# Patient Record
Sex: FEMALE | Race: WHITE | HISPANIC OR LATINO | Employment: OTHER | URBAN - METROPOLITAN AREA
[De-identification: names, ages, dates, MRNs, and addresses within clinical notes are randomized per-mention and may not be internally consistent; named-entity substitution may affect disease eponyms.]

---

## 2017-09-22 ENCOUNTER — HOSPITAL ENCOUNTER (EMERGENCY)
Facility: HOSPITAL | Age: 82
Discharge: HOME | End: 2017-09-22
Attending: EMERGENCY MEDICINE
Payer: MEDICARE

## 2017-09-22 ENCOUNTER — APPOINTMENT (EMERGENCY)
Dept: RADIOLOGY | Facility: HOSPITAL | Age: 82
End: 2017-09-22
Payer: MEDICARE

## 2017-09-22 VITALS
HEIGHT: 55 IN | SYSTOLIC BLOOD PRESSURE: 190 MMHG | DIASTOLIC BLOOD PRESSURE: 79 MMHG | WEIGHT: 104.8 LBS | OXYGEN SATURATION: 97 % | BODY MASS INDEX: 24.26 KG/M2 | TEMPERATURE: 98.2 F | HEART RATE: 64 BPM | RESPIRATION RATE: 18 BRPM
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
## 2017-09-22 PROCEDURE — 72125 CT NECK SPINE W/O DYE: CPT

## 2017-09-22 PROCEDURE — 99284 EMERGENCY DEPT VISIT MOD MDM: CPT

## 2017-09-22 PROCEDURE — 70450 CT HEAD/BRAIN W/O DYE: CPT

## 2017-09-22 RX ORDER — ESCITALOPRAM OXALATE 20 MG/1
20 TABLET ORAL DAILY
COMMUNITY
End: 2018-05-03

## 2017-09-22 RX ORDER — INSULIN GLARGINE 100 [IU]/ML
10 INJECTION, SOLUTION SUBCUTANEOUS
COMMUNITY
End: 2017-11-27 | Stop reason: HOSPADM

## 2017-09-22 RX ORDER — ROSUVASTATIN CALCIUM 5 MG/1
1 TABLET, COATED ORAL DAILY
COMMUNITY

## 2017-09-22 RX ORDER — LANOLIN ALCOHOL/MO/W.PET/CERES
1000 CREAM (GRAM) TOPICAL DAILY
COMMUNITY
End: 2020-11-16 | Stop reason: ALTCHOICE

## 2017-09-22 RX ORDER — LEVOTHYROXINE SODIUM 0.05 MG/1
75 TABLET ORAL DAILY
COMMUNITY
End: 2020-11-16 | Stop reason: ALTCHOICE

## 2017-09-22 RX ORDER — OMEPRAZOLE 20 MG/1
20 CAPSULE, DELAYED RELEASE ORAL DAILY
COMMUNITY
End: 2020-11-16 | Stop reason: ALTCHOICE

## 2017-09-22 RX ORDER — NYSTATIN 100000 [USP'U]/G
POWDER TOPICAL 4 TIMES DAILY
COMMUNITY
End: 2017-11-27 | Stop reason: HOSPADM

## 2017-09-22 RX ORDER — MELATONIN
1 DAILY
COMMUNITY

## 2017-09-22 RX ORDER — ACETAMINOPHEN 500 MG
500 TABLET ORAL EVERY 6 HOURS PRN
COMMUNITY
End: 2017-11-27 | Stop reason: HOSPADM

## 2017-11-12 ENCOUNTER — APPOINTMENT (EMERGENCY)
Dept: RADIOLOGY | Facility: HOSPITAL | Age: 82
End: 2017-11-12
Payer: MEDICARE

## 2017-11-12 ENCOUNTER — HOSPITAL ENCOUNTER (EMERGENCY)
Facility: HOSPITAL | Age: 82
Discharge: HOME | End: 2017-11-12
Attending: EMERGENCY MEDICINE
Payer: MEDICARE

## 2017-11-12 ENCOUNTER — HOSPITAL ENCOUNTER (EMERGENCY)
Facility: HOSPITAL | Age: 82
Discharge: NURSING, REHAB OR LONG-TERM CARE | End: 2017-11-12
Attending: EMERGENCY MEDICINE | Admitting: EMERGENCY MEDICINE
Payer: MEDICARE

## 2017-11-12 VITALS
SYSTOLIC BLOOD PRESSURE: 159 MMHG | HEART RATE: 76 BPM | BODY MASS INDEX: 27.02 KG/M2 | WEIGHT: 110 LBS | TEMPERATURE: 97.6 F | RESPIRATION RATE: 20 BRPM | DIASTOLIC BLOOD PRESSURE: 116 MMHG

## 2017-11-12 VITALS
TEMPERATURE: 97.8 F | DIASTOLIC BLOOD PRESSURE: 72 MMHG | OXYGEN SATURATION: 100 % | SYSTOLIC BLOOD PRESSURE: 164 MMHG | HEART RATE: 72 BPM | RESPIRATION RATE: 18 BRPM | BODY MASS INDEX: 27.02 KG/M2 | WEIGHT: 110.01 LBS

## 2017-11-12 DIAGNOSIS — J98.11 ATELECTASIS: ICD-10-CM

## 2017-11-12 DIAGNOSIS — R07.89 CHEST WALL PAIN: Primary | ICD-10-CM
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
## 2017-11-12 DIAGNOSIS — R07.9 CHEST PAIN: Primary | ICD-10-CM

## 2017-11-12 LAB
ANION GAP BLD CALC-SCNC: 15 MMOL/L (ref 4–13)
ATRIAL RATE: 69 BPM
BUN BLD-MCNC: 31 MG/DL (ref 5–25)
CA-I BLD-SCNC: 1.17 MMOL/L (ref 1.12–1.32)
CHLORIDE BLD-SCNC: 105 MMOL/L (ref 100–108)
CREAT BLD-MCNC: 1.5 MG/DL (ref 0.6–1.3)
GFR SERPL CREATININE-BSD FRML MDRD: 31 ML/MIN/1.73SQ M
GLUCOSE SERPL-MCNC: 231 MG/DL (ref 65–140)
HCT VFR BLD CALC: 39 % (ref 34.8–46.1)
HGB BLDA-MCNC: 13.3 G/DL (ref 11.5–15.4)
P AXIS: 47 DEGREES
PCO2 BLD: 24 MMOL/L (ref 21–32)
POTASSIUM BLD-SCNC: 4.5 MMOL/L (ref 3.5–5.3)
PR INTERVAL: 192 MS
QRS AXIS: 24 DEGREES
QRSD INTERVAL: 72 MS
QT INTERVAL: 408 MS
QTC INTERVAL: 437 MS
SODIUM BLD-SCNC: 138 MMOL/L (ref 136–145)
SPECIMEN SOURCE: ABNORMAL
SPECIMEN SOURCE: NORMAL
SPECIMEN SOURCE: NORMAL
T WAVE AXIS: 46 DEGREES
TROPONIN I BLD-MCNC: 0 NG/ML (ref 0–0.08)
TROPONIN I BLD-MCNC: 0 NG/ML (ref 0–0.08)
VENTRICULAR RATE: 69 BPM

## 2017-11-12 PROCEDURE — 93005 ELECTROCARDIOGRAM TRACING: CPT | Performed by: EMERGENCY MEDICINE

## 2017-11-12 PROCEDURE — 71020 HB CHEST X-RAY 2VW FRONTAL&LATL: CPT

## 2017-11-12 PROCEDURE — 99284 EMERGENCY DEPT VISIT MOD MDM: CPT

## 2017-11-12 PROCEDURE — 71250 CT THORAX DX C-: CPT

## 2017-11-12 PROCEDURE — 84484 ASSAY OF TROPONIN QUANT: CPT

## 2017-11-12 PROCEDURE — 80047 BASIC METABLC PNL IONIZED CA: CPT

## 2017-11-12 PROCEDURE — 85014 HEMATOCRIT: CPT

## 2017-11-12 RX ORDER — ACETAMINOPHEN 325 MG/1
650 TABLET ORAL ONCE
Status: COMPLETED | OUTPATIENT
Start: 2017-11-12 | End: 2017-11-12

## 2017-11-12 RX ORDER — ACETAMINOPHEN 325 MG/1
975 TABLET ORAL ONCE
Status: COMPLETED | OUTPATIENT
Start: 2017-11-12 | End: 2017-11-12

## 2017-11-12 RX ORDER — SENNOSIDES 8.6 MG
650 CAPSULE ORAL EVERY 8 HOURS PRN
Qty: 28 TABLET | Refills: 0 | Status: SHIPPED | OUTPATIENT
Start: 2017-11-12 | End: 2017-11-27 | Stop reason: HOSPADM

## 2017-11-12 RX ADMIN — ACETAMINOPHEN 975 MG: 325 TABLET, FILM COATED ORAL at 04:42

## 2017-11-12 RX ADMIN — ACETAMINOPHEN 650 MG: 325 TABLET, FILM COATED ORAL at 16:26

## 2017-11-12 NOTE — ED NOTES
SLETS to p/u at [address] [place] [address], [address] [place] [address]  11/12/17 116

## 2017-11-12 NOTE — ED ATTENDING ATTESTATION
Anup Crespo MD, saw and evaluated the patient  I have discussed the patient with the resident/non-physician practitioner and agree with the resident's/non-physician practitioner's findings, Plan of Care, and MDM as documented in the resident's/non-physician practitioner's note, except where noted  All available labs and Radiology studies were reviewed  At this point I agree with the current assessment done in the Emergency Department  I have conducted an independent evaluation of this patient a history and physical is as follows:    Pt was seen here for fall and traumatic chest pain Pt had ct that was neg Pt returns now because pain has not gotten any better She did not get meds that were prescribed for her   Pain has been constant Review of Ct shows healing fractures PE; alert nad abd soft nontender some rib tenderness mdm: will check cardiac gutiérrez treat pain  Critical Care Time  CritCare Time

## 2017-11-12 NOTE — ED ATTENDING ATTESTATION
Piper Horton MD, saw and evaluated the patient  I have discussed the patient with the resident/non-physician practitioner and agree with the resident's/non-physician practitioner's findings, Plan of Care, and MDM as documented in the resident's/non-physician practitioner's note, except where noted  All available labs and Radiology studies were reviewed  At this point I agree with the current assessment done in the Emergency Department  I have conducted an independent evaluation of this patient including a focused history of:    Emergency Department Note- Beatris Aschoff 80 y o  female MRN: [id number]    Unit/Bed#: ED 10 Encounter: [id number]    Beatris Aschoff is a 80 y o  female who presents with   Chief Complaint   Patient presents with   [employer] Fall     Per EMS, pt comes from [facility] after falling on her way to the bathroom  Patient tripped and fell onto her walker and hit her chest           History of Present Illness   HPI:  Beatris Aschoff is a 80 y o  female who presents for evaluation of: With chest wall tenderness after falling in the bathroom  Patient was transferred from [facility] where she lives  She states that she fell onto her walker injuring her chest   She denies loss of consciousness  Review of Systems    Historical Information   Past Medical History:   Diagnosis Date    Diabetes mellitus ([address] )     Hyperlipidemia     Hypothyroidism     Osteoarthritis      History reviewed  No pertinent surgical history    Social History   History   Alcohol Use No     History   Drug Use     History   Smoking Status    Never Smoker   Smokeless Tobacco    Never Used     Family History: non-contributory    Meds/Allergies   all medications and allergies reviewed  Allergies   Allergen Reactions    Penicillins     Valsartan        Objective   First Vitals:   Blood Pressure: (!) 159/116 (11/12/17 0347)  Pulse: 76 (11/12/17 0347)  Temperature: 97 6 °F (36 4 °C) (11/12/17 0347)  Temp Source: Tympanic (17)  Respirations: 20 (17)  Weight - Scale: 49 9 kg (110 lb) (17)    Current Vitals:   Blood Pressure: (!) 159/116 (17)  Pulse: 76 (17)  Temperature: 97 6 °F (36 4 °C) (17)  Temp Source: Tympanic (17)  Respirations: 20 (17)  Weight - Scale: 49 9 kg (110 lb) (17)    No intake or output data in the 24 hours ending 17 0408    Invasive Devices     Peripheral Intravenous Line            Peripheral IV 17 Left Antecubital less than 1 day                Physical Exam   Constitutional: She is oriented to person, place, and time  She appears well-developed and well-nourished  HENT:   Head: Normocephalic and atraumatic  Eyes: Conjunctivae are normal  Pupils are equal, round, and reactive to light  Neck: Normal range of motion  Neck supple  Pulmonary/Chest: Effort normal and breath sounds normal  No respiratory distress  She has no wheezes  She exhibits tenderness (  Anterior)  Abdominal: Soft  Bowel sounds are normal    Musculoskeletal: Normal range of motion  She exhibits no deformity  Neurological: She is alert and oriented to person, place, and time  Skin: Skin is warm and dry  Psychiatric: She has a normal mood and affect  Her behavior is normal  Judgment and thought content normal    Nursing note and vitals reviewed  Medical Decision Makin  Acute chest wall trauma status post fall: Plan to obtain a CT scan of the chest to rule out intrathoracic injury including pulmonary contusion, rib fracture, and pneumothorax      Recent Results (from the past 36 hour(s))   POCT troponin    Collection Time: 17  3:53 AM   Result Value Ref Range    POC Troponin I 0 00 0 00 - 0 08 ng/ml    Specimen Type VENOUS    POCT Chem 8+    Collection Time: 17  3:57 AM   Result Value Ref Range    SODIUM, I-STAT 138 136 - 145 mmol/l    Potassium, i-STAT 4 5 3 5 - 5 3 mmol/L Chloride, istat 105 100 - 108 mmol/L    CO2, i-STAT 24 21 - 32 mmol/L    Anion Gap, Istat 15 (H) 4 - 13 mmol/L    Calcium, Ionized i-STAT 1 17 1 12 - 1 32 mmol/L    BUN, I-STAT 31 (H) 5 - 25 mg/dl    Creatinine, i-STAT 1 5 (H) 0 6 - 1 3 mg/dl    eGFR 31 ml/min/1 73sq m    Glucose, i-STAT 231 (H) 65 - 140 mg/dl    Hct, i-STAT 39 34 8 - 46 1 %    Hgb, i-STAT 13 3 11 5 - 15 4 g/dl    Specimen Type VENOUS      CT chest without contrast    (Results Pending)         Portions of the record may have been created with voice recognition software  Occasional wrong word or "sound a like" substitutions may have occurred due to the inherent limitations of voice recognition software  Read the chart carefully and recognize, using context, where substitutions have occurred

## 2017-11-12 NOTE — ED PROVIDER NOTES
History  Chief Complaint   Patient presents with    Fall     Per EMS, pt comes from [facility] after falling on her way to the bathroom  Patient tripped and fell onto her walker and hit her chest       This is a 80 y o  old female who presents to the ED for evaluation of fall  Patient was in her walker to go into the bathroom when she caught it on the interface between the carpet and the bathroom floor  She fell forward onto her chest   Has pain diffuse across her chest   Not hit her head or lose consciousness  There is no evidence of head trauma  Staff heard her fall and merely found on the ground awake alert and oriented  She is not short of breath  Her pain is worse with inspiration  Diffuse across her entire chest   Rated 5/10  She has not taken any medications to help with it  No anti-platelet him anticoagulant medications  No abdominal pain, neck pain, back pain  Denies preceding chest pain, shortness of breath, palpitations, dizziness  Prior to Admission Medications   Prescriptions Last Dose Informant Patient Reported? Taking?    Linagliptin (TRADJENTA) 5 MG TABS 11/11/2017 at Unknown time  Yes Yes   Sig: Take 5 mg by mouth daily   acetaminophen (TYLENOL) 500 mg tablet   Yes Yes   Sig: Take 500 mg by mouth every 6 (six) hours as needed for mild pain   cholecalciferol (VITAMIN D3) 1,000 units tablet 11/11/2017 at Unknown time  Yes Yes   Sig: Take 1,000 Units by mouth daily   cyanocobalamin (VITAMIN B-12) 1,000 mcg tablet 11/11/2017 at Unknown time  Yes Yes   Sig: Take 1,000 mcg by mouth daily   escitalopram (LEXAPRO) 20 mg tablet 11/11/2017 at Unknown time  Yes Yes   Sig: Take 20 mg by mouth daily   insulin glargine (LANTUS) 100 units/mL subcutaneous injection 11/11/2017 at Unknown time  Yes Yes   Sig: Inject 10 Units under the skin daily at bedtime   levothyroxine 50 mcg tablet 11/11/2017 at Unknown time  Yes Yes   Sig: Take 75 mcg by mouth daily   nystatin (MYCOSTATIN) powder 11/11/2017 at Unknown time  Yes Yes   Sig: Apply topically 4 (four) times a day   omeprazole (PriLOSEC) 20 mg delayed release capsule 11/11/2017 at Unknown time  Yes Yes   Sig: Take 20 mg by mouth daily   rosuvastatin (CRESTOR) 5 mg tablet 11/11/2017 at Unknown time  Yes Yes   Sig: Take 5 mg by mouth daily      Facility-Administered Medications: None     Past Medical History:   Diagnosis Date    Diabetes mellitus ([facility] [address] )     Hyperlipidemia     Hypothyroidism     Osteoarthritis      History reviewed  No pertinent surgical history  History reviewed  No pertinent family history  I have reviewed and agree with the history as documented  Social History   Substance Use Topics    Smoking status: Never Smoker    Smokeless tobacco: Never Used    Alcohol use No      Review of Systems   Constitutional: Negative for chills, fatigue, fever and unexpected weight change  HENT: Negative for congestion, rhinorrhea and sore throat  Eyes: Negative for redness and visual disturbance  Respiratory: Negative for cough and shortness of breath  Cardiovascular: Positive for chest pain  Negative for leg swelling  Gastrointestinal: Negative for abdominal pain, constipation, diarrhea, nausea and vomiting  Endocrine: Negative for cold intolerance and heat intolerance  Genitourinary: Negative for dysuria, frequency and urgency  Musculoskeletal: Negative for back pain  Skin: Negative for rash  Neurological: Negative for dizziness, syncope and numbness  All other systems reviewed and are negative  Physical Exam  ED Triage Vitals [11/12/17 0347]   Temperature Pulse Respirations Blood Pressure SpO2   97 6 °F (36 4 °C) 76 20 (!) 159/116 --      Temp Source Heart Rate Source Patient Position - Orthostatic VS BP Location FiO2 (%)   Tympanic Monitor Lying Right arm --      Pain Score       6         Physical Exam   Constitutional: She is oriented to person, place, and time   She appears well-developed and well-nourished  No distress  HENT:   Head: Normocephalic and atraumatic  Nose: Nose normal    Mouth/Throat: No oropharyngeal exudate  Eyes: Conjunctivae and EOM are normal  Pupils are equal, round, and reactive to light  Neck: Normal range of motion  Neck supple  Cardiovascular: Normal rate, regular rhythm and normal heart sounds  Exam reveals no gallop  No murmur heard  Pulmonary/Chest: Effort normal and breath sounds normal  She has no wheezes  She exhibits no tenderness  Abdominal: Soft  Bowel sounds are normal  She exhibits no distension  There is no tenderness  There is no rebound and no guarding  Musculoskeletal: Normal range of motion  She exhibits no tenderness or deformity  Lymphadenopathy:     She has no cervical adenopathy  Neurological: She is alert and oriented to person, place, and time  No cranial nerve deficit  Skin: Skin is warm and dry  No rash noted  She is not diaphoretic  No erythema  Psychiatric: She has a normal mood and affect  Nursing note and vitals reviewed  ED Medications  Medications   acetaminophen (TYLENOL) tablet 975 mg (975 mg Oral Given 11/12/17 0442)     Diagnostic Studies  Results Reviewed     Procedure Component Value Units Date/Time    POCT troponin [[id number]]  (Normal) Collected:  11/12/17 0353    Lab Status:  Final result Updated:  11/12/17 0407     POC Troponin I 0 00 ng/ml      Specimen Type VENOUS    Narrative:         [employer] i-Stat handheld analyzer 99% cutoff is > 0 08ng/mL in [facility] Emergency Departments    o cTnI 99% cutoff is useful only when applied to patients in the clinical setting of myocardial ischemia  o cTnI 99% cutoff should be interpreted in the context of clinical history, ECG findings and possibly cardiac imaging to establish correct diagnosis  o cTnI 99% cutoff may be suggestive but clearly not indicative of a coronary event without the clinical setting of myocardial ischemia      POCT Chem 8+ [[id number]]  (Abnormal) Collected:  11/12/17 0357    Lab Status:  Final result Updated:  11/12/17 0401     SODIUM, I-STAT 138 mmol/l      Potassium, i-STAT 4 5 mmol/L      Chloride, istat 105 mmol/L      CO2, i-STAT 24 mmol/L      Anion Gap, Istat 15 (H) mmol/L      Calcium, Ionized i-STAT 1 17 mmol/L      BUN, I-STAT 31 (H) mg/dl      Creatinine, i-STAT 1 5 (H) mg/dl      eGFR 31 ml/min/1 73sq m      Glucose, i-STAT 231 (H) mg/dl      Hct, i-STAT 39 %      Hgb, i-STAT 13 3 g/dl      Specimen Type VENOUS        CT chest without contrast    (Results Pending)    ED interpretation: Old ribs and sternal fracture  No acute fracture noted, no hemothorax, no pneumothorax, interpreted by me  Procedures  ECG 12 Lead Documentation  Date/Time: 11/12/2017 3:55 AM  Performed by: Samuel Dietz  Authorized by: Honora Button     Indications / Diagnosis:  Fall, chest pain  ECG reviewed by me, the ED Provider: yes    Patient location:  ED  Previous ECG:     Previous ECG:  Unavailable    Comparison to cardiac monitor: Yes    Comments:      Normal sinus rhythm, rate 70, normal intervals, normal axis, no acute ST, T-wave changes  Phone Consults  ED Phone Contact    ED Course    A/P: This is a 80 y o  female who presents to the ED for evaluation of fall with chest wall pain  Will that CT scan of the chest to rule out fractures, pneumothorax  EKG, i-STAT chemistry, i-STAT troponin  Symptom management    0430 CT scan is negative for acute fracture patient's exam is stable  We will treat with Tylenol  Discharge back to facility  Return precautions discussed  We will discharge this patient home with primary care follow-up  Patient is in agreement with this plan as outlined above      MDM  CritCare Time    Disposition  Final diagnoses:   Chest wall pain   Fall, initial encounter     Time reflects when diagnosis was documented in both MDM as applicable and the Disposition within this note     Time User Action Codes Description Comment    11/12/2017 4:29 AM Allison Jean Baptiste Add [R07 89] Chest wall pain     11/12/2017  4:29 AM Allison Jean Baptiste Add [X37  XXXA] Fall, initial encounter       ED Disposition     ED Disposition Condition Comment    Discharge  Nandini Foreman Angelica discharge to home/self care  Condition at discharge: Stable        Follow-up Information     Follow up With Specialties Details Why Mary Menjivar MD [facility] Medicine Schedule an appointment as soon as possible for a visit in 2 days As needed, If symptoms worsen [address] [facility]  [phone]          Discharge Medication List as of 11/12/2017  4:29 AM      CONTINUE these medications which have NOT CHANGED    Details   acetaminophen (TYLENOL) 500 mg tablet Take 500 mg by mouth every 6 (six) hours as needed for mild pain, Historical Med      cholecalciferol (VITAMIN D3) 1,000 units tablet Take 1,000 Units by mouth daily, Historical Med      cyanocobalamin (VITAMIN B-12) 1,000 mcg tablet Take 1,000 mcg by mouth daily, Historical Med      escitalopram (LEXAPRO) 20 mg tablet Take 20 mg by mouth daily, Historical Med      insulin glargine (LANTUS) 100 units/mL subcutaneous injection Inject 10 Units under the skin daily at bedtime, Historical Med      levothyroxine 50 mcg tablet Take 75 mcg by mouth daily, Historical Med      Linagliptin (TRADJENTA) 5 MG TABS Take 5 mg by mouth daily, Historical Med      nystatin (MYCOSTATIN) powder Apply topically 4 (four) times a day, Historical Med      omeprazole (PriLOSEC) 20 mg delayed release capsule Take 20 mg by mouth daily, Historical Med      rosuvastatin (CRESTOR) 5 mg tablet Take 5 mg by mouth daily, Historical Med           No discharge procedures on file  ED Provider  Attending physically available and evaluated Nandini Harris  I managed the patient along with the ED Attending      Electronically Signed by         Roxanne Graham MD  Resident  11/12/17 5987

## 2017-11-12 NOTE — ED NOTES
Patient to be picked up by [facility] at 2000  [facility] did call ER  Med tech made aware patient has been discharged and awaiting ride back  Tech requested that patient be sent back with tylenol RX so she can be medicated through the night           Piper Costello RN  11/12/17 0767

## 2017-11-12 NOTE — ED NOTES
Dr Rachele Polanco at the bedside for patient evaluation upon arrival to ED     Loreta Esparza RN  11/12/17 9958

## 2017-11-12 NOTE — ED NOTES
[facility] to transport patient back to [facility] at [facility], [facility]  11/12/17 1037

## 2017-11-12 NOTE — DISCHARGE INSTRUCTIONS
Patient can take Tylenol and ibuprofen for her chest pain as needed  Atelectasis   WHAT YOU NEED TO KNOW:   Atelectasis happens when the alveoli in your lungs cannot expand fully  This may cause part or all of your lung to collapse  The exchange of oxygen and carbon dioxide cannot take place in the alveoli, when your lung collapses  Atelectasis happens very often after surgery  Atelectasis may last for days  It may be caused by not being able to take a deep breath due to blocked airways or surgery  It may also be due to disease, infection, or trauma  DISCHARGE INSTRUCTIONS:   Call 911 if:  You cough up blood continuously or more than 3 teaspoons  Return to the emergency department if:   · Your symptoms return or get worse  · You have a fever  · You cough up blood  Contact your healthcare provider if:   · You are coughing up a large amount of mucus  · You are more tired than usual      · You have trouble catching your breath while you exercise or walk up stairs  · You have questions or concerns about your condition or care  Follow up with your healthcare provider as directed: You may need more tests  Write down your questions so you remember to ask them during your visits  Do not smoke:  Smoking can make your symptoms worse  It is never too late to quit  Ask your healthcare provider for more information if you need help quitting  Manage and prevent atelectasis:   · Postural drainage  means getting into positions that help mucus drain  Postural drainage is sometimes used with chest percussion  (gentle clapping to help move the mucus out of your lungs)  Ask your healthcare provider for more information about postural drainage and chest percussion  · Frequent coughing  can help clear mucus from your lungs  · Deep breathing exercises  help improve your lung function and reduce your risk for atelectasis   An incentive spirometer may be used after surgery to help you breathe deeply and slowly  Ask your healthcare provider for more information on deep breathing exercises  · Change your position  to promote lung expansion and reduce the risk for infection  Sit on the side of the bed or walk frequently after surgery as directed  · Drink liquids as directed  to help loosen mucus  Ask how much liquid to drink each day and which liquids are best for you  © 2017 [address] Information is for End User's use only and may not be sold, redistributed or otherwise used for commercial purposes  All illustrations and images included in CareNotes® are the copyrighted property of [employer]  or Darrick Sanchez  The above information is an  only  It is not intended as medical advice for individual conditions or treatments  Talk to your doctor, nurse or pharmacist before following any medical regimen to see if it is safe and effective for you

## 2017-11-12 NOTE — DISCHARGE INSTRUCTIONS
Chest Wall Pain, Ambulatory Care   GENERAL INFORMATION:   Chest wall pain  may be caused by problems with the muscles, cartilage, or bones of the chest wall  Chest wall pain may also be caused by pain that spreads to your chest from another part of your body  The pain may be aching, severe, dull, or sharp  It may come and go, or it may be constant  The pain may be worse when you move in certain ways, breathe deeply, or cough  Seek immediate care for the following symptoms:   · Severe pain    · You have any of the following signs of a heart attack:      ¨ Squeezing, pressure, or pain in your chest that lasts longer than 5 minutes or returns    ¨ Discomfort or pain in your back, neck, jaw, stomach, or arm     ¨ Trouble breathing    ¨ Nausea or vomiting    ¨ Lightheadedness or a sudden cold sweat, especially with chest pain or trouble breathing  Treatment  depends on the cause of your chest wall pain  You may need any of the following:  · NSAIDs  help decrease swelling and pain or fever  This medicine is available with or without a doctor's order  NSAIDs can cause stomach bleeding or kidney problems in certain people  If you take blood thinner medicine, always ask your healthcare provider if NSAIDs are safe for you  Always read the medicine label and follow directions  · Acetaminophen  decreases pain  It is available without a doctor's order  Ask how much to take and how often to take it  Follow directions  Acetaminophen can cause liver damage if not taken correctly  · Apply heat  on your chest for 20 to 30 minutes every 2 hours for as many days as directed  Heat helps decrease pain and muscle spasms  · Apply ice  on your chest for 15 to 20 minutes every hour or as directed  Use an ice pack, or put crushed ice in a plastic bag  Cover it with a towel  Ice helps prevent tissue damage and decreases swelling and pain    Follow up with your healthcare provider as directed:  Write down your questions so you remember to ask them during your visits  CARE AGREEMENT:   You have the right to help plan your care  Learn about your health condition and how it may be treated  Discuss treatment options with your caregivers to decide what care you want to receive  You always have the right to refuse treatment  The above information is an  only  It is not intended as medical advice for individual conditions or treatments  Talk to your doctor, nurse or pharmacist before following any medical regimen to see if it is safe and effective for you  © 2014 [address] is for End User's use only and may not be sold, redistributed or otherwise used for commercial purposes  All illustrations and images included in CareNotes® are the copyrighted property of [employer]  or Darrick Sanchez

## 2017-11-12 NOTE — ED PROVIDER NOTES
History  Chief Complaint   Patient presents with    Pain     Patient seen here this am for fall with chest pain and left rib pain  Patient test results negative  Patient send back to ER due to pain  Patient was not provided pain medication by Juancarlos Fitzgerald and sent back  This is an 78-year-old female with chronic past medical history who presents to the emergency department after falling at her nursing home last night  Patient was seen in this emergency department early this morning for fall had a CT of her chest performed which was negative except for chronic fractures from previous falls and injuries  Patient presents complaining of the same chest wall pain she complained of last night, states the nursing home did not provide her medications for her pain  Patient denies any other symptoms at this time including headache, shortness of breath, nausea or vomiting, urinary symptoms but does state she has back pain as well since the fall  This back pain is not new from her fall last night  History provided by:  Patient  Fall   Mechanism of injury: fall    Injury location:  Torso  Torso injury location:  R chest and L chest  Incident location:  Home  Arrived directly from scene: yes    Fall:     Fall occurred:  [facility] [employer] of impact:  Front    Entrapped after fall: no    Suspicion of alcohol use: no    Suspicion of drug use: no    Prior to arrival data:     Bystander interventions:  None    Patient ambulatory at scene: no      Blood loss:  None    Loss of consciousness: no      Amnesic to event: no      Airway interventions:  None    Fluids administered:  None    Cardiac interventions:  None    Medications administered:  None    Immobilization:  None  Associated symptoms: chest pain    Associated symptoms: no abdominal pain, no nausea and no vomiting        Prior to Admission Medications   Prescriptions Last Dose Informant Patient Reported? Taking?    Linagliptin (TRADJENTA) 5 MG TABS   Yes No Sig: Take 5 mg by mouth daily   acetaminophen (TYLENOL) 500 mg tablet   Yes No   Sig: Take 500 mg by mouth every 6 (six) hours as needed for mild pain   cholecalciferol (VITAMIN D3) 1,000 units tablet   Yes No   Sig: Take 1,000 Units by mouth daily   cyanocobalamin (VITAMIN B-12) 1,000 mcg tablet   Yes No   Sig: Take 1,000 mcg by mouth daily   escitalopram (LEXAPRO) 20 mg tablet   Yes No   Sig: Take 20 mg by mouth daily   insulin glargine (LANTUS) 100 units/mL subcutaneous injection   Yes No   Sig: Inject 10 Units under the skin daily at bedtime   levothyroxine 50 mcg tablet   Yes No   Sig: Take 75 mcg by mouth daily   nystatin (MYCOSTATIN) powder   Yes No   Sig: Apply topically 4 (four) times a day   omeprazole (PriLOSEC) 20 mg delayed release capsule   Yes No   Sig: Take 20 mg by mouth daily   rosuvastatin (CRESTOR) 5 mg tablet   Yes No   Sig: Take 5 mg by mouth daily      Facility-Administered Medications: None       Past Medical History:   Diagnosis Date    Diabetes mellitus ([facility] [address] )     Hyperlipidemia     Hypothyroidism     Osteoarthritis        History reviewed  No pertinent surgical history  History reviewed  No pertinent family history  I have reviewed and agree with the history as documented  Social History   Substance Use Topics    Smoking status: Never Smoker    Smokeless tobacco: Never Used    Alcohol use No        Review of Systems   Constitutional: Negative for chills and fever  HENT: Negative for sore throat  Eyes: Negative for visual disturbance  Respiratory: Negative for chest tightness, shortness of breath and wheezing  Cardiovascular: Positive for chest pain  Gastrointestinal: Negative for abdominal pain, blood in stool, constipation, diarrhea, nausea and vomiting  Genitourinary: Negative for dysuria and hematuria  Musculoskeletal: Negative for arthralgias and myalgias  Skin: Negative for color change  Neurological: Negative for light-headedness     Hematological: Negative for adenopathy  Psychiatric/Behavioral: Negative for agitation and behavioral problems  All other systems reviewed and are negative  Physical Exam  ED Triage Vitals   Temperature Pulse Respirations Blood Pressure SpO2   11/12/17 1524 11/12/17 1524 11/12/17 1524 11/12/17 1524 11/12/17 1524   97 8 °F (36 6 °C) 75 18 (!) 186/85 98 %      Temp Source Heart Rate Source Patient Position - Orthostatic VS BP Location FiO2 (%)   11/12/17 1524 11/12/17 1524 11/12/17 1524 11/12/17 1524 --   Oral Monitor Sitting Right arm       Pain Score       11/12/17 1830       2           Orthostatic Vital Signs  Vitals:    11/12/17 1524 11/12/17 1621 11/12/17 1801   BP: (!) 186/85 (!) 192/91 164/72   Pulse: 75 68 72   Patient Position - Orthostatic VS: Sitting Lying Lying       Physical Exam   Constitutional: She is oriented to person, place, and time  She appears well-developed and well-nourished  No distress  HENT:   Head: Normocephalic and atraumatic  Eyes: Conjunctivae and EOM are normal  No scleral icterus  Neck: Normal range of motion  Neck supple  Cardiovascular: Normal rate, regular rhythm and normal heart sounds  No murmur heard  Pulmonary/Chest: Effort normal and breath sounds normal  No respiratory distress  She exhibits tenderness  Abdominal: Soft  Bowel sounds are normal  There is no tenderness  Musculoskeletal: Normal range of motion  Neurological: She is alert and oriented to person, place, and time  Skin: Skin is warm and dry  Psychiatric: She has a normal mood and affect  Her behavior is normal    Nursing note and vitals reviewed        ED Medications  Medications   acetaminophen (TYLENOL) tablet 650 mg (650 mg Oral Given 11/12/17 1626)       Diagnostic Studies  Results Reviewed     Procedure Component Value Units Date/Time    POCT troponin [[id number]]  (Normal) Collected:  11/12/17 1700    Lab Status:  Final result Updated:  11/12/17 1713     POC Troponin I 0 00 ng/ml      Specimen Type VENOUS    Narrative:         [employer] i-Stat handheld analyzer 99% cutoff is > 0 08ng/mL in network Emergency Departments    o cTnI 99% cutoff is useful only when applied to patients in the clinical setting of myocardial ischemia  o cTnI 99% cutoff should be interpreted in the context of clinical history, ECG findings and possibly cardiac imaging to establish correct diagnosis  o cTnI 99% cutoff may be suggestive but clearly not indicative of a coronary event without the clinical setting of myocardial ischemia  XR chest 2 views   Final Result by Emma Mckay MD (11/12 4260)      Scattered interstitial opacities are noted bilaterally, question related to atelectasis or possibly infiltrate  ##sigslh##sigslh         Workstation performed: [id number]               Procedures  ECG 12 Lead Documentation  Date/Time: 11/12/2017 6:11 PM  Performed by: Bravo Siegel  Authorized by: Leny SANTA     ECG reviewed by me, the ED Provider: yes    Patient location:  ED  Previous ECG:     Previous ECG:  Compared to current    Similarity:  No change    Comparison to cardiac monitor: Yes    Interpretation:     Interpretation: normal    Rate:     ECG rate assessment: normal    Rhythm:     Rhythm: sinus rhythm    Ectopy:     Ectopy: none    QRS:     QRS axis:  Normal  Conduction:     Conduction: normal    ST segments:     ST segments:  Normal  T waves:     T waves: normal            Phone Consults  ED Phone Contact    ED Course  ED Course            Identification of Seniors at Risk    Flowsheet Row Most Recent Value   (ISAR) Identification of Seniors at Risk   Before the illness or injury that brought you to the Emergency, did you need someone to help you on a regular basis? 1 Filed at: 11/12/2017 1525   In the last 24 hours, have you needed more help than usual?  0 Filed at: 11/12/2017 1525   Have you been hospitalized for one or more nights during the past 6 months?   0 Filed at: 11/12/2017 1525   In general, do you see well?  0 Filed at: 11/12/2017 1525   In general, do you have serious problems with your memory? 0 Filed at: 11/12/2017 1525   Do you take more than three different medications every day? 1 Filed at: 11/12/2017 1525   ISAR Score  2 Filed at: 11/12/2017 1525              [facility]  Number of Diagnoses or Management Options  Atelectasis: new and does not require workup  Chest pain: established and worsening  Fall, initial encounter: established and worsening  Diagnosis management comments: 80-year-old female presenting after visiting the department last night with continued chest wall pain, will provide patient pain relief with Tylenol as well as a cardiac workup including EKG, troponin, chest x-ray  Chest x-ray concerning for atelectasis as was CT this morning, will provide patient with incentive spirometer and encourage her to use it  Will write patient prescription for Tylenol so her nursing facility can provided to her  Amount and/or Complexity of Data Reviewed  Clinical lab tests: ordered and reviewed  Tests in the radiology section of CPT®: ordered and reviewed  Tests in the medicine section of CPT®: ordered and reviewed  Review and summarize past medical records: yes  Independent visualization of images, tracings, or specimens: yes      CritCare Time    Disposition  Final diagnoses:   Fall, initial encounter   Chest pain   Atelectasis     Time reflects when diagnosis was documented in both MDM as applicable and the Disposition within this note     Time User Action Codes Description Comment    11/12/2017  6:04 PM Radha Clock Add [H31  WDJP] Fall, initial encounter     11/12/2017  6:04 PM Radha Clock Add [R07 9] Chest pain     11/12/2017  6:04 PM Radha Clock Add [J98 11] Atelectasis     11/12/2017  6:04 PM Radha Clock Modify [C10  QYUZ] SEEM, initial encounter     11/12/2017  6:04 PM Radha Clock Modify [R07 9] Chest pain       ED Disposition     ED Disposition Condition Comment    Discharge  [address] discharge to home/self care      Condition at discharge: Stable        Follow-up Information     Follow up With Specialties Details Why [address], MD Family Medicine Schedule an appointment as soon as possible for a visit in 1 week  Digna Mccray  [address] [facility] Emergency Department Emergency Medicine Go to If symptoms worsen [place] [address] 8[address] [facility] ED, [address], [address]        Discharge Medication List as of 11/12/2017  7:52 PM      START taking these medications    Details   acetaminophen (TYLENOL) 650 mg CR tablet Take 1 tablet by mouth every 8 (eight) hours as needed for mild pain or moderate pain for up to 14 days, Starting Sun 11/12/2017, Until Sun 11/26/2017, Print         CONTINUE these medications which have NOT CHANGED    Details   acetaminophen (TYLENOL) 500 mg tablet Take 500 mg by mouth every 6 (six) hours as needed for mild pain, Historical Med      cholecalciferol (VITAMIN D3) 1,000 units tablet Take 1,000 Units by mouth daily, Historical Med      cyanocobalamin (VITAMIN B-12) 1,000 mcg tablet Take 1,000 mcg by mouth daily, Historical Med      escitalopram (LEXAPRO) 20 mg tablet Take 20 mg by mouth daily, Historical Med      insulin glargine (LANTUS) 100 units/mL subcutaneous injection Inject 10 Units under the skin daily at bedtime, Historical Med      levothyroxine 50 mcg tablet Take 75 mcg by mouth daily, Historical Med      Linagliptin (TRADJENTA) 5 MG TABS Take 5 mg by mouth daily, Historical Med      nystatin (MYCOSTATIN) powder Apply topically 4 (four) times a day, Historical Med      omeprazole (PriLOSEC) 20 mg delayed release capsule Take 20 mg by mouth daily, Historical Med      rosuvastatin (CRESTOR) 5 mg tablet Take 5 mg by mouth daily, Historical Med           No discharge procedures on file  ED Provider  Attending physically available and evaluated Anamika Lindo I managed the patient along with the ED Attending      Electronically Signed by         Roshan Downs MD  Resident  11/12/17 2393

## 2017-11-13 LAB
ATRIAL RATE: 67 BPM
P AXIS: 32 DEGREES
PR INTERVAL: 164 MS
QRS AXIS: 22 DEGREES
QRSD INTERVAL: 66 MS
QT INTERVAL: 404 MS
QTC INTERVAL: 426 MS
T WAVE AXIS: 41 DEGREES
VENTRICULAR RATE: 67 BPM

## 2017-11-13 NOTE — ED NOTES
Patient saturated at this time  Patient changed cleaned and new adult underwear provided         Eliezer Adames RN  11/12/17 7867

## 2017-11-13 NOTE — ED NOTES
Patient escorted by GABBY back to [facility] in [place] via stretcher  Verbal report provided to crew  Discharge paperwork and prescription sent back with patient         Zoya Garces RN  11/12/17 0792

## 2017-11-15 ENCOUNTER — APPOINTMENT (EMERGENCY)
Dept: RADIOLOGY | Facility: HOSPITAL | Age: 82
End: 2017-11-15
Payer: MEDICARE

## 2017-11-15 ENCOUNTER — HOSPITAL ENCOUNTER (EMERGENCY)
Facility: HOSPITAL | Age: 82
Discharge: HOME | End: 2017-11-15
Attending: EMERGENCY MEDICINE | Admitting: EMERGENCY MEDICINE
Payer: MEDICARE

## 2017-11-15 VITALS
DIASTOLIC BLOOD PRESSURE: 68 MMHG | TEMPERATURE: 98.8 F | OXYGEN SATURATION: 96 % | HEART RATE: 62 BPM | RESPIRATION RATE: 20 BRPM | SYSTOLIC BLOOD PRESSURE: 160 MMHG

## 2017-11-15 DIAGNOSIS — N39.0 UTI (URINARY TRACT INFECTION): Primary | ICD-10-CM

## 2017-11-15 DIAGNOSIS — M54.9 BACK PAIN: ICD-10-CM

## 2017-11-15 DIAGNOSIS — G89.29 CHRONIC BACK PAIN: ICD-10-CM

## 2017-11-15 DIAGNOSIS — M54.9 CHRONIC BACK PAIN: ICD-10-CM

## 2017-11-15 LAB
BACTERIA UR QL AUTO: ABNORMAL /HPF
BILIRUB UR QL STRIP: NEGATIVE
CLARITY UR: ABNORMAL
COLOR UR: YELLOW
COLOR, POC: NORMAL
GLUCOSE UR STRIP-MCNC: ABNORMAL MG/DL
HGB UR QL STRIP.AUTO: ABNORMAL
KETONES UR STRIP-MCNC: NEGATIVE MG/DL
LEUKOCYTE ESTERASE UR QL STRIP: ABNORMAL
NITRITE UR QL STRIP: POSITIVE
NON-SQ EPI CELLS URNS QL MICRO: ABNORMAL /HPF
PH UR STRIP.AUTO: 5.5 [PH] (ref 4.5–8)
PROT UR STRIP-MCNC: ABNORMAL MG/DL
RBC #/AREA URNS AUTO: ABNORMAL /HPF
SP GR UR STRIP.AUTO: 1.02 (ref 1–1.03)
UROBILINOGEN UR QL STRIP.AUTO: 0.2 E.U./DL
WBC #/AREA URNS AUTO: ABNORMAL /HPF

## 2017-11-15 PROCEDURE — 81002 URINALYSIS NONAUTO W/O SCOPE: CPT | Performed by: EMERGENCY MEDICINE

## 2017-11-15 PROCEDURE — 99284 EMERGENCY DEPT VISIT MOD MDM: CPT

## 2017-11-15 PROCEDURE — 87086 URINE CULTURE/COLONY COUNT: CPT

## 2017-11-15 PROCEDURE — 81001 URINALYSIS AUTO W/SCOPE: CPT

## 2017-11-15 PROCEDURE — 87077 CULTURE AEROBIC IDENTIFY: CPT

## 2017-11-15 PROCEDURE — 72131 CT LUMBAR SPINE W/O DYE: CPT

## 2017-11-15 PROCEDURE — 87186 SC STD MICRODIL/AGAR DIL: CPT

## 2017-11-15 RX ORDER — NITROFURANTOIN 25; 75 MG/1; MG/1
100 CAPSULE ORAL 2 TIMES DAILY
Qty: 10 CAPSULE | Refills: 0 | Status: SHIPPED | OUTPATIENT
Start: 2017-11-15 | End: 2017-11-27 | Stop reason: HOSPADM

## 2017-11-15 RX ORDER — HYDROCODONE BITARTRATE AND ACETAMINOPHEN 5; 325 MG/1; MG/1
1 TABLET ORAL ONCE
Status: COMPLETED | OUTPATIENT
Start: 2017-11-15 | End: 2017-11-15

## 2017-11-15 RX ORDER — LIDOCAINE 50 MG/G
1 PATCH TOPICAL ONCE
Status: DISCONTINUED | OUTPATIENT
Start: 2017-11-15 | End: 2017-11-16 | Stop reason: HOSPADM

## 2017-11-15 RX ORDER — NITROFURANTOIN 25; 75 MG/1; MG/1
100 CAPSULE ORAL 2 TIMES DAILY
Qty: 14 CAPSULE | Refills: 0 | Status: SHIPPED | OUTPATIENT
Start: 2017-11-15 | End: 2017-11-27 | Stop reason: HOSPADM

## 2017-11-15 RX ADMIN — LIDOCAINE 1 PATCH: 50 PATCH TOPICAL at 20:04

## 2017-11-15 RX ADMIN — HYDROCODONE BITARTRATE AND ACETAMINOPHEN 1 TABLET: 5; 325 TABLET ORAL at 20:11

## 2017-11-16 NOTE — ED PROVIDER NOTES
History  Chief Complaint   Patient presents with    Back Pain     pt with fall last sunday, has been seen 3X post fall, presenting with lower back pain, and pelvic pain  80[facility] history of diabetes dimentia, and subacute fractures comes emergent department for evaluation of back pain  Patient was seen in the ED last Sunday for fall and had CT of the chest did not show any acute abnormalities and patient was discharged subsequently  Patient today was curled up in a ball at the nursing home  When the son saw this he called 46 and patient was brought to the emergency department  Patient otherwise has denies any other recent traumas  Denying having any fever, chest pain, shortness of breath, nausea, vomiting, abdominal pain  Medical management decision making:  I will get a CT lumbar spine given the previous CTs did not evaluate the entire lumbar spine I will assess for fractures  I will get a urinalysis to assess for UTI as cause of patient's back pain  I will treat patient symptomatically Lidoderm patch and Norco             Prior to Admission Medications   Prescriptions Last Dose Informant Patient Reported? Taking?    Linagliptin (TRADJENTA) 5 MG TABS 11/15/2017 at Unknown time  Yes Yes   Sig: Take 5 mg by mouth daily   acetaminophen (TYLENOL) 500 mg tablet   Yes Yes   Sig: Take 500 mg by mouth every 6 (six) hours as needed for mild pain   acetaminophen (TYLENOL) 650 mg CR tablet 11/15/2017 at Unknown time  No Yes   Sig: Take 1 tablet by mouth every 8 (eight) hours as needed for mild pain or moderate pain for up to 14 days   cholecalciferol (VITAMIN D3) 1,000 units tablet 11/15/2017 at Unknown time  Yes Yes   Sig: Take 1,000 Units by mouth daily   cyanocobalamin (VITAMIN B-12) 1,000 mcg tablet 11/15/2017 at Unknown time  Yes Yes   Sig: Take 1,000 mcg by mouth daily   escitalopram (LEXAPRO) 20 mg tablet 11/15/2017 at Unknown time  Yes Yes   Sig: Take 20 mg by mouth daily insulin glargine (LANTUS) 100 units/mL subcutaneous injection 11/14/2017 at Unknown time  Yes Yes   Sig: Inject 10 Units under the skin daily at bedtime   levothyroxine 50 mcg tablet 11/15/2017 at Unknown time  Yes Yes   Sig: Take 75 mcg by mouth daily   nystatin (MYCOSTATIN) powder 11/15/2017 at Unknown time  Yes Yes   Sig: Apply topically 4 (four) times a day   omeprazole (PriLOSEC) 20 mg delayed release capsule 11/15/2017 at Unknown time  Yes Yes   Sig: Take 20 mg by mouth daily   rosuvastatin (CRESTOR) 5 mg tablet 11/15/2017 at Unknown time  Yes Yes   Sig: Take 5 mg by mouth daily      Facility-Administered Medications: None       Past Medical History:   Diagnosis Date    Diabetes mellitus ([facility] [address] )     Hyperlipidemia     Hypothyroidism     Osteoarthritis        History reviewed  No pertinent surgical history  History reviewed  No pertinent family history  I have reviewed and agree with the history as documented  Social History   Substance Use Topics    Smoking status: Never Smoker    Smokeless tobacco: Never Used    Alcohol use No        Review of Systems   Constitutional: Negative for appetite change, chills, diaphoresis, fatigue and fever  HENT: Negative for congestion, ear discharge, ear pain, hearing loss, postnasal drip, rhinorrhea, sneezing and sore throat  Eyes: Negative for pain, discharge and redness  Respiratory: Negative for choking, chest tightness, shortness of breath, wheezing and stridor  Cardiovascular: Negative for chest pain and palpitations  Gastrointestinal: Negative for abdominal distention, abdominal pain, blood in stool, constipation, diarrhea, nausea and vomiting  Genitourinary: Negative for decreased urine volume, difficulty urinating, dysuria, flank pain, frequency and hematuria  Musculoskeletal: Positive for back pain  Negative for arthralgias, gait problem, joint swelling and neck pain  Skin: Negative for color change, pallor and rash  Allergic/Immunologic: Negative for environmental allergies, food allergies and immunocompromised state  Neurological: Negative for dizziness, seizures, weakness, light-headedness, numbness and headaches  Hematological: Negative for adenopathy  Does not bruise/bleed easily  Psychiatric/Behavioral: Negative for agitation and behavioral problems  Physical Exam  ED Triage Vitals [11/15/17 1843]   Temperature Pulse Respirations Blood Pressure SpO2   98 8 °F (37 1 °C) 66 18 (!) 191/104 95 %      Temp Source Heart Rate Source Patient Position - Orthostatic VS BP Location FiO2 (%)   Oral Monitor Lying Right arm --      Pain Score       3           Orthostatic Vital Signs  Vitals:    11/15/17 1843 11/15/17 2015 11/15/17 2143   BP: (!) 191/104 (!) 204/86 160/68   Pulse: 66 70 62   Patient Position - Orthostatic VS: Lying Lying Lying       Physical Exam   Constitutional: She is oriented to person, place, and time  She appears well-developed and well-nourished  HENT:   Head: Normocephalic and atraumatic  Nose: Nose normal    Mouth/Throat: Oropharynx is clear and moist    Eyes: Conjunctivae and EOM are normal  Pupils are equal, round, and reactive to light  Neck: Normal range of motion  Neck supple  Cardiovascular: Normal rate, regular rhythm and normal heart sounds  Exam reveals no gallop and no friction rub  No murmur heard  Pulmonary/Chest: Effort normal and breath sounds normal    Abdominal: Soft  Bowel sounds are normal  She exhibits no distension  There is no tenderness  There is no rebound and no guarding  Musculoskeletal: Normal range of motion  She exhibits tenderness  Neurological: She is alert and oriented to person, place, and time  She has normal reflexes  Skin: Skin is warm and dry  No erythema  No pallor  Psychiatric: She has a normal mood and affect  Her behavior is normal    Nursing note and vitals reviewed        ED Medications  Medications   HYDROcodone-acetaminophen (NORCO) 5-325 mg per tablet 1 tablet (1 tablet Oral Given 11/15/17 2011)       Diagnostic Studies  Results Reviewed     Procedure Component Value Units Date/Time    Urine Microscopic [[id number]]  (Abnormal) Collected:  11/15/17 2246    Lab Status:  Final result Specimen:  Urine from Urine, Indwelling Palomo Catheter Updated:  11/15/17 2230     RBC, UA None Seen /hpf      WBC, UA Innumerable (A) /hpf      Epithelial Cells Occasional /hpf      Bacteria, UA Innumerable (A) /hpf     Urine culture [[id number]] Collected:  11/15/17 2246    Lab Status: In process Specimen:  Urine from Urine, Indwelling Palomo Catheter Updated:  11/15/17 2230    POCT urinalysis dipstick [[id number]]  (Normal) Resulted:  11/15/17 2146    Lab Status:  Final result Specimen:  Urine Updated:  11/15/17 2146     Color, UA *    ED Urine Macroscopic [[id number]]  (Abnormal) Collected:  11/15/17 2246    Lab Status:  Final result Specimen:  Urine Updated:  11/15/17 2139     Color, UA Yellow     Clarity, UA Cloudy     pH, UA 5 5     Leukocytes, UA Large (A)     Nitrite, UA Positive (A)     Protein,  (2+) (A) mg/dl      Glucose,  (1/10%) (A) mg/dl      Ketones, UA Negative mg/dl      Urobilinogen, UA 0 2 E U /dl      Bilirubin, UA Negative     Blood, UA Large (A)     Specific Tumtum, UA 1 020    Narrative:       CLINITEK RESULT                 CT spine lumbar without contrast   Final Result by Yee Alexander MD (11/15 2024)      1  Chronic severe wedging deformities T11-L1, unchanged since 2006  No new fractures or acute malalignment  2   Multilevel degenerative changes, greatest at L4-5           Workstation performed: [id number]               Procedures  Procedures      Phone Consults  ED Phone Contact    ED Course  ED Course            Identification of Seniors at [facility] Most Recent Value   (ISAR) Identification of Seniors at Risk   Before the illness or injury that brought you to the Emergency, did you need someone to help you on a regular basis? 0 Filed at: 11/15/2017 1845   In the last 24 hours, have you needed more help than usual?  0 Filed at: 11/15/2017 1845   Have you been hospitalized for one or more nights during the past 6 months? 0 Filed at: 11/15/2017 1845   In general, do you see well?  0 Filed at: 11/15/2017 1845   In general, do you have serious problems with your memory? 1 Filed at: 11/15/2017 1845   Do you take more than three different medications every day? 1 Filed at: 11/15/2017 1845   ISAR Score  2 Filed at: 11/15/2017 1845                          MDM  CritCare Time    Disposition  Final diagnoses:   UTI (urinary tract infection)   Back pain   Chronic back pain     Time reflects when diagnosis was documented in both MDM as applicable and the Disposition within this note     Time User Action Codes Description Comment    11/15/2017  9:44 PM Joy Rings Add [N39 0] UTI (urinary tract infection)     11/15/2017  9:44 PM Joy Rings Add [M54 9] Back pain     11/15/2017  9:44 PM Joy Rings Add [M54 9,  G89 29] Chronic back pain       ED Disposition     ED Disposition Condition Comment    Discharge  [address] discharge to home/self care      Condition at discharge: Stable        Follow-up Information     Follow up With Specialties Details Why Mary Menjivar MD Family Medicine In 3 days  [address] [facility]  [phone]          Discharge Medication List as of 11/15/2017  9:46 PM      START taking these medications    Details   nitrofurantoin (MACROBID) 100 mg capsule Take 1 capsule by mouth 2 (two) times a day for 7 days, Starting Wed 11/15/2017, Until Wed 11/22/2017, Print         CONTINUE these medications which have NOT CHANGED    Details   acetaminophen (TYLENOL) 500 mg tablet Take 500 mg by mouth every 6 (six) hours as needed for mild pain, Historical Med      acetaminophen (TYLENOL) 650 mg CR tablet Take 1 tablet by mouth every 8 (eight) hours as needed for mild pain or moderate pain for up to 14 days, Starting Sun 11/12/2017, Until Sun 11/26/2017, Print      cholecalciferol (VITAMIN D3) 1,000 units tablet Take 1,000 Units by mouth daily, Historical Med      cyanocobalamin (VITAMIN B-12) 1,000 mcg tablet Take 1,000 mcg by mouth daily, Historical Med      escitalopram (LEXAPRO) 20 mg tablet Take 20 mg by mouth daily, Historical Med      insulin glargine (LANTUS) 100 units/mL subcutaneous injection Inject 10 Units under the skin daily at bedtime, Historical Med      levothyroxine 50 mcg tablet Take 75 mcg by mouth daily, Historical Med      Linagliptin (TRADJENTA) 5 MG TABS Take 5 mg by mouth daily, Historical Med      nystatin (MYCOSTATIN) powder Apply topically 4 (four) times a day, Historical Med      omeprazole (PriLOSEC) 20 mg delayed release capsule Take 20 mg by mouth daily, Historical Med      rosuvastatin (CRESTOR) 5 mg tablet Take 5 mg by mouth daily, Historical Med           No discharge procedures on file  ED Provider  Attending physically available and evaluated Yudy Cowiley ABBOTT managed the patient along with the ED Attending      Electronically Signed by         Nathen Smith MD  Resident  11/16/17 1336

## 2017-11-16 NOTE — ED ATTENDING ATTESTATION
Rosa Maria Fernandez MD, saw and evaluated the patient  I have discussed the patient with the resident/non-physician practitioner and agree with the resident's/non-physician practitioner's findings, Plan of Care, and MDM as documented in the resident's/non-physician practitioner's note, except where noted  All available labs and Radiology studies were reviewed  At this point I agree with the current assessment done in the Emergency Department  I have conducted an independent evaluation of this patient a history and physical is as follows:  Patient presents with complaints of back pain the patient has had chronic back pain she is in nursing home resident who only walk with a walker she has had falls in the past however none recently she was seen several days ago in the emergency room for similar thoracic back pain she had a CT scan of the chest which revealed multiple subacute to chronic thoracic compression fractures rib fractures and upper lumbar fractures there are no acute fractures noted the patient was sent home with Tylenol for pain the Tylenol does not seem to be adequately treating her pain  Exam the patient is in no acute distress vital signs are stable she is afebrile HEENT is unremarkable neck is no JVD lungs clear she has kyphoscoliosis noted mild thoracic tenderness and mild lumbar tenderness noted abdomen is obese positive bowel sounds soft nondistended no bruising noted pelvis is stable good range of motion of the hip joints moves all extremities  Impression:  Chronic back pain secondary to arthritis, probable    Spinal stenosis and compression fractures  Will treat pain will CT the lumbar spine as this was not done on the prior visit    Critical Care Time  CritCare Time

## 2017-11-16 NOTE — DISCHARGE INSTRUCTIONS
Follow up with primary care doctor in 1-3 days  If you begin to have fevers, worsening symptoms, return to the ED immediately

## 2017-11-16 NOTE — ED NOTES
[facility] ambulance called for BLS transport back to facility  Waiting for call back with transport time        Jasmyne Dove RN  11/15/17 2988

## 2017-11-18 LAB
BACTERIA UR CULT: ABNORMAL
BACTERIA UR CULT: ABNORMAL

## 2017-11-21 ENCOUNTER — APPOINTMENT (EMERGENCY)
Dept: RADIOLOGY | Facility: HOSPITAL | Age: 82
DRG: 884 | End: 2017-11-21
Payer: MEDICARE

## 2017-11-21 ENCOUNTER — HOSPITAL ENCOUNTER (INPATIENT)
Facility: HOSPITAL | Age: 82
LOS: 6 days | Discharge: NURSING, REHAB OR LONG-TERM CARE | DRG: 884 | End: 2017-11-27
Attending: EMERGENCY MEDICINE | Admitting: HOSPITALIST
Payer: MEDICARE

## 2017-11-21 DIAGNOSIS — R29.810 FACIAL DROOP: ICD-10-CM

## 2017-11-21 DIAGNOSIS — R41.82 ALTERED MENTAL STATUS: ICD-10-CM

## 2017-11-21 DIAGNOSIS — G45.9 TIA (TRANSIENT ISCHEMIC ATTACK): Primary | ICD-10-CM

## 2017-11-21 DIAGNOSIS — R41.82 ALTERED MENTAL STATUS, UNSPECIFIED ALTERED MENTAL STATUS TYPE: ICD-10-CM

## 2017-11-21 PROBLEM — E11.9 DIABETES MELLITUS (HCC): Status: ACTIVE | Noted: 2017-11-21

## 2017-11-21 PROBLEM — I10 ESSENTIAL HYPERTENSION: Status: ACTIVE | Noted: 2017-11-21

## 2017-11-21 PROBLEM — E86.0 DEHYDRATION: Status: ACTIVE | Noted: 2017-11-21

## 2017-11-21 PROBLEM — E87.5 HYPERKALEMIA: Status: ACTIVE | Noted: 2017-11-21

## 2017-11-21 PROBLEM — F03.90 DEMENTIA (HCC): Status: ACTIVE | Noted: 2017-11-21

## 2017-11-21 PROBLEM — N39.0 UTI (URINARY TRACT INFECTION): Status: ACTIVE | Noted: 2017-11-21

## 2017-11-21 LAB
ALBUMIN SERPL BCP-MCNC: 2.9 G/DL (ref 3.5–5)
ALP SERPL-CCNC: 70 U/L (ref 46–116)
ALT SERPL W P-5'-P-CCNC: 20 U/L (ref 12–78)
ANION GAP SERPL CALCULATED.3IONS-SCNC: 5 MMOL/L (ref 4–13)
AST SERPL W P-5'-P-CCNC: 59 U/L (ref 5–45)
BACTERIA UR QL AUTO: ABNORMAL /HPF
BASOPHILS # BLD AUTO: 0.05 THOUSANDS/ΜL (ref 0–0.1)
BASOPHILS NFR BLD AUTO: 1 % (ref 0–1)
BILIRUB SERPL-MCNC: 0.34 MG/DL (ref 0.2–1)
BILIRUB UR QL STRIP: NEGATIVE
BUN SERPL-MCNC: 23 MG/DL (ref 5–25)
CALCIUM SERPL-MCNC: 9.1 MG/DL (ref 8.3–10.1)
CHLORIDE SERPL-SCNC: 103 MMOL/L (ref 100–108)
CLARITY UR: ABNORMAL
CO2 SERPL-SCNC: 27 MMOL/L (ref 21–32)
COLOR UR: YELLOW
COLOR, POC: NORMAL
CREAT SERPL-MCNC: 1.19 MG/DL (ref 0.6–1.3)
EOSINOPHIL # BLD AUTO: 0.24 THOUSAND/ΜL (ref 0–0.61)
EOSINOPHIL NFR BLD AUTO: 4 % (ref 0–6)
ERYTHROCYTE [DISTWIDTH] IN BLOOD BY AUTOMATED COUNT: 14.8 % (ref 11.6–15.1)
GFR SERPL CREATININE-BSD FRML MDRD: 41 ML/MIN/1.73SQ M
GLUCOSE SERPL-MCNC: 189 MG/DL (ref 65–140)
GLUCOSE UR STRIP-MCNC: ABNORMAL MG/DL
HCT VFR BLD AUTO: 36.3 % (ref 34.8–46.1)
HGB BLD-MCNC: 12.1 G/DL (ref 11.5–15.4)
HGB UR QL STRIP.AUTO: ABNORMAL
KETONES UR STRIP-MCNC: NEGATIVE MG/DL
LEUKOCYTE ESTERASE UR QL STRIP: ABNORMAL
LIPASE SERPL-CCNC: 256 U/L (ref 73–393)
LYMPHOCYTES # BLD AUTO: 1.97 THOUSANDS/ΜL (ref 0.6–4.47)
LYMPHOCYTES NFR BLD AUTO: 35 % (ref 14–44)
MCH RBC QN AUTO: 30.1 PG (ref 26.8–34.3)
MCHC RBC AUTO-ENTMCNC: 33.3 G/DL (ref 31.4–37.4)
MCV RBC AUTO: 90 FL (ref 82–98)
MONOCYTES # BLD AUTO: 0.53 THOUSAND/ΜL (ref 0.17–1.22)
MONOCYTES NFR BLD AUTO: 9 % (ref 4–12)
NEUTROPHILS # BLD AUTO: 2.84 THOUSANDS/ΜL (ref 1.85–7.62)
NEUTS SEG NFR BLD AUTO: 51 % (ref 43–75)
NITRITE UR QL STRIP: NEGATIVE
NON-SQ EPI CELLS URNS QL MICRO: ABNORMAL /HPF
NRBC BLD AUTO-RTO: 0 /100 WBCS
PH UR STRIP.AUTO: 6 [PH] (ref 4.5–8)
PLATELET # BLD AUTO: 311 THOUSANDS/UL (ref 149–390)
PMV BLD AUTO: 11.5 FL (ref 8.9–12.7)
POTASSIUM SERPL-SCNC: 5.9 MMOL/L (ref 3.5–5.3)
PROT SERPL-MCNC: 8 G/DL (ref 6.4–8.2)
PROT UR STRIP-MCNC: ABNORMAL MG/DL
RBC # BLD AUTO: 4.02 MILLION/UL (ref 3.81–5.12)
RBC #/AREA URNS AUTO: ABNORMAL /HPF
SODIUM SERPL-SCNC: 135 MMOL/L (ref 136–145)
SP GR UR STRIP.AUTO: 1.02 (ref 1–1.03)
SPECIMEN SOURCE: NORMAL
TROPONIN I BLD-MCNC: 0 NG/ML (ref 0–0.08)
UROBILINOGEN UR QL STRIP.AUTO: 1 E.U./DL
WBC # BLD AUTO: 5.66 THOUSAND/UL (ref 4.31–10.16)
WBC #/AREA URNS AUTO: ABNORMAL /HPF

## 2017-11-21 PROCEDURE — 80053 COMPREHEN METABOLIC PANEL: CPT | Performed by: EMERGENCY MEDICINE

## 2017-11-21 PROCEDURE — 93005 ELECTROCARDIOGRAM TRACING: CPT | Performed by: EMERGENCY MEDICINE

## 2017-11-21 PROCEDURE — 99285 EMERGENCY DEPT VISIT HI MDM: CPT

## 2017-11-21 PROCEDURE — 85025 COMPLETE CBC W/AUTO DIFF WBC: CPT | Performed by: EMERGENCY MEDICINE

## 2017-11-21 PROCEDURE — 84484 ASSAY OF TROPONIN QUANT: CPT

## 2017-11-21 PROCEDURE — 96360 HYDRATION IV INFUSION INIT: CPT

## 2017-11-21 PROCEDURE — 83690 ASSAY OF LIPASE: CPT | Performed by: EMERGENCY MEDICINE

## 2017-11-21 PROCEDURE — 81001 URINALYSIS AUTO W/SCOPE: CPT

## 2017-11-21 PROCEDURE — 70450 CT HEAD/BRAIN W/O DYE: CPT

## 2017-11-21 PROCEDURE — 74177 CT ABD & PELVIS W/CONTRAST: CPT

## 2017-11-21 PROCEDURE — 87086 URINE CULTURE/COLONY COUNT: CPT

## 2017-11-21 PROCEDURE — 96361 HYDRATE IV INFUSION ADD-ON: CPT

## 2017-11-21 PROCEDURE — 81002 URINALYSIS NONAUTO W/O SCOPE: CPT | Performed by: EMERGENCY MEDICINE

## 2017-11-21 PROCEDURE — 36415 COLL VENOUS BLD VENIPUNCTURE: CPT | Performed by: EMERGENCY MEDICINE

## 2017-11-21 RX ORDER — ACETAMINOPHEN 325 MG/1
650 TABLET ORAL EVERY 6 HOURS PRN
Status: DISCONTINUED | OUTPATIENT
Start: 2017-11-21 | End: 2017-11-22

## 2017-11-21 RX ORDER — ONDANSETRON 2 MG/ML
4 INJECTION INTRAMUSCULAR; INTRAVENOUS EVERY 6 HOURS PRN
Status: DISCONTINUED | OUTPATIENT
Start: 2017-11-21 | End: 2017-11-27 | Stop reason: HOSPADM

## 2017-11-21 RX ORDER — PRAVASTATIN SODIUM 40 MG
40 TABLET ORAL
Status: DISCONTINUED | OUTPATIENT
Start: 2017-11-22 | End: 2017-11-27 | Stop reason: HOSPADM

## 2017-11-21 RX ORDER — MELATONIN
1000 DAILY
Status: DISCONTINUED | OUTPATIENT
Start: 2017-11-22 | End: 2017-11-27 | Stop reason: HOSPADM

## 2017-11-21 RX ORDER — LEVOTHYROXINE SODIUM 0.07 MG/1
75 TABLET ORAL
Status: DISCONTINUED | OUTPATIENT
Start: 2017-11-22 | End: 2017-11-27 | Stop reason: HOSPADM

## 2017-11-21 RX ORDER — CHOLECALCIFEROL (VITAMIN D3) 125 MCG
1000 CAPSULE ORAL DAILY
Status: DISCONTINUED | OUTPATIENT
Start: 2017-11-22 | End: 2017-11-27 | Stop reason: HOSPADM

## 2017-11-21 RX ORDER — SODIUM CHLORIDE 9 MG/ML
75 INJECTION, SOLUTION INTRAVENOUS CONTINUOUS
Status: DISCONTINUED | OUTPATIENT
Start: 2017-11-21 | End: 2017-11-25

## 2017-11-21 RX ORDER — ESCITALOPRAM OXALATE 20 MG/1
20 TABLET ORAL DAILY
Status: DISCONTINUED | OUTPATIENT
Start: 2017-11-22 | End: 2017-11-27 | Stop reason: HOSPADM

## 2017-11-21 RX ORDER — ASPIRIN 81 MG/1
81 TABLET, CHEWABLE ORAL DAILY
Status: DISCONTINUED | OUTPATIENT
Start: 2017-11-22 | End: 2017-11-27 | Stop reason: HOSPADM

## 2017-11-21 RX ORDER — ACETAMINOPHEN 325 MG/1
650 TABLET ORAL ONCE
Status: DISCONTINUED | OUTPATIENT
Start: 2017-11-21 | End: 2017-11-27 | Stop reason: HOSPADM

## 2017-11-21 RX ORDER — NYSTATIN 100000 [USP'U]/G
POWDER TOPICAL 3 TIMES DAILY
Status: DISCONTINUED | OUTPATIENT
Start: 2017-11-22 | End: 2017-11-27 | Stop reason: HOSPADM

## 2017-11-21 RX ORDER — INSULIN GLARGINE 100 [IU]/ML
10 INJECTION, SOLUTION SUBCUTANEOUS
Status: DISCONTINUED | OUTPATIENT
Start: 2017-11-21 | End: 2017-11-24

## 2017-11-21 RX ORDER — PANTOPRAZOLE SODIUM 20 MG/1
20 TABLET, DELAYED RELEASE ORAL
Status: DISCONTINUED | OUTPATIENT
Start: 2017-11-22 | End: 2017-11-22

## 2017-11-21 RX ADMIN — IODIXANOL 80 ML: 320 INJECTION, SOLUTION INTRAVASCULAR at 21:31

## 2017-11-21 RX ADMIN — SODIUM CHLORIDE 1000 ML: 0.9 INJECTION, SOLUTION INTRAVENOUS at 20:15

## 2017-11-21 NOTE — ED PROVIDER NOTES
History  Chief Complaint   Patient presents with    Altered Mental Status     nursing home reports increased confusion  pt reports no complaints     Patient is an 79 yo woman with a pmh of dementia , dm, who presents for evaluation of altered mental status  Patient is a resident at [facility]  Per staff, she was noted to have a change in mental status over the last few days  She is confused at baseline but staff states that she has subjectively worsened somewhat  Staff also states that staff physician noticed right facial drooping  Staff is unsure of what time this was noticed or when her last known normal may have been  She was found with spilled coffee this am as well  Staff is unsure if she dropped the coffee or not  Patient complains of back and left chest wall pain  She does not know why she is here  She doesn't remember dropping anything  She denies weakness in extremities, dizziness, f/c/n/v/d, cp, sob  Denies pain  History provided by:  Nursing home, EMS personnel and patient  History limited by:  Dementia   used: No        Prior to Admission Medications   Prescriptions Last Dose Informant Patient Reported? Taking?    Linagliptin (TRADJENTA) 5 MG TABS   Yes Yes   Sig: Take 5 mg by mouth daily   acetaminophen (TYLENOL) 500 mg tablet   Yes Yes   Sig: Take 500 mg by mouth every 6 (six) hours as needed for mild pain   acetaminophen (TYLENOL) 650 mg CR tablet   No Yes   Sig: Take 1 tablet by mouth every 8 (eight) hours as needed for mild pain or moderate pain for up to 14 days   cholecalciferol (VITAMIN D3) 1,000 units tablet   Yes Yes   Sig: Take 1,000 Units by mouth daily   cyanocobalamin (VITAMIN B-12) 1,000 mcg tablet   Yes Yes   Sig: Take 1,000 mcg by mouth daily   escitalopram (LEXAPRO) 20 mg tablet   Yes Yes   Sig: Take 20 mg by mouth daily   insulin glargine (LANTUS) 100 units/mL subcutaneous injection   Yes Yes   Sig: Inject 10 Units under the skin daily at bedtime levothyroxine 50 mcg tablet   Yes Yes   Sig: Take 75 mcg by mouth daily   nitrofurantoin (MACROBID) 100 mg capsule   No Yes   Sig: Take 1 capsule by mouth 2 (two) times a day for 7 days   nitrofurantoin (MACROBID) 100 mg capsule   No No   Sig: Take 1 capsule by mouth 2 (two) times a day for 5 days   nystatin (MYCOSTATIN) powder   Yes Yes   Sig: Apply topically 4 (four) times a day   omeprazole (PriLOSEC) 20 mg delayed release capsule   Yes Yes   Sig: Take 20 mg by mouth daily   rosuvastatin (CRESTOR) 5 mg tablet   Yes Yes   Sig: Take 5 mg by mouth daily      Facility-Administered Medications: None       Past Medical History:   Diagnosis Date    Diabetes mellitus ([facility] [address] )     Hyperlipidemia     Hypothyroidism     Macular degeneration     Osteoarthritis        History reviewed  No pertinent surgical history  History reviewed  No pertinent family history  I have reviewed and agree with the history as documented  Social History   Substance Use Topics    Smoking status: Never Smoker    Smokeless tobacco: Never Used    Alcohol use No        Review of Systems   Constitutional: Negative for chills and fever  HENT: Negative for congestion, rhinorrhea and sore throat  Eyes: Negative for redness and visual disturbance  Respiratory: Negative for cough, shortness of breath, wheezing and stridor  Cardiovascular: Positive for chest pain (diffuse rib pain)  Negative for palpitations and leg swelling  Gastrointestinal: Negative for abdominal distention, abdominal pain, constipation, diarrhea, nausea and vomiting  Genitourinary: Negative for dysuria and hematuria  Musculoskeletal: Positive for back pain  Negative for neck pain  Skin: Negative for pallor, rash and wound  Neurological: Negative for light-headedness and headaches  Psychiatric/Behavioral: Positive for confusion (Patient is unsure why she is here  )  Negative for behavioral problems         Physical Exam  ED Triage Vitals   Temperature Pulse Respirations Blood Pressure SpO2   11/21/17 1804 11/21/17 1718 11/21/17 1718 11/21/17 1718 11/21/17 1718   98 2 °F (36 8 °C) 64 18 139/65 98 %      Temp Source Heart Rate Source Patient Position - Orthostatic VS BP Location FiO2 (%)   11/21/17 1804 11/21/17 1718 11/21/17 1718 11/21/17 1718 --   Oral Monitor Lying Right arm       Pain Score       11/21/17 1718       4           Orthostatic Vital Signs  Vitals:    11/23/17 0716 11/23/17 1515 11/24/17 0003 11/24/17 0753   BP: 146/70 155/82 157/85 141/63   Pulse: 61 66 62 62   Patient Position - Orthostatic VS:  Lying Lying Lying       Physical Exam   Constitutional: She appears well-developed and well-nourished  No distress  HENT:   Head: Normocephalic and atraumatic  Eyes: Conjunctivae are normal  Pupils are equal, round, and reactive to light  No scleral icterus  Neck: Normal range of motion  Neck supple  Cardiovascular: Normal rate, regular rhythm, normal heart sounds and intact distal pulses  No murmur heard  Pulmonary/Chest: Breath sounds normal  No respiratory distress  She has no wheezes  Abdominal: Soft  Bowel sounds are normal  She exhibits no distension and no mass  There is tenderness (Suprapubic tenderness to light palpation)  There is no rebound and no guarding  Musculoskeletal: She exhibits no edema or deformity  Neurological: She is alert  She has normal strength  No cranial nerve deficit or sensory deficit  Coordination normal  GCS eye subscore is 4  GCS verbal subscore is 5  GCS motor subscore is 6  Patient oriented to self only  Cranial Nerves II-XII intact grossly bilateral    Motor: 5/5 in b/l UE with flexion and extension  5/5 in b/l LE with flexion, extension, EHL    No focal sensory deficit   Skin: Skin is warm and dry  Capillary refill takes less than 2 seconds  No rash noted  She is not diaphoretic  No erythema  No pallor  Psychiatric: She has a normal mood and affect   Her behavior is normal    Nursing note and vitals reviewed        ED Medications  Medications   acetaminophen (TYLENOL) tablet 650 mg (650 mg Oral Not Given 11/21/17 1858)   cholecalciferol (VITAMIN D3) tablet 1,000 Units (1,000 Units Oral Given 11/24/17 1023)   cyanocobalamin (VITAMIN B-12) tablet 1,000 mcg (1,000 mcg Oral Given 11/24/17 1023)   escitalopram (LEXAPRO) tablet 20 mg (20 mg Oral Given 11/24/17 1022)   insulin glargine (LANTUS) subcutaneous injection 10 Units (10 Units Subcutaneous Given 11/23/17 2117)   levothyroxine tablet 75 mcg (75 mcg Oral Given 11/24/17 0557)   sitaGLIPtin (JANUVIA) tablet 25 mg (25 mg Oral Given 11/24/17 1025)   nystatin (MYCOSTATIN) powder ( Topical Given 11/24/17 1025)   pravastatin (PRAVACHOL) tablet 40 mg (40 mg Oral Given 11/23/17 1705)   sodium chloride 0 9 % infusion (75 mL/hr Intravenous New Bag 11/23/17 2120)   ondansetron (ZOFRAN) injection 4 mg (not administered)   enoxaparin (LOVENOX) subcutaneous injection 30 mg (30 mg Subcutaneous Given 11/24/17 1022)   aspirin chewable tablet 81 mg (81 mg Oral Given 11/24/17 1023)   insulin lispro (HumaLOG) 100 units/mL subcutaneous injection 1-5 Units (1 Units Subcutaneous Not Given 11/24/17 0720)   insulin lispro (HumaLOG) 100 units/mL subcutaneous injection 1-5 Units (1 Units Subcutaneous Given 11/23/17 2117)   amLODIPine (NORVASC) tablet 2 5 mg (2 5 mg Oral Given 11/24/17 1023)   acetaminophen (TYLENOL) tablet 975 mg (975 mg Oral Given 11/24/17 0556)   lidocaine (LIDODERM) 5 % patch 1 patch (1 patch Transdermal Medication Applied 11/24/17 1023)   pantoprazole (PROTONIX) EC tablet 40 mg (40 mg Oral Given 11/24/17 0557)   aluminum-magnesium hydroxide-simethicone (MYLANTA) 200-200-20 mg/5 mL oral suspension 30 mL (not administered)   sodium chloride 0 9 % bolus 1,000 mL (1,000 mL Intravenous New Bag 11/21/17 2015)   iodixanol (VISIPAQUE) 320 MG/ML injection 80 mL (80 mL Intravenous Given 11/21/17 2131)       Diagnostic Studies  Results Reviewed     Procedure Component Value Units Date/Time    Urine culture [[id number]]  (Abnormal) Collected:  11/21/17 1841    Lab Status:  Final result Specimen:  Urine from Urine, Clean Catch Updated:  11/23/17 1453     Urine Culture >100,000 cfu/ml Alpha Hemolytic Streptococcus NOT Enterococcus (A)      5591-6828 cfu/ml Gram Negative Parvez (A)    MRSA culture [[id number]]  (Normal) Collected:  11/22/17 0026    Lab Status:  Final result Specimen:  Nares from Nose Updated:  11/23/17 1326     MRSA Culture Only No Methicillin Resistant Staphlyococcus aureus (MRSA) isolated    Basic metabolic panel [[id number]] Collected:  11/22/17 0753    Lab Status:  Final result Specimen:  Blood from Arm, Left Updated:  11/22/17 0741     Sodium 139 mmol/L      Potassium 4 1 mmol/L      Chloride 107 mmol/L      CO2 28 mmol/L      Anion Gap 4 mmol/L      BUN 17 mg/dL      Creatinine 1 10 mg/dL      Glucose 139 mg/dL      Calcium 8 7 mg/dL      eGFR 46 ml/min/1 73sq m     Narrative:         National Kidney Disease Education Program recommendations are as follows:  GFR calculation is accurate only with a steady state creatinine  Chronic Kidney disease less than 60 ml/min/1 73 sq  meters  Kidney failure less than 15 ml/min/1 73 sq  meters  TSH, 3rd generation [[id number]]  (Normal) Collected:  11/22/17 9155    Lab Status:  Final result Specimen:  Blood from Arm, Left Updated:  11/22/17 0741     TSH 3RD GENERATON 1 340 uIU/mL     Narrative:         Patients undergoing fluorescein dye angiography may retain small amounts of fluorescein in the body for 48-72 hours post procedure  Samples containing fluorescein can produce falsely depressed TSH values  If the patient had this procedure,a specimen should be resubmitted post fluorescein clearance            The recommended reference ranges for TSH during pregnancy are as follows:  First trimester 0 1 to 2 5 uIU/mL  Second trimester  0 2 to 3 0 uIU/mL  Third trimester 0 3 to 3 0 uIU/m      Vitamin B12 [[id number]]  (Normal) Collected: 11/22/17 2596    Lab Status:  Final result Specimen:  Blood from Arm, Left Updated:  11/22/17 0741     Vitamin B-12 612 pg/mL     Hemoglobin A1c (Orders if not completed within the last 90 days) [[id number]]  (Abnormal) Collected:  11/22/17 0635    Lab Status:  Final result Specimen:  Blood from Arm, Left Updated:  11/22/17 0721     Hemoglobin A1C 8 9 (H) %       mg/dl     Platelet count [[id number]]  (Normal) Collected:  11/22/17 0915    Lab Status:  Final result Specimen:  Blood from Arm, Left Updated:  11/22/17 0647     Platelets 212 Thousands/uL      MPV 10 8 fL     Urine Microscopic [[id number]]  (Abnormal) Collected:  11/21/17 1841    Lab Status:  Final result Specimen:  Urine from Urine, Clean Catch Updated:  11/21/17 2011     RBC, UA None Seen /hpf      WBC, UA Innumerable (A) /hpf      Epithelial Cells Occasional /hpf      Bacteria, UA Occasional /hpf     Comprehensive metabolic panel [[id number]]  (Abnormal) Collected:  11/21/17 1821    Lab Status:  Final result Specimen:  Blood from Arm, Left Updated:  11/21/17 2004     Sodium 135 (L) mmol/L      Potassium 5 9 (H) mmol/L      Chloride 103 mmol/L      CO2 27 mmol/L      Anion Gap 5 mmol/L      BUN 23 mg/dL      Creatinine 1 19 mg/dL      Glucose 189 (H) mg/dL      Calcium 9 1 mg/dL      AST 59 (H) U/L      ALT 20 U/L      Alkaline Phosphatase 70 U/L      Total Protein 8 0 g/dL      Albumin 2 9 (L) g/dL      Total Bilirubin 0 34 mg/dL      eGFR 41 ml/min/1 73sq m     Narrative:         National Kidney Disease Education Program recommendations are as follows:  GFR calculation is accurate only with a steady state creatinine  Chronic Kidney disease less than 60 ml/min/1 73 sq  meters  Kidney failure less than 15 ml/min/1 73 sq  meters      Lipase [[id number]]  (Normal) Collected:  11/21/17 1821    Lab Status:  Final result Specimen:  Blood from Arm, Left Updated:  11/21/17 2004     Lipase 256 u/L     POCT troponin [[id number]]  (Normal) Collected:  11/21/17 1843 Lab Status:  Final result Updated:  11/21/17 1857     POC Troponin I 0 00 ng/ml      Specimen Type VENOUS    Narrative:         [employer] i-Stat handheld analyzer 99% cutoff is > 0 08ng/mL in [facility] Emergency Departments    o cTnI 99% cutoff is useful only when applied to patients in the clinical setting of myocardial ischemia  o cTnI 99% cutoff should be interpreted in the context of clinical history, ECG findings and possibly cardiac imaging to establish correct diagnosis  o cTnI 99% cutoff may be suggestive but clearly not indicative of a coronary event without the clinical setting of myocardial ischemia      POCT urinalysis dipstick [[id number]]  (Normal) Resulted:  11/21/17 1846    Lab Status:  Final result Specimen:  Urine Updated:  11/21/17 1846     Color, UA -    ED Urine Macroscopic [[id number]]  (Abnormal) Collected:  11/21/17 1841    Lab Status:  Final result Specimen:  Urine Updated:  11/21/17 1843     Color, UA Yellow     Clarity, UA Cloudy     pH, UA 6 0     Leukocytes, UA Small (A)     Nitrite, UA Negative     Protein,  (2+) (A) mg/dl      Glucose,  (1/10%) (A) mg/dl      Ketones, UA Negative mg/dl      Urobilinogen, UA 1 0 E U /dl      Bilirubin, UA Negative     Blood, UA Trace (A)     Specific [place], UA 1 020    Narrative:       CLINITEK RESULT    CBC and differential [[id number]]  (Normal) Collected:  11/21/17 1821    Lab Status:  Final result Specimen:  Blood from Arm, Left Updated:  11/21/17 1835     WBC 5 66 Thousand/uL      RBC 4 02 Million/uL      Hemoglobin 12 1 g/dL      Hematocrit 36 3 %      MCV 90 fL      MCH 30 1 pg      MCHC 33 3 g/dL      RDW 14 8 %      MPV 11 5 fL      Platelets 971 Thousands/uL      nRBC 0 /100 WBCs      Neutrophils Relative 51 %      Lymphocytes Relative 35 %      Monocytes Relative 9 %      Eosinophils Relative 4 %      Basophils Relative 1 %      Neutrophils Absolute 2 84 Thousands/µL      Lymphocytes Absolute 1 97 Thousands/µL      Monocytes Absolute 0 53 Thousand/µL      Eosinophils Absolute 0 24 Thousand/µL      Basophils Absolute 0 05 Thousands/µL                  MRI brain wo contrast   Final Result by Altagracia Klein MD (11/22 2111)         1  White matter T2/FLAIR signal abnormality in the periventricular, subcortical and deep white matter could relate to a combination of microangiopathy and/or demyelinating disease as clinically suspected  There is abnormal signal abnormality in the    anterior genu of the corpus callosum suggesting demyelinating disease as clinically suspected  2  Volume loss/atrophy  Workstation performed: [id number]         CT abdomen pelvis with contrast   Final Result by Nick Batista MD (11/21 2154)      No acute abdominal or pelvic abnormality  Stable compression fractures in the lumbar spine  Distended but thick-walled bladder  Correlate with UA  Workstation performed: [id number]         CT head without contrast   Final Result by Nick Batista MD (11/21 2149)      No acute intracranial abnormality or significant change from 9/22/2017           Workstation performed: [id number]               Procedures  ECG 12 Lead Documentation  Date/Time: 11/21/2017 7:00 PM  Performed by: Vijay De by: Bassam Milner     Indications / Diagnosis:  Chest wall pain  ECG reviewed by me, the ED Provider: yes    Patient location:  ED  Previous ECG:     Previous ECG:  Compared to current    Similarity:  No change    Comparison to cardiac monitor: Yes    Interpretation:     Interpretation: normal    Rate:     ECG rate:  61    ECG rate assessment: normal    Rhythm:     Rhythm: sinus rhythm    QRS:     QRS axis:  Normal  Conduction:     Conduction: normal    ST segments:     ST segments:  Normal  T waves:     T waves: normal            Phone Consults  ED Phone Contact    ED Course  ED Course as of Nov 24 1114   Tue Nov 21, 2017   1857 POC Troponin I: 0 00   1858 WBC: 5 66   1858 Hemoglobin: 12 1 1858 Leukocytes, UA: (!) Small   1858 Nitrite, UA: Negative     ABCD2 Score    Flowsheet Row Most Recent Value   ABCD2 Score   Age 60+  1 Filed at: 11/21/2017 2238   Initial SBP >140 or DBP >90   1 Filed at: 11/21/2017 2238   Clinical Features of TIA  2 Filed at: 11/21/2017 2238   Duration of Symptoms  0 Filed at: 11/21/2017 2238   History of Diabetes  1 Filed at: 11/21/2017 2238   ABCD2 Score  5 Filed at: 11/21/2017 2238            Identification of Seniors at [address] [place] [address] Most Recent Value   (ISAR) Identification of Seniors at Risk   Before the illness or injury that brought you to the Emergency, did you need someone to help you on a regular basis? 1 Filed at: 11/21/2017 1720   In the last 24 hours, have you needed more help than usual?  1 Filed at: 11/21/2017 1720   Have you been hospitalized for one or more nights during the past 6 months? 1 Filed at: 11/21/2017 1720   In general, do you see well?  0 Filed at: 11/21/2017 1720   In general, do you have serious problems with your memory? 1 Filed at: 11/21/2017 1720   Do you take more than three different medications every day? 1 Filed at: 11/21/2017 1720   ISAR Score  5 Filed at: 11/21/2017 1720                          MDM  Number of Diagnoses or Management Options  Altered mental status: new and requires workup  Facial droop: new and requires workup  TIA (transient ischemic attack): new and requires workup  Diagnosis management comments: 81 yo woman presents from [facility] with change in mental status, dropping of objects and right facial droop observed by staff physician with unknown last known normal  Neuro exam significant for confusion  Abdomen tender on PE  Will obtain CMP, lipase, CBC troponin, CXR, EKG, CT head, CT abd/pelvis  CT head negative for acute process  Patient will be admitted to [facility] on stroke pathway and possible further imaging          Amount and/or Complexity of Data Reviewed  Clinical lab tests: ordered and reviewed  Tests in the radiology section of CPT®: ordered  Tests in the medicine section of CPT®: ordered and reviewed  Decide to obtain previous medical records or to obtain history from someone other than the patient: yes  Obtain history from someone other than the patient: yes  Review and summarize past medical records: yes  Discuss the patient with other providers: yes  Independent visualization of images, tracings, or specimens: yes    Risk of Complications, Morbidity, and/or Mortality  Presenting problems: high  Diagnostic procedures: low  Management options: low    Patient Progress  Patient progress: stable    CritCare Time    Disposition  Final diagnoses:   TIA (transient ischemic attack)   Altered mental status   Facial droop - Right, now resolved     Time reflects when diagnosis was documented in both MDM as applicable and the Disposition within this note     Time User Action Codes Description Comment    11/21/2017 10:43 PM Efrem Mendez Add [G45 9] TIA (transient ischemic attack)     11/21/2017 10:43 PM Efrem Mendez Add [R41 82] Altered mental state     11/21/2017 10:43 PM Efrem Mendez Remove [R41 82] Altered mental state     11/21/2017 10:43 PM Alyce Mendez Add [R41 82] Altered mental status     11/21/2017 10:43 PM Alyce Mendez Add [R29 810] Facial droop     11/21/2017 10:43 PM Efrem Mendez Modify [R29 810] Facial droop Left, now resolved    11/21/2017 10:43 PM Alyce Mendez Modify [R29 810] Facial droop Right, now resolved    11/21/2017 11:05 PM Florencio James Modify [R41 82] Altered mental status     11/22/2017  3:45 PM Pramod Garcia 35 [R41 82] Altered mental status, unspecified altered mental status type     11/22/2017  3:45 PM Gloria Garcia 27 [R41 82] Altered mental status, unspecified altered mental status type       ED Disposition     ED Disposition Condition Comment    Admit  Case was discussed with Dr Mayda Sykes and the patient's admission status was agreed to be Admission Status: inpatient status to the service of Dr Chris Ogden   Follow-up Information    None       Current Discharge Medication List      CONTINUE these medications which have NOT CHANGED    Details   acetaminophen (TYLENOL) 500 mg tablet Take 500 mg by mouth every 6 (six) hours as needed for mild pain      acetaminophen (TYLENOL) 650 mg CR tablet Take 1 tablet by mouth every 8 (eight) hours as needed for mild pain or moderate pain for up to 14 days  Qty: 28 tablet, Refills: 0      cholecalciferol (VITAMIN D3) 1,000 units tablet Take 1,000 Units by mouth daily      cyanocobalamin (VITAMIN B-12) 1,000 mcg tablet Take 1,000 mcg by mouth daily      escitalopram (LEXAPRO) 20 mg tablet Take 20 mg by mouth daily      insulin glargine (LANTUS) 100 units/mL subcutaneous injection Inject 10 Units under the skin daily at bedtime      levothyroxine 50 mcg tablet Take 75 mcg by mouth daily      Linagliptin (TRADJENTA) 5 MG TABS Take 5 mg by mouth daily      nystatin (MYCOSTATIN) powder Apply topically 4 (four) times a day      omeprazole (PriLOSEC) 20 mg delayed release capsule Take 20 mg by mouth daily      rosuvastatin (CRESTOR) 5 mg tablet Take 5 mg by mouth daily         STOP taking these medications       nitrofurantoin (MACROBID) 100 mg capsule Comments:   Reason for Stopping:         nitrofurantoin (MACROBID) 100 mg capsule Comments:   Reason for Stopping:             No discharge procedures on file  ED Provider  Attending physically available and evaluated Lexx Miles I managed the patient along with the ED Attending      Electronically Signed by         Xin Islas MD  Resident  11/24/17 5939

## 2017-11-21 NOTE — PROGRESS NOTES
This is a 69-year-old female with history of dementia, hypertension who presents from the nursing home for concerns of change of mental status and report of a right-sided facial droop that has since resolved  Report from the nursing home is that the patient is not acting like her normal self and her confusion appears to be worse than its baseline  There is also reports that the rounding physician noted a right-sided facial droop and right arm drop attacks at unknown time during the day the patient has an unknown last normal according to nursing home staff  The patient has had several frequent visits to the ED over the past 10 days for both mechanical falls and urinary tract infections  The nursing home staff does not report any other falls or traumatic events  Today she offers complaints of chest discomfort when pressed on  She is disoriented to place and time but does know her name and date of birth  She has no signs of head trauma or midline C-spine tenderness  She has reproducible right-sided chest wall tenderness with no external signs of trauma  She has regular rate and rhythm without murmur, lungs are clear with equal breath sounds bilaterally, abdomen has periumbilical and suprapubic tenderness without rebound or guarding or external signs of trauma  She has no midline T but mild L-spine tenderness, no sacral decubitus ulcers or signs of skin breakdown in the perineum  She has full range of motion of bilateral lower extremity with no overlying skin changes concerning for infection  She has normal pulses to the bilateral radius and DP pulses  Patient has normal cranial nerve, upper and lower extremity strength and sensory exam   For change in mental status will evaluate with CT head, will evaluate chest discomfort with EKG, chest x-ray troponin    Evaluate abdominal pain with CBC, CMP, lipase and CT abdomen pelvis with IV contrast as well as straight cath urine to evaluate for a continued UTI

## 2017-11-22 ENCOUNTER — APPOINTMENT (INPATIENT)
Dept: NEUROLOGY | Facility: AMBULATORY SURGERY CENTER | Age: 82
DRG: 884 | End: 2017-11-22
Payer: MEDICARE

## 2017-11-22 ENCOUNTER — APPOINTMENT (INPATIENT)
Dept: RADIOLOGY | Facility: HOSPITAL | Age: 82
DRG: 884 | End: 2017-11-22
Payer: MEDICARE

## 2017-11-22 ENCOUNTER — GENERIC CONVERSION - ENCOUNTER (OUTPATIENT)
Dept: OTHER | Facility: OTHER | Age: 82
End: 2017-11-22
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
## 2017-11-22 PROBLEM — R10.13 EPIGASTRIC PAIN: Status: ACTIVE | Noted: 2017-11-22

## 2017-11-22 LAB
ANION GAP SERPL CALCULATED.3IONS-SCNC: 4 MMOL/L (ref 4–13)
ATRIAL RATE: 61 BPM
BUN SERPL-MCNC: 17 MG/DL (ref 5–25)
CALCIUM SERPL-MCNC: 8.7 MG/DL (ref 8.3–10.1)
CHLORIDE SERPL-SCNC: 107 MMOL/L (ref 100–108)
CO2 SERPL-SCNC: 28 MMOL/L (ref 21–32)
CREAT SERPL-MCNC: 1.1 MG/DL (ref 0.6–1.3)
EST. AVERAGE GLUCOSE BLD GHB EST-MCNC: 209 MG/DL
GFR SERPL CREATININE-BSD FRML MDRD: 46 ML/MIN/1.73SQ M
GLUCOSE SERPL-MCNC: 135 MG/DL (ref 65–140)
GLUCOSE SERPL-MCNC: 139 MG/DL (ref 65–140)
GLUCOSE SERPL-MCNC: 201 MG/DL (ref 65–140)
GLUCOSE SERPL-MCNC: 214 MG/DL (ref 65–140)
GLUCOSE SERPL-MCNC: 264 MG/DL (ref 65–140)
HBA1C MFR BLD: 8.9 % (ref 4.2–6.3)
P AXIS: 52 DEGREES
PLATELET # BLD AUTO: 230 THOUSANDS/UL (ref 149–390)
PMV BLD AUTO: 10.8 FL (ref 8.9–12.7)
POTASSIUM SERPL-SCNC: 4.1 MMOL/L (ref 3.5–5.3)
PR INTERVAL: 198 MS
QRS AXIS: 32 DEGREES
QRSD INTERVAL: 78 MS
QT INTERVAL: 446 MS
QTC INTERVAL: 448 MS
SODIUM SERPL-SCNC: 139 MMOL/L (ref 136–145)
T WAVE AXIS: 62 DEGREES
TROPONIN I SERPL-MCNC: <0.02 NG/ML
TSH SERPL DL<=0.05 MIU/L-ACNC: 1.34 UIU/ML (ref 0.36–3.74)
VENTRICULAR RATE: 61 BPM
VIT B12 SERPL-MCNC: 612 PG/ML (ref 100–900)

## 2017-11-22 PROCEDURE — 70551 MRI BRAIN STEM W/O DYE: CPT

## 2017-11-22 PROCEDURE — 85049 AUTOMATED PLATELET COUNT: CPT | Performed by: HOSPITALIST

## 2017-11-22 PROCEDURE — 87081 CULTURE SCREEN ONLY: CPT | Performed by: HOSPITALIST

## 2017-11-22 PROCEDURE — 84484 ASSAY OF TROPONIN QUANT: CPT | Performed by: PHYSICIAN ASSISTANT

## 2017-11-22 PROCEDURE — 95816 EEG AWAKE AND DROWSY: CPT

## 2017-11-22 PROCEDURE — 84443 ASSAY THYROID STIM HORMONE: CPT | Performed by: HOSPITALIST

## 2017-11-22 PROCEDURE — 82948 REAGENT STRIP/BLOOD GLUCOSE: CPT

## 2017-11-22 PROCEDURE — 92610 EVALUATE SWALLOWING FUNCTION: CPT

## 2017-11-22 PROCEDURE — 83036 HEMOGLOBIN GLYCOSYLATED A1C: CPT | Performed by: HOSPITALIST

## 2017-11-22 PROCEDURE — 82607 VITAMIN B-12: CPT | Performed by: HOSPITALIST

## 2017-11-22 PROCEDURE — 80048 BASIC METABOLIC PNL TOTAL CA: CPT | Performed by: HOSPITALIST

## 2017-11-22 RX ORDER — MAGNESIUM HYDROXIDE/ALUMINUM HYDROXICE/SIMETHICONE 120; 1200; 1200 MG/30ML; MG/30ML; MG/30ML
30 SUSPENSION ORAL EVERY 4 HOURS PRN
Status: DISCONTINUED | OUTPATIENT
Start: 2017-11-22 | End: 2017-11-27 | Stop reason: HOSPADM

## 2017-11-22 RX ORDER — AMLODIPINE BESYLATE 2.5 MG/1
2.5 TABLET ORAL DAILY
Status: DISCONTINUED | OUTPATIENT
Start: 2017-11-22 | End: 2017-11-27 | Stop reason: HOSPADM

## 2017-11-22 RX ORDER — LIDOCAINE 50 MG/G
1 PATCH TOPICAL DAILY
Status: DISCONTINUED | OUTPATIENT
Start: 2017-11-22 | End: 2017-11-27 | Stop reason: HOSPADM

## 2017-11-22 RX ORDER — ACETAMINOPHEN 325 MG/1
975 TABLET ORAL EVERY 8 HOURS SCHEDULED
Status: DISCONTINUED | OUTPATIENT
Start: 2017-11-22 | End: 2017-11-27 | Stop reason: HOSPADM

## 2017-11-22 RX ORDER — PANTOPRAZOLE SODIUM 40 MG/1
40 TABLET, DELAYED RELEASE ORAL
Status: DISCONTINUED | OUTPATIENT
Start: 2017-11-22 | End: 2017-11-27 | Stop reason: HOSPADM

## 2017-11-22 RX ADMIN — INSULIN LISPRO 2 UNITS: 100 INJECTION, SOLUTION INTRAVENOUS; SUBCUTANEOUS at 16:59

## 2017-11-22 RX ADMIN — INSULIN LISPRO 2 UNITS: 100 INJECTION, SOLUTION INTRAVENOUS; SUBCUTANEOUS at 21:35

## 2017-11-22 RX ADMIN — INSULIN LISPRO 1 UNITS: 100 INJECTION, SOLUTION INTRAVENOUS; SUBCUTANEOUS at 01:15

## 2017-11-22 RX ADMIN — SODIUM CHLORIDE 75 ML/HR: 0.9 INJECTION, SOLUTION INTRAVENOUS at 15:02

## 2017-11-22 RX ADMIN — ASPIRIN 81 MG 81 MG: 81 TABLET ORAL at 11:06

## 2017-11-22 RX ADMIN — ENOXAPARIN SODIUM 30 MG: 30 INJECTION SUBCUTANEOUS at 08:49

## 2017-11-22 RX ADMIN — ACETAMINOPHEN 975 MG: 325 TABLET, FILM COATED ORAL at 16:27

## 2017-11-22 RX ADMIN — PRAVASTATIN SODIUM 40 MG: 40 TABLET ORAL at 17:01

## 2017-11-22 RX ADMIN — PANTOPRAZOLE SODIUM 20 MG: 20 TABLET, DELAYED RELEASE ORAL at 06:03

## 2017-11-22 RX ADMIN — ESCITALOPRAM OXALATE 20 MG: 20 TABLET ORAL at 11:09

## 2017-11-22 RX ADMIN — INSULIN GLARGINE 10 UNITS: 100 INJECTION, SOLUTION SUBCUTANEOUS at 00:37

## 2017-11-22 RX ADMIN — VITAMIN D, TAB 1000IU (100/BT) 1000 UNITS: 25 TAB at 11:07

## 2017-11-22 RX ADMIN — LEVOTHYROXINE SODIUM 75 MCG: 75 TABLET ORAL at 06:00

## 2017-11-22 RX ADMIN — SODIUM CHLORIDE 75 ML/HR: 0.9 INJECTION, SOLUTION INTRAVENOUS at 00:15

## 2017-11-22 RX ADMIN — INSULIN GLARGINE 10 UNITS: 100 INJECTION, SOLUTION SUBCUTANEOUS at 21:34

## 2017-11-22 RX ADMIN — LIDOCAINE 1 PATCH: 50 PATCH TOPICAL at 16:26

## 2017-11-22 RX ADMIN — SITAGLIPTIN 25 MG: 25 TABLET, FILM COATED ORAL at 11:17

## 2017-11-22 RX ADMIN — CYANOCOBALAMIN TAB 500 MCG 1000 MCG: 500 TAB at 11:07

## 2017-11-22 RX ADMIN — ACETAMINOPHEN 975 MG: 325 TABLET, FILM COATED ORAL at 21:34

## 2017-11-22 RX ADMIN — NYSTATIN: 100000 POWDER TOPICAL at 21:35

## 2017-11-22 RX ADMIN — AMLODIPINE BESYLATE 2.5 MG: 2.5 TABLET ORAL at 11:04

## 2017-11-22 RX ADMIN — NYSTATIN: 100000 POWDER TOPICAL at 17:00

## 2017-11-22 RX ADMIN — INSULIN LISPRO 1 UNITS: 100 INJECTION, SOLUTION INTRAVENOUS; SUBCUTANEOUS at 11:47

## 2017-11-22 NOTE — MEDICAL STUDENT
Progress Note - Gonzalez Jean 80 y o  female MRN: [id number]    Unit/Bed#: [facility] 728-01 Encounter: [id number]      Assessment:  · Altered Mental Status  · Pt was brought to ED after nursing home staff reported altered mental status and R sided facial droop  She presented to the ED as alert and oriented  When rounding the AM she was extremely fatigued and was aroused to verbal stimuli  When rounding for a second time, she was alert and oriented to person  · No facial droop noted on examination today  · CT Head w/o contrast 11/21/17: No intracranial abnormality or significant change  · CT abdomen and pelvis with IV contrast 11/21/17: No acute abnormalities  Stable compression fx in lumbar spine  Distended by thick-walled bladder   · Continue neuro checks q 4 hours   · Brain MRI   · Neuro Consult pending   · Continue ASA 81 mg PO qd  · Continue Pravastatin 40 mg PO qd  · Lipid Profile Pending   · TSH today 1 340   · Dementia  · Pt most recently alert and oriented to person   · Neuro Consult pending   · Essential Hypertension   · On Amlodipine 2 5 mg qd    · Continue to monitor Bps   · Most recent Bp: 11/22/17 08:42   105/60  · Will continue IVF   · Diabetes Mellitus Type II   · On Lantus 10 U at Bed time at home    · Most recent blood glucose 11/22/17 11:25: 214   · Continue to monitor glucose levels prior to meals   · Continue Lantus 10 U at bed time   · Continue Sliding scale insulin  · Abdominal Pain  · Per , pt reporting severe pain in epigastric region, states it is constant and has been occurring for several days  · Will increase Pantoprazole to 40 mg qd   · Will start Mylanta prn for heartburn, indigestion   · Will obtain Troponin level today  · CT scan abd/pelvis: No acute abnormalities  Stable compression fx in lumbar spine   Distended by thick-walled bladder   · Ambulatory Dysfunction  · PT/OT recommendations appreciated   · Pt experiencing pain, PT to hold treatment at this time  · Difficulty Eating   · Diet changed to Mechanical Diet Type II per Speech pathology recommendation   · UTI  · Pt recent UTI on 11/15/17 treated with Macrobid   · Recent culture pending at this time   · Headache  · Pt reports headache this AM  · Will start pt on Acetaminophen 650 mg PO q 6 hours       Subjective: On initial visit with patient this AM she was extremely fatigued and was able to be aroused to some verbal and mild painful stimuli  Assessment was difficult at that time secondary to the pt's fatigue and language barrier  On return to assess pt she was alert and oriented to person and complained of pain in her epigastric region  She states that pain is constant and has been occurring for several days  She also notes pain in her L lateral chest which is worsened with manipulation, this pain is most likely attributed to hx of rib and sternal fractures  Pt admits to headache and when she gets a headache at home she reports she finds relief with Tylenol  Objective:     Vitals: Blood pressure 105/60, pulse 65, temperature 98 °F (36 7 °C), resp  rate 18, height 4' 6" (1 372 m), weight 61 7 kg (136 lb 0 4 oz), SpO2 96 %  ,Body mass index is 32 8 kg/m²  Intake/Output Summary (Last 24 hours) at 11/22/17 1202  Last data filed at 11/22/17 0900   Gross per 24 hour   Intake                0 ml   Output              746 ml   Net             -746 ml       Physical Exam: General appearance: alert and appears to be in pain  Skin: No rashes, lesions or bruising noted on skin   Cardiac: No murmurs, rubs or gallops noted to auscultation  Regular rate and rhythm  Pulmonary: No accessory muscle use noted with respirations  No wheezing, rales or rhonchi noted to ausculation  Abdomen: Pt reporting pain in epigastric region  No pain noted to palpation  No palpable masses noted  Neurologic: Pt alert and oriented to person  Pt confused and fatigued throughout examination       Invasive Devices     Peripheral Intravenous Line            Peripheral IV 11/15/17 Left Antecubital 6 days    Peripheral IV 11/21/17 Left Hand less than 1 day                Lab, Imaging and other studies: I have personally reviewed pertinent reports      VTE Pharmacologic Prophylaxis: Enoxaparin (Lovenox)  VTE Mechanical Prophylaxis: sequential compression device

## 2017-11-22 NOTE — OCCUPATIONAL THERAPY NOTE
Occupational Therapy     Patient Name: Jose Alfredo Farrell  YQTPU'W Date: 11/22/2017    OT consult received and chart review completed   Pt  cx'd this AM d/t increased pain and fatigue per RN request      BALJINDER Rasmussen, OTR/L  11/22/2017  11:59 AM

## 2017-11-22 NOTE — ASSESSMENT & PLAN NOTE
· Son noted overall decline over last year  · Feels she needs upgrade from assisted living to skilled nursing upon discharge

## 2017-11-22 NOTE — PLAN OF CARE
Problem: DISCHARGE PLANNING - CARE MANAGEMENT  Goal: Discharge to post-acute care or home with appropriate resources  INTERVENTIONS:  - Conduct assessment to determine patient/family and health care team treatment goals, and need for post-acute services based on payer coverage, community resources, and patient preferences, and barriers to discharge  - Address psychosocial, clinical, and financial barriers to discharge as identified in assessment in conjunction with the patient/family and health care team  - Arrange appropriate level of post-acute services according to patient's   needs and preference and payer coverage in collaboration with the physician and health care team  - Communicate with and update the patient/family, physician, and health care team regarding progress on the discharge plan  - Arrange appropriate transportation to post-acute venues  - Plan for discharge to [facility]    Outcome: Progressing

## 2017-11-22 NOTE — PROCEDURES
ELECTROENCEPHALOGRAM    Patient Name:  Luberta Nageotte  MRN: [id number]   :  1931 File #: Galina Villafuerte 16-12   Age: 80 y o  Encounter #: [id number]   Date performed: 2017 Referring Provider: Kristy Kingsley MD          Report date: 2017          Study type: awake EEG    ICD 10 diagnosis: Transient alteration of awareness R40 4 and Encephalopathy, unspecified G93 40    Patient History:  EEG is requested to assess for seizures and/or classification of epilepsy  Patient is 80 y o  female with episodic confusion, possible underlying dementia, presenting to hospital with a right facial droop and right-sided weakness  She also has some visual deficits  Current AEDs:  Medications include:   acetaminophen 650 mg Oral Once   acetaminophen 975 mg Oral Q8H [address]   amLODIPine 2 5 mg Oral Daily   aspirin 81 mg Oral Daily   cholecalciferol 1,000 Units Oral Daily   cyanocobalamin 1,000 mcg Oral Daily   enoxaparin 30 mg Subcutaneous Daily   escitalopram 20 mg Oral Daily   insulin glargine 10 Units Subcutaneous HS   insulin lispro 1-5 Units Subcutaneous TID AC   insulin lispro 1-5 Units Subcutaneous HS   levothyroxine 75 mcg Oral Early Morning   lidocaine 1 patch Transdermal Daily   nystatin  Topical TID   pantoprazole 40 mg Oral Early Morning   pravastatin 40 mg Oral Daily With Dinner   sitaGLIPtin 25 mg Oral Daily       Description of Procedure: The EEG was performed with electrodes applied using the International 10-20 System  Additional electrodes used included EOG, ECG and T1/T2 electrodes  A single lead ECG channel is also present  At least 16 channels are reviewed at a time and formatted into longitudinal bipolar, transverse bipolar, and referential (to common reference or calculated common reference) montages  The EEG was recorded with the patient encephalopathic  The recording was technically satisfactory  EEG was recorded from 14:59 to 15:32  Findings:   Background Activity:    The background is grossly symmetric with respect to voltages and activities  The background is disorganized with moderate voltage 0 5-2 Hz delta activity and 5-7 Hz low-moderate voltage theta activity with admixed alpha/beta activity mostly within the midline region  There is no stable posterior dominant rhythm  There is a slight asymmetry with lower voltage theta activity over the right posterior quadrant  Activation Procedures:   Hyperventilation was not performed  Stepped photic stimulation from 1 to 30 fps was performed and produced no abnormality  Other findings: The single lead ECG shows a regular and sinus rhythm  Interpretation: This is an abnormal 33 minutes awake EEG due to disorganized background with theta high and delta activity and slight attenuation of beta activity over the right posterior quadrant  These findings indicate the presence of moderate diffuse cerebral dysfunction, etiologically nonspecific, with a superimposed right posterior quadrant focal cerebral dysfunction, possibly structural in origin        MD Brenna Mejía [facility]  [address] [place] [address]

## 2017-11-22 NOTE — CASE MANAGEMENT
Initial Clinical Review    Admission: Date/Time/Statement: 11/21/17 @ 2242     Orders Placed This Encounter   Procedures    Inpatient Admission (expected length of stay for this patient is greater than two midnights)     Standing Status:   Standing     Number of Occurrences:   1     Order Specific Question:   Admitting Physician     Answer:   Bettye Thompson [[id number]]     Order Specific Question:   Level of Care     Answer:   Med Surg [[id number]]     Order Specific Question:   Estimated length of stay     Answer:   More than 2 Midnights     Order Specific Question:   Certification     Answer:   I certify that inpatient services are medically necessary for this patient for a duration of greater than two midnights  See H&P and MD Progress Notes for additional information about the patient's course of treatment  ED: Date/Time/Mode of Arrival:   ED Arrival Information     Expected Arrival Acuity Means of Arrival Escorted By Service Admission Type    - 11/21/2017 17:10 Urgent Ambulance [facility] Urgent    Arrival Complaint    confusion          Chief Complaint:   Chief Complaint   Patient presents with    Altered Mental Status     nursing home reports increased confusion  pt reports no complaints       History of Illness:       Beatris Aschoff is a 80 y o  female with hx of dementia, HL, GERD, IDDM was recently in ED with low back had CT which showed severe DDD thoracic and lumbar fx, b/l rib fractures and sternum fx, she returned with MS change and was found to have UTI and was sent from ED with script for macrobid  She returns today after being evaluted by nursing home physician for mental status change over past several days and he noted that pt had right facial droop  In the ED no noticeable droop, pt oriented to person only and unable to provide much history    Patient is an 79 yo woman with a pmh of dementia , dm, who presents for evaluation of altered mental status   Patient is a resident at [facility]  Per staff, she was noted to have a change in mental status over the last few days  She is confused at baseline but staff states that she has subjectively worsened  Staff also states that staff physician noticed right facial drooping  Staff is unsure of what time this was noticed or when her last known normal may have been  She was found with spilled coffee this am as well  Staff is unsure if she dropped the coffee or not   Patient complains of back and left chest wall pain      ED Vital Signs:   ED Triage Vitals   Temperature Pulse Respirations Blood Pressure SpO2   11/21/17 1804 11/21/17 1718 11/21/17 1718 11/21/17 1718 11/21/17 1718   98 2 °F (36 8 °C) 64 18 139/65 98 %      Temp Source Heart Rate Source Patient Position - Orthostatic VS BP Location FiO2 (%)   11/21/17 1804 11/21/17 1718 11/21/17 1718 11/21/17 1718 --   Oral Monitor Lying Right arm       Pain Score       11/21/17 1718       4        Wt Readings from Last 1 Encounters:   11/22/17 61 7 kg (136 lb 0 4 oz)       Vital Signs (abnormal):        11/22/17 0700  98 °F (36 7 °C)  65  18  96/59  96 %  --  --   11/22/17 0330  97 6 °F (36 4 °C)  60  20   176/61  96 %  None (Room air)  Lying   11/22/17 0100  --  --  --   176/92  --  --  Lying   11/22/17 0030  --  --  --  --  --  None (Room air)  --   11/22/17 0024  97 9 °F (36 6 °C)  61  20   191/101  97 %  None (Room air)  Lying   11/21/17 2338  97 9 °F (36 6 °C)  62  20   199/90  96 %  None (Room air)  Lying   11/21/17 2100  --  60  20   173/72  97 %  None (Room air)  Lying       GCS 14      Abnormal Labs/Diagnostic Test Results:     Color, UA Yellow   Clarity, UA Cloudy   pH, UA 6 0   Leukocytes, UA Small (A)   Nitrite, UA Negative   Protein,  (2+) (A) mg/dl     Glucose,  (1/10%) (A) mg/dl     Ketones, UA Negative mg/dl     Urobilinogen, UA 1 0 E U /dl     Bilirubin, UA Negative   Blood, UA Trace (A)       Sodium 135 (*) 136 - 145 mmol/L Final   Potassium 5 9 (*) 3 5 - 5 3 mmol/L Final     Glucose 189 (*) 65 - 140 mg/dL Final   AST 59 (*) 5 - 45 U/L Final   Albumin 2 9 (*) 3 5 - 5 0 g/dL Final       CT HEAD  NO ACUTE FINDING  CT ABDOMEN AND PELVIS   NO ACUTE FINGING    TIA SCORE =6    AGE>60=1   SBP>140=1  DBP>90 =1  LEFT FACIAL DROOP =2   DURATION UNKNOWN =0   DM = 1    ED Treatment:   Medication Administration from 11/21/2017 1710 to 11/21/2017 0417       Date/Time Order Dose Route     11/21/2017 2015 sodium chloride 0 9 % bolus 1,000 mL 1,000 mL Intravenous       Past Medical/Surgical History:       Past Medical History:    Diabetes mellitus ([facility] [address] )     Hyperlipidemia     Hypothyroidism     Osteoarthritis        Admitting Diagnosis: TIA (transient ischemic attack) [G45 9]  Altered mental status [R41 82]  Facial droop [R29 810]    Age/Sex: 80 y o  female    Assessment/Plan:      · Altered mental status  ? ? Facial droop earlier today CT brain with no acute abnormalities  ? Will place on neuro checks, check MRI, neurology consultation will be obtained  ?  ASA/statin, check lipid profile in am  ? Check tsh, b12 levels      Plan for Additional Problems:   · Dementia: supportive care  · UTI: completed course of macrobid, afebrile and no leukocytosis  · Dehydration: by examination, may be contributing to altered mental status, maintenance fluids, follow exam  · Hyperkalemia: hydrate, repeat bmp, no ekg changes, if increase further will treat with kayexalate, dextrose, insulin  · GERD: ppi  · Abn CXR: suspect atx as pt afebrile and no leukocytosis, increase activity as toelrated, pt/ot eval        Admission Orders:    HBA1C  MRI BRAIN   CONSULT NEUROLOGY  SEQUENTIAL COMPRESSION DEVICE  PT OT EVAL AND TREAT  NPO  SPEECH BEDSIDE SWALLOW EVAL AND TREAT  NEURO CHECKS  Q4 HR         Scheduled Meds:   acetaminophen 650 mg Oral Once   amLODIPine 2 5 mg Oral Daily   aspirin 81 mg Oral Daily   cholecalciferol 1,000 Units Oral Daily   cyanocobalamin 1,000 mcg Oral Daily   enoxaparin 30 mg Subcutaneous Daily   escitalopram 20 mg Oral Daily   insulin glargine 10 Units Subcutaneous HS   insulin lispro 1-5 Units Subcutaneous TID AC   insulin lispro 1-5 Units Subcutaneous HS   levothyroxine 75 mcg Oral Early Morning   nystatin  Topical TID   pantoprazole 20 mg Oral Early Morning   pravastatin 40 mg Oral Daily With Dinner   sitaGLIPtin 25 mg Oral Daily     Continuous Infusions:   sodium chloride 75 mL/hr Last Rate: 75 mL/hr (11/22/17 0015)     PRN Meds: acetaminophen    ondansetron

## 2017-11-22 NOTE — ED ATTENDING ATTESTATION
Mami Coombs MD, saw and evaluated the patient  I have discussed the patient with the resident/non-physician practitioner and agree with the resident's/non-physician practitioner's findings, Plan of Care, and MDM as documented in the resident's/non-physician practitioner's note, except where noted  All available labs and Radiology studies were reviewed  At this point I agree with the current assessment done in the Emergency Department  I have conducted an independent evaluation of this patient including a focused history and a physical exam     19-year-old female, nursing home resident, sent from the nursing home for evaluation of change in mental status  Unclear from the description as to what the change in mental status consisted of  Additional reports were that the patient had right-sided facial droop and right upper extremity weakness earlier today  Unclear as to the time of onset, duration of symptoms, or time of resolution  Patient has underlying dementia prevented her from providing reliable review of systems or history  Patient was found to have abdominal tenderness on resident exam     The patient is resting comfortably on a stretcher in no acute respiratory distress  The patient appears nontoxic  HEENT reveals moist mucous membranes  Head is normocephalic and atraumatic  Conjunctiva and sclera are normal  Neck is nontender and supple with full range of motion to flexion, extension, lateral rotation  No meningismus appreciated  No masses are appreciated  Lungs are clear to auscultation bilaterally without any wheezes, rales or rhonchi  Heart is regular rate and rhythm without any murmurs, rubs or gallops  Abdomen is soft and nontender without any rebound or guarding  Extremities appear grossly normal without any significant arthropathy  Patient is awake and alert  The patient has normal interaction  Motor is 5 out of 5 bilateral upper lower extremities      Assessment and plan: 66-year-old female with transient face and extremity weakness which is not present on today's exam     Evaluation with lab work and CT abdomen  Labs Reviewed   COMPREHENSIVE METABOLIC PANEL - Abnormal        Result Value Ref Range Status    Sodium 135 (*) 136 - 145 mmol/L Final    Potassium 5 9 (*) 3 5 - 5 3 mmol/L Final    Comment: Moderately Hemolyzed; Results May be Affected    Glucose 189 (*) 65 - 140 mg/dL Final    Comment:   If the patient is fasting, the ADA then defines impaired fasting glucose as > 100 mg/dL and diabetes as > or equal to 123 mg/dL  Specimen collection should occur prior to Sulfasalazine administration due to the potential for falsely depressed results  Specimen collection should occur prior to Sulfapyridine administration due to the potential for falsely elevated results  AST 59 (*) 5 - 45 U/L Final    Comment: Moderately Hemolyzed; Results May be Affected  Specimen collection should occur prior to Sulfasalazine administration due to the potential for falsely depressed results  Albumin 2 9 (*) 3 5 - 5 0 g/dL Final    Chloride 103  100 - 108 mmol/L Final    CO2 27  21 - 32 mmol/L Final    Anion Gap 5  4 - 13 mmol/L Final    BUN 23  5 - 25 mg/dL Final    Creatinine 1 19  0 60 - 1 30 mg/dL Final    Comment: Standardized to IDMS reference method    Calcium 9 1  8 3 - 10 1 mg/dL Final    ALT 20  12 - 78 U/L Final    Comment:   Specimen collection should occur prior to Sulfasalazine and/or Sulfapyridine administration due to the potential for falsely depressed results       Alkaline Phosphatase 70  46 - 116 U/L Final    Total Protein 8 0  6 4 - 8 2 g/dL Final    Total Bilirubin 0 34  0 20 - 1 00 mg/dL Final    eGFR 41  ml/min/1 73sq m Final    Narrative:     National Kidney Disease Education Program recommendations are as follows:  GFR calculation is accurate only with a steady state creatinine  Chronic Kidney disease less than 60 ml/min/1 73 sq  meters  Kidney failure less than 15 ml/min/1 73 sq  meters     URINE MICROSCOPIC - Abnormal     WBC, UA Innumerable (*) None Seen, 0-5, 5-55, 5-65 /hpf Final    RBC, UA None Seen  None Seen, 0-5 /hpf Final    Epithelial Cells Occasional  None Seen, Occasional /hpf Final    Bacteria, UA Occasional  None Seen, Occasional /hpf Final   ED URINE MACROSCOPIC - Abnormal     Leukocytes, UA Small (*) Negative Final    Protein,  (2+) (*) Negative mg/dl Final    Glucose,  (1/10%) (*) Negative mg/dl Final    Blood, UA Trace (*) Negative Final    Color, UA Yellow   Final    Clarity, UA Cloudy   Final    pH, UA 6 0  4 5 - 8 0 Final    Nitrite, UA Negative  Negative Final    Ketones, UA Negative  Negative mg/dl Final    Urobilinogen, UA 1 0  0 2, 1 0 E U /dl E U /dl Final    Bilirubin, UA Negative  Negative Final    Specific Gravity, UA 1 020  1 003 - 1 030 Final    Narrative:     CLINITEK RESULT   LIPASE - Normal    Lipase 256  73 - 393 u/L Final   CBC AND DIFFERENTIAL - Normal    WBC 5 66  4 31 - 10 16 Thousand/uL Final    RBC 4 02  3 81 - 5 12 Million/uL Final    Hemoglobin 12 1  11 5 - 15 4 g/dL Final    Hematocrit 36 3  34 8 - 46 1 % Final    MCV 90  82 - 98 fL Final    MCH 30 1  26 8 - 34 3 pg Final    MCHC 33 3  31 4 - 37 4 g/dL Final    RDW 14 8  11 6 - 15 1 % Final    MPV 11 5  8 9 - 12 7 fL Final    Platelets 234  071 - 390 Thousands/uL Final    nRBC 0  /100 WBCs Final    Neutrophils Relative 51  43 - 75 % Final    Lymphocytes Relative 35  14 - 44 % Final    Monocytes Relative 9  4 - 12 % Final    Eosinophils Relative 4  0 - 6 % Final    Basophils Relative 1  0 - 1 % Final    Neutrophils Absolute 2 84  1 85 - 7 62 Thousands/µL Final    Lymphocytes Absolute 1 97  0 60 - 4 47 Thousands/µL Final    Monocytes Absolute 0 53  0 17 - 1 22 Thousand/µL Final    Eosinophils Absolute 0 24  0 00 - 0 61 Thousand/µL Final    Basophils Absolute 0 05  0 00 - 0 10 Thousands/µL Final   POCT URINALYSIS DIPSTICK - Normal    Color, UA -   Final   POCT TROPONIN - Normal    POC Troponin I 0 00  0 00 - 0 08 ng/ml Final    Specimen Type VENOUS   Final    Narrative:     [employer] i-Stat handheld analyzer 99% cutoff is > 0 08ng/mL in network Emergency Departments    o cTnI 99% cutoff is useful only when applied to patients in the clinical setting of myocardial ischemia  o cTnI 99% cutoff should be interpreted in the context of clinical history, ECG findings and possibly cardiac imaging to establish correct diagnosis  o cTnI 99% cutoff may be suggestive but clearly not indicative of a coronary event without the clinical setting of myocardial ischemia  URINE CULTURE   MRSA CULTURE   PLATELET COUNT     CT abdomen pelvis with contrast   Final Result      No acute abdominal or pelvic abnormality  Stable compression fractures in the lumbar spine  Distended but thick-walled bladder  Correlate with UA  Workstation performed: [id number]         CT head without contrast   Final Result      No acute intracranial abnormality or significant change from 9/22/2017           Workstation performed: [id number]         MRI inpatient order    (Results Pending)

## 2017-11-22 NOTE — SOCIAL WORK
CM was informed that pt was admitted from [facility]  CM called [facility] and spoke to markedup regarding pts prior level of functioning  Pt performed ADL's indptly pta, pt ambulates with a Rw  Staff administers medications     Contact: Santiago Winston (son) [phone]

## 2017-11-22 NOTE — ASSESSMENT & PLAN NOTE
· No abnormality on CT scan  · Increase Protonix to 40mg  Add Mylanta  · Cardiac cause not suspected  Troponins normal  EKG unremarkable

## 2017-11-22 NOTE — PHYSICAL THERAPY NOTE
Physical Therapy Cancellation Note  PT orders received and chart reviewed  PT spoke to nsg who requested therapy be held off at this time secondary to pts pain per RN      Rudi Sawyer, PT

## 2017-11-22 NOTE — H&P
History and Physical - [facility] Internal Medicine    Patient Information: Vernon Farris 80 y o  female MRN: [id number]  Unit/Bed#: ED 06 Encounter: [id number]  Admitting Physician: Luna Rod MD  PCP: Bhumi Carroll MD  Date of Admission:  11/21/17    Assessment/Plan:    Hospital Problem List:     Principal Problem:    Altered mental status  Active Problems:    Dementia    Essential hypertension    Diabetes mellitus ([facility] [address] )    UTI (urinary tract infection)    Hyperkalemia    Dehydration    Present on Admission:   Altered mental status   Dementia   Essential hypertension   Diabetes mellitus ([facility] [address] )   UTI (urinary tract infection)   Hyperkalemia   Dehydration      Plan for the Primary Problem(s):  · Altered mental status  · ? Facial droop earlier today CT brain with no acute abnormalities  · Will place on neuro checks, check MRI, neurology consultation will be obtained  · ASA/statin, check lipid profile in am  · Check tsh, b12 levels     Plan for Additional Problems:   · Dementia: supportive care  · UTI: completed course of macrobid, afebrile and no leukocytosis  · Dehydration: by examination, may be contributing to altered mental status, maintenance fluids, follow exam  · Hyperkalemia: hydrate, repeat bmp, no ekg changes, if increase further will treat with kayexalate, dextrose, insulin  · GERD: ppi  · Abn CXR: suspect atx as pt afebrile and no leukocytosis, increase activity as toelrated, pt/ot eval    VTE Prophylaxis: Enoxaparin (Lovenox)  / sequential compression device   Code Status: FULL  POLST: There is no POLST form on file for this patient (pre-hospital)    Anticipated Length of Stay:  Patient will be admitted on an Inpatient basis with an anticipated length of stay of  GREATER 2 midnights  Justification for Hospital Stay: altered mental status    Total Time for Visit, including Counseling / Coordination of Care: 45 minutes    Greater than 50% of this total time spent on direct patient counseling and coordination of care  Chief Complaint:   Demented unable to provide hx, sent from nursing home for altered mental status    History of Present Illness:    Sebastien Pacheco is a 80 y o  female with hx of dementia, HL, GERD, IDDM was recently in ED with low back had CT which showed severe DDD thoracic and lumbar fx, b/l rib fractures and sternum fx, she returned with MS change and was found to have UTI and was sent from ED with script for macrobid  She returns today after being evaluted by nursing home physician for mental status change over past several days and he noted that pt had right facial droop  In the ED no noticeable droop, pt oriented to person only and unable to provide much history        Review of Systems:  Unable to obtain (dementia and altered MS), answers no to all ROS questions     All systems are reviewed  Positive as per history of presenting illness  Patient answered no to all other questions  Past Medical and Surgical History:     Past Medical History:   Diagnosis Date    Diabetes mellitus ([facility] [address] )     Hyperlipidemia     Hypothyroidism     Osteoarthritis        History reviewed  No pertinent surgical history  Meds/Allergies:    Prior to Admission medications    Medication Sig Start Date End Date Taking?  Authorizing Provider   acetaminophen (TYLENOL) 500 mg tablet Take 500 mg by mouth every 6 (six) hours as needed for mild pain   Yes Historical Provider, MD   acetaminophen (TYLENOL) 650 mg CR tablet Take 1 tablet by mouth every 8 (eight) hours as needed for mild pain or moderate pain for up to 14 days 11/12/17 11/26/17 Yes Nel Prajapati MD   cholecalciferol (VITAMIN D3) 1,000 units tablet Take 1,000 Units by mouth daily   Yes Historical Provider, MD   cyanocobalamin (VITAMIN B-12) 1,000 mcg tablet Take 1,000 mcg by mouth daily   Yes Historical Provider, MD   escitalopram (LEXAPRO) 20 mg tablet Take 20 mg by mouth daily   Yes Historical Provider, MD   insulin glargine (LANTUS) 100 units/mL subcutaneous injection Inject 10 Units under the skin daily at bedtime   Yes Historical Provider, MD   levothyroxine 50 mcg tablet Take 75 mcg by mouth daily   Yes Historical Provider, MD   Linagliptin (TRADJENTA) 5 MG TABS Take 5 mg by mouth daily   Yes Historical Provider, MD   nitrofurantoin (MACROBID) 100 mg capsule Take 1 capsule by mouth 2 (two) times a day for 7 days 11/15/17 11/22/17 Yes Jose Rajput MD   nystatin (MYCOSTATIN) powder Apply topically 4 (four) times a day   Yes Historical Provider, MD   omeprazole (PriLOSEC) 20 mg delayed release capsule Take 20 mg by mouth daily   Yes Historical Provider, MD   rosuvastatin (CRESTOR) 5 mg tablet Take 5 mg by mouth daily   Yes Historical Provider, MD   nitrofurantoin (MACROBID) 100 mg capsule Take 1 capsule by mouth 2 (two) times a day for 5 days 11/15/17 11/20/17  Jose Rajput MD     I have reviewed home medications using allscripts  Allergies: Allergies   Allergen Reactions    Penicillins     Valsartan        Social History:     Marital Status:     Patient Pre-hospital Living Situation: nursing home  Patient Pre-hospital Diet Restrictions: diabetic  Substance Use History:   History   Alcohol Use No     History   Smoking Status    Never Smoker   Smokeless Tobacco    Never Used     History   Drug Use No       Family History:    non-contributory      Physical Exam:  Vitals:   Blood Pressure: 169/74 (11/21/17 2200)  Pulse: 61 (11/21/17 2200)  Temperature: 98 2 °F (36 8 °C) (11/21/17 1804)  Temp Source: Oral (11/21/17 1804)  Respirations: 20 (11/21/17 2200)  Weight - Scale: 49 9 kg (110 lb) (11/21/17 1718)  SpO2: 98 % (11/21/17 2200)    Vital signs are reviewed as above  Sleeping but arousable  Dry MM  Sclera anicteric  RRR  Clear b/l  Soft, +bs, nontender  No edema  +distal pulses  Moves all extremities, follows some commands  No rash  Calm           Additional Data:     Lab Results: I have personally reviewed pertinent reports  Results from last 7 days  Lab Units 11/21/17  1821   WBC Thousand/uL 5 66   HEMOGLOBIN g/dL 12 1   HEMATOCRIT % 36 3   PLATELETS Thousands/uL 311   NEUTROS PCT % 51   LYMPHS PCT % 35   MONOS PCT % 9   EOS PCT % 4       Results from last 7 days  Lab Units 11/21/17  1821   SODIUM mmol/L 135*   POTASSIUM mmol/L 5 9*   CHLORIDE mmol/L 103   CO2 mmol/L 27   BUN mg/dL 23   CREATININE mg/dL 1 19   CALCIUM mg/dL 9 1   TOTAL PROTEIN g/dL 8 0   BILIRUBIN TOTAL mg/dL 0 34   ALK PHOS U/L 70   ALT U/L 20   AST U/L 59*   GLUCOSE RANDOM mg/dL 189*           Imaging: I have personally reviewed pertinent reports  Xr Chest 2 Views    Result Date: 11/12/2017  Narrative: CHEST INDICATION:  chest pain, fall  History taken directly from the electronic ordering system  COMPARISON:  Chest x-ray of 9/5/2012 VIEWS:  Frontal and lateral projections IMAGES:  2 FINDINGS:     Cardiac silhouette is mildly enlarged  There are low lung volumes  Scattered interstitial opacities are noted bilaterally, question related to atelectasis or possibly infiltrate  Right hemidiaphragm is mildly elevated  No pneumothorax  Visualized osseous structures appear within normal limits for the patient's age  Impression: Scattered interstitial opacities are noted bilaterally, question related to atelectasis or possibly infiltrate  ##sigslh##sigslh Workstation performed: [id number]     Ct Head Without Contrast    Result Date: 11/21/2017  Narrative: CT BRAIN - WITHOUT CONTRAST INDICATION:  Right-sided facial droop  COMPARISON:  CT head performed on 9/22/2017 TECHNIQUE:  CT examination of the brain was performed  In addition to axial images, coronal reformatted images were created and submitted for interpretation  Radiation dose length product (DLP) for this visit:  2120 mGy-cm     This examination, like all CT scans performed in the [facility], was performed utilizing techniques to minimize radiation dose exposure, including the use of iterative reconstruction and automated exposure control  IMAGE QUALITY:  Diagnostic  FINDINGS:  PARENCHYMA:  There is no acute intracranial hemorrhage, mass effect or midline shift  No extra-axial collection identified  Gray-white differentiation is maintained  Patchy areas of periventricular and deep white matter hypoattenuation noted  While nonspecific, these likely reflect changes of chronic microvascular ischemia in a patient of this age  Moderate generalized volume loss noted  There is no parenchymal hemorrhage  VENTRICLES AND EXTRA-AXIAL SPACES:  Ventricles are commensurate with the degree of volume loss  Basal cisterns are patent  Atherosclerotic calcification of the intracranial vasculature is noted  VISUALIZED ORBITS AND PARANASAL SINUSES:  Changes of bilateral lens replacements noted  CALVARIUM AND EXTRACRANIAL SOFT TISSUES:   Normal      Impression: No acute intracranial abnormality or significant change from 9/22/2017  Workstation performed: [id number]     Ct Chest Without Contrast    Result Date: 11/12/2017  Narrative: CT CHEST WITHOUT IV CONTRAST INDICATION:  Fall  Chest pain  COMPARISON: None  TECHNIQUE: CT examination of the chest was performed without intravenous contrast   Reformatted images were created in axial, sagittal, and coronal planes  Radiation dose length product (DLP) for this visit:  544 mGy-cm   This examination, like all CT scans performed in the [facility], was performed utilizing techniques to minimize radiation dose exposure, including the use of iterative reconstruction and automated exposure control  FINDINGS: Study limited due to respiratory motion artifact  LUNGS:  Lung volumes are diminished  Dependent atelectasis in the lower lobes bilaterally  PLEURA:  Unremarkable  HEART/GREAT VESSELS:  The heart is enlarged  Coronary artery calcifications  No evidence of a pericardial effusion   MEDIASTINUM AND JENNIFER: Unremarkable  CHEST WALL AND LOWER NECK:       Normal  VISUALIZED STRUCTURES IN THE UPPER ABDOMEN:  Hiatal hernia  Layering calculi in the gallbladder  OSSEOUS STRUCTURES:  Osseous structures demineralized  Increase in the thoracic kyphotic curvature  Mild compression fractures of multiple upper thoracic vertebral bodies  Severe compression fractures of multiple lower thoracic/upper lumbar vertebral bodies  Bony retropulsion  Moderate to severe central stenosis  Displaced fracture of the mid body of the sternum  Numerous bilateral rib fractures  These all appear subacute to chronic  No acute fractures are identified  Impression: 1  Subacute to chronic fractures of the thoracic spine, lumbar spine, sternum and bilateral ribs  If there is concern for acute fracture, consider bone scan  2   Cholelithiasis  3   Hiatal hernia  Findings are consistent with the preliminary report from [email or web address] which was provided shortly after completion of the exam              Workstation performed: [id number]     Ct Spine Lumbar Without Contrast    Result Date: 11/15/2017  Narrative: CT LUMBAR SPINE INDICATION: Back pain  COMPARISON: Lumbar spine radiographs dated 10/20/2005 and CT of the abdomen/pelvis dated 10/20/2005  Thoracic spine radiographs dated 8/23/2006  TECHNIQUE:  Contiguous axial images through the lumbar spine were obtained  Sagittal and coronal reconstructions were performed  Radiation dose length product (DLP) for this visit:  391 92 mGy-cm   This examination, like all CT scans performed in the [facility], was performed utilizing techniques to minimize radiation dose exposure, including the use of iterative  reconstruction and automated exposure control  IMAGE QUALITY:  Diagnostic  FINDINGS: ALIGNMENT:  Grade 1 anterolisthesis of L4 and L5 related to facet arthropathy  No pars defects  Otherwise, normal lordosis  Incompletely characterized thoracolumbar levoscoliosis  VERTEBRAL BODIES: Diffuse osteopenia  Wedging deformities of T11, T12, and L1 with severe vertebral body height loss, especially at T12  Findings are unchanged when compared to 2006 radiographs  No focal lytic or blastic lesions  DEGENERATIVE CHANGES: Disc bulges at L3-4 through L5-S1 with moderate L3-4 and severe L4-5 spinal stenosis  Foraminal stenosis is greatest at L4-5 with severe bilateral impingement  PARASPINAL SOFT TISSUES:  Aortoiliac atherosclerosis  Sludge in the gallbladder  No acute soft tissue swelling or hematomas  Impression: 1  Chronic severe wedging deformities T11-L1, unchanged since 2006  No new fractures or acute malalignment  2   Multilevel degenerative changes, greatest at L4-5  Workstation performed: [id number]     Ct Abdomen Pelvis With Contrast    Result Date: 11/21/2017  Narrative: CT ABDOMEN AND PELVIS WITH IV CONTRAST INDICATION:  Periumbilical pain COMPARISON: None  TECHNIQUE:  CT examination of the abdomen and pelvis was performed  Reformatted images were created in axial, sagittal, and coronal planes  Radiation dose length product (DLP) for this visit:  634 3 mGy-cm   This examination, like all CT scans performed in the [facility], was performed utilizing techniques to minimize radiation dose exposure, including the use of iterative reconstruction and automated exposure control  IV Contrast:  80 mL of iodixanol (VISIPAQUE)     Enteric Contrast:  Enteric contrast was not administered  FINDINGS: ABDOMEN LOWER CHEST:  Bibasilar atelectasis  Dense mitral annular calcification  LIVER/BILIARY TREE:  Unremarkable  GALLBLADDER:  No calcified gallstones  No pericholecystic inflammatory change  SPLEEN:  Unremarkable  PANCREAS:  Unremarkable  ADRENAL GLANDS:  Unremarkable  KIDNEYS/URETERS:  Unremarkable  No hydronephrosis  STOMACH AND BOWEL:  Hiatal hernia  No evidence for inflammatory process or obstruction  APPENDIX:  No findings to suggest appendicitis  ABDOMINOPELVIC CAVITY:  No ascites or free intraperitoneal air  No lymphadenopathy  VESSELS:  Aortoiliac calcifications  PELVIS REPRODUCTIVE ORGANS:  Unremarkable for patient's age  URINARY BLADDER:  Distended with thick-walled bladder  Correlate with UA  ABDOMINAL WALL/INGUINAL REGIONS:  Unremarkable  OSSEOUS STRUCTURES:  Changes of left femoral fixation  Unchanged is the appearance of the spine with compression deformities and near vertebral plana appearance of T11-L1  Superior endplate fracture of L2 and L3, less than one third height loss  Concomitant cavity to the superior endplate of L4 likely reflects fracture without significant height loss  Remote deformity to the right inferior pubic ramus  Impression: No acute abdominal or pelvic abnormality  Stable compression fractures in the lumbar spine  Distended but thick-walled bladder  Correlate with UA  Workstation performed: [id number]       EKG, Pathology, and Other Studies Reviewed on Admission:   · EKG: NSR, NO ISCHEMIC CHANGES    Allscripts Records Reviewed: Yes     ** Please Note: Dragon 360 Dictation voice to text software may have been used in the creation of this document   **

## 2017-11-22 NOTE — SPEECH THERAPY NOTE
Speech Language/Pathology  Speech/Language Pathology  Assessment    Patient Name: Walter Rush  GISDF'V Date: 11/22/2017     Problem List  Patient Active Problem List   Diagnosis    Altered mental status    Dementia    Essential hypertension    Diabetes mellitus ([facility] [address] )    UTI (urinary tract infection)    Hyperkalemia    Dehydration     Past Medical History  Past Medical History:   Diagnosis Date    Diabetes mellitus ([facility] [address] [facility]     Hyperlipidemia     Hypothyroidism     Osteoarthritis      Past Surgical History  History reviewed  No pertinent surgical history  Chief Complaint:   Demented unable to provide hx, sent from nursing home for altered mental status  History of Present Illness:  Walter Rush is a 80 y o  female with hx of dementia, HL, GERD, IDDM was recently in ED with low back had CT which showed severe DDD thoracic and lumbar fx, b/l rib fractures and sternum fx, she returned with MS change and was found to have UTI and was sent from ED with script for macrobid  She returns today after being evaluted by nursing home physician for mental status change over past several days and he noted that pt had right facial droop  In the ED no noticeable droop, pt oriented to person only and unable to provide much history    Reason for consult:  R/o aspiration  Determine safest and least restrictive diet  Change in mental status  New neuro event  H/o neurological disease  Current diet:  Nursing keeping pt npo  Premorbid diet[de-identified]  Unknown  ? Regular, as no diet was listed on records sent  Previous VBS:  None known  O2 requirement:  none  Voice/Speech:  Speaks both english and Mohawk  Social:  [place]  Follows commands:  inconsistent                         Cognitive Status:  Alert but was getting tred by end of session and wanted to sleep  [facility]  Partial dentition, has most of her teeth     Items administered:  Eggs, corn flakes, thin milk,  juice, coffee, applesauce       Oral stage:  Mild to mild/mod  Lip closure:adequate  Mastication:prolonged but wfl  Bolus formation: fair  adequate  Bolus control:adequate  Transfer:mildly reduced  Oral residue: mild  Pocketing: none    Pharyngeal stage:  WFL/no overt s/s  Swallow promptness: prompt  Laryngeal rise: adequate  Wet voice:no  Throat clear:no  Cough:no  Secondary swallows:no  Audible swallows:no  No s/s aspiration    Esophageal stage:  Hiatal hernia on ct abdomen and pelvis    Summary:  Pt presents w/ mild/mod oral stage due to prolonged mastication, mild residue  (may be due to change in MS)  No pharyngeal s/s  Needed to be fed  Recommendations:  Diet: dysphagia 2 mechanical soft  Liquid:thin  Meds: crush in puree unless very small  Positioning:Upright  Strategies: must be fed at this time     Aspiration precautions  Reflux precautions    Aspiration precautions posted    Results d/w:  Pt, nurse, primary    Goal(s):  Pt will tolerate least restrictive diet w/out s/s aspiration or oral/pharyngeal difficulties

## 2017-11-22 NOTE — PLAN OF CARE
DISCHARGE PLANNING     Discharge to home or other facility with appropriate resources Progressing        INFECTION - ADULT     Absence or prevention of progression during hospitalization Progressing     Absence of fever/infection during neutropenic period Progressing        Knowledge Deficit     Patient/family/caregiver demonstrates understanding of disease process, treatment plan, medications, and discharge instructions Progressing        NEUROSENSORY - ADULT     Achieves stable or improved neurological status Progressing     Absence of seizures Progressing     Remains free of injury related to seizures activity Progressing     Achieves maximal functionality and self care Progressing        Nutrition/Hydration-ADULT     Nutrient/Hydration intake appropriate for improving, restoring or maintaining nutritional needs Progressing        PAIN - ADULT     Verbalizes/displays adequate comfort level or baseline comfort level Progressing        Potential for Falls     Patient will remain free of falls Progressing        SAFETY ADULT     Maintain or return to baseline ADL function Progressing     Maintain or return mobility status to optimal level Progressing

## 2017-11-22 NOTE — CONSULTS
Consultation - Neurology   Huber Jesus Manuel Avalos 80 y o  female MRN: [id number]  Unit/Bed#: [facility] 728-01 Encounter: [id number]      Assessment/Plan   Assessment:  3 80year old female admitted with altered mental status, and report of R facial droop and R sided weakness     - Would recommend check MRI brain to rule out ischemic stroke but do not suspect this is likely given no focal deficit noted  - Possible that patient has underlying cognitive impairment and recent UTI and falls have worsened this  Patient will likely benefit from outpatient neuro-cognitive assessment     - Continue on ASA and Pravastatin (Feel this is reasonable for patient in this age group, if MRI positive for ischemic stroke will consider change to atorvastatin)  - Check fasting lipid panel   - PT/OT evaluations  - Check EEG   - Monitor exam and notify with changes  2  Recent falls with subacute to chronic fractures of the thoracic, lumbar spine, sternum and B/L ribs noted   - Pain control as per primary team      3  Distended and thickened bladder wall   - Await urine culture    History of Present Illness     Reason for Consult / Principal Problem: TIA  Hx and PE limited by: Patient mental status  HPI: Nayely Head is a 80 y o   female who presents to the hospital from the assisted living after staff noted that she had R facial droop and R sided weakness  Per discussion with the staff at the assisted living, she has not been acting herself since about 1 1/2 weeks ago after she had a fall  They notes that since that time, she has not been feeding herself well, not getting herself up to go to the bathroom and not walking with her walker which she had previously been able to do  They note that the patient has vision deficit and follows with a retinal specialist but note that she has not done that recently  Staff as unable to provide further details regarding timing of facial droop and weakness   Per discussion with the patient's son, he saw the patient on Monday and he felt that she was at her baseline but he felt that the way she was talking it sounded like her tongue was "thick " He notes that he did not feel that she was more confused than usual but does note that he feels she has an element of dementia  He notes that at that time, she was complaining of pain under her breast which she had been complaining of since her fall  Son notes that the patient is bilingual     Inpatient consult to Neurology  Consult performed by: Timothy Ceja ordered by: Steve Mcgraw          Review of Systems   Unable to perform ROS: Mental status change       Historical Information   Past Medical History:   Diagnosis Date    Diabetes mellitus ([facility] [address] [facility]     Hyperlipidemia     Hypothyroidism     Macular degeneration     Osteoarthritis      History reviewed  No pertinent surgical history  Social History   History   Alcohol Use No     History   Drug Use No     History   Smoking Status    Never Smoker   Smokeless Tobacco    Never Used     Family History: History reviewed  No pertinent family history  Review of previous medical records was completed  Patient was admitted to the emergency department on November 12th after fall  No acute fractures were noted and she was sent back to the assisted living where she resides  She returned later that day with complaint of pain and was noted to have subacute to chronic fractures of the thoracic spine, lumbar spine, sternum and B/L ribs  She was discharged back to the assisted living again  She then returned back to the hospital on 11/15 and was found to have UTI and started on antibiotics and discharged back to SNF       Meds/Allergies   Scheduled Meds:  acetaminophen 650 mg Oral Once   amLODIPine 2 5 mg Oral Daily   aspirin 81 mg Oral Daily   cholecalciferol 1,000 Units Oral Daily   cyanocobalamin 1,000 mcg Oral Daily   enoxaparin 30 mg Subcutaneous Daily   escitalopram 20 mg Oral Daily   insulin glargine 10 Units Subcutaneous HS   insulin lispro 1-5 Units Subcutaneous TID AC   insulin lispro 1-5 Units Subcutaneous HS   levothyroxine 75 mcg Oral Early Morning   nystatin  Topical TID   pantoprazole 20 mg Oral Early Morning   pravastatin 40 mg Oral Daily With Dinner   sitaGLIPtin 25 mg Oral Daily     Continuous Infusions:  sodium chloride 75 mL/hr Last Rate: 75 mL/hr (11/22/17 0015)     PRN Meds: acetaminophen    ondansetron      Allergies   Allergen Reactions    Penicillins     Valsartan        Objective   Vitals:Blood pressure 105/60, pulse 65, temperature 98 °F (36 7 °C), resp  rate 18, height 4' 6" (1 372 m), weight 61 7 kg (136 lb 0 4 oz), SpO2 96 %  ,Body mass index is 32 8 kg/m²  Intake/Output Summary (Last 24 hours) at 11/22/17 1120  Last data filed at 11/22/17 0900   Gross per 24 hour   Intake                0 ml   Output              746 ml   Net             -746 ml       Invasive Devices: Invasive Devices     Peripheral Intravenous Line            Peripheral IV 11/15/17 Left Antecubital 6 days    Peripheral IV 11/21/17 Left Hand less than 1 day                Physical Exam   Constitutional: She appears well-developed and well-nourished  No distress  HENT:   Head: Normocephalic and atraumatic  Eyes: Conjunctivae are normal  Pupils are equal, round, and reactive to light  No scleral icterus  Neck: Normal range of motion  Neck supple  Cardiovascular: Normal rate and regular rhythm  Pulmonary/Chest: Effort normal and breath sounds normal  No respiratory distress  Musculoskeletal: She exhibits no edema  Neurological: She is alert  Reflex Scores:       Bicep reflexes are 2+ on the right side and 2+ on the left side  Brachioradialis reflexes are 2+ on the right side and 2+ on the left side  Patellar reflexes are 2+ on the right side and 2+ on the left side  Achilles reflexes are 2+ on the right side and 2+ on the left side    Able to state name but answering "no" to other questions  Skin: Skin is warm and dry  She is not diaphoretic  No erythema  Psychiatric: Her speech is normal      Neurologic Exam     Mental Status   Oriented to person  Disoriented to place  Disoriented to time  Attention: decreased  Concentration: decreased  Speech: speech is normal   Level of consciousness: alert  Limited exam secondary to patient cooperation  Cranial Nerves     CN III, IV, VI   Pupils are equal, round, and reactive to light  Right pupil: Size: 4 mm  Shape: regular  Reactivity: brisk  Consensual response: intact  Left pupil: Size: 4 mm  Shape: regular  Reactivity: brisk  Consensual response: intact  Conjugate gaze: present    CN VII   Right facial weakness: none  Left facial weakness: none    CN XII   Tongue: not atrophic  Fasciculations: absent  Tongue deviation: none  Limited exam secondary to mental status  Motor Exam   Muscle bulk: normal  Overall muscle tone: normal  Right arm tone: normal  Left arm tone: normal  Right leg tone: normal  Left leg tone: normalLimited exam secondary to patient cooperation, but  strength B/L 5/5 and able to follow commands with B/L LE by wiggling toes  Sensory Exam   Unable to reliably assess secondary to mental status        Gait, Coordination, and Reflexes     Reflexes   Right brachioradialis: 2+  Left brachioradialis: 2+  Right biceps: 2+  Left biceps: 2+  Right patellar: 2+  Left patellar: 2+  Right achilles: 2+  Left achilles: 2+  Right plantar: normal  Left plantar: normalLimited exam secondary to mental status        Lab Results:   Recent Results (from the past 24 hour(s))   Comprehensive metabolic panel    Collection Time: 11/21/17  6:21 PM   Result Value Ref Range    Sodium 135 (L) 136 - 145 mmol/L    Potassium 5 9 (H) 3 5 - 5 3 mmol/L    Chloride 103 100 - 108 mmol/L    CO2 27 21 - 32 mmol/L    Anion Gap 5 4 - 13 mmol/L    BUN 23 5 - 25 mg/dL    Creatinine 1 19 0 60 - 1 30 mg/dL    Glucose 189 (H) 65 - 140 mg/dL Calcium 9 1 8 3 - 10 1 mg/dL    AST 59 (H) 5 - 45 U/L    ALT 20 12 - 78 U/L    Alkaline Phosphatase 70 46 - 116 U/L    Total Protein 8 0 6 4 - 8 2 g/dL    Albumin 2 9 (L) 3 5 - 5 0 g/dL    Total Bilirubin 0 34 0 20 - 1 00 mg/dL    eGFR 41 ml/min/1 73sq m   Lipase    Collection Time: 11/21/17  6:21 PM   Result Value Ref Range    Lipase 256 73 - 393 u/L   CBC and differential    Collection Time: 11/21/17  6:21 PM   Result Value Ref Range    WBC 5 66 4 31 - 10 16 Thousand/uL    RBC 4 02 3 81 - 5 12 Million/uL    Hemoglobin 12 1 11 5 - 15 4 g/dL    Hematocrit 36 3 34 8 - 46 1 %    MCV 90 82 - 98 fL    MCH 30 1 26 8 - 34 3 pg    MCHC 33 3 31 4 - 37 4 g/dL    RDW 14 8 11 6 - 15 1 %    MPV 11 5 8 9 - 12 7 fL    Platelets 570 344 - 153 Thousands/uL    nRBC 0 /100 WBCs    Neutrophils Relative 51 43 - 75 %    Lymphocytes Relative 35 14 - 44 %    Monocytes Relative 9 4 - 12 %    Eosinophils Relative 4 0 - 6 %    Basophils Relative 1 0 - 1 %    Neutrophils Absolute 2 84 1 85 - 7 62 Thousands/µL    Lymphocytes Absolute 1 97 0 60 - 4 47 Thousands/µL    Monocytes Absolute 0 53 0 17 - 1 22 Thousand/µL    Eosinophils Absolute 0 24 0 00 - 0 61 Thousand/µL    Basophils Absolute 0 05 0 00 - 0 10 Thousands/µL   ECG 12 lead    Collection Time: 11/21/17  6:40 PM   Result Value Ref Range    Ventricular Rate 61 BPM    Atrial Rate 61 BPM    ID Interval 198 ms    QRSD Interval 78 ms    QT Interval 446 ms    QTC Interval 448 ms    P Axis 52 degrees    QRS Axis 32 degrees    T Wave Axis 62 degrees   ED Urine Macroscopic    Collection Time: 11/21/17  6:41 PM   Result Value Ref Range    Color, UA Yellow     Clarity, UA Cloudy     pH, UA 6 0 4 5 - 8 0    Leukocytes, UA Small (A) Negative    Nitrite, UA Negative Negative    Protein,  (2+) (A) Negative mg/dl    Glucose,  (1/10%) (A) Negative mg/dl    Ketones, UA Negative Negative mg/dl    Urobilinogen, UA 1 0 0 2, 1 0 E U /dl E U /dl    Bilirubin, UA Negative Negative    Blood, UA Trace (A) Negative    Specific Gravity, UA 1 020 1 003 - 1 030   Urine Microscopic    Collection Time: 11/21/17  6:41 PM   Result Value Ref Range    RBC, UA None Seen None Seen, 0-5 /hpf    WBC, UA Innumerable (A) None Seen, 0-5, 5-55, 5-65 /hpf    Epithelial Cells Occasional None Seen, Occasional /hpf    Bacteria, UA Occasional None Seen, Occasional /hpf   POCT troponin    Collection Time: 11/21/17  6:43 PM   Result Value Ref Range    POC Troponin I 0 00 0 00 - 0 08 ng/ml    Specimen Type VENOUS    POCT urinalysis dipstick    Collection Time: 11/21/17  6:46 PM   Result Value Ref Range    Color, UA -    Fingerstick Glucose (POCT)    Collection Time: 11/22/17 12:31 AM   Result Value Ref Range    POC Glucose 201 (H) 65 - 140 mg/dl   Platelet count    Collection Time: 11/22/17  6:33 AM   Result Value Ref Range    Platelets 363 696 - 634 Thousands/uL    MPV 10 8 8 9 - 12 7 fL   Basic metabolic panel    Collection Time: 11/22/17  6:33 AM   Result Value Ref Range    Sodium 139 136 - 145 mmol/L    Potassium 4 1 3 5 - 5 3 mmol/L    Chloride 107 100 - 108 mmol/L    CO2 28 21 - 32 mmol/L    Anion Gap 4 4 - 13 mmol/L    BUN 17 5 - 25 mg/dL    Creatinine 1 10 0 60 - 1 30 mg/dL    Glucose 139 65 - 140 mg/dL    Calcium 8 7 8 3 - 10 1 mg/dL    eGFR 46 ml/min/1 73sq m   TSH, 3rd generation    Collection Time: 11/22/17  6:33 AM   Result Value Ref Range    TSH 3RD GENERATON 1 340 0 358 - 3 740 uIU/mL   Vitamin B12    Collection Time: 11/22/17  6:33 AM   Result Value Ref Range    Vitamin B-12 612 100 - 900 pg/mL   Hemoglobin A1c (Orders if not completed within the last 90 days)    Collection Time: 11/22/17  6:35 AM   Result Value Ref Range    Hemoglobin A1C 8 9 (H) 4 2 - 6 3 %     mg/dl   Fingerstick Glucose (POCT)    Collection Time: 11/22/17  6:57 AM   Result Value Ref Range    POC Glucose 135 65 - 140 mg/dl       Imaging Studies: I have personally reviewed pertinent reports     and I have personally reviewed pertinent films in PACS  CTH- No acute intracranial abnormality or significant change from 9/22/17       VTE Prophylaxis: Enoxaparin (Lovenox)

## 2017-11-22 NOTE — PLAN OF CARE
Problem: SLP ADULT - SWALLOWING, IMPAIRED  Goal: Initial SLP swallow eval performed  Outcome: Completed Date Met: 11/22/17

## 2017-11-22 NOTE — ASSESSMENT & PLAN NOTE
· Cause unclear  · Prior to admission, there was note of facial droop, no longer present on exam  · F/U MRI to r/o stroke  · No clear metabolic cause at this time, although she was recently treated for UTI  F/U urine culture  · Continue to monitor for infection  · Continue IVFs  Patient appears dehydrated  · Consider progression of dementia  Discussed this with patient's son  He feels she may need upgrade from assisted living to skilled nursing upon discharge

## 2017-11-23 PROBLEM — E87.5 HYPERKALEMIA: Status: RESOLVED | Noted: 2017-11-21 | Resolved: 2017-11-23

## 2017-11-23 LAB
ALBUMIN SERPL BCP-MCNC: 3.1 G/DL (ref 3.5–5)
ALP SERPL-CCNC: 76 U/L (ref 46–116)
ALT SERPL W P-5'-P-CCNC: 16 U/L (ref 12–78)
AMMONIA PLAS-SCNC: 22 UMOL/L (ref 11–35)
ANION GAP SERPL CALCULATED.3IONS-SCNC: 6 MMOL/L (ref 4–13)
AST SERPL W P-5'-P-CCNC: 16 U/L (ref 5–45)
BACTERIA UR CULT: ABNORMAL
BACTERIA UR CULT: ABNORMAL
BASOPHILS # BLD AUTO: 0.05 THOUSANDS/ΜL (ref 0–0.1)
BASOPHILS NFR BLD AUTO: 1 % (ref 0–1)
BILIRUB SERPL-MCNC: 0.6 MG/DL (ref 0.2–1)
BUN SERPL-MCNC: 13 MG/DL (ref 5–25)
CALCIUM SERPL-MCNC: 9.3 MG/DL (ref 8.3–10.1)
CHLORIDE SERPL-SCNC: 111 MMOL/L (ref 100–108)
CO2 SERPL-SCNC: 26 MMOL/L (ref 21–32)
CREAT SERPL-MCNC: 0.99 MG/DL (ref 0.6–1.3)
EOSINOPHIL # BLD AUTO: 0.32 THOUSAND/ΜL (ref 0–0.61)
EOSINOPHIL NFR BLD AUTO: 5 % (ref 0–6)
ERYTHROCYTE [DISTWIDTH] IN BLOOD BY AUTOMATED COUNT: 15 % (ref 11.6–15.1)
GFR SERPL CREATININE-BSD FRML MDRD: 52 ML/MIN/1.73SQ M
GLUCOSE SERPL-MCNC: 109 MG/DL (ref 65–140)
GLUCOSE SERPL-MCNC: 114 MG/DL (ref 65–140)
GLUCOSE SERPL-MCNC: 117 MG/DL (ref 65–140)
GLUCOSE SERPL-MCNC: 150 MG/DL (ref 65–140)
GLUCOSE SERPL-MCNC: 84 MG/DL (ref 65–140)
HCT VFR BLD AUTO: 40.7 % (ref 34.8–46.1)
HGB BLD-MCNC: 13.4 G/DL (ref 11.5–15.4)
LYMPHOCYTES # BLD AUTO: 2.14 THOUSANDS/ΜL (ref 0.6–4.47)
LYMPHOCYTES NFR BLD AUTO: 35 % (ref 14–44)
MCH RBC QN AUTO: 30.2 PG (ref 26.8–34.3)
MCHC RBC AUTO-ENTMCNC: 32.9 G/DL (ref 31.4–37.4)
MCV RBC AUTO: 92 FL (ref 82–98)
MONOCYTES # BLD AUTO: 0.47 THOUSAND/ΜL (ref 0.17–1.22)
MONOCYTES NFR BLD AUTO: 8 % (ref 4–12)
MRSA NOSE QL CULT: NORMAL
NEUTROPHILS # BLD AUTO: 3.12 THOUSANDS/ΜL (ref 1.85–7.62)
NEUTS SEG NFR BLD AUTO: 51 % (ref 43–75)
NRBC BLD AUTO-RTO: 0 /100 WBCS
PLATELET # BLD AUTO: 261 THOUSANDS/UL (ref 149–390)
PMV BLD AUTO: 11.2 FL (ref 8.9–12.7)
POTASSIUM SERPL-SCNC: 3.7 MMOL/L (ref 3.5–5.3)
PROT SERPL-MCNC: 7.8 G/DL (ref 6.4–8.2)
RBC # BLD AUTO: 4.44 MILLION/UL (ref 3.81–5.12)
SODIUM SERPL-SCNC: 143 MMOL/L (ref 136–145)
WBC # BLD AUTO: 6.11 THOUSAND/UL (ref 4.31–10.16)

## 2017-11-23 PROCEDURE — 97163 PT EVAL HIGH COMPLEX 45 MIN: CPT

## 2017-11-23 PROCEDURE — 82140 ASSAY OF AMMONIA: CPT | Performed by: PHYSICIAN ASSISTANT

## 2017-11-23 PROCEDURE — 80053 COMPREHEN METABOLIC PANEL: CPT | Performed by: PHYSICIAN ASSISTANT

## 2017-11-23 PROCEDURE — G8978 MOBILITY CURRENT STATUS: HCPCS

## 2017-11-23 PROCEDURE — 85025 COMPLETE CBC W/AUTO DIFF WBC: CPT | Performed by: PHYSICIAN ASSISTANT

## 2017-11-23 PROCEDURE — 82948 REAGENT STRIP/BLOOD GLUCOSE: CPT

## 2017-11-23 PROCEDURE — G8979 MOBILITY GOAL STATUS: HCPCS

## 2017-11-23 RX ADMIN — ENOXAPARIN SODIUM 30 MG: 30 INJECTION SUBCUTANEOUS at 10:25

## 2017-11-23 RX ADMIN — ESCITALOPRAM OXALATE 20 MG: 20 TABLET ORAL at 10:24

## 2017-11-23 RX ADMIN — ACETAMINOPHEN 975 MG: 325 TABLET, FILM COATED ORAL at 05:46

## 2017-11-23 RX ADMIN — SITAGLIPTIN 25 MG: 25 TABLET, FILM COATED ORAL at 10:24

## 2017-11-23 RX ADMIN — LEVOTHYROXINE SODIUM 75 MCG: 75 TABLET ORAL at 05:47

## 2017-11-23 RX ADMIN — LIDOCAINE 1 PATCH: 50 PATCH TOPICAL at 10:25

## 2017-11-23 RX ADMIN — INSULIN GLARGINE 10 UNITS: 100 INJECTION, SOLUTION SUBCUTANEOUS at 21:17

## 2017-11-23 RX ADMIN — AMLODIPINE BESYLATE 2.5 MG: 2.5 TABLET ORAL at 10:25

## 2017-11-23 RX ADMIN — SODIUM CHLORIDE 75 ML/HR: 0.9 INJECTION, SOLUTION INTRAVENOUS at 06:27

## 2017-11-23 RX ADMIN — PRAVASTATIN SODIUM 40 MG: 40 TABLET ORAL at 17:05

## 2017-11-23 RX ADMIN — ACETAMINOPHEN 975 MG: 325 TABLET, FILM COATED ORAL at 21:17

## 2017-11-23 RX ADMIN — INSULIN LISPRO 1 UNITS: 100 INJECTION, SOLUTION INTRAVENOUS; SUBCUTANEOUS at 21:17

## 2017-11-23 RX ADMIN — NYSTATIN: 100000 POWDER TOPICAL at 10:26

## 2017-11-23 RX ADMIN — NYSTATIN: 100000 POWDER TOPICAL at 21:20

## 2017-11-23 RX ADMIN — NYSTATIN: 100000 POWDER TOPICAL at 16:06

## 2017-11-23 RX ADMIN — PANTOPRAZOLE SODIUM 40 MG: 40 TABLET, DELAYED RELEASE ORAL at 05:46

## 2017-11-23 RX ADMIN — CYANOCOBALAMIN TAB 500 MCG 1000 MCG: 500 TAB at 10:24

## 2017-11-23 RX ADMIN — VITAMIN D, TAB 1000IU (100/BT) 1000 UNITS: 25 TAB at 10:25

## 2017-11-23 RX ADMIN — SODIUM CHLORIDE 75 ML/HR: 0.9 INJECTION, SOLUTION INTRAVENOUS at 21:20

## 2017-11-23 RX ADMIN — ASPIRIN 81 MG 81 MG: 81 TABLET ORAL at 10:25

## 2017-11-23 NOTE — PLAN OF CARE
Problem: PHYSICAL THERAPY ADULT  Goal: Performs mobility at highest level of function for planned discharge setting  See evaluation for individualized goals  Treatment/Interventions: Functional transfer training, LE strengthening/ROM, Endurance training, Therapeutic exercise, Patient/family training, Equipment eval/education, Bed mobility, Gait training          See flowsheet documentation for full assessment, interventions and recommendations  Prognosis: Guarded  Problem List: Decreased strength, Decreased range of motion, Decreased endurance, Impaired balance, Decreased mobility, Decreased cognition, Impaired judgement, Decreased safety awareness, Decreased coordination, Pain  Assessment: Pt seen for high complexity physical therapy evaluation, bedlevel only as very confused, resisting care  Pt is an 79 y/o female w/ history/comorbidities of HTN, DM, dementia, recent fall w/ fx as abovewho is now admitted w/ worsening MS, ? facial droop  dx was r/o CVA, MRI brain showed periventricular, subcortical and deep white matter abnormalities, suspicious for demyelinating dz  Undergoing w/u  Due to unclear medical dx, pain w/ resistance to crae, note unstable clinical picture  PT consulted to assess mobility, d/c needs  Pt presents w/ decreased bed mob, B LE ROM and strength, overall endurance  will benefit from skilled PT to correct for the above problems, as well as to assess mobility next session as medically appropriate  anticipate the need for snf rehab at d/c        Recommendation:  (anticipate the need for snf rehab at d/c)     PT - OK to Discharge: (S) Yes (when stable, likely to snf rehab)    See flowsheet documentation for full assessment

## 2017-11-23 NOTE — PROGRESS NOTES
Progress Note - Ricardo Smith 80 y o  female MRN: [id number]    Unit/Bed#: [facility] 728-01 Encounter: [id number]        * Altered mental status   Assessment & Plan    · Cause unclear - likely worsening of chronic demetia  · Prior to admission, there was note of facial droop, no longer present on exam  · MRI showed no stroke  · EEG showed diffuse cerebral dysfunction  · No clear metabolic cause at this time, although she was recently treated for UTI  F/U urine culture  · Continue to monitor for infection  · Continue IVFs  Patient appears dehydrated  · Consider progression of dementia  Discussed this with patient's son  He feels she may need upgrade from assisted living to skilled nursing upon discharge  UTI (urinary tract infection)   Assessment & Plan    · Recently complete course of Macrobid  · U cx grew > 100,0000 alpha-hemolytic strep  · Hold off on Abx as this likely a contaminant        Diabetes mellitus ([facility])   Assessment & Plan    STable  C/w current tx  Essential hypertension   Assessment & Plan    Stable  C/w current meds  Dementia   Assessment & Plan    · Son noted overall decline over last year  · Feels she needs upgrade from assisted living to skilled nursing upon discharge          VTE Pharmacologic Prophylaxis:   Pharmacologic: Enoxaparin (Lovenox)  Mechanical: Mechanical VTE prophylaxis in place  Patient Centered Rounds: I have performed bedside rounds with nursing staff today  Discussions with Specialists or Other Care Team Provider: not today  Education and Discussions with Family / Patient: son  Time Spent for Care: 30 minutes  More than 50% of total time spent on counseling and coordination of care as described above      Current Length of Stay: 2 day(s)  Current Patient Status: Inpatient   Certification Statement: The patient will continue to require additional inpatient hospital stay due to need for str    Discharge Plan: pending str placement  Code Status: Level 1 - Full Code    Subjective:   PT seen and examined  No acute complaints  She denies pain  Objective:   Vitals:   Temp (24hrs), Av 8 °F (36 6 °C), Min:97 7 °F (36 5 °C), Max:97 9 °F (36 6 °C)    HR:  [56-66] 66  Resp:  [16-18] 16  BP: (146-159)/() 155/82  SpO2:  [96 %-98 %] 98 %  Body mass index is 32 8 kg/m²  Input and Output Summary (last 24 hours): Intake/Output Summary (Last 24 hours) at 17 1703  Last data filed at 17 1100   Gross per 24 hour   Intake             1680 ml   Output                0 ml   Net             1680 ml       Physical Exam:     Physical Exam    Additional Data:   Labs:    Results from last 7 days  Lab Units 17  0554   WBC Thousand/uL 6 11   HEMOGLOBIN g/dL 13 4   HEMATOCRIT % 40 7   PLATELETS Thousands/uL 261   NEUTROS PCT % 51   LYMPHS PCT % 35   MONOS PCT % 8   EOS PCT % 5       Results from last 7 days  Lab Units 17  0554   SODIUM mmol/L 143   POTASSIUM mmol/L 3 7   CHLORIDE mmol/L 111*   CO2 mmol/L 26   BUN mg/dL 13   CREATININE mg/dL 0 99   CALCIUM mg/dL 9 3   TOTAL PROTEIN g/dL 7 8   BILIRUBIN TOTAL mg/dL 0 60   ALK PHOS U/L 76   ALT U/L 16   AST U/L 16   GLUCOSE RANDOM mg/dL 109           * I Have Reviewed All Lab Data Listed Above  * Additional Pertinent Lab Tests Reviewed:  [facility] 66 Admission Reviewed    Imaging:    Imaging Reports Reviewed Today Include: none    Cultures:   Blood Culture: No results found for: BLOODCX  Urine Culture:   Lab Results   Component Value Date    URINECX (A) 2017     >100,000 cfu/ml Alpha Hemolytic Streptococcus NOT Enterococcus    URINECX 8276-7103 cfu/ml Gram Negative Parvez (A) 2017    URINECX >100,000 cfu/ml Escherichia coli (A) 11/15/2017    URINECX >100,000 cfu/ml Aerococcus urinae (A) 11/15/2017     Sputum Culture: No components found for: SPUTUMCX  Wound Culture: No results found for: WOUNDCULT    Last 24 Hours Medication List:     acetaminophen 650 mg Oral Once acetaminophen 975 mg Oral Q8H [address]   amLODIPine 2 5 mg Oral Daily   aspirin 81 mg Oral Daily   cholecalciferol 1,000 Units Oral Daily   cyanocobalamin 1,000 mcg Oral Daily   enoxaparin 30 mg Subcutaneous Daily   escitalopram 20 mg Oral Daily   insulin glargine 10 Units Subcutaneous HS   insulin lispro 1-5 Units Subcutaneous TID AC   insulin lispro 1-5 Units Subcutaneous HS   levothyroxine 75 mcg Oral Early Morning   lidocaine 1 patch Transdermal Daily   nystatin  Topical TID   pantoprazole 40 mg Oral Early Morning   pravastatin 40 mg Oral Daily With Dinner   sitaGLIPtin 25 mg Oral Daily        Today, Patient Was Seen By: Gumaro Jane DO    ** Please Note: Dragon 360 Dictation voice to text software may have been used in the creation of this document   **

## 2017-11-23 NOTE — PROGRESS NOTES
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 - exam was limited due to motion  No focal neurologic deficit has been noted on exam  EEG was completed as well-  Predominantly theta and delta activity  Patient likely has moderate toxic metabolic encephalopathy in the setting of advanced dementia would be causing her clinical presentation with multiple admissions due to sundowning  Plan:  Follow up with primary neurology services upon discharge  Treat underlying medical condition  Continue ASa and pravastatin  High fall risk in the blind patient with underlying moderate to severe dementia  Subjective:   Patient  was seen and examined - no acute events noted overnight  Patient is fully alert, follows simple commands, oriented to self  Very limited/poor vision due to macular degeneration    ROS:  12 points of ROS was reviewed with the patient and was unremarkable, with exception: see HPI      Vitals: Blood pressure 146/70, pulse 61, temperature 97 9 °F (36 6 °C), temperature source Oral, resp  rate 16, height 4' 6" (1 372 m), weight 61 7 kg (136 lb 0 4 oz), SpO2 96 %  ,Body mass index is 32 8 kg/m²  Physical Exam: Patient is oriented to self, follows simple commands  Moves all 4 extremities, with significant visual deficit noted on her exam      Lab, Imaging and other studies: I have personally reviewed pertinent reports      VTE Prophylaxis: Sequential compression device (Venodyne)     Counseling / Coordination of Care  Total time spent today 25 minutes  Greater than 50% of total time was spent with the patient and / or family counseling and / or coordination of care   A description of the counseling / coordination of care: fall risk

## 2017-11-23 NOTE — ASSESSMENT & PLAN NOTE
· Cause unclear - likely worsening of chronic demetia  · Prior to admission, there was note of facial droop, no longer present on exam  · MRI showed no stroke  · EEG showed diffuse cerebral dysfunction  · No clear metabolic cause at this time, although she was recently treated for UTI  F/U urine culture  · Continue to monitor for infection  · Continue IVFs  Patient appears dehydrated  · Consider progression of dementia  Discussed this with patient's son  He feels she may need upgrade from assisted living to skilled nursing upon discharge

## 2017-11-23 NOTE — PHYSICIAN ADVISOR
Current patient class: Inpatient  The patient is currently on Hospital Day: 2      The patient was admitted to the hospital at [phone] on 11/21/17 for the following diagnosis:  TIA (transient ischemic attack) [G45 9]  Altered mental status [R41 82]  Facial droop [R29 810]       There is documentation in the medical record of an expected length of stay of at least 2 midnights  The patient is therefore expected to satisfy the 2 midnight benchmark and given the 2 midnight presumption is appropriate for INPATIENT ADMISSION  Given this expectation of a satisfying stay, CMS instructs us that the patient is most often appropriate for inpatient admission under part A provided medical necessity is documented in the chart  After review of the relevant documentation, labs, vital signs and test results, the patient is appropriate for INPATIENT ADMISSION  Admission to the hospital as an inpatient is a complex decision making process which requires the practitioner to consider the patients presenting complaint, history and physical examination and all relevant testing  With this in mind, in this case, the patient was deemed appropriate for INPATIENT ADMISSION  After review of the documentation and testing available at the time of the admission I concur with this clinical determination of medical necessity  Rationale is as follows: The patient is a 80 yrs old Female who presented to the ED at 11/21/2017  5:10 PM with a chief complaint of Altered Mental Status (nursing home reports increased confusion  pt reports no complaints)    This 30-year-old female continues to remain hospitalized for altered mental status  Patient does not have clear etiology for the symptoms, does have clinical dehydration, and will require further IV fluids  Patient is noted to have underlying dementia, and at the present time is not safe for discharge  Patient was also noted to have urinary tract infection, recently treated with Macrobid  Given the patient's age, risk of adverse outcome upon discharge, need for further evaluation, the patient is appropriate for inpatient admission  The patients vitals on arrival were ED Triage Vitals   Temperature Pulse Respirations Blood Pressure SpO2   11/21/17 1804 11/21/17 1718 11/21/17 1718 11/21/17 1718 11/21/17 1718   98 2 °F (36 8 °C) 64 18 139/65 98 %      Temp Source Heart Rate Source Patient Position - Orthostatic VS BP Location FiO2 (%)   11/21/17 1804 11/21/17 1718 11/21/17 1718 11/21/17 1718 --   Oral Monitor Lying Right arm       Pain Score       11/21/17 1718       4           Past Medical History:   Diagnosis Date    [facility] [address] [facility]     Hyperlipidemia     Hypothyroidism     Macular degeneration     Osteoarthritis      History reviewed  No pertinent surgical history          Consults have been placed to:   IP CONSULT TO CASE MANAGEMENT  IP CONSULT TO NEUROLOGY    Vitals:    11/22/17 0330 11/22/17 0700 11/22/17 0842 11/22/17 1542   BP: (!) 176/61 96/59 105/60 149/55   Pulse: 60 65  68   Resp: 20 18  18   Temp: 97 6 °F (36 4 °C) 98 °F (36 7 °C)  98 3 °F (36 8 °C)   TempSrc: Oral   Oral   SpO2: 96% 96%  96%   Weight:  61 7 kg (136 lb 0 4 oz)     Height:  4' 6" (1 372 m)         Most recent labs:    Recent Labs      11/21/17   1821  11/22/17   0633  11/22/17   1214   WBC  5 66   --    --    HGB  12 1   --    --    HCT  36 3   --    --    PLT  311  230   --    K  5 9*  4 1   --    NA  135*  139   --    CALCIUM  9 1  8 7   --    BUN  23  17   --    CREATININE  1 19  1 10   --    LIPASE  256   --    --    TROPONINI   --    --   <0 02   AST  59*   --    --    ALT  20   --    --    ALKPHOS  70   --    --    BILITOT  0 34   --    --        Scheduled Meds:  acetaminophen 650 mg Oral Once   acetaminophen 975 mg Oral Q8H ROEL   amLODIPine 2 5 mg Oral Daily   aspirin 81 mg Oral Daily   cholecalciferol 1,000 Units Oral Daily   cyanocobalamin 1,000 mcg Oral Daily   enoxaparin 30 mg Subcutaneous Daily escitalopram 20 mg Oral Daily   insulin glargine 10 Units Subcutaneous HS   insulin lispro 1-5 Units Subcutaneous TID AC   insulin lispro 1-5 Units Subcutaneous HS   levothyroxine 75 mcg Oral Early Morning   lidocaine 1 patch Transdermal Daily   nystatin  Topical TID   pantoprazole 40 mg Oral Early Morning   pravastatin 40 mg Oral Daily With Dinner   sitaGLIPtin 25 mg Oral Daily     Continuous Infusions:  sodium chloride 75 mL/hr Last Rate: 75 mL/hr (11/22/17 1502)     PRN Meds: aluminum-magnesium hydroxide-simethicone    ondansetron    Surgical procedures (if appropriate):

## 2017-11-23 NOTE — PHYSICAL THERAPY NOTE
Physical Therapy Evaluation    Patient's Name: Jose Alfredo Farrell    Admitting Diagnosis  TIA (transient ischemic attack) [G45 9]  Altered mental status [R41 82]  Facial droop [R29 810]    Problem List  Patient Active Problem List   Diagnosis    Altered mental status    Dementia    Essential hypertension    [facility]    UTI (urinary tract infection)    Hyperkalemia    Dehydration    Falls    Epigastric pain       Past Medical History  Past Medical History:   Diagnosis Date    [facility]     Hyperlipidemia     Hypothyroidism     Macular degeneration     Osteoarthritis        Past Surgical History  History reviewed  No pertinent surgical history  11/23/17 0840   Note Type   Note type Eval only   Pain Assessment   Pain Assessment FLACC   Pain Rating: FLACC (Rest) - Face 1   Pain Rating: FLACC (Rest) - Legs 1   Pain Rating: FLACC (Rest) - Activity 1   Pain Rating: FLACC (Rest) - Cry 1   Pain Rating: FLACC (Rest) - Consolability 2   Score: FLACC (Rest) 6   Pain Rating: FLACC (Activity) - Face 1   Pain Rating: FLACC (Activity) - Legs 1   Pain Rating: FLACC (Activity) - Activity 1   Pain Rating: FLACC (Activity) - Cry 1   Pain Rating: FLACC (Activity) - Consolability 2   Score: FLACC (Activity) 6   Home Living   Type of Home Other (Comment)   Additional Comments Per CM notes- admitted from [facility]  Normally indep self care, ambulates w/ RW,  Recently seen in ED w / fall and sternal, rib B, and T/L spine fxs   Prior Function   Falls in the last 6 months 1 to 4   Restrictions/Precautions   Weight Bearing Precautions Per Order No   Other Precautions Cognitive; Chair Alarm; Bed Alarm;Multiple lines;Telemetry;O2;Fall Risk;Pain   General   Family/Caregiver Present No   Cognition   Overall Cognitive Status Impaired   Arousal/Participation Lethargic   Orientation Level Oriented to person   Memory Unable to assess   Following Commands Unable to follow one step commands Comments lethargic, not following any commands on eval   does not open eyes for more than 10% of session /     RLE Assessment   RLE Assessment (LEs drawn up, resists mvmt, strength 3 to 3+/5)   LLE Assessment   LLE Assessment (LEs drawn up, resists straightening, strength grossly 3-3+/5)   Coordination   Movements are Fluid and Coordinated 0   Bed Mobility   Rolling R 2  Maximal assistance   Additional items Assist x 1  (repositioned away from bed rails, resists care)   Endurance Deficit   Endurance Deficit Yes   Endurance Deficit Description fatigue, weakness, cog status, pain   Activity Tolerance   Activity Tolerance Patient limited by fatigue;Patient limited by pain;Treatment limited secondary to medical complications (Comment)   Nurse Made Aware yes   Assessment   Prognosis Guarded   Problem List Decreased strength;Decreased range of motion;Decreased endurance; Impaired balance;Decreased mobility; Decreased cognition; Impaired judgement;Decreased safety awareness;Decreased coordination;Pain   Assessment Pt seen for high complexity physical therapy evaluation, bedlevel only as very confused, resisting care  Pt is an 79 y/o female w/ history/comorbidities of HTN, DM, dementia, recent fall w/ fx as abovewho is now admitted w/ worsening MS, ? facial droop  dx was r/o CVA, MRI brain showed periventricular, subcortical and deep white matter abnormalities, suspicious for demyelinating dz  Undergoing w/u  Due to unclear medical dx, pain w/ resistance to crae, note unstable clinical picture  PT consulted to assess mobility, d/c needs  Pt presents w/ decreased bed mob, B LE ROM and strength, overall endurance  will benefit from skilled PT to correct for the above problems, as well as to assess mobility next session as medically appropriate   anticipate the need for snf rehab at d/c   Goals   Patient Goals none stated   [facility] Expiration Date 12/07/17   Short Term Goal #1 1-2 wks: PT to see to assess mobility tasks and set related goals and OC, increase B LE strength by 1/2 -1 grade, and B LE ROM to [facility], increase active participation in PT session to 100%   Treatment Day 0   Plan   Treatment/Interventions Functional transfer training;LE strengthening/ROM; Endurance training; Therapeutic exercise;Patient/family training;Equipment eval/education; Bed mobility;Gait training   PT Frequency 5x/wk   Recommendation   Recommendation (anticipate the need for snf rehab at d/c)   PT - OK to Discharge Yes  (when stable, likely to snf rehab)   Modified [place] Scale   Modified [place] Scale 5   Barthel Index   Feeding 5   Bathing 0   Grooming Score 0   Dressing Score 0   Bladder Score 0   Bowels Score 0   Toilet Use Score 0   Transfers (Bed/Chair) Score 0   Mobility (Level Surface) Score 0   Stairs Score 0   Barthel Index Score 5         Orlando Harris PT, DPT, CSRS

## 2017-11-24 PROBLEM — N39.0 UTI (URINARY TRACT INFECTION): Status: RESOLVED | Noted: 2017-11-21 | Resolved: 2017-11-24

## 2017-11-24 LAB
GLUCOSE SERPL-MCNC: 141 MG/DL (ref 65–140)
GLUCOSE SERPL-MCNC: 173 MG/DL (ref 65–140)
GLUCOSE SERPL-MCNC: 215 MG/DL (ref 65–140)
GLUCOSE SERPL-MCNC: 70 MG/DL (ref 65–140)

## 2017-11-24 PROCEDURE — G8987 SELF CARE CURRENT STATUS: HCPCS

## 2017-11-24 PROCEDURE — 97166 OT EVAL MOD COMPLEX 45 MIN: CPT

## 2017-11-24 PROCEDURE — G8988 SELF CARE GOAL STATUS: HCPCS

## 2017-11-24 PROCEDURE — 97116 GAIT TRAINING THERAPY: CPT

## 2017-11-24 PROCEDURE — 92526 ORAL FUNCTION THERAPY: CPT

## 2017-11-24 PROCEDURE — 82948 REAGENT STRIP/BLOOD GLUCOSE: CPT

## 2017-11-24 RX ORDER — INSULIN GLARGINE 100 [IU]/ML
7 INJECTION, SOLUTION SUBCUTANEOUS
Status: DISCONTINUED | OUTPATIENT
Start: 2017-11-24 | End: 2017-11-27 | Stop reason: HOSPADM

## 2017-11-24 RX ADMIN — ACETAMINOPHEN 975 MG: 325 TABLET, FILM COATED ORAL at 17:43

## 2017-11-24 RX ADMIN — PANTOPRAZOLE SODIUM 40 MG: 40 TABLET, DELAYED RELEASE ORAL at 05:57

## 2017-11-24 RX ADMIN — NYSTATIN: 100000 POWDER TOPICAL at 10:25

## 2017-11-24 RX ADMIN — ACETAMINOPHEN 975 MG: 325 TABLET, FILM COATED ORAL at 05:56

## 2017-11-24 RX ADMIN — INSULIN LISPRO 1 UNITS: 100 INJECTION, SOLUTION INTRAVENOUS; SUBCUTANEOUS at 21:13

## 2017-11-24 RX ADMIN — CYANOCOBALAMIN TAB 500 MCG 1000 MCG: 500 TAB at 10:23

## 2017-11-24 RX ADMIN — ESCITALOPRAM OXALATE 20 MG: 20 TABLET ORAL at 10:22

## 2017-11-24 RX ADMIN — ACETAMINOPHEN 975 MG: 325 TABLET, FILM COATED ORAL at 21:07

## 2017-11-24 RX ADMIN — AMLODIPINE BESYLATE 2.5 MG: 2.5 TABLET ORAL at 10:23

## 2017-11-24 RX ADMIN — INSULIN LISPRO 1 UNITS: 100 INJECTION, SOLUTION INTRAVENOUS; SUBCUTANEOUS at 12:00

## 2017-11-24 RX ADMIN — PRAVASTATIN SODIUM 40 MG: 40 TABLET ORAL at 17:43

## 2017-11-24 RX ADMIN — VITAMIN D, TAB 1000IU (100/BT) 1000 UNITS: 25 TAB at 10:23

## 2017-11-24 RX ADMIN — LIDOCAINE 1 PATCH: 50 PATCH TOPICAL at 10:23

## 2017-11-24 RX ADMIN — NYSTATIN: 100000 POWDER TOPICAL at 21:08

## 2017-11-24 RX ADMIN — LEVOTHYROXINE SODIUM 75 MCG: 75 TABLET ORAL at 05:57

## 2017-11-24 RX ADMIN — ASPIRIN 81 MG 81 MG: 81 TABLET ORAL at 10:23

## 2017-11-24 RX ADMIN — INSULIN GLARGINE 7 UNITS: 100 INJECTION, SOLUTION SUBCUTANEOUS at 21:07

## 2017-11-24 RX ADMIN — NYSTATIN: 100000 POWDER TOPICAL at 17:45

## 2017-11-24 RX ADMIN — SITAGLIPTIN 25 MG: 25 TABLET, FILM COATED ORAL at 10:25

## 2017-11-24 RX ADMIN — ENOXAPARIN SODIUM 30 MG: 30 INJECTION SUBCUTANEOUS at 10:22

## 2017-11-24 NOTE — PLAN OF CARE
Problem: OCCUPATIONAL THERAPY ADULT  Goal: Performs self-care activities at highest level of function for planned discharge setting  See evaluation for individualized goals  See flowsheet documentation for full assessment, interventions and recommendations  Limitation: Decreased ADL status, Decreased UE strength, Decreased Safe judgement during ADL, Decreased cognition, Decreased endurance, Decreased fine motor control, Decreased self-care trans  Prognosis: Fair  Assessment: Pt  is a 81 y/o/ female who was admitted to \[facility]\"" on 11/21/17 with a dx  AMS and report of R facial droop and weakness  TWAN Calvin rec  MRI to rule out CVA, however was negative for acute findings  Pt  w/a significant PMH of dementia, DM, HTN  Pt  currently lives in a [facility], pt  (I) in all ADLs except received (s) for bathing  Pt used a RW for functional mobility  Currently, pt  requires mod A x2 with RW for functional mobility and transfers, mod A for UB ADLs and Max A for LB ADLs  A is needed d/t the following impairments:decreased strength, decreased functional activity tolerance, generalized weakness, deconditioning, decreased standing balance, decreased sustained attention, decreased STM,  decreased insight into condition, decreased safety awareness, decreased speed of information processing and increased repetition of v c  Additionally, pt  requires mod v c  during functional activity 2* to cognitive deficits noted  Therefore, pt  will benefit from skilled OT services to maximize functional gains, monitor status and prevent decline  Will continue to follow pt  2-3x/week to meet the goals described below in 7-10 days  Recommend STR once medically stable  It should be noted that per CM son is considering SNF placement at this time       OT Discharge Recommendation: Short Term Rehab  OT - OK to Discharge: Yes    BALJINDER Avila, OTR/L

## 2017-11-24 NOTE — SPEECH THERAPY NOTE
Speech Language/Pathology                             SLP  Note  Patient Name: Sera Fuentes  UJHCK'A Date: 11/24/2017     Problem List  Patient Active Problem List   Diagnosis    Altered mental status    Dementia    Essential hypertension    [facility] mellitus ([facility] )    UTI (urinary tract infection)    Dehydration    Falls    Epigastric pain     Past Medical History  Past Medical History:   Diagnosis Date    [facility]     Hyperlipidemia     Hypothyroidism     Macular degeneration     Osteoarthritis      Past Surgical History  History reviewed  No pertinent surgical history  Subjective:  Pt alert and confused  Not as verbal today  Objective:  Pt seen for f/u dysphagia tx  Assessment:  able to feed self but drops things at times  Ate chopped green beans w/ fingers wfl  When i gave her the ice cream she stuck her finger in it  Able to use the spoon  No s/s aspiration  mastication was adequate for mechanical soft  Good intake  Nurse reported no concerns  Plan/Recommendations:  Noted pt will be going to snf for rehab  Recommend dysphagia 2 mechanical soft w/ thin liquids  D/c ST

## 2017-11-24 NOTE — ASSESSMENT & PLAN NOTE
· Cause unclear - likely worsening of chronic demetia  · Prior to admission, there was note of facial droop, no longer present on exam  · MRI showed no stroke  · EEG showed diffuse cerebral dysfunction  · No clear metabolic cause at this time, although she was recently treated for UTI  F/U urine culture  · Continue to monitor for infection  · Continue IVFs  Patient appears dehydrated  · Consider progression of dementia  Discussed this with patient's son   Will d/c to STR with plan to transition to LTC

## 2017-11-24 NOTE — PLAN OF CARE
Problem: PHYSICAL THERAPY ADULT  Goal: Performs mobility at highest level of function for planned discharge setting  See evaluation for individualized goals  Treatment/Interventions: Functional transfer training, LE strengthening/ROM, Endurance training, Therapeutic exercise, Patient/family training, Equipment eval/education, Bed mobility, Gait training          See flowsheet documentation for full assessment, interventions and recommendations  Outcome: Progressing  Prognosis: Guarded  Problem List: Decreased strength, Decreased endurance, Impaired balance, Decreased mobility, Decreased cognition, Impaired judgement, Decreased safety awareness  Assessment: Pt seen for completion of eval, gait training x 15 min   more awake, alert and willing to mobilize w/ coaxing  Very confused and needs re-direction  Pt presents w/ decreased functional mob, standing and sitting balance, endurance, B LE strength, barriers at home  will benefit from skilled PT to correct for the above problems  Recommend rehAb at d/c        Recommendation:  (recommend rehab at d/c)     PT - OK to Discharge: (S) Yes (to rehab when stable)    See flowsheet documentation for full assessment

## 2017-11-24 NOTE — PROGRESS NOTES
Progress Note - Adalberto Davison 80 y o  female MRN: [id number]    Unit/Bed#: [facility] 728-01 Encounter: [id number]        * Altered mental status   Assessment & Plan    · Cause unclear - likely worsening of chronic demetia  · Prior to admission, there was note of facial droop, no longer present on exam  · MRI showed no stroke  · EEG showed diffuse cerebral dysfunction  · No clear metabolic cause at this time, although she was recently treated for UTI  F/U urine culture  · Continue to monitor for infection  · Continue IVFs  Patient appears dehydrated  · Consider progression of dementia  Discussed this with patient's son  Will d/c to STR with plan to transition to LTC        Diabetes mellitus ([facility] [address] )   Assessment & Plan    AM BS 70  Will decrease Lantus to 7U qhs  A1c 8 9  - possibly elevated due to dietary indiscretion as BSs normal here  Essential hypertension   Assessment & Plan    Stable  C/w current meds  Dementia   Assessment & Plan    · Son noted overall decline over last year  · Feels she needs upgrade from assisted living to skilled nursing upon discharge        UTI (urinary tract infection)resolved as of 11/24/2017   Assessment & Plan    · Recently complete course of Macrobid  · U cx grew > 100,0000 alpha-hemolytic strep  · Hold off on Abx as this likely a contaminant              VTE Pharmacologic Prophylaxis:   Pharmacologic: Enoxaparin (Lovenox)  Mechanical: Mechanical VTE prophylaxis in place  Patient Centered Rounds: I have performed bedside rounds with nursing staff today  Discussions with Specialists or Other Care Team Provider: n/a  Education and Discussions with Family / Patient: CM d/w son  Time Spent for Care: 30 minutes  More than 50% of total time spent on counseling and coordination of care as described above      Current Length of Stay: 3 day(s)  Current Patient Status: Inpatient   Certification Statement: The patient will continue to require additional inpatient hospital stay due to need for str    Discharge Plan: pending str  Code Status: Level 1 - Full Code    Subjective:   Pt seen and examined  No acute complaints  No o/n events    Objective:   Vitals:   Temp (24hrs), Av 8 °F (36 6 °C), Min:97 5 °F (36 4 °C), Max:98 3 °F (36 8 °C)    HR:  [62-69] 69  Resp:  [16-18] 18  BP: (139-157)/(63-85) 139/69  SpO2:  [95 %-98 %] 95 %  Body mass index is 32 8 kg/m²  Input and Output Summary (last 24 hours): Intake/Output Summary (Last 24 hours) at 17 1624  Last data filed at 17 0842   Gross per 24 hour   Intake          1508 75 ml   Output              790 ml   Net           718 75 ml       Physical Exam:     Physical Exam   Constitutional: She appears well-developed and well-nourished  HENT:   Head: Normocephalic and atraumatic  Eyes: Conjunctivae and EOM are normal    Neck: Normal range of motion  Neck supple  Cardiovascular: Normal rate and regular rhythm  Pulmonary/Chest: Effort normal and breath sounds normal    Abdominal: Soft  Bowel sounds are normal  She exhibits no distension  There is no tenderness  Musculoskeletal: Normal range of motion  She exhibits no edema  Neurological: She is alert  Oriented x 2   Skin: Skin is warm and dry  Additional Data:   Labs:    Results from last 7 days  Lab Units 17  0554   WBC Thousand/uL 6 11   HEMOGLOBIN g/dL 13 4   HEMATOCRIT % 40 7   PLATELETS Thousands/uL 261   NEUTROS PCT % 51   LYMPHS PCT % 35   MONOS PCT % 8   EOS PCT % 5       Results from last 7 days  Lab Units 17  0554   SODIUM mmol/L 143   POTASSIUM mmol/L 3 7   CHLORIDE mmol/L 111*   CO2 mmol/L 26   BUN mg/dL 13   CREATININE mg/dL 0 99   CALCIUM mg/dL 9 3   TOTAL PROTEIN g/dL 7 8   BILIRUBIN TOTAL mg/dL 0 60   ALK PHOS U/L 76   ALT U/L 16   AST U/L 16   GLUCOSE RANDOM mg/dL 109           * I Have Reviewed All Lab Data Listed Above  * Additional Pertinent Lab Tests Reviewed:  Javier 66 Admission Reviewed    Imaging:    Imaging Reports Reviewed Today Include: n/a    Cultures:   Blood Culture: No results found for: BLOODCX  Urine Culture:   Lab Results   Component Value Date    URINECX (A) 11/21/2017     >100,000 cfu/ml Alpha Hemolytic Streptococcus NOT Enterococcus    URINECX 5844-5337 cfu/ml Gram Negative Parvez (A) 11/21/2017    URINECX >100,000 cfu/ml Escherichia coli (A) 11/15/2017    URINECX >100,000 cfu/ml Aerococcus urinae (A) 11/15/2017     Sputum Culture: No components found for: SPUTUMCX  Wound Culture: No results found for: WOUNDCULT    Last 24 Hours Medication List:     acetaminophen 650 mg Oral Once   acetaminophen 975 mg Oral Q8H [address]   amLODIPine 2 5 mg Oral Daily   aspirin 81 mg Oral Daily   cholecalciferol 1,000 Units Oral Daily   cyanocobalamin 1,000 mcg Oral Daily   enoxaparin 30 mg Subcutaneous Daily   escitalopram 20 mg Oral Daily   insulin glargine 7 Units Subcutaneous HS   insulin lispro 1-5 Units Subcutaneous TID AC   insulin lispro 1-5 Units Subcutaneous HS   levothyroxine 75 mcg Oral Early Morning   lidocaine 1 patch Transdermal Daily   nystatin  Topical TID   pantoprazole 40 mg Oral Early Morning   pravastatin 40 mg Oral Daily With Dinner   sitaGLIPtin 25 mg Oral Daily        Today, Patient Was Seen By: Santi Alcala DO    ** Please Note: Dragon 360 Dictation voice to text software may have been used in the creation of this document   **

## 2017-11-24 NOTE — PHYSICAL THERAPY NOTE
Physical Therapy Treatment Note     11/24/17 0935   Pain Assessment   Pain Assessment FLACC   Pain Rating: FLACC (Rest) - Face 1   Pain Rating: FLACC (Rest) - Legs 1   Pain Rating: FLACC (Rest) - Activity 1   Pain Rating: FLACC (Rest) - Cry 1   Pain Rating: FLACC (Rest) - Consolability 1   Score: FLACC (Rest) 5   Pain Rating: FLACC (Activity) - Face 1   Pain Rating: FLACC (Activity) - Legs 1   Pain Rating: FLACC (Activity) - Activity 1   Pain Rating: FLACC (Activity) - Cry 1   Pain Rating: FLACC (Activity) - Consolability 1   Score: FLACC (Activity) 5   Restrictions/Precautions   Weight Bearing Precautions Per Order No   Other Precautions Cognitive; Chair Alarm; Bed Alarm;Multiple lines; Fall Risk;Pain   General   Chart Reviewed Yes   Family/Caregiver Present No   Cognition   Overall Cognitive Status Impaired   Arousal/Participation Arousable   Attention Attends with cues to redirect   Orientation Level Oriented to person   Memory Unable to assess   Following Commands Follows one step commands inconsistently   Subjective   Subjective wakes to voice  very confused  "where am I and why am I here?"   Bed Mobility   Supine to Sit 3  Moderate assistance   Additional items Assist x 2   Transfers   Sit to Stand 3  Moderate assistance   Additional items Assist x 2   Stand to Sit 3  Moderate assistance   Additional items Assist x 2   Ambulation/Elevation   Gait pattern (slow, B knee buckling, short step length)   Gait Assistance 3  Moderate assist   Additional items Assist x 2   Assistive Device Rolling walker   Distance 7'x1 w/ chair follow   Balance   Static Sitting Fair   Dynamic Sitting Poor +   Static Standing Poor   Dynamic Standing Poor   Ambulatory Poor -   Endurance Deficit   Endurance Deficit Yes   Endurance Deficit Description fatigue, weakness, cog   Activity Tolerance   Activity Tolerance Patient limited by fatigue;Patient limited by pain;Treatment limited secondary to medical complications (Comment); Other (Comment)  (cog)   Nurse Made Aware yes   Assessment   Prognosis Guarded   Problem List Decreased strength;Decreased endurance; Impaired balance;Decreased mobility; Decreased cognition; Impaired judgement;Decreased safety awareness   Assessment Pt seen for completion of eval, gait training x 15 min   more awake, alert and willing to mobilize w/ coaxing  Very confused and needs re-direction  Pt presents w/ decreased functional mob, standing and sitting balance, endurance, B LE strength, barriers at home  will benefit from skilled PT to correct for the above problems  Recommend rehAb at d/c   Goals   Patient Goals none stated   STG Expiration Date 12/08/17   Short Term Goal #1 1-2 wks: bed mob w/ S, sitting balance to good/normal, standing balance to fair w/ device, ambulate  ft w/ RW and min A, increase B LE strength by 1/2 -1 grade   Treatment Day 1   Plan   Treatment/Interventions Functional transfer training;LE strengthening/ROM; Therapeutic exercise; Endurance training;Patient/family training;Equipment eval/education; Bed mobility;Gait training   Progress Slow progress, medical status limitations   PT Frequency 5x/wk   Recommendation   Recommendation (recommend rehab at d/c)   Equipment Recommended Christa Frias   PT - OK to Discharge Yes  (to rehab when stable)   Rosey Velazquez PT, DPT CSRS

## 2017-11-24 NOTE — PROGRESS NOTES
Progress Note - Neurology   Shimon Taveras 80 y o  female MRN: [id number]  [facility] Encounter: [id number]    Assessment:  80year old female presented for evaluation of acute changes in mental status and right sided weakness in the setting of advanced dementia and underlying medical conditions, with gradual improvement noted during her hospitalization      - MRI brain was completed and she has diffuse brain atrophy with no acute findings appreciated  - EEG was completed - predominantly theta and delta activity  As previously stated, the patient likely has moderate toxic metabolic encephalopathy in the setting of advanced dementia with sundowning  Plan:  -  Follow up with primary neurology services upon discharge  -  Continue to treat underlying medical condition     -  Continue asa and pravastatin  -  Fall precautions, high fall risk in the blind patient with underlying moderate to severe dementia  Safe supportive environment    -  Reviewed case with attending, whom was in to evaluate and examine patient    -  Notify neurology with any questions  Subjective:   No events reported overnight or documented  Patient denies complaints, however, confused  Patient focused on why she is here and who put her in the hospital       Vitals: Blood pressure 141/63, pulse 62, temperature 98 3 °F (36 8 °C), temperature source Oral, resp  rate 17, height 4' 6" (1 372 m), weight 61 7 kg (136 lb 0 4 oz), SpO2 96 %  ,Body mass index is 32 8 kg/m²         Current Facility-Administered Medications:     acetaminophen (TYLENOL) tablet 650 mg, 650 mg, Oral, Once, Braeden Bowser MD    acetaminophen (TYLENOL) tablet 975 mg, 975 mg, Oral, Q8H [address], Ervin Coelho PA-C, 975 mg at 11/24/17 0556    aluminum-magnesium hydroxide-simethicone (MYLANTA) 200-200-20 mg/5 mL oral suspension 30 mL, 30 mL, Oral, Q4H PRN, Ervin Coelho PA-C    amLODIPine (NORVASC) tablet 2 5 mg, 2 5 mg, Oral, Daily, Lucia Ramon MD, 2 5 mg at 11/24/17 1023    aspirin chewable tablet 81 mg, 81 mg, Oral, Daily, Lucia Ramon MD, 81 mg at 11/24/17 1023    cholecalciferol (VITAMIN D3) tablet 1,000 Units, 1,000 Units, Oral, Daily, Lucia Ramon MD, 1,000 Units at 11/24/17 1023    cyanocobalamin (VITAMIN B-12) tablet 1,000 mcg, 1,000 mcg, Oral, Daily, Lucia Ramon MD, 1,000 mcg at 11/24/17 1023    enoxaparin (LOVENOX) subcutaneous injection 30 mg, 30 mg, Subcutaneous, Daily, Lucia Ramon MD, 30 mg at 11/24/17 1022    escitalopram (LEXAPRO) tablet 20 mg, 20 mg, Oral, Daily, Lucia Ramon MD, 20 mg at 11/24/17 1022    insulin glargine (LANTUS) subcutaneous injection 10 Units, 10 Units, Subcutaneous, HS, Lucia Ramon MD, 10 Units at 11/23/17 2117    insulin lispro (HumaLOG) 100 units/mL subcutaneous injection 1-5 Units, 1-5 Units, Subcutaneous, TID AC, 2 Units at 11/22/17 1659 **AND** Fingerstick Glucose (POCT), , , TID AC, Lucia Ramon MD    insulin lispro (HumaLOG) 100 units/mL subcutaneous injection 1-5 Units, 1-5 Units, Subcutaneous, HS, Lucia Ramon MD, 1 Units at 11/23/17 2117    levothyroxine tablet 75 mcg, 75 mcg, Oral, Early Morning, Lucia Ramon MD, 75 mcg at 11/24/17 0557    lidocaine (LIDODERM) 5 % patch 1 patch, 1 patch, Transdermal, Daily, Mercedes Ward PA-C, 1 patch at 11/24/17 1023    nystatin (MYCOSTATIN) powder, , Topical, TID, Lucia Ramon MD    ondansetron (ZOFRAN) injection 4 mg, 4 mg, Intravenous, Q6H PRN, Lucia Ramon MD    pantoprazole (PROTONIX) EC tablet 40 mg, 40 mg, Oral, Early Morning, Mercedes Ward PA-C, 40 mg at 11/24/17 0557    pravastatin (PRAVACHOL) tablet 40 mg, 40 mg, Oral, Daily With Caryle Boyers, MD, 40 mg at 11/23/17 1705    sitaGLIPtin (JANUVIA) tablet 25 mg, 25 mg, Oral, Daily, Lucia Ramon MD, 25 mg at 11/24/17 1025    sodium chloride 0 9 % infusion, 75 mL/hr, Intravenous, Continuous, Carmelo Ríos MD, Last Rate: 75 mL/hr at 11/23/17 2120, 75 mL/hr at 11/23/17 2120    Physical Exam:   NAD, no acute distress  HEENT - NC/AT  Heart - rate regular  Lungs - CTAB, no wheezing  Abdomen - non - tender to touch  Extremities - no edema  She is awake and alert, she is able to tell me her name, not year, not place  Poor insight into current condition, poor historian  Follows simple commands, difficulty with more complex commands, needs extra cueing with simple commands  She at times does not follow commands when asked, she repeats "who put me in here"    CN - pupils are equal and reactive, no gaze preference eomi, no facial droop, facial sensation equal reported to gross touch, tongue midline, SCM intact, blinks to threat bilateral, SCM intact, no tongue fasciculations noted  Motor - moves all extremities equally, does not cooperative with formal testing, no drift noted in uppers or lowers at least 4/5 strength with out lateralizing features noted    FTN - no ataxia noted, needed extra cueing    Toes - equivical    Lab, Imaging and other studies: I have personally reviewed pertinent reports  Recent Results (from the past 24 hour(s))   Fingerstick Glucose (POCT)    Collection Time: 11/23/17 10:50 AM   Result Value Ref Range    POC Glucose 117 65 - 140 mg/dl   Fingerstick Glucose (POCT)    Collection Time: 11/23/17  4:55 PM   Result Value Ref Range    POC Glucose 84 65 - 140 mg/dl   Fingerstick Glucose (POCT)    Collection Time: 11/23/17  9:12 PM   Result Value Ref Range    POC Glucose 150 (H) 65 - 140 mg/dl   Fingerstick Glucose (POCT)    Collection Time: 11/24/17  6:58 AM   Result Value Ref Range    POC Glucose 70 65 - 140 mg/dl     Counseling / Coordination of Care  Total time spent today 20 minutes  Greater than 50% of total time was spent with the patient and / or family counseling and / or coordination of care   A description of the counseling / coordination of care: floor time, reviewing records, new to examiner, reviewing tests and imaging, discussing with nursing

## 2017-11-24 NOTE — OCCUPATIONAL THERAPY NOTE
OccupationalTherapy Evaluation     Patient Name: Barby Carrizales Date: 11/24/2017  Problem List  Patient Active Problem List   Diagnosis    Altered mental status    Dementia    Essential hypertension    [facility] mellitus [facility] [address] )    UTI (urinary tract infection)    Dehydration    Falls    Epigastric pain     Past Medical History  Past Medical History:   Diagnosis Date    Diabetes mellitus ([facility] [address] )     Hyperlipidemia     Hypothyroidism     Macular degeneration     Osteoarthritis      Past Surgical History  History reviewed  No pertinent surgical history  11/24/17 0900   Note Type   Note type Eval/Treat   Restrictions/Precautions   Weight Bearing Precautions Per Order No   Other Precautions Cognitive; Chair Alarm; Bed Alarm;Multiple lines; Fall Risk;THR   Pain Assessment   Pain Assessment No/denies pain   Home Living   Type of Home Assisted living  [facility])   Home Layout One level   Additional Comments per CM, son is thinking about alrternative placement in [facility]   Prior Function   Level of [place] Independent with ADLs and functional mobility  ((S) for bathing, mobuility with RW)   Lives With Alone   ADL Assistance Independent   IADLs Needs assistance   Falls in the last 6 months 1 to 4   Vocational Retired   Lifestyle   Autonomy previously (I) in [place], required (S) for bathings, favility staff A with IADLS   Reciprocal Relationships has supportive rhea archibald is well known by facility staff   Service to Others "I worked whereever I could find a job"   Intrinsic Gratification "I don't like to do anything but sleep "   Psychosocial   Psychosocial (WDL) WDL   Subjective   Subjective "I look better to you but I still feel bad"   ADL   Eating Assistance 5  Supervision/Setup   Grooming Assistance 5  Supervision/Setup   UB Bathing Assistance 3  Moderate [address] 2  Maximal Assistance   [address] 3  Moderate Assistance    [facility] 2  Maximal Assistance   Toileting Assistance  2  Maximal Assistance   Bed Mobility   Rolling R 4  Minimal assistance   Supine to Sit 3  Moderate assistance   Sit to Supine 3  Moderate assistance   Transfers   Sit to Stand 3  Moderate assistance   Additional items Assist x 2;Verbal cues   Stand to Sit 3  Moderate assistance   Additional items Assist x 2;Verbal cues   Functional Mobility   Functional Mobility 3  Moderate assistance   Balance   Static Sitting Fair   Dynamic Sitting Poor +   Static Standing Poor +   Dynamic Standing Poor   Ambulatory Poor   Activity Tolerance   Activity Tolerance Patient limited by fatigue   RUE Assessment   RUE Assessment X  (MMT grossly 3+/5)   LUE Assessment   LUE Assessment X  (MMT grossly 3+/5)   Hand Function   Gross Motor Coordination Functional   Fine Motor Coordination Impaired   Sensation   Light Touch No apparent deficits   Cognition   Overall Cognitive Status Impaired   Arousal/Participation Cooperative   Attention Difficulty attending to directions   Orientation Level Oriented to person   Memory Decreased recall of recent events   Following Commands Follows one step commands with increased time or repetition   Assessment   Limitation Decreased ADL status; Decreased UE strength;Decreased Safe judgement during ADL;Decreased cognition;Decreased endurance;Decreased fine motor control;Decreased self-care trans   Prognosis Fair   Assessment Pt  is a 81 y/o/ female who was [facility] to [facility]"" on 11/21/17 with a dx  AMS and report of R facial droop and weakness  TWAN Calvin rec  MRI to rule out CVA, however was negative for acute findings  Pt  w/a significant PMH of dementia, DM, HTN  Pt  currently lives in a [facility]  PTA, pt  (I) in all ADLs except received (s) for bathing  Pt used a RW for functional mobility  Currently, pt  requires mod A x2 with RW for functional mobility and transfers, mod A for UB ADLs and Max A for LB ADLs   A is needed d/t the following impairments:decreased strength, decreased functional activity tolerance, generalized weakness, deconditioning, decreased standing balance, decreased sustained attention, decreased STM,  decreased insight into condition, decreased safety awareness, decreased speed of information processing and increased repetition of v c  Additionally, pt  requires mod v c  during functional activity 2* to cognitive deficits noted  Therefore, pt  will benefit from skilled OT services to maximize functional gains, monitor status and prevent decline  Will continue to follow pt  2-3x/week to meet the goals described below in 7-10 days  Recommend STR once medically stable  It should be noted that per CM son is considering SNF placement at this time  Recommendation   OT Discharge Recommendation Short Term Rehab   OT - OK to Discharge Yes   Barthel Index   Feeding 10   Bathing 5   Grooming Score 5   Dressing Score 5   Bladder Score 10   Bowels Score 5   Toilet Use Score 5   Transfers (Bed/Chair) Score 5   Mobility (Level Surface) Score 0   Stairs Score 0   Barthel Index Score 50   Modified Beacon Scale   Modified Beacon Scale 4     GOALS    Patient will perform all functional mobility and transfers at a mod I level with use of the least restrictive device  Pt  will perform bed mobility at a mod I level with G balance and activity tolerance  Pt  will complete LB/UB dressing at a mod I level with AE as needed  Pt  will complete all grooming activities at a mod I level while standing at sink  Pt  will complete a toileting tasks at a mod I level  Pt will complete LB/UB bathing at a (s) level with the use of AE as needed  Patient will participate in 20 minutes of functional activity without any overt signs of fatigue or abnormal vitals to demonstrate G endurance as it is required for ADLs/functional activity  Pt  Will follow simple, 1 step commands 100% of trials in order to progress to PLOF      Pt  will increase sustained attention to task to 10 min with no more than 3 v c  to redirect attention to task  Pt will engage in ongoing cognitive assessment w/ G participation to A w/ safe d/c planning/recommendation         BALJINDER Jean, OTR/L

## 2017-11-24 NOTE — ASSESSMENT & PLAN NOTE
AM BS 70  Will decrease Lantus to 7U qhs  A1c 8 9  - possibly elevated due to dietary indiscretion as BSs normal here

## 2017-11-24 NOTE — SOCIAL WORK
Call to pts son Vito and explained the recommnendation for SNF STR rehab at [place]  Pts son is agreeable to referrals being made to [facility], [facility], and [address] [place] [address]  I explained that once we have an accepting facility we will contact him  Pt states he is going to talk to his wife to see what facility he would want as his first choice

## 2017-11-24 NOTE — ASSESSMENT & PLAN NOTE
· Recently complete course of Macrobid    · U cx grew > 100,0000 alpha-hemolytic strep  · Hold off on Abx as this likely a contaminant

## 2017-11-25 LAB
GLUCOSE SERPL-MCNC: 156 MG/DL (ref 65–140)
GLUCOSE SERPL-MCNC: 218 MG/DL (ref 65–140)
GLUCOSE SERPL-MCNC: 309 MG/DL (ref 65–140)
GLUCOSE SERPL-MCNC: 87 MG/DL (ref 65–140)

## 2017-11-25 PROCEDURE — 82948 REAGENT STRIP/BLOOD GLUCOSE: CPT

## 2017-11-25 RX ADMIN — INSULIN LISPRO 2 UNITS: 100 INJECTION, SOLUTION INTRAVENOUS; SUBCUTANEOUS at 23:04

## 2017-11-25 RX ADMIN — ENOXAPARIN SODIUM 30 MG: 30 INJECTION SUBCUTANEOUS at 09:45

## 2017-11-25 RX ADMIN — VITAMIN D, TAB 1000IU (100/BT) 1000 UNITS: 25 TAB at 09:45

## 2017-11-25 RX ADMIN — ALUMINUM HYDROXIDE, MAGNESIUM HYDROXIDE, AND SIMETHICONE 30 ML: 200; 200; 20 SUSPENSION ORAL at 23:04

## 2017-11-25 RX ADMIN — LEVOTHYROXINE SODIUM 75 MCG: 75 TABLET ORAL at 05:34

## 2017-11-25 RX ADMIN — NYSTATIN 1 APPLICATION: 100000 POWDER TOPICAL at 17:51

## 2017-11-25 RX ADMIN — PRAVASTATIN SODIUM 40 MG: 40 TABLET ORAL at 17:51

## 2017-11-25 RX ADMIN — ACETAMINOPHEN 975 MG: 325 TABLET, FILM COATED ORAL at 17:56

## 2017-11-25 RX ADMIN — CYANOCOBALAMIN TAB 500 MCG 1000 MCG: 500 TAB at 09:45

## 2017-11-25 RX ADMIN — AMLODIPINE BESYLATE 2.5 MG: 2.5 TABLET ORAL at 09:45

## 2017-11-25 RX ADMIN — NYSTATIN: 100000 POWDER TOPICAL at 23:07

## 2017-11-25 RX ADMIN — INSULIN LISPRO 3 UNITS: 100 INJECTION, SOLUTION INTRAVENOUS; SUBCUTANEOUS at 11:42

## 2017-11-25 RX ADMIN — INSULIN GLARGINE 7 UNITS: 100 INJECTION, SOLUTION SUBCUTANEOUS at 23:03

## 2017-11-25 RX ADMIN — NYSTATIN 1 APPLICATION: 100000 POWDER TOPICAL at 09:46

## 2017-11-25 RX ADMIN — ESCITALOPRAM OXALATE 20 MG: 20 TABLET ORAL at 09:45

## 2017-11-25 RX ADMIN — PANTOPRAZOLE SODIUM 40 MG: 40 TABLET, DELAYED RELEASE ORAL at 05:34

## 2017-11-25 RX ADMIN — LIDOCAINE 1 PATCH: 50 PATCH TOPICAL at 09:45

## 2017-11-25 RX ADMIN — ACETAMINOPHEN 975 MG: 325 TABLET, FILM COATED ORAL at 05:34

## 2017-11-25 RX ADMIN — INSULIN LISPRO 1 UNITS: 100 INJECTION, SOLUTION INTRAVENOUS; SUBCUTANEOUS at 17:51

## 2017-11-25 RX ADMIN — SITAGLIPTIN 25 MG: 25 TABLET, FILM COATED ORAL at 09:46

## 2017-11-25 RX ADMIN — ASPIRIN 81 MG 81 MG: 81 TABLET ORAL at 09:45

## 2017-11-25 NOTE — SOCIAL WORK
CM called pt's son Augistine to f/up on pt's d/c plan  [facility] is unable to accept pt  [facility] and [facility] continue to review  Pt's son did not have a first choice and had not yet reviewed SNF list  Pt's son reported that he was not in agreement with pt being d/c to any [facility] facility  CM and pt's son discussed additional facilities that accept AZALEA JANSEN for LTC  Pt's son in agreement with additional referrals to [facility], [facility], [facility], [facility] and [facility]  Referrals made to [facility] via [facility] and SNF list left in pt's room for son to

## 2017-11-25 NOTE — PROGRESS NOTES
Progress Note - Dunia Parada 80 y o  female MRN: [id number]    Unit/Bed#: [facility] 728-01 Encounter: [id number]        * Altered mental status   Assessment & Plan    · Cause unclear - likely worsening of chronic demetia  · Prior to admission, there was note of facial droop, no longer present on exam  · MRI showed no stroke  · EEG showed diffuse cerebral dysfunction  · No clear metabolic cause at this time, although she was recently treated for UTI  F/U urine culture  · Continue to monitor for infection  · Continue IVFs  Patient appears dehydrated  · Consider progression of dementia  Discussed this with patient's son  Will d/c to STR with plan to transition to LTC        Epigastric pain   Assessment & Plan    · No abnormality on CT scan - 2/2 GERD  · Increase Protonix to 40mg  Add Mylanta  · Cardiac cause not suspected  Troponins normal  EKG unremarkable  Diabetes mellitus ([facility] [address] )   Assessment & Plan    AM BS 87, but prelunch BS > 300  c/w Lantus 7U qhs and ISS  Added 3U novolog TIDAC   A1c 8 9  - possibly elevated due to dietary indiscretion as BSs normal here  Essential hypertension   Assessment & Plan    Stable  C/w current meds  Dementia   Assessment & Plan    · Son noted overall decline over last year  · Feels she needs upgrade from assisted living to skilled nursing upon discharge            VTE Pharmacologic Prophylaxis:   Pharmacologic: Enoxaparin (Lovenox)  Mechanical: Mechanical VTE prophylaxis in place  Patient Centered Rounds: I have performed bedside rounds with nursing staff today  Discussions with Specialists or Other Care Team Provider: n/a  Education and Discussions with Family / Patient: CM d/w son  Time Spent for Care: 30 minutes  More than 50% of total time spent on counseling and coordination of care as described above      Current Length of Stay: 4 day(s)  Current Patient Status: Inpatient   Certification Statement: The patient will continue to require additional inpatient hospital stay due to need for STR    Discharge Plan: pending STR placement  Code Status: Level 1 - Full Code    Subjective:   Pt seen and examined  C/o intermittent epigastric pain  But no o/n events per RN  Objective:   Vitals:   Temp (24hrs), Av 4 °F (36 9 °C), Min:98 3 °F (36 8 °C), Max:98 5 °F (36 9 °C)    HR:  [60-69] 69  Resp:  [18-20] 20  BP: (139-172)/(70-85) 172/85  SpO2:  [94 %-96 %] 96 %  Body mass index is 32 8 kg/m²  Input and Output Summary (last 24 hours): Intake/Output Summary (Last 24 hours) at 17 1542  Last data filed at 17 1300   Gross per 24 hour   Intake              600 ml   Output              650 ml   Net              -50 ml       Physical Exam:     Physical Exam   Constitutional: She appears well-developed and well-nourished  HENT:   Head: Normocephalic and atraumatic  Eyes: Conjunctivae and EOM are normal    Neck: Normal range of motion  Neck supple  Cardiovascular: Normal rate and regular rhythm  Pulmonary/Chest: Effort normal and breath sounds normal    Abdominal: Soft  Bowel sounds are normal  She exhibits no distension  There is tenderness (epigastric)  Musculoskeletal: She exhibits no edema or deformity  Neurological: She is alert  Oriented to self   Skin: Skin is warm and dry  Additional Data:   Labs:    Results from last 7 days  Lab Units 17  0554   WBC Thousand/uL 6 11   HEMOGLOBIN g/dL 13 4   HEMATOCRIT % 40 7   PLATELETS Thousands/uL 261   NEUTROS PCT % 51   LYMPHS PCT % 35   MONOS PCT % 8   EOS PCT % 5       Results from last 7 days  Lab Units 17  0554   SODIUM mmol/L 143   POTASSIUM mmol/L 3 7   CHLORIDE mmol/L 111*   CO2 mmol/L 26   BUN mg/dL 13   CREATININE mg/dL 0 99   CALCIUM mg/dL 9 3   TOTAL PROTEIN g/dL 7 8   BILIRUBIN TOTAL mg/dL 0 60   ALK PHOS U/L 76   ALT U/L 16   AST U/L 16   GLUCOSE RANDOM mg/dL 109           * I Have Reviewed All Lab Data Listed Above    * Additional Pertinent Lab Tests Reviewed: Javier 66 Admission Reviewed        Cultures:   Blood Culture: No results found for: BLOODCX  Urine Culture:   Lab Results   Component Value Date    URINECX (A) 11/21/2017     >100,000 cfu/ml Alpha Hemolytic Streptococcus NOT Enterococcus    URINECX 4439-8697 cfu/ml Gram Negative Parvez (A) 11/21/2017    URINECX >100,000 cfu/ml Escherichia coli (A) 11/15/2017    URINECX >100,000 cfu/ml Aerococcus urinae (A) 11/15/2017     Sputum Culture: No components found for: SPUTUMCX  Wound Culture: No results found for: WOUNDCULT    Last 24 Hours Medication List:     acetaminophen 650 mg Oral Once   acetaminophen 975 mg Oral Q8H [address]   amLODIPine 2 5 mg Oral Daily   aspirin 81 mg Oral Daily   cholecalciferol 1,000 Units Oral Daily   cyanocobalamin 1,000 mcg Oral Daily   enoxaparin 30 mg Subcutaneous Daily   escitalopram 20 mg Oral Daily   insulin glargine 7 Units Subcutaneous HS   insulin lispro 1-5 Units Subcutaneous TID AC   insulin lispro 1-5 Units Subcutaneous HS   insulin lispro 3 Units Subcutaneous TID With Meals   levothyroxine 75 mcg Oral Early Morning   lidocaine 1 patch Transdermal Daily   nystatin  Topical TID   pantoprazole 40 mg Oral Early Morning   pravastatin 40 mg Oral Daily With Dinner   sitaGLIPtin 25 mg Oral Daily        Today, Patient Was Seen By: Dayan Sheridan DO    ** Please Note: Dragon 360 Dictation voice to text software may have been used in the creation of this document   **

## 2017-11-25 NOTE — ASSESSMENT & PLAN NOTE
· No abnormality on CT scan - 2/2 GERD  · Increase Protonix to 40mg  Add Mylanta  · Cardiac cause not suspected  Troponins normal  EKG unremarkable

## 2017-11-25 NOTE — ASSESSMENT & PLAN NOTE
AM BS 87, but prelunch BS > 300  c/w Lantus 7U qhs and ISS  Added 3U novolog TIDAC   A1c 8 9  - possibly elevated due to dietary indiscretion as BSs normal here

## 2017-11-26 PROBLEM — N18.30 STAGE 3 CHRONIC KIDNEY DISEASE (HCC): Status: ACTIVE | Noted: 2017-11-21

## 2017-11-26 LAB
ANION GAP SERPL CALCULATED.3IONS-SCNC: 7 MMOL/L (ref 4–13)
BUN SERPL-MCNC: 22 MG/DL (ref 5–25)
CALCIUM SERPL-MCNC: 9.5 MG/DL (ref 8.3–10.1)
CHLORIDE SERPL-SCNC: 107 MMOL/L (ref 100–108)
CO2 SERPL-SCNC: 28 MMOL/L (ref 21–32)
CREAT SERPL-MCNC: 1.45 MG/DL (ref 0.6–1.3)
GFR SERPL CREATININE-BSD FRML MDRD: 33 ML/MIN/1.73SQ M
GLUCOSE SERPL-MCNC: 108 MG/DL (ref 65–140)
GLUCOSE SERPL-MCNC: 117 MG/DL (ref 65–140)
GLUCOSE SERPL-MCNC: 140 MG/DL (ref 65–140)
GLUCOSE SERPL-MCNC: 160 MG/DL (ref 65–140)
GLUCOSE SERPL-MCNC: 249 MG/DL (ref 65–140)
POTASSIUM SERPL-SCNC: 3.9 MMOL/L (ref 3.5–5.3)
SODIUM SERPL-SCNC: 142 MMOL/L (ref 136–145)

## 2017-11-26 PROCEDURE — 80048 BASIC METABOLIC PNL TOTAL CA: CPT | Performed by: GENERAL PRACTICE

## 2017-11-26 PROCEDURE — 82948 REAGENT STRIP/BLOOD GLUCOSE: CPT

## 2017-11-26 RX ADMIN — INSULIN GLARGINE 7 UNITS: 100 INJECTION, SOLUTION SUBCUTANEOUS at 22:26

## 2017-11-26 RX ADMIN — PRAVASTATIN SODIUM 40 MG: 40 TABLET ORAL at 16:42

## 2017-11-26 RX ADMIN — NYSTATIN 1 APPLICATION: 100000 POWDER TOPICAL at 16:42

## 2017-11-26 RX ADMIN — VITAMIN D, TAB 1000IU (100/BT) 1000 UNITS: 25 TAB at 10:00

## 2017-11-26 RX ADMIN — SITAGLIPTIN 25 MG: 25 TABLET, FILM COATED ORAL at 10:02

## 2017-11-26 RX ADMIN — AMLODIPINE BESYLATE 2.5 MG: 2.5 TABLET ORAL at 10:00

## 2017-11-26 RX ADMIN — ASPIRIN 81 MG 81 MG: 81 TABLET ORAL at 10:00

## 2017-11-26 RX ADMIN — ENOXAPARIN SODIUM 30 MG: 30 INJECTION SUBCUTANEOUS at 10:00

## 2017-11-26 RX ADMIN — CYANOCOBALAMIN TAB 500 MCG 1000 MCG: 500 TAB at 10:00

## 2017-11-26 RX ADMIN — INSULIN LISPRO 1 UNITS: 100 INJECTION, SOLUTION INTRAVENOUS; SUBCUTANEOUS at 16:43

## 2017-11-26 RX ADMIN — ACETAMINOPHEN 975 MG: 325 TABLET, FILM COATED ORAL at 22:22

## 2017-11-26 RX ADMIN — NYSTATIN: 100000 POWDER TOPICAL at 22:25

## 2017-11-26 RX ADMIN — PANTOPRAZOLE SODIUM 40 MG: 40 TABLET, DELAYED RELEASE ORAL at 05:35

## 2017-11-26 RX ADMIN — LIDOCAINE 1 PATCH: 50 PATCH TOPICAL at 10:00

## 2017-11-26 RX ADMIN — ESCITALOPRAM OXALATE 20 MG: 20 TABLET ORAL at 10:00

## 2017-11-26 RX ADMIN — INSULIN LISPRO 2 UNITS: 100 INJECTION, SOLUTION INTRAVENOUS; SUBCUTANEOUS at 22:26

## 2017-11-26 RX ADMIN — ACETAMINOPHEN 975 MG: 325 TABLET, FILM COATED ORAL at 14:46

## 2017-11-26 RX ADMIN — INSULIN LISPRO 3 UNITS: 100 INJECTION, SOLUTION INTRAVENOUS; SUBCUTANEOUS at 16:42

## 2017-11-26 RX ADMIN — INSULIN LISPRO 3 UNITS: 100 INJECTION, SOLUTION INTRAVENOUS; SUBCUTANEOUS at 10:00

## 2017-11-26 RX ADMIN — LEVOTHYROXINE SODIUM 75 MCG: 75 TABLET ORAL at 05:35

## 2017-11-26 RX ADMIN — NYSTATIN 1 APPLICATION: 100000 POWDER TOPICAL at 10:02

## 2017-11-26 RX ADMIN — INSULIN LISPRO 3 UNITS: 100 INJECTION, SOLUTION INTRAVENOUS; SUBCUTANEOUS at 12:07

## 2017-11-26 RX ADMIN — ACETAMINOPHEN 975 MG: 325 TABLET, FILM COATED ORAL at 05:35

## 2017-11-26 NOTE — PLAN OF CARE
DISCHARGE PLANNING     Discharge to home or other facility with appropriate resources Progressing        DISCHARGE PLANNING - CARE MANAGEMENT     Discharge to post-acute care or home with appropriate resources Progressing        INFECTION - ADULT     Absence or prevention of progression during hospitalization Progressing        Knowledge Deficit     Patient/family/caregiver demonstrates understanding of disease process, treatment plan, medications, and discharge instructions Progressing        Nutrition/Hydration-ADULT     Nutrient/Hydration intake appropriate for improving, restoring or maintaining nutritional needs Progressing        PAIN - ADULT     Verbalizes/displays adequate comfort level or baseline comfort level Progressing        Potential for Falls     Patient will remain free of falls Progressing        Prexisting or High Potential for Compromised Skin Integrity     Skin integrity is maintained or improved Progressing        SAFETY ADULT     Maintain or return to baseline ADL function Progressing     Maintain or return mobility status to optimal level Progressing

## 2017-11-26 NOTE — PROGRESS NOTES
Progress Note - Malena Pompa 80 y o  female MRN: [id number]    Unit/Bed#: [facility] 728-01 Encounter: [id number]        * Altered mental status   Assessment & Plan    · Cause unclear - likely worsening of chronic demetia  · Prior to admission, there was note of facial droop, no longer present on exam  · MRI showed no stroke  · EEG showed diffuse cerebral dysfunction  · No clear metabolic cause at this time, although she was recently treated for UTI  F/U urine culture  · No infection  · Stopped IVFs  Encourage PO intake  · POA is with patient's son  Will d/c to STR with plan to transition to LTC        Stage 3 chronic kidney disease   Assessment & Plan    Cr  1 45 today - baseline around 1-1 2  Encourage PO intake  Check BMP tomorrow        Diabetes mellitus ([facility] [address] )   Assessment & Plan    c/w Lantus 7U qhs and ISS  Added 3U novolog TIDAC   A1c 8 9  - possibly elevated due to dietary indiscretion as BSs normal here  Essential hypertension   Assessment & Plan    Stable  C/w current meds  Dementia   Assessment & Plan    · Son noted overall decline over last year  · Feels she needs upgrade from assisted living to skilled nursing upon discharge              VTE Pharmacologic Prophylaxis:   Pharmacologic: Enoxaparin (Lovenox)  Mechanical VTE Prophylaxis in Place: Yes    Patient Centered Rounds: I have performed bedside rounds with nursing staff today  Discussions with Specialists or Other Care Team Provider: n/a    Education and Discussions with Family / Patient: not today    Time Spent for Care: 30 minutes  More than 50% of total time spent on counseling and coordination of care as described above  Current Length of Stay: 5 day(s)    Current Patient Status: Inpatient   Certification Statement: The patient will continue to require additional inpatient hospital stay due to need for STR    Discharge Plan: pending STR placement    Code Status: Level 1 - Full Code      Subjective:   Pt seen and examined  No o/n issues  No acute complaints  Objective:     Vitals:   Temp (24hrs), Av 2 °F (36 8 °C), Min:97 4 °F (36 3 °C), Max:98 7 °F (37 1 °C)    HR:  [66-69] 67  Resp:  [16-18] 16  BP: (117-148)/(55-74) 127/60  SpO2:  [95 %-96 %] 96 %  Body mass index is 32 8 kg/m²  Input and Output Summary (last 24 hours): Intake/Output Summary (Last 24 hours) at 17 1638  Last data filed at 17 1319   Gross per 24 hour   Intake              918 ml   Output                0 ml   Net              918 ml       Physical Exam:     Physical Exam   Constitutional: She appears well-developed and well-nourished  HENT:   Head: Normocephalic and atraumatic  Eyes: Conjunctivae and EOM are normal    Neck: Normal range of motion  Neck supple  Cardiovascular: Normal rate and regular rhythm  Pulmonary/Chest: Effort normal and breath sounds normal    Abdominal: Soft  Bowel sounds are normal  She exhibits no distension  There is no tenderness  Musculoskeletal: Normal range of motion  She exhibits no edema  Neurological: She is alert  Oriented to place, but not person or time   Skin: Skin is warm and dry  Additional Data:     Labs:      Results from last 7 days  Lab Units 17  0554   WBC Thousand/uL 6 11   HEMOGLOBIN g/dL 13 4   HEMATOCRIT % 40 7   PLATELETS Thousands/uL 261   NEUTROS PCT % 51   LYMPHS PCT % 35   MONOS PCT % 8   EOS PCT % 5       Results from last 7 days  Lab Units 17  0535 17  0554   SODIUM mmol/L 142 143   POTASSIUM mmol/L 3 9 3 7   CHLORIDE mmol/L 107 111*   CO2 mmol/L 28 26   BUN mg/dL 22 13   CREATININE mg/dL 1 45* 0 99   CALCIUM mg/dL 9 5 9 3   TOTAL PROTEIN g/dL  --  7 8   BILIRUBIN TOTAL mg/dL  --  0 60   ALK PHOS U/L  --  76   ALT U/L  --  16   AST U/L  --  16   GLUCOSE RANDOM mg/dL 117 109           * I Have Reviewed All Lab Data Listed Above  * Additional Pertinent Lab Tests Reviewed:  [address] Admission Reviewed        Recent Cultures (last 7 days):       Results from last 7 days  Lab Units 11/21/17  1841   URINE CULTURE  >100,000 cfu/ml Alpha Hemolytic Streptococcus NOT Enterococcus*  8129-4647 cfu/ml Gram Negative Parvez*       Last 24 Hours Medication List:     acetaminophen 650 mg Oral Once   acetaminophen 975 mg Oral Q8H [facility]   amLODIPine 2 5 mg Oral Daily   aspirin 81 mg Oral Daily   cholecalciferol 1,000 Units Oral Daily   cyanocobalamin 1,000 mcg Oral Daily   enoxaparin 30 mg Subcutaneous Daily   escitalopram 20 mg Oral Daily   insulin glargine 7 Units Subcutaneous HS   insulin lispro 1-5 Units Subcutaneous TID AC   insulin lispro 1-5 Units Subcutaneous HS   insulin lispro 3 Units Subcutaneous TID With Meals   levothyroxine 75 mcg Oral Early Morning   lidocaine 1 patch Transdermal Daily   nystatin  Topical TID   pantoprazole 40 mg Oral Early Morning   pravastatin 40 mg Oral Daily With Dinner   sitaGLIPtin 25 mg Oral Daily        Today, Patient Was Seen By: Lauren Gold DO    ** Please Note: Dictation voice to text software may have been used in the creation of this document   **

## 2017-11-26 NOTE — ASSESSMENT & PLAN NOTE
c/w Lantus 7U qhs and ISS  Added 3U novolog TIDAC   A1c 8 9  - possibly elevated due to dietary indiscretion as BSs normal here

## 2017-11-26 NOTE — ASSESSMENT & PLAN NOTE
· Cause unclear - likely worsening of chronic demetia  · Prior to admission, there was note of facial droop, no longer present on exam  · MRI showed no stroke  · EEG showed diffuse cerebral dysfunction  · No clear metabolic cause at this time, although she was recently treated for UTI  F/U urine culture  · No infection  · Stopped IVFs  Encourage PO intake  · POA is with patient's son   Will d/c to STR with plan to transition to LTC

## 2017-11-27 VITALS
BODY MASS INDEX: 31.48 KG/M2 | DIASTOLIC BLOOD PRESSURE: 66 MMHG | RESPIRATION RATE: 20 BRPM | SYSTOLIC BLOOD PRESSURE: 146 MMHG | TEMPERATURE: 98.7 F | OXYGEN SATURATION: 96 % | HEART RATE: 76 BPM | HEIGHT: 55 IN | WEIGHT: 136.02 LBS

## 2017-11-27 LAB
ANION GAP SERPL CALCULATED.3IONS-SCNC: 7 MMOL/L (ref 4–13)
ANION GAP SERPL CALCULATED.3IONS-SCNC: 7 MMOL/L (ref 4–13)
BUN SERPL-MCNC: 22 MG/DL (ref 5–25)
BUN SERPL-MCNC: 26 MG/DL (ref 5–25)
CALCIUM SERPL-MCNC: 9.1 MG/DL (ref 8.3–10.1)
CALCIUM SERPL-MCNC: 9.4 MG/DL (ref 8.3–10.1)
CHLORIDE SERPL-SCNC: 107 MMOL/L (ref 100–108)
CHLORIDE SERPL-SCNC: 109 MMOL/L (ref 100–108)
CO2 SERPL-SCNC: 23 MMOL/L (ref 21–32)
CO2 SERPL-SCNC: 26 MMOL/L (ref 21–32)
CREAT SERPL-MCNC: 1.33 MG/DL (ref 0.6–1.3)
CREAT SERPL-MCNC: 1.57 MG/DL (ref 0.6–1.3)
GFR SERPL CREATININE-BSD FRML MDRD: 30 ML/MIN/1.73SQ M
GFR SERPL CREATININE-BSD FRML MDRD: 36 ML/MIN/1.73SQ M
GLUCOSE SERPL-MCNC: 147 MG/DL (ref 65–140)
GLUCOSE SERPL-MCNC: 155 MG/DL (ref 65–140)
GLUCOSE SERPL-MCNC: 405 MG/DL (ref 65–140)
GLUCOSE SERPL-MCNC: 440 MG/DL (ref 65–140)
GLUCOSE SERPL-MCNC: 72 MG/DL (ref 65–140)
GLUCOSE SERPL-MCNC: 88 MG/DL (ref 65–140)
POTASSIUM SERPL-SCNC: 4 MMOL/L (ref 3.5–5.3)
POTASSIUM SERPL-SCNC: 4.2 MMOL/L (ref 3.5–5.3)
SODIUM SERPL-SCNC: 139 MMOL/L (ref 136–145)
SODIUM SERPL-SCNC: 140 MMOL/L (ref 136–145)

## 2017-11-27 PROCEDURE — 82948 REAGENT STRIP/BLOOD GLUCOSE: CPT

## 2017-11-27 PROCEDURE — 80048 BASIC METABOLIC PNL TOTAL CA: CPT | Performed by: GENERAL PRACTICE

## 2017-11-27 RX ORDER — ACETAMINOPHEN 325 MG/1
975 TABLET ORAL EVERY 8 HOURS
Qty: 30 TABLET | Refills: 0
Start: 2017-11-27 | End: 2020-11-16 | Stop reason: ALTCHOICE

## 2017-11-27 RX ORDER — NYSTATIN 100000 [USP'U]/G
POWDER TOPICAL 3 TIMES DAILY
Qty: 15 G | Refills: 0
Start: 2017-11-27 | End: 2018-05-03

## 2017-11-27 RX ORDER — LIDOCAINE 50 MG/G
1 PATCH TOPICAL DAILY
Qty: 30 PATCH | Refills: 0
Start: 2017-11-28 | End: 2020-11-16 | Stop reason: ALTCHOICE

## 2017-11-27 RX ORDER — INSULIN GLARGINE 100 [IU]/ML
7 INJECTION, SOLUTION SUBCUTANEOUS
Qty: 10 ML | Refills: 0
Start: 2017-11-27 | End: 2020-11-16 | Stop reason: ALTCHOICE

## 2017-11-27 RX ORDER — AMLODIPINE BESYLATE 2.5 MG/1
2.5 TABLET ORAL DAILY
Refills: 0
Start: 2017-11-28 | End: 2021-03-14 | Stop reason: ALTCHOICE

## 2017-11-27 RX ORDER — ASPIRIN 81 MG/1
81 TABLET, CHEWABLE ORAL DAILY
Refills: 0
Start: 2017-11-28 | End: 2021-03-14 | Stop reason: ALTCHOICE

## 2017-11-27 RX ADMIN — ACETAMINOPHEN 975 MG: 325 TABLET, FILM COATED ORAL at 06:36

## 2017-11-27 RX ADMIN — PANTOPRAZOLE SODIUM 40 MG: 40 TABLET, DELAYED RELEASE ORAL at 06:36

## 2017-11-27 RX ADMIN — ACETAMINOPHEN 975 MG: 325 TABLET, FILM COATED ORAL at 14:34

## 2017-11-27 RX ADMIN — PRAVASTATIN SODIUM 40 MG: 40 TABLET ORAL at 17:00

## 2017-11-27 RX ADMIN — NYSTATIN 1 APPLICATION: 100000 POWDER TOPICAL at 08:45

## 2017-11-27 RX ADMIN — ASPIRIN 81 MG 81 MG: 81 TABLET ORAL at 08:44

## 2017-11-27 RX ADMIN — SODIUM CHLORIDE 1000 ML: 0.9 INJECTION, SOLUTION INTRAVENOUS at 10:50

## 2017-11-27 RX ADMIN — INSULIN LISPRO 3 UNITS: 100 INJECTION, SOLUTION INTRAVENOUS; SUBCUTANEOUS at 11:18

## 2017-11-27 RX ADMIN — CYANOCOBALAMIN TAB 500 MCG 1000 MCG: 500 TAB at 08:44

## 2017-11-27 RX ADMIN — INSULIN LISPRO 3 UNITS: 100 INJECTION, SOLUTION INTRAVENOUS; SUBCUTANEOUS at 08:45

## 2017-11-27 RX ADMIN — ENOXAPARIN SODIUM 30 MG: 30 INJECTION SUBCUTANEOUS at 08:44

## 2017-11-27 RX ADMIN — LIDOCAINE 1 PATCH: 50 PATCH TOPICAL at 08:44

## 2017-11-27 RX ADMIN — INSULIN LISPRO 3 UNITS: 100 INJECTION, SOLUTION INTRAVENOUS; SUBCUTANEOUS at 17:01

## 2017-11-27 RX ADMIN — INSULIN LISPRO 1 UNITS: 100 INJECTION, SOLUTION INTRAVENOUS; SUBCUTANEOUS at 08:43

## 2017-11-27 RX ADMIN — LEVOTHYROXINE SODIUM 75 MCG: 75 TABLET ORAL at 06:36

## 2017-11-27 RX ADMIN — NYSTATIN 1 APPLICATION: 100000 POWDER TOPICAL at 17:00

## 2017-11-27 RX ADMIN — INSULIN LISPRO 4 UNITS: 100 INJECTION, SOLUTION INTRAVENOUS; SUBCUTANEOUS at 11:17

## 2017-11-27 RX ADMIN — AMLODIPINE BESYLATE 2.5 MG: 2.5 TABLET ORAL at 08:44

## 2017-11-27 RX ADMIN — SITAGLIPTIN 25 MG: 25 TABLET, FILM COATED ORAL at 08:44

## 2017-11-27 RX ADMIN — VITAMIN D, TAB 1000IU (100/BT) 1000 UNITS: 25 TAB at 08:44

## 2017-11-27 RX ADMIN — ESCITALOPRAM OXALATE 20 MG: 20 TABLET ORAL at 08:44

## 2017-11-27 NOTE — DISCHARGE INSTRUCTIONS
Drink at least 4 cups of water per day  Dementia, Ambulatory Care   GENERAL INFORMATION:   Dementia  is a condition that causes loss of memory, thought control, and judgment  Dementia may develop quickly over a few months after a head injury or stroke  It may develop slowly over many years if you have Alzheimer disease  Dementia cannot be cured or prevented, but treatment may slow or reduce your symptoms  Common symptoms include the following:   · Loss of short-term memory, followed by loss of long-term memory    · Trouble remembering to go to the bathroom, to urinate, or have a bowel movement    · Anger or violent behavior     · Depression, anxiety, or hallucinations  Seek immediate care for the following symptoms:   · Signs of delirium, such as extreme confusion, and seeing or hearing things that are not there    · Anger or violence that cannot be calmed down    · Fainting and you cannot be woken  Treatment for dementia  may include medicines to slow memory loss  You may also need medicines to help control anger, decrease anxiety, or improve your mood  Take your medicines as directed  Manage dementia:   · Keep your mind and body active  Do activities that you love, such as art, gardening, or listening to music  Call or visit people often  This will keep your social skills sharp, and may help reduce depression  · Write daily schedules and routines  Record medical appointments, times to take your medicines, meal times, or any other things to remember  Write down reminders to use the bathroom if you have trouble remembering  You may need to ask someone to write things down for you  · Place clocks and calendars where you can see them  This will help you remember appointments and tasks  · Do not smoke  If you smoke, it is never too late to quit  Smoking may slow blood flow in your brain, and make your symptoms worse  Ask your caregiver for information if you need help quitting      · Eat healthy foods   Examples are fruits, vegetables, whole-grain breads, low-fat dairy products, beans, lean meats, and fish  Ask if you need to be on a special diet  · Ask your healthcare provider for a list of organizations that can help  You may begin to need an in-home aide to help you remember your daily tasks  Arrange for help while you are thinking clearly  Follow up with your healthcare provider as directed:  Ask someone to go with you to help you remember what your healthcare provider tells you  The person can take notes for you during the visit and go over the notes with you later  Write down your questions so you remember to ask them during your visits  CARE AGREEMENT:   You have the right to help plan your care  Learn about your health condition and how it may be treated  Discuss treatment options with your caregivers to decide what care you want to receive  You always have the right to refuse treatment  The above information is an  only  It is not intended as medical advice for individual conditions or treatments  Talk to your doctor, nurse or pharmacist before following any medical regimen to see if it is safe and effective for you  © 2014 [address] is for End User's use only and may not be sold, redistributed or otherwise used for commercial purposes  All illustrations and images included in CareNotes® are the copyrighted property of [employer]  or [employer]

## 2017-11-27 NOTE — DISCHARGE SUMMARY
Discharge Summary - [address] [facility]    Patient Information: Latonya Moscoso 80 y o  female MRN: [id number]  [facility] Encounter: [id number]    Discharging Physician / Practitioner: Gumaro Jane DO  PCP: Leatha Cogan, MD  Admission Date: 11/21/2017  Discharge Date: 11/27/17    Reason for Admission: mental status change    Discharge Diagnoses:     Principal Problem:    Altered mental status  Active Problems:    Stage 3 chronic kidney disease    Dementia    Essential hypertension    Diabetes mellitus ([address] )    Falls    Epigastric pain  Resolved Problems:    UTI (urinary tract infection)    Hyperkalemia      Consultations During Hospital Stay:  · Dr Emily Salgado - neuro    Procedures Performed:     · EEG: diffuse cerebral dysfunction    Significant Findings / Test Results:     · MRI brain: no acute pathology    Incidental Findings:   · none     Test Results Pending at Discharge (will require follow up):   · none     Outpatient Tests Requested:  · BMP in about 1 week    Complications:  none    Hospital Course:     Latonya Moscoso is a 80 y o  female patient who originally presented to the hospital on 11/21/2017 due to mental status change  Workup unremarkable, suspected this was 2/2 progression of dementia  Pt had CKD3 and Cr noted to be rising at d/c  Per RN, pt only drinks coffee, so given a 1L NS bolus before d/c, and Cr returned to near baseline  Pt told to drink at least 4 cups of water per day  For DM2, blood sugars labile - usually low in AM and high the rest of the day  I lowered Lantus dose and put pt on mealtime humalog  Condition at Discharge: stable     Discharge Day Visit / Exam:     Subjective:  Pt seen and examined  No o/n events  No acute complaints    Vitals: Blood Pressure: 146/66 (11/27/17 1539)  Pulse: 76 (11/27/17 1539)  Temperature: 98 7 °F (37 1 °C) (11/27/17 1539)  Temp Source: Oral (11/27/17 1539)  Respirations: 20 (11/27/17 1539)  Height: 4' 6"

## 2017-11-27 NOTE — SOCIAL WORK
DELFINO spoke to pts son Kaveh Amador regarding SNF choices--DELFINO informed [place] that pt has been accepted at [facility], [facility], and [facility]  [place] states he prefers [facility]     DELFINO spoke to Kaylee at [facility] to inform of same  [place] states she will speak with [place] to complete admission paperwork  DELFINO spoke to [place] who states she spoke with [facility] Amador and they are able to take pt when medically stable  DELFINO informed Dr Warren Burns of same

## 2017-11-27 NOTE — CASE MANAGEMENT
Continued Stay Review    Date: 17      Vital Signs:Temp (24hrs), Av 4 °F (36 9 °C), Min:98 3 °F (36 8 °C), Max:98 5 °F (36 9 °C)     HR:  [60-69] 69  Resp:  [18-20] 20  BP: (139-172)/(70-85) 172/85  SpO2:  [94 %-96 %] 96 %  Body mass index is 32 8 kg/m²  Medications:     acetaminophen 650 mg Oral Once   acetaminophen 975 mg Oral Q8H [address]   amLODIPine 2 5 mg Oral Daily   aspirin 81 mg Oral Daily   cholecalciferol 1,000 Units Oral Daily   cyanocobalamin 1,000 mcg Oral Daily   enoxaparin 30 mg Subcutaneous Daily   escitalopram 20 mg Oral Daily   insulin glargine 7 Units Subcutaneous HS   insulin lispro 1-5 Units Subcutaneous TID AC   insulin lispro 1-5 Units Subcutaneous HS   insulin lispro 3 Units Subcutaneous TID With Meals   levothyroxine 75 mcg Oral Early Morning   lidocaine 1 patch Transdermal Daily   nystatin   Topical TID   pantoprazole 40 mg Oral Early Morning   pravastatin 40 mg Oral Daily With Dinner   sitaGLIPtin 25 mg Oral Daily       Age/Sex: 80 y o  female     Assessment/Plan:       Altered mental status   Assessment & Plan     · Cause unclear - likely worsening of chronic demetia  · Prior to admission, there was note of facial droop, no longer present on exam  · MRI showed no stroke  · EEG showed diffuse cerebral dysfunction  · No clear metabolic cause at this time, although she was recently treated for UTI  F/U urine culture  · Continue to monitor for infection  · Continue IVFs  Patient appears dehydrated  · Consider progression of dementia  Discussed this with patient's son  Will d/c to STR with plan to transition to LTC       Epigastric pain   Assessment & Plan     · No abnormality on CT scan - 2/2 GERD  · Increase Protonix to 40mg  Add Mylanta  · Cardiac cause not suspected   Troponins normal  EKG unremarkable        Diabetes mellitus (HCC)   Assessment & Plan     AM BS 87, but prelunch BS > 300  c/w Lantus 7U qhs and ISS  Added 3U novolog TIDAC   A1c 8 9  - possibly elevated due to dietary indiscretion as BSs normal here        Essential hypertension   Assessment & Plan     Stable  C/w current meds        Dementia   Assessment & Plan     · Son noted overall decline over last year  · Feels she needs upgrade from assisted living to skilled nursing upon discharge             VCurrent Length of Stay: 4 day(s)  Current Patient Status: Inpatient   Certification Statement: The patient will continue to require additional inpatient hospital stay due to need for STR     Discharge Plan: pending STR placement        Discharge Plan: [facility] is unable to accept pt  [facility] and [facility] continue to review   Pt's son did not have a first choice and had not yet reviewed SNF list

## 2017-11-27 NOTE — SOCIAL WORK
CM was informed by Dr  [address] that pt is medically stable for dc today  CM arranged with [facility] for a 6:30pm dc to -  CM notified pts bedside RN Kasandra Magallanes at , and pts son Esvin Garcia of dc time  Facility transfer form and CMN completed  Chart copy requested  PASRR completed and sent to Mulu at [facility] via [facility]

## 2018-01-16 NOTE — PROCEDURES
Procedures by Naeem Albert MD at  2017  5:25 PM      Author:  Naeem Albert MD Service:  Neurology Author Type:  Physician    Filed:  2017  5:30 PM Date of Service:  2017  5:25 PM Status:  Signed    :  Naeem Albert MD (Physician)        Procedure Orders:       1  EEG awake or drowsy routine [[id number]] ordered by Nova Wilson PA-C at 17 1358                  ELECTROENCEPHALOGRAM    Patient Name:  Tiki Chaney  MRN: [id number]   :  1931 File #: Evelia Jamison 16-12   Age: 80 y o  Encounter #: [id number]   Date performed: 2017 Referring Provider: Beth Mendoza MD          Report date: 2017          Study type: awake EEG    ICD 10 diagnosis: Transient alteration of awareness R40 4 and Encephalopathy, unspecified G93 40    Patient History:  EEG is requested to assess for seizures and/or classification of epilepsy  Patient is 80 y o  female with episodic confusion, possible underlying dementia, presenting to hospital with a right facial droop and right-sided weakness  She  also has some visual deficits  Current AEDs:  Medications include:   acetaminophen 650 mg Oral Once   acetaminophen 975 mg Oral Q8H [address]   amLODIPine 2 5 mg Oral Daily   aspirin 81 mg Oral Daily   cholecalciferol 1,000 Units Oral Daily   cyanocobalamin 1,000 mcg Oral Daily   enoxaparin 30 mg Subcutaneous Daily   escitalopram 20 mg Oral Daily   insulin glargine 10 Units Subcutaneous HS   insulin lispro 1-5 Units Subcutaneous TID AC   insulin lispro 1-5 Units Subcutaneous HS   levothyroxine 75 mcg Oral Early Morning   lidocaine 1 patch Transdermal Daily   nystatin  Topical TID   pantoprazole 40 mg Oral Early Morning   pravastatin 40 mg Oral Daily With Dinner   sitaGLIPtin 25 mg Oral Daily       Description of Procedure: The EEG was performed with electrodes applied using the International 10-20 System  Additional electrodes used included EOG, ECG and T1/T2 electrodes    A single lead ECG channel is also  present  At least 16 channels are reviewed at a time  and formatted into longitudinal bipolar, transverse bipolar, and referential (to common reference or calculated common reference) montages  The EEG was recorded  with the patient encephalopathic  The recording was technically satisfactory  EEG was recorded from 14:59 to 15:32  Findings:   Background Activity: The background is grossly symmetric with respect to voltages and activities  The background is disorganized with moderate voltage 0 5-2 Hz delta activity and 5-7 Hz low-moderate voltage theta activity with admixed alpha/beta activity mostly within the midline region  There is no stable posterior  dominant rhythm  There is a slight asymmetry with lower voltage theta activity over the right posterior quadrant  Activation Procedures:   Hyperventilation was not performed  Stepped photic stimulation from 1 to 30 fps was performed and produced no abnormality  Other findings: The single lead ECG shows a regular and sinus rhythm  Interpretation: This is an abnormal 33 minutes awake EEG due to disorganized background with theta high and delta activity and slight attenuation of beta activity over the right posterior quadrant  These findings indicate the presence of moderate diffuse cerebral dysfunction, etiologically nonspecific, with a superimposed right posterior quadrant focal cerebral dysfunction, possibly structural in origin  Aydin Schultz MD  [facility]  Janine GE    Nov 22 2017  5:30PM [facility] Standard Time

## 2018-01-19 ENCOUNTER — APPOINTMENT (EMERGENCY)
Dept: RADIOLOGY | Facility: HOSPITAL | Age: 83
DRG: 445 | End: 2018-01-19
Payer: MEDICARE

## 2018-01-19 ENCOUNTER — HOSPITAL ENCOUNTER (INPATIENT)
Facility: HOSPITAL | Age: 83
LOS: 10 days | Discharge: NURSING, REHAB OR LONG-TERM CARE | DRG: 445 | End: 2018-01-30
Attending: EMERGENCY MEDICINE | Admitting: SURGERY
Payer: MEDICARE

## 2018-01-19 DIAGNOSIS — K81.0 ACUTE CHOLECYSTITIS: Primary | ICD-10-CM

## 2018-01-19 DIAGNOSIS — R06.02 SHORTNESS OF BREATH: ICD-10-CM

## 2018-01-19 DIAGNOSIS — F03.90 DEMENTIA WITHOUT BEHAVIORAL DISTURBANCE, UNSPECIFIED DEMENTIA TYPE (HCC): ICD-10-CM

## 2018-01-19 DIAGNOSIS — D72.829 ELEVATED WBC COUNT: ICD-10-CM

## 2018-01-19 DIAGNOSIS — R10.11 RUQ PAIN: ICD-10-CM

## 2018-01-19 LAB
ALBUMIN SERPL BCP-MCNC: 2.9 G/DL (ref 3.5–5)
ALP SERPL-CCNC: 79 U/L (ref 46–116)
ALT SERPL W P-5'-P-CCNC: 35 U/L (ref 12–78)
ANION GAP SERPL CALCULATED.3IONS-SCNC: 7 MMOL/L (ref 4–13)
AST SERPL W P-5'-P-CCNC: 50 U/L (ref 5–45)
BASOPHILS # BLD AUTO: 0.02 THOUSANDS/ΜL (ref 0–0.1)
BASOPHILS NFR BLD AUTO: 0 % (ref 0–1)
BILIRUB SERPL-MCNC: 1.47 MG/DL (ref 0.2–1)
BUN SERPL-MCNC: 34 MG/DL (ref 5–25)
CALCIUM SERPL-MCNC: 9.8 MG/DL (ref 8.3–10.1)
CHLORIDE SERPL-SCNC: 102 MMOL/L (ref 100–108)
CO2 SERPL-SCNC: 25 MMOL/L (ref 21–32)
CREAT SERPL-MCNC: 1.47 MG/DL (ref 0.6–1.3)
EOSINOPHIL # BLD AUTO: 0 THOUSAND/ΜL (ref 0–0.61)
EOSINOPHIL NFR BLD AUTO: 0 % (ref 0–6)
ERYTHROCYTE [DISTWIDTH] IN BLOOD BY AUTOMATED COUNT: 16 % (ref 11.6–15.1)
GFR SERPL CREATININE-BSD FRML MDRD: 32 ML/MIN/1.73SQ M
GLUCOSE SERPL-MCNC: 261 MG/DL (ref 65–140)
HCT VFR BLD AUTO: 38.5 % (ref 34.8–46.1)
HGB BLD-MCNC: 13.2 G/DL (ref 11.5–15.4)
LIPASE SERPL-CCNC: 65 U/L (ref 73–393)
LYMPHOCYTES # BLD AUTO: 0.91 THOUSANDS/ΜL (ref 0.6–4.47)
LYMPHOCYTES NFR BLD AUTO: 6 % (ref 14–44)
MCH RBC QN AUTO: 32.2 PG (ref 26.8–34.3)
MCHC RBC AUTO-ENTMCNC: 34.3 G/DL (ref 31.4–37.4)
MCV RBC AUTO: 94 FL (ref 82–98)
MONOCYTES # BLD AUTO: 0.94 THOUSAND/ΜL (ref 0.17–1.22)
MONOCYTES NFR BLD AUTO: 6 % (ref 4–12)
NEUTROPHILS # BLD AUTO: 14.04 THOUSANDS/ΜL (ref 1.85–7.62)
NEUTS SEG NFR BLD AUTO: 88 % (ref 43–75)
NRBC BLD AUTO-RTO: 0 /100 WBCS
PLATELET # BLD AUTO: 221 THOUSANDS/UL (ref 149–390)
PMV BLD AUTO: 10.7 FL (ref 8.9–12.7)
POTASSIUM SERPL-SCNC: 4.6 MMOL/L (ref 3.5–5.3)
PROT SERPL-MCNC: 8.1 G/DL (ref 6.4–8.2)
RBC # BLD AUTO: 4.1 MILLION/UL (ref 3.81–5.12)
SODIUM SERPL-SCNC: 134 MMOL/L (ref 136–145)
TROPONIN I SERPL-MCNC: <0.02 NG/ML
WBC # BLD AUTO: 15.99 THOUSAND/UL (ref 4.31–10.16)

## 2018-01-19 PROCEDURE — 80053 COMPREHEN METABOLIC PANEL: CPT | Performed by: EMERGENCY MEDICINE

## 2018-01-19 PROCEDURE — 36415 COLL VENOUS BLD VENIPUNCTURE: CPT | Performed by: EMERGENCY MEDICINE

## 2018-01-19 PROCEDURE — 85025 COMPLETE CBC W/AUTO DIFF WBC: CPT | Performed by: EMERGENCY MEDICINE

## 2018-01-19 PROCEDURE — 84484 ASSAY OF TROPONIN QUANT: CPT | Performed by: EMERGENCY MEDICINE

## 2018-01-19 PROCEDURE — 93005 ELECTROCARDIOGRAM TRACING: CPT | Performed by: EMERGENCY MEDICINE

## 2018-01-19 PROCEDURE — 83690 ASSAY OF LIPASE: CPT | Performed by: EMERGENCY MEDICINE

## 2018-01-19 PROCEDURE — 74176 CT ABD & PELVIS W/O CONTRAST: CPT

## 2018-01-19 PROCEDURE — 93005 ELECTROCARDIOGRAM TRACING: CPT

## 2018-01-19 PROCEDURE — 71046 X-RAY EXAM CHEST 2 VIEWS: CPT

## 2018-01-19 PROCEDURE — 76705 ECHO EXAM OF ABDOMEN: CPT

## 2018-01-19 PROCEDURE — 96361 HYDRATE IV INFUSION ADD-ON: CPT

## 2018-01-19 RX ORDER — MORPHINE SULFATE 2 MG/ML
2 INJECTION, SOLUTION INTRAMUSCULAR; INTRAVENOUS ONCE
Status: DISCONTINUED | OUTPATIENT
Start: 2018-01-19 | End: 2018-01-25

## 2018-01-19 RX ADMIN — SODIUM CHLORIDE 1000 ML: 0.9 INJECTION, SOLUTION INTRAVENOUS at 22:00

## 2018-01-20 LAB
ALBUMIN SERPL BCP-MCNC: 2.6 G/DL (ref 3.5–5)
ALP SERPL-CCNC: 76 U/L (ref 46–116)
ALT SERPL W P-5'-P-CCNC: 28 U/L (ref 12–78)
ANION GAP SERPL CALCULATED.3IONS-SCNC: 8 MMOL/L (ref 4–13)
AST SERPL W P-5'-P-CCNC: 28 U/L (ref 5–45)
ATRIAL RATE: 85 BPM
BASOPHILS # BLD AUTO: 0.01 THOUSANDS/ΜL (ref 0–0.1)
BASOPHILS NFR BLD AUTO: 0 % (ref 0–1)
BILIRUB DIRECT SERPL-MCNC: 0.38 MG/DL (ref 0–0.2)
BILIRUB SERPL-MCNC: 0.81 MG/DL (ref 0.2–1)
BUN SERPL-MCNC: 32 MG/DL (ref 5–25)
CALCIUM SERPL-MCNC: 9.2 MG/DL (ref 8.3–10.1)
CHLORIDE SERPL-SCNC: 107 MMOL/L (ref 100–108)
CO2 SERPL-SCNC: 24 MMOL/L (ref 21–32)
CREAT SERPL-MCNC: 1.27 MG/DL (ref 0.6–1.3)
EOSINOPHIL # BLD AUTO: 0.01 THOUSAND/ΜL (ref 0–0.61)
EOSINOPHIL NFR BLD AUTO: 0 % (ref 0–6)
ERYTHROCYTE [DISTWIDTH] IN BLOOD BY AUTOMATED COUNT: 16.2 % (ref 11.6–15.1)
GFR SERPL CREATININE-BSD FRML MDRD: 38 ML/MIN/1.73SQ M
GLUCOSE SERPL-MCNC: 196 MG/DL (ref 65–140)
GLUCOSE SERPL-MCNC: 223 MG/DL (ref 65–140)
GLUCOSE SERPL-MCNC: 223 MG/DL (ref 65–140)
GLUCOSE SERPL-MCNC: 229 MG/DL (ref 65–140)
HCT VFR BLD AUTO: 37.1 % (ref 34.8–46.1)
HGB BLD-MCNC: 12.7 G/DL (ref 11.5–15.4)
LYMPHOCYTES # BLD AUTO: 1.3 THOUSANDS/ΜL (ref 0.6–4.47)
LYMPHOCYTES NFR BLD AUTO: 9 % (ref 14–44)
MCH RBC QN AUTO: 32 PG (ref 26.8–34.3)
MCHC RBC AUTO-ENTMCNC: 34.2 G/DL (ref 31.4–37.4)
MCV RBC AUTO: 94 FL (ref 82–98)
MONOCYTES # BLD AUTO: 0.82 THOUSAND/ΜL (ref 0.17–1.22)
MONOCYTES NFR BLD AUTO: 6 % (ref 4–12)
NEUTROPHILS # BLD AUTO: 12.16 THOUSANDS/ΜL (ref 1.85–7.62)
NEUTS SEG NFR BLD AUTO: 85 % (ref 43–75)
NRBC BLD AUTO-RTO: 0 /100 WBCS
P AXIS: 39 DEGREES
PLATELET # BLD AUTO: 218 THOUSANDS/UL (ref 149–390)
PMV BLD AUTO: 10.7 FL (ref 8.9–12.7)
POTASSIUM SERPL-SCNC: 3.9 MMOL/L (ref 3.5–5.3)
PR INTERVAL: 176 MS
PROT SERPL-MCNC: 7.5 G/DL (ref 6.4–8.2)
QRS AXIS: 43 DEGREES
QRSD INTERVAL: 76 MS
QT INTERVAL: 356 MS
QTC INTERVAL: 423 MS
RBC # BLD AUTO: 3.97 MILLION/UL (ref 3.81–5.12)
SODIUM SERPL-SCNC: 139 MMOL/L (ref 136–145)
T WAVE AXIS: 48 DEGREES
VENTRICULAR RATE: 85 BPM
WBC # BLD AUTO: 14.37 THOUSAND/UL (ref 4.31–10.16)

## 2018-01-20 PROCEDURE — 80048 BASIC METABOLIC PNL TOTAL CA: CPT | Performed by: SURGERY

## 2018-01-20 PROCEDURE — 96374 THER/PROPH/DIAG INJ IV PUSH: CPT

## 2018-01-20 PROCEDURE — 80076 HEPATIC FUNCTION PANEL: CPT | Performed by: SURGERY

## 2018-01-20 PROCEDURE — 82948 REAGENT STRIP/BLOOD GLUCOSE: CPT

## 2018-01-20 PROCEDURE — 99285 EMERGENCY DEPT VISIT HI MDM: CPT

## 2018-01-20 PROCEDURE — 36415 COLL VENOUS BLD VENIPUNCTURE: CPT | Performed by: SURGERY

## 2018-01-20 PROCEDURE — 85025 COMPLETE CBC W/AUTO DIFF WBC: CPT | Performed by: SURGERY

## 2018-01-20 RX ORDER — HEPARIN SODIUM 5000 [USP'U]/ML
5000 INJECTION, SOLUTION INTRAVENOUS; SUBCUTANEOUS EVERY 8 HOURS SCHEDULED
Status: DISCONTINUED | OUTPATIENT
Start: 2018-01-20 | End: 2018-01-30 | Stop reason: HOSPADM

## 2018-01-20 RX ORDER — SODIUM CHLORIDE 9 MG/ML
75 INJECTION, SOLUTION INTRAVENOUS CONTINUOUS
Status: DISCONTINUED | OUTPATIENT
Start: 2018-01-20 | End: 2018-01-23

## 2018-01-20 RX ORDER — LABETALOL HYDROCHLORIDE 5 MG/ML
10 INJECTION, SOLUTION INTRAVENOUS EVERY 4 HOURS PRN
Status: DISCONTINUED | OUTPATIENT
Start: 2018-01-20 | End: 2018-01-30 | Stop reason: HOSPADM

## 2018-01-20 RX ADMIN — METRONIDAZOLE 500 MG: 500 INJECTION, SOLUTION INTRAVENOUS at 10:41

## 2018-01-20 RX ADMIN — CEFAZOLIN SODIUM 2000 MG: 2 SOLUTION INTRAVENOUS at 00:13

## 2018-01-20 RX ADMIN — METRONIDAZOLE 500 MG: 500 INJECTION, SOLUTION INTRAVENOUS at 05:23

## 2018-01-20 RX ADMIN — SODIUM CHLORIDE 125 ML/HR: 0.9 INJECTION, SOLUTION INTRAVENOUS at 05:21

## 2018-01-20 RX ADMIN — HEPARIN SODIUM 5000 UNITS: 5000 INJECTION, SOLUTION INTRAVENOUS; SUBCUTANEOUS at 21:48

## 2018-01-20 RX ADMIN — SODIUM CHLORIDE 125 ML/HR: 0.9 INJECTION, SOLUTION INTRAVENOUS at 13:45

## 2018-01-20 RX ADMIN — CEFAZOLIN SODIUM 1000 MG: 1 SOLUTION INTRAVENOUS at 12:28

## 2018-01-20 RX ADMIN — SODIUM CHLORIDE 125 ML/HR: 0.9 INJECTION, SOLUTION INTRAVENOUS at 21:29

## 2018-01-20 RX ADMIN — METRONIDAZOLE 500 MG: 500 INJECTION, SOLUTION INTRAVENOUS at 17:45

## 2018-01-20 NOTE — ED PROVIDER NOTES
ASSESSMENT AND PLAN    Tami Montano is a 80 y o  female with a history of dementia, diabetes, presents with abdominal pain located in the epigastrium and right upper quadrant, associated with anorexia she is currently hemodynamically stable, but confused appearing, and does appear to be in mild discomfort  Differential diagnosis gastritis versus cholecystitis versus pancreatitis versus ACS versus pneumonia   -CMP, CBC, lipase  -will check troponin to rule out ACS  Initial EKG was normal sinus rhythm with a heart rate of 85, normal axis, normal intervals, Q-wave in lead 3 suspicious for old inferior infarction  No other ST T wave changes concerning for ACS  -chest x-ray  -patient the patient's exam findings, as well as her baseline GFR of 35, will do noncontrast abdominal CT  -1 L normal saline  -2 mg of morphine for pain  -will reassess    ED COURSE    Labwork and Imaging reviewed - CT scan showed dilated gallbladder, pericholecystic fluid, gallstones, concerning for acute cholecystitis  An ultrasound was ordered which confirmed these findings  Ancef was started based on these findings  -on re-evaluation, the patient states her pain is improved  She continues to be intermittently lucid, but due to her baseline dementia, I do not believe she is competent to make her own medical decisions at this time   -I did have a discussion with the patient's son, who was the patient's POA, regarding goals of care  The patient is DNR at this time, but the patient's son states that he would like to pursue surgical intervention if needed the to treat the patient's cholecystitis  -I discussed this case with the general surgery resident attending  Patient will be admitted to the surgery service for further evaluation treatment, possible surgical intervention if deemed appropriate, and pending further discussion with the patient's POA      History  Chief Complaint   Patient presents with    Abdominal Pain     abd pain starting today with decreased apetite  c/o vomiting yesterday  45-year-old female presents with abdominal pain  She does have a history of dementia, so history is limited  The patient states that her pain started approximately yesterday afternoon, is located in the patient's epigastrium, and is associated with "back pain", which the patient describes as actually her right flank  The patient states she also vomited once, but has had a decreased appetite and has not eaten since that episode of vomiting, and also has had no further episodes of vomiting since then  Patient denies diarrhea  She has no fevers, chills, chest pain, shortness of breath, lightheadedness, leg pain, urinary changes  Of note, she is incontinent of urine at baseline  She has no recent hospitalizations  No history of abdominal surgeries  Prior to Admission Medications   Prescriptions Last Dose Informant Patient Reported? Taking?    Linagliptin (TRADJENTA) 5 MG TABS   Yes Yes   Sig: Take 5 mg by mouth daily   acetaminophen (TYLENOL) 325 mg tablet   No Yes   Sig: Take 3 tablets by mouth every 8 (eight) hours   amLODIPine (NORVASC) 2 5 mg tablet   No Yes   Sig: Take 1 tablet by mouth daily   aspirin 81 mg chewable tablet   No Yes   Sig: Chew 1 tablet daily   cholecalciferol (VITAMIN D3) 1,000 units tablet   Yes Yes   Sig: Take 1,000 Units by mouth daily   cyanocobalamin (VITAMIN B-12) 1,000 mcg tablet   Yes Yes   Sig: Take 1,000 mcg by mouth daily   escitalopram (LEXAPRO) 20 mg tablet   Yes Yes   Sig: Take 20 mg by mouth daily   insulin glargine (LANTUS) 100 units/mL subcutaneous injection   No Yes   Sig: Inject 7 Units under the skin daily at bedtime   insulin lispro (HumaLOG) 100 units/mL injection   No Yes   Sig: Inject 3 Units under the skin 3 (three) times a day with meals   levothyroxine 50 mcg tablet   Yes Yes   Sig: Take 75 mcg by mouth daily   lidocaine (LIDODERM) 5 %   No Yes   Sig: Place 1 patch on the skin daily Remove & Discard patch within 12 hours or as directed by MD   nystatin (MYCOSTATIN) powder   No Yes   Sig: Apply topically 3 (three) times a day   omeprazole (PriLOSEC) 20 mg delayed release capsule   Yes Yes   Sig: Take 20 mg by mouth daily   rosuvastatin (CRESTOR) 5 mg tablet   Yes Yes   Sig: Take 5 mg by mouth daily      Facility-Administered Medications: None       Past Medical History:   Diagnosis Date    Diabetes mellitus ([facility] [address] )     Hyperlipidemia     Hypothyroidism     Macular degeneration     Osteoarthritis        History reviewed  No pertinent surgical history  History reviewed  No pertinent family history  I have reviewed and agree with the history as documented  Social History   Substance Use Topics    Smoking status: Never Smoker    Smokeless tobacco: Never Used    Alcohol use No        Review of Systems   Constitutional: Positive for appetite change (Decreased)  Negative for chills and fever  HENT: Negative for congestion and sore throat  Eyes: Negative for photophobia and visual disturbance  Respiratory: Negative for cough and shortness of breath  Cardiovascular: Negative for chest pain and palpitations  Gastrointestinal: Positive for abdominal pain  Negative for diarrhea, nausea and vomiting  Endocrine: Negative for polyphagia and polyuria  Genitourinary: Positive for flank pain ( right-sided)  Negative for dysuria  Musculoskeletal: Negative for neck pain and neck stiffness  Skin: Negative for pallor and rash  Neurological: Negative for dizziness and light-headedness         Physical Exam  ED Triage Vitals   Temperature Pulse Respirations Blood Pressure SpO2   01/19/18 2033 01/19/18 2033 01/19/18 2033 01/19/18 2033 01/19/18 2033   97 6 °F (36 4 °C) 84 16 146/62 95 %      Temp src Heart Rate Source Patient Position - Orthostatic VS BP Location FiO2 (%)   -- 01/19/18 2351 01/19/18 2351 01/19/18 2351 --    Monitor Lying Right arm       Pain Score       01/19/18 2033 3           Orthostatic Vital Signs  Vitals:    01/20/18 1230 01/20/18 1330 01/20/18 1346 01/20/18 1430   BP: (!) 179/81  (!) 198/86 (!) 217/91   Pulse: 80 82  82   Patient Position - Orthostatic VS: Lying          Physical Exam   Constitutional: She is oriented to person, place, and time  Awake and alert, confused appearing, no acute distress  Nontoxic-appearing   HENT:   Head: Normocephalic and atraumatic  Mouth/Throat: No oropharyngeal exudate  Mucous membranes dry   Eyes: Pupils are equal, round, and reactive to light  No scleral icterus  Neck: Normal range of motion  No JVD present  Cardiovascular: Normal rate, regular rhythm and normal heart sounds  No murmur heard  Pulmonary/Chest: Effort normal  No respiratory distress  She has no wheezes  She has no rales  Abdominal:   Abdomen is soft  There is tenderness to palpation in the epigastrium, and right upper quadrant  There is no rigidity, distention, rebound  Musculoskeletal: Normal range of motion  She exhibits no edema  Neurological: She is alert and oriented to person, place, and time  No cranial nerve deficit  Skin: Skin is warm and dry  No pallor         ED Medications  Medications   morphine injection 2 mg (2 mg Intravenous Not Given 1/20/18 0014)   HYDROmorphone (DILAUDID) injection 0 5 mg (not administered)   sodium chloride 0 9 % infusion (125 mL/hr Intravenous New Bag 1/20/18 1345)   heparin (porcine) subcutaneous injection 5,000 Units (5,000 Units Subcutaneous Not Given 1/20/18 0525)   ceFAZolin (ANCEF) IVPB (premix) 1,000 mg (0 mg Intravenous Stopped 1/20/18 1345)   metroNIDAZOLE (FLAGYL) IVPB (premix) 500 mg (500 mg Intravenous New Bag 1/20/18 1041)   insulin lispro (HumaLOG) 100 units/mL subcutaneous injection 1-5 Units (not administered)   labetalol (NORMODYNE) injection 10 mg (not administered)   sodium chloride 0 9 % bolus 1,000 mL (0 mL Intravenous Stopped 1/20/18 0030)   ceFAZolin (ANCEF) IVPB (premix) 2,000 mg (0 mg Intravenous Stopped 1/20/18 0100)       Diagnostic Studies  Results Reviewed     Procedure Component Value Units Date/Time    Fingerstick Glucose (POCT) [[id number]]  (Abnormal) Collected:  01/20/18 1032    Lab Status:  Final result Updated:  01/20/18 1210     POC Glucose 196 (H) mg/dl     Fingerstick Glucose (POCT) [[id number]]  (Abnormal) Collected:  01/20/18 0547    Lab Status:  Final result Updated:  01/20/18 0548     POC Glucose 223 (H) mg/dl     Hepatic function panel [[id number]]  (Abnormal) Collected:  01/20/18 0457    Lab Status:  Final result Specimen:  Blood from Arm, Right Updated:  01/20/18 0523     Total Bilirubin 0 81 mg/dL      Bilirubin, Direct 0 38 (H) mg/dL      Alkaline Phosphatase 76 U/L      AST 28 U/L      ALT 28 U/L      Total Protein 7 5 g/dL      Albumin 2 6 (L) g/dL     Basic metabolic panel [[id number]]  (Abnormal) Collected:  01/20/18 0457    Lab Status:  Final result Specimen:  Blood from Arm, Right Updated:  01/20/18 0523     Sodium 139 mmol/L      Potassium 3 9 mmol/L      Chloride 107 mmol/L      CO2 24 mmol/L      Anion Gap 8 mmol/L      BUN 32 (H) mg/dL      Creatinine 1 27 mg/dL      Glucose 229 (H) mg/dL      Calcium 9 2 mg/dL      eGFR 38 ml/min/1 73sq m     Narrative:         National Kidney Disease Education Program recommendations are as follows:  GFR calculation is accurate only with a steady state creatinine  Chronic Kidney disease less than 60 ml/min/1 73 sq  meters  Kidney failure less than 15 ml/min/1 73 sq  meters      CBC and differential [[id number]]  (Abnormal) Collected:  01/20/18 0457    Lab Status:  Final result Specimen:  Blood from Arm, Right Updated:  01/20/18 0505     WBC 14 37 (H) Thousand/uL      RBC 3 97 Million/uL      Hemoglobin 12 7 g/dL      Hematocrit 37 1 %      MCV 94 fL      MCH 32 0 pg      MCHC 34 2 g/dL      RDW 16 2 (H) %      MPV 10 7 fL      Platelets 878 Thousands/uL      nRBC 0 /100 WBCs      Neutrophils Relative 85 (H) %      Lymphocytes Relative 9 (L) %      Monocytes Relative 6 %      Eosinophils Relative 0 %      Basophils Relative 0 %      Neutrophils Absolute 12 16 (H) Thousands/µL      Lymphocytes Absolute 1 30 Thousands/µL      Monocytes Absolute 0 82 Thousand/µL      Eosinophils Absolute 0 01 Thousand/µL      Basophils Absolute 0 01 Thousands/µL     Comprehensive metabolic panel [[id number]]  (Abnormal) Collected:  01/19/18 2138    Lab Status:  Final result Specimen:  Blood from Arm, Left Updated:  01/19/18 2215     Sodium 134 (L) mmol/L      Potassium 4 6 mmol/L      Chloride 102 mmol/L      CO2 25 mmol/L      Anion Gap 7 mmol/L      BUN 34 (H) mg/dL      Creatinine 1 47 (H) mg/dL      Glucose 261 (H) mg/dL      Calcium 9 8 mg/dL      AST 50 (H) U/L      ALT 35 U/L      Alkaline Phosphatase 79 U/L      Total Protein 8 1 g/dL      Albumin 2 9 (L) g/dL      Total Bilirubin 1 47 (H) mg/dL      eGFR 32 ml/min/1 73sq m     Narrative:         National Kidney Disease Education Program recommendations are as follows:  GFR calculation is accurate only with a steady state creatinine  Chronic Kidney disease less than 60 ml/min/1 73 sq  meters  Kidney failure less than 15 ml/min/1 73 sq  meters  Lipase [[id number]]  (Abnormal) Collected:  01/19/18 2138    Lab Status:  Final result Specimen:  Blood from Arm, Left Updated:  01/19/18 2215     Lipase 65 (L) u/L     Troponin I [[id number]]  (Normal) Collected:  01/19/18 2138    Lab Status:  Final result Specimen:  Blood from Arm, Left Updated:  01/19/18 2204     Troponin I <0 02 ng/mL     Narrative:         [employer] Chemistry analyzer 99% cutoff is > 0 04 ng/mL in network labs    o cTnI 99% cutoff is useful only when applied to patients in the clinical setting of myocardial ischemia  o cTnI 99% cutoff should be interpreted in the context of clinical history, ECG findings and possibly cardiac imaging to establish correct diagnosis    o cTnI 99% cutoff may be suggestive but clearly not indicative of a coronary event without the clinical setting of myocardial ischemia  CBC and differential [[id number]]  (Abnormal) Collected:  01/19/18 2139    Lab Status:  Final result Specimen:  Blood from Arm, Left Updated:  01/19/18 2145     WBC 15 99 (H) Thousand/uL      RBC 4 10 Million/uL      Hemoglobin 13 2 g/dL      Hematocrit 38 5 %      MCV 94 fL      MCH 32 2 pg      MCHC 34 3 g/dL      RDW 16 0 (H) %      MPV 10 7 fL      Platelets 092 Thousands/uL      nRBC 0 /100 WBCs      Neutrophils Relative 88 (H) %      Lymphocytes Relative 6 (L) %      Monocytes Relative 6 %      Eosinophils Relative 0 %      Basophils Relative 0 %      Neutrophils Absolute 14 04 (H) Thousands/µL      Lymphocytes Absolute 0 91 Thousands/µL      Monocytes Absolute 0 94 Thousand/µL      Eosinophils Absolute 0 00 Thousand/µL      Basophils Absolute 0 02 Thousands/µL     UA w Reflex to Microscopic w Reflex to Culture [[id number]]     Lab Status:  No result Specimen:  Urine                  XR chest 2 views   Final Result by Lynda Goldsmith MD (01/20 0310)      There are low volumes with associated crowding of the pulmonary vasculature and atelectasis  Increased right lower lung field opacity may be due to atelectasis and/or consolidation  Workstation performed: [id number]         US gallbladder   Final Result by Lynda Goldsmith MD (01/19 2335)      Cholelithiasis with gallbladder wall thickening and pericholecystic fluid compatible with acute cholecystitis  The study was marked in [facility] for immediate notification  Workstation performed: [id number]         CT abdomen pelvis wo contrast   Final Result by Rohan Gabriel MD (01/19 2219)         1  Cholelithiasis and evidence of acute cholecystitis  Ultrasound may be helpful for confirmation  No biliary obstruction  2  Few mildly distended loops of fluid-filled small bowel without evidence of obstruction  Possible mild enteritis  Small hiatal hernia           Workstation performed: [id number] Procedures  Procedures      Phone Consults  ED Phone Contact    ED Course  ED Course            Identification of Seniors at [address] [facility] Most Recent Value   (ISAR) Identification of Seniors at Risk   Before the illness or injury that brought you to the Emergency, did you need someone to help you on a regular basis? 1 Filed at: 01/19/2018 2036   In the last 24 hours, have you needed more help than usual?  0 Filed at: 01/19/2018 2036   Have you been hospitalized for one or more nights during the past 6 months? 0 Filed at: 01/19/2018 2036   In general, do you see well? 1 Filed at: 01/19/2018 2036   In general, do you have serious problems with your memory? 1 Filed at: 01/19/2018 2036   Do you take more than three different medications every day? 1 Filed at: 01/19/2018 2036   ISAR Score  4 Filed at: 01/19/2018 2036                          MDM  CritCare Time    Disposition  Final diagnoses:   Acute cholecystitis   RUQ pain   Elevated WBC count     Time reflects when diagnosis was documented in both MDM as applicable and the Disposition within this note     Time User Action Codes Description Comment    1/20/2018  4:17 AM Karen Mode Add [K85 90] Pancreatitis     1/20/2018  4:17 AM Karen Mode Remove [K85 90] Pancreatitis     1/20/2018  4:17 AM Karen Mode Add [K81 0] Acute cholecystitis     1/20/2018  4:18 AM Karen Mode Add [R10 11] RUQ pain     1/20/2018  4:19 AM Karen Mode Add [D72 829] Elevated WBC count       ED Disposition     ED Disposition Condition Comment    Admit  Case was discussed with surgery resident and the patient's admission status was agreed to be Admission Status: inpatient status to the service of Dr Marina Albrecht   Follow-up Information    None       Patient's Medications   Discharge Prescriptions    No medications on file     No discharge procedures on file  ED Provider  Attending physically available and evaluated Brandon Garber I managed the patient along with the ED Attending      Electronically Signed by         Edis Bermudez MD  01/20/18 5844

## 2018-01-20 NOTE — ED ATTENDING ATTESTATION
Cornelio Garcia MD, saw and evaluated the patient  I have discussed the patient with the resident/non-physician practitioner and agree with the resident's/non-physician practitioner's findings, Plan of Care, and MDM as documented in the resident's/non-physician practitioner's note, except where noted  All available labs and Radiology studies were reviewed  At this point I agree with the current assessment done in the Emergency Department  I have conducted an independent evaluation of this patient a history and physical is as follows:      Critical Care Time  CritCare Time    Procedures     81 yo female with hx of dementia from nursing home c/o epigastric abdominal pain with back pain since yesterday  Pt with one episode of vomiting  No diarrhea, no fever, no chills  No urinary complaints, no cp, no sob   pmh htn, hld, hypothyroidism  Vss, afebrile, lungs cta, rrr, abdomen soft tender epigastric area, no rebound, no guarding  No pedal edema  Rectal heme negative  Labs, ekg, cxr, ct a/p  Gi cocktail, urine

## 2018-01-20 NOTE — PROGRESS NOTES
Discussed with son, Vito, recommendation for surgery at this time  He would like to talk to his mother himself, and his ETA is tonight or tomorrow, as he is traveling from out of town  Currently, he is supports surgical intervention for his mother, but he would like her to make the decision  Risks, benefits, and alternatives explained  Would like to continue managing conservatively with NPO and antibiotics at this time  Jemal Peña Surgery  [id number]

## 2018-01-20 NOTE — H&P
H&P Exam - General Surgery   Nathaly Dominguez 80 y o  female MRN: [id number]  Unit/Bed#: GYN 1 Encounter: [id number]    Assessment/Plan     Assessment:  55-year-old female presenting with acute cholecystitis currently being managed conservatively     Plan:  GABO [email or web address]  Obtain UA   Ancef/ Flagyl  Serial abd exams   Repeat LFTs in am   SQH and venodynes for DVT ppx    Will re-discuss surgical options w/ the pt and her son (POA) in the morning       History of Present Illness     HPI:  Nathaly Dominguez is a 80 y o  female who presents from her nursing facility with complaints of 1 day of right upper quadrant abdominal pain and decreased oral intake  The patient suffers from dementia therefore the majority of this history was obtained after speaking with her son  The patient suffers from chronic UTI infections and never complains of abd pain therefore when she started to c/o abd pain she was immediately taken to  hospital  Upon assessment she notably had a leukocytosis and elevated TBili of 1 47  Therefore a CTAP was obtained which showed GB distention, multiple stones & wall thickening  Subsequently a RUQ was obtained which was also positive for acute cholecystitis  After the workup however the pt was no longer c/o abd pain  When the patient's findings were explained to her, she was asked if she would want sx if indicated however she said "no"  Review of Systems   Constitutional: Positive for appetite change  HENT: Negative  Eyes: Negative  Respiratory: Negative  Cardiovascular: Negative  Gastrointestinal: Positive for abdominal pain  Negative for constipation, diarrhea, nausea and vomiting  Endocrine: Negative  Genitourinary:        Hx of multiple UTIs   Musculoskeletal: Negative  Skin: Negative  Allergic/Immunologic: Negative  Neurological:        Dementia    Hematological: Negative  Psychiatric/Behavioral: Negative  All other systems reviewed and are negative        Historical Information   Past Medical History:   Diagnosis Date    Diabetes mellitus ([facility] [address] )     Hyperlipidemia     Hypothyroidism     Macular degeneration     Osteoarthritis      History reviewed  No pertinent surgical history  Social History   History   Alcohol Use No     History   Drug Use No     History   Smoking Status    Never Smoker   Smokeless Tobacco    Never Used     Family History: History reviewed  No pertinent family history  Meds/Allergies   PTA meds:   Prior to Admission Medications   Prescriptions Last Dose Informant Patient Reported? Taking?    Linagliptin (TRADJENTA) 5 MG TABS   Yes No   Sig: Take 5 mg by mouth daily   acetaminophen (TYLENOL) 325 mg tablet   No No   Sig: Take 3 tablets by mouth every 8 (eight) hours   amLODIPine (NORVASC) 2 5 mg tablet   No No   Sig: Take 1 tablet by mouth daily   aspirin 81 mg chewable tablet   No No   Sig: Chew 1 tablet daily   cholecalciferol (VITAMIN D3) 1,000 units tablet   Yes No   Sig: Take 1,000 Units by mouth daily   cyanocobalamin (VITAMIN B-12) 1,000 mcg tablet   Yes No   Sig: Take 1,000 mcg by mouth daily   escitalopram (LEXAPRO) 20 mg tablet   Yes No   Sig: Take 20 mg by mouth daily   insulin glargine (LANTUS) 100 units/mL subcutaneous injection   No No   Sig: Inject 7 Units under the skin daily at bedtime   insulin lispro (HumaLOG) 100 units/mL injection   No No   Sig: Inject 3 Units under the skin 3 (three) times a day with meals   levothyroxine 50 mcg tablet   Yes No   Sig: Take 75 mcg by mouth daily   lidocaine (LIDODERM) 5 %   No No   Sig: Place 1 patch on the skin daily Remove & Discard patch within 12 hours or as directed by MD   nystatin (MYCOSTATIN) powder   No No   Sig: Apply topically 3 (three) times a day   omeprazole (PriLOSEC) 20 mg delayed release capsule   Yes No   Sig: Take 20 mg by mouth daily   rosuvastatin (CRESTOR) 5 mg tablet   Yes No   Sig: Take 5 mg by mouth daily      Facility-Administered Medications: None     Allergies Allergen Reactions    Penicillins     Valsartan        Objective   First Vitals:   Blood Pressure: 146/62 (01/19/18 2033)  Pulse: 84 (01/19/18 2033)  Temperature: 97 6 °F (36 4 °C) (01/19/18 2033)  Respirations: 16 (01/19/18 2033)  Weight - Scale: 59 kg (130 lb) (01/19/18 2033)  SpO2: 95 % (01/19/18 2033)    Current Vitals:   Blood Pressure: 159/86 (01/19/18 2351)  Pulse: 85 (01/19/18 2351)  Temperature: 97 6 °F (36 4 °C) (01/19/18 2033)  Respirations: 18 (01/19/18 2351)  Weight - Scale: 59 kg (130 lb) (01/19/18 2033)  SpO2: 96 % (01/19/18 2351)    No intake or output data in the 24 hours ending 01/20/18 0022    Invasive Devices     Peripheral Intravenous Line            Peripheral IV 01/19/18 Left Hand less than 1 day                Physical Exam   Constitutional: She appears well-developed and well-nourished  No distress  Cardiovascular: Normal rate, regular rhythm and normal heart sounds  Exam reveals no gallop and no friction rub  No murmur heard  Pulmonary/Chest: Effort normal and breath sounds normal  No respiratory distress  She has no wheezes  She has no rales  She exhibits no tenderness  Abdominal: Soft  Bowel sounds are normal  She exhibits no distension and no mass  There is tenderness (RUQ )  There is no rebound and no guarding  Musculoskeletal: Normal range of motion  Neurological: She is alert  Confused    Skin: Skin is warm and dry  No rash noted  She is not diaphoretic  No erythema  No pallor  Nursing note and vitals reviewed  Lab Results:   I have personally reviewed pertinent lab results    , CBC:   Lab Results   Component Value Date    WBC 15 99 (H) 01/19/2018    HGB 13 2 01/19/2018    HCT 38 5 01/19/2018    MCV 94 01/19/2018     01/19/2018    MCH 32 2 01/19/2018    MCHC 34 3 01/19/2018    RDW 16 0 (H) 01/19/2018    MPV 10 7 01/19/2018    NRBC 0 01/19/2018   , CMP:   Lab Results   Component Value Date     (L) 01/19/2018    K 4 6 01/19/2018     01/19/2018 CO2 25 01/19/2018    ANIONGAP 7 01/19/2018    BUN 34 (H) 01/19/2018    CREATININE 1 47 (H) 01/19/2018    GLUCOSE 261 (H) 01/19/2018    CALCIUM 9 8 01/19/2018    AST 50 (H) 01/19/2018    ALT 35 01/19/2018    ALKPHOS 79 01/19/2018    PROT 8 1 01/19/2018    ALBUMIN 2 9 (L) 01/19/2018    BILITOT 1 47 (H) 01/19/2018    EGFR 32 01/19/2018   , Coagulation: No results found for: PT, INR, APTT, Urinalysis: No results found for: Osvaldo Baker, SPECGRAV, PHUR, LEUKOCYTESUR, NITRITE, PROTEINUA, GLUCOSEU, KETONESU, BILIRUBINUR, BLOODU, Amylase: No results found for: AMYLASE, Lipase:   Lab Results   Component Value Date    LIPASE 65 (L) 01/19/2018     Imaging: I have personally reviewed pertinent reports  and I have personally reviewed pertinent films in PACS     Ct Abdomen Pelvis Wo Contrast    Result Date: 1/19/2018  Narrative: CT ABDOMEN AND PELVIS WITHOUT IV CONTRAST INDICATION:  Epigastric pain  COMPARISON: 11/21/2017  TECHNIQUE:  CT examination of the abdomen and pelvis was performed without intravenous contrast   Reformatted images were created in axial, sagittal, and coronal planes  Radiation dose length product (DLP) for this visit:  483 93 mGy-cm   This examination, like all CT scans performed in the [facility], was performed utilizing techniques to minimize radiation dose exposure, including the use of iterative  reconstruction and automated exposure control  Enteric contrast was administered  FINDINGS: ABDOMEN LOWER CHEST:  Subsegmental atelectasis right base  LIVER/BILIARY TREE:  No biliary obstruction  GALLBLADDER:  Significant gallbladder distention  Punctate gallstones layering along the wall  Mild wall thickening and pericholecystic fluid  SPLEEN:  Unremarkable  PANCREAS:  Unremarkable  ADRENAL GLANDS:  Unremarkable  KIDNEYS/URETERS:  Unremarkable  No hydronephrosis  STOMACH AND BOWEL:  Small hiatal hernia   Few mildly distended fluid-filled loops of small bowel in the abdomen without transition point  No bowel obstruction, colitis or diverticulitis  APPENDIX:  Normal appendix  ABDOMINOPELVIC CAVITY:  No ascites or free intraperitoneal air  No lymphadenopathy  VESSELS:  Unremarkable for patient's age  PELVIS REPRODUCTIVE ORGANS:  Unremarkable for patient's age  URINARY BLADDER:  There is a 0 5 cm diverticulum along the right wall, just superior and lateral to the ureterovesical junction  Ureterocele entirely excluded  ABDOMINAL WALL/INGUINAL REGIONS:  Unremarkable  OSSEOUS STRUCTURES:  Chronic T11, T12 and L1 compression fractures  Deformity of the thoracolumbar junction  Impression: 1  Cholelithiasis and evidence of acute cholecystitis  Ultrasound may be helpful for confirmation  No biliary obstruction  2  Few mildly distended loops of fluid-filled small bowel without evidence of obstruction  Possible mild enteritis  Small hiatal hernia  Workstation performed: [id number]     Xr Chest 2 Views    Result Date: 1/20/2018  Narrative: CHEST INDICATION:  Epigastric pain  History taken directly from the electronic ordering system  COMPARISON:  November 12, 2017  VIEWS:  Frontal and lateral projections IMAGES:  2 FINDINGS:     The cardiac silhouette is stable in size  There are low volumes with associated crowding of the pulmonary vasculature and atelectasis  There is chronic elevation of the right hemidiaphragm  There is increased opacity overlying the right lower lung field which may be due to atelectasis and/or consolidation  No pleural effusion  No pneumothorax  No acute osseous abnormality  Redemonstration of multiple chronic left rib fractures  Impression: There are low volumes with associated crowding of the pulmonary vasculature and atelectasis  Increased right lower lung field opacity may be due to atelectasis and/or consolidation   Workstation performed: [id number]     Us Gallbladder    Result Date: 1/19/2018  Narrative: RIGHT UPPER QUADRANT ULTRASOUND INDICATION:  Right upper quadrant pain  COMPARISON:  CT of the abdomen and pelvis on 1/19/2018  TECHNIQUE:   Real-time ultrasound of the right upper quadrant was performed with a curvilinear transducer with both volumetric sweeps and still imaging techniques  FINDINGS: PANCREAS:  The pancreas is not well-visualized due to overlying bowel gas  AORTA AND IVC:  Not well-visualized  LIVER: Size:  Within normal range  The liver measures 15 4 cm in the midclavicular line  Contour:  Surface contour is smooth  Parenchyma:  Echogenicity and echotexture are within normal limits  No evidence of suspicious mass  Limited imaging of the main portal vein shows it to be patent and hepatopetal   BILIARY: The gallbladder is distended  There is gallbladder wall thickening measuring up to 7 6 mm in thickness  There is pericholecystic fluid  Layering hyperechoic material seen within the gallbladder likely reflecting sludge/stones  The sonographic Schwartz's sign cannot be accurately elicited  No intrahepatic biliary dilatation  CBD measures 5 5 mm  No proximal choledocholithiasis  KIDNEY: Right kidney measures 8 cm in length  Within normal limits  ASCITES:   Pericholecystic fluid  Impression: Cholelithiasis with gallbladder wall thickening and pericholecystic fluid compatible with acute cholecystitis  The study was marked in [facility] for immediate notification  Workstation performed: [id number]           EKG, Pathology, and Other Studies: I have personally reviewed pertinent reports  Code Status: Prior  Advance Directive and Living Will:      Power of :    POLST:      Counseling / Coordination of Care  Total floor / unit time spent today 30 minutes  Greater than 50% of total time was spent with the patient and / or family counseling and / or coordination of care

## 2018-01-21 PROBLEM — K80.10 CHOLECYSTITIS WITH CHOLELITHIASIS: Status: ACTIVE | Noted: 2018-01-21

## 2018-01-21 LAB
ANION GAP SERPL CALCULATED.3IONS-SCNC: 9 MMOL/L (ref 4–13)
BACTERIA UR QL AUTO: ABNORMAL /HPF
BASOPHILS # BLD AUTO: 0.02 THOUSANDS/ΜL (ref 0–0.1)
BASOPHILS NFR BLD AUTO: 0 % (ref 0–1)
BILIRUB UR QL STRIP: NEGATIVE
BUN SERPL-MCNC: 23 MG/DL (ref 5–25)
CALCIUM SERPL-MCNC: 8.7 MG/DL (ref 8.3–10.1)
CHLORIDE SERPL-SCNC: 111 MMOL/L (ref 100–108)
CLARITY UR: ABNORMAL
CO2 SERPL-SCNC: 21 MMOL/L (ref 21–32)
COLOR UR: YELLOW
CREAT SERPL-MCNC: 0.91 MG/DL (ref 0.6–1.3)
EOSINOPHIL # BLD AUTO: 0.01 THOUSAND/ΜL (ref 0–0.61)
EOSINOPHIL NFR BLD AUTO: 0 % (ref 0–6)
ERYTHROCYTE [DISTWIDTH] IN BLOOD BY AUTOMATED COUNT: 15.9 % (ref 11.6–15.1)
GFR SERPL CREATININE-BSD FRML MDRD: 57 ML/MIN/1.73SQ M
GLUCOSE SERPL-MCNC: 193 MG/DL (ref 65–140)
GLUCOSE SERPL-MCNC: 204 MG/DL (ref 65–140)
GLUCOSE SERPL-MCNC: 209 MG/DL (ref 65–140)
GLUCOSE SERPL-MCNC: 237 MG/DL (ref 65–140)
GLUCOSE UR STRIP-MCNC: NEGATIVE MG/DL
HCT VFR BLD AUTO: 34 % (ref 34.8–46.1)
HGB BLD-MCNC: 11.4 G/DL (ref 11.5–15.4)
HGB UR QL STRIP.AUTO: ABNORMAL
KETONES UR STRIP-MCNC: ABNORMAL MG/DL
LEUKOCYTE ESTERASE UR QL STRIP: ABNORMAL
LYMPHOCYTES # BLD AUTO: 1.44 THOUSANDS/ΜL (ref 0.6–4.47)
LYMPHOCYTES NFR BLD AUTO: 10 % (ref 14–44)
MAGNESIUM SERPL-MCNC: 2.1 MG/DL (ref 1.6–2.6)
MCH RBC QN AUTO: 31.5 PG (ref 26.8–34.3)
MCHC RBC AUTO-ENTMCNC: 33.5 G/DL (ref 31.4–37.4)
MCV RBC AUTO: 94 FL (ref 82–98)
MONOCYTES # BLD AUTO: 1.14 THOUSAND/ΜL (ref 0.17–1.22)
MONOCYTES NFR BLD AUTO: 8 % (ref 4–12)
NEUTROPHILS # BLD AUTO: 12.06 THOUSANDS/ΜL (ref 1.85–7.62)
NEUTS SEG NFR BLD AUTO: 82 % (ref 43–75)
NITRITE UR QL STRIP: NEGATIVE
NON-SQ EPI CELLS URNS QL MICRO: ABNORMAL /HPF
NRBC BLD AUTO-RTO: 0 /100 WBCS
PH UR STRIP.AUTO: 6.5 [PH] (ref 4.5–8)
PLATELET # BLD AUTO: 245 THOUSANDS/UL (ref 149–390)
PMV BLD AUTO: 11.1 FL (ref 8.9–12.7)
POTASSIUM SERPL-SCNC: 3.1 MMOL/L (ref 3.5–5.3)
PROT UR STRIP-MCNC: ABNORMAL MG/DL
RBC # BLD AUTO: 3.62 MILLION/UL (ref 3.81–5.12)
RBC #/AREA URNS AUTO: ABNORMAL /HPF
SODIUM SERPL-SCNC: 141 MMOL/L (ref 136–145)
SP GR UR STRIP.AUTO: 1.01 (ref 1–1.03)
UROBILINOGEN UR QL STRIP.AUTO: 1 E.U./DL
WBC # BLD AUTO: 14.73 THOUSAND/UL (ref 4.31–10.16)
WBC #/AREA URNS AUTO: ABNORMAL /HPF

## 2018-01-21 PROCEDURE — 85025 COMPLETE CBC W/AUTO DIFF WBC: CPT | Performed by: SURGERY

## 2018-01-21 PROCEDURE — 87081 CULTURE SCREEN ONLY: CPT | Performed by: SURGERY

## 2018-01-21 PROCEDURE — 87077 CULTURE AEROBIC IDENTIFY: CPT | Performed by: EMERGENCY MEDICINE

## 2018-01-21 PROCEDURE — 87086 URINE CULTURE/COLONY COUNT: CPT | Performed by: EMERGENCY MEDICINE

## 2018-01-21 PROCEDURE — 81001 URINALYSIS AUTO W/SCOPE: CPT | Performed by: EMERGENCY MEDICINE

## 2018-01-21 PROCEDURE — 87186 SC STD MICRODIL/AGAR DIL: CPT | Performed by: EMERGENCY MEDICINE

## 2018-01-21 PROCEDURE — 82948 REAGENT STRIP/BLOOD GLUCOSE: CPT

## 2018-01-21 PROCEDURE — 83735 ASSAY OF MAGNESIUM: CPT | Performed by: SURGERY

## 2018-01-21 PROCEDURE — 80048 BASIC METABOLIC PNL TOTAL CA: CPT | Performed by: SURGERY

## 2018-01-21 RX ORDER — POTASSIUM CHLORIDE 14.9 MG/ML
20 INJECTION INTRAVENOUS
Status: COMPLETED | OUTPATIENT
Start: 2018-01-21 | End: 2018-01-22

## 2018-01-21 RX ADMIN — HEPARIN SODIUM 5000 UNITS: 5000 INJECTION, SOLUTION INTRAVENOUS; SUBCUTANEOUS at 07:50

## 2018-01-21 RX ADMIN — CEFAZOLIN SODIUM 1000 MG: 1 SOLUTION INTRAVENOUS at 11:20

## 2018-01-21 RX ADMIN — SODIUM CHLORIDE 100 ML/HR: 0.9 INJECTION, SOLUTION INTRAVENOUS at 16:25

## 2018-01-21 RX ADMIN — LABETALOL 20 MG/4 ML (5 MG/ML) INTRAVENOUS SYRINGE 10 MG: at 03:06

## 2018-01-21 RX ADMIN — HEPARIN SODIUM 5000 UNITS: 5000 INJECTION, SOLUTION INTRAVENOUS; SUBCUTANEOUS at 12:44

## 2018-01-21 RX ADMIN — INSULIN LISPRO 1 UNITS: 100 INJECTION, SOLUTION INTRAVENOUS; SUBCUTANEOUS at 00:09

## 2018-01-21 RX ADMIN — POTASSIUM CHLORIDE 20 MEQ: 200 INJECTION, SOLUTION INTRAVENOUS at 16:23

## 2018-01-21 RX ADMIN — HEPARIN SODIUM 5000 UNITS: 5000 INJECTION, SOLUTION INTRAVENOUS; SUBCUTANEOUS at 22:28

## 2018-01-21 RX ADMIN — INSULIN LISPRO 1 UNITS: 100 INJECTION, SOLUTION INTRAVENOUS; SUBCUTANEOUS at 16:24

## 2018-01-21 RX ADMIN — METRONIDAZOLE 500 MG: 500 INJECTION, SOLUTION INTRAVENOUS at 03:00

## 2018-01-21 RX ADMIN — INSULIN LISPRO 2 UNITS: 100 INJECTION, SOLUTION INTRAVENOUS; SUBCUTANEOUS at 07:50

## 2018-01-21 RX ADMIN — METRONIDAZOLE 500 MG: 500 INJECTION, SOLUTION INTRAVENOUS at 10:20

## 2018-01-21 RX ADMIN — POTASSIUM CHLORIDE 20 MEQ: 200 INJECTION, SOLUTION INTRAVENOUS at 14:23

## 2018-01-21 RX ADMIN — CEFAZOLIN SODIUM 1000 MG: 1 SOLUTION INTRAVENOUS at 23:36

## 2018-01-21 RX ADMIN — CEFAZOLIN SODIUM 1000 MG: 1 SOLUTION INTRAVENOUS at 00:07

## 2018-01-21 RX ADMIN — INSULIN LISPRO 2 UNITS: 100 INJECTION, SOLUTION INTRAVENOUS; SUBCUTANEOUS at 12:18

## 2018-01-21 RX ADMIN — SODIUM CHLORIDE 125 ML/HR: 0.9 INJECTION, SOLUTION INTRAVENOUS at 07:53

## 2018-01-21 RX ADMIN — METRONIDAZOLE 500 MG: 500 INJECTION, SOLUTION INTRAVENOUS at 16:24

## 2018-01-21 RX ADMIN — POTASSIUM CHLORIDE 20 MEQ: 200 INJECTION, SOLUTION INTRAVENOUS at 09:00

## 2018-01-21 RX ADMIN — POTASSIUM CHLORIDE 20 MEQ: 200 INJECTION, SOLUTION INTRAVENOUS at 12:18

## 2018-01-21 NOTE — PROGRESS NOTES
Progress Note - General Surgery   Tad Days Colon Lines 80 y o  female MRN: [id number]  Unit/Bed#: [facility] 622-01 Encounter: [id number]    Assessment and Plan:  Patient Active Problem List   Diagnosis    Altered mental status    Dementia    Essential hypertension    Diabetes mellitus ([address] )    Stage 3 chronic kidney disease    Falls    Epigastric pain    Cholecystitis with cholelithiasis       Assessment:  80-year-old female presenting with acute cholecystitis currently being managed conservatively     Plan:  NPO   [email or web address]  Labetalol PRN for SBP >170 (home meds on hold as pt is NPO)   ancef /flagyl  Check UA   Serial abd exams   Continue to trend leukocytosis   SQH and venodynes for DVT ppx    Waiting for pt's son to arrive to discuss surgery vs continuing conservative tx     Subjective: Pt confused this morning but does state she is still having abd pain  Has been hypertensive during her admission  According to son pt has chronic UTIs    Objective:       Vitals   Vitals:    01/20/18 2300 01/21/18 0300 01/21/18 0302 01/21/18 0651   BP: 162/76 (!) 182/116 (!) 180/76 152/58   BP Location:  Right arm Right arm Right arm   Pulse: 83 76  75   Resp: 18   20   Temp: 98 2 °F (36 8 °C)   (!) 97 4 °F (36 3 °C)   TempSrc: Oral   Axillary   SpO2: 96%   95%   Weight:         Temp  Min: 97 4 °F (36 3 °C)  Max: 98 2 °F (36 8 °C)  IBW: -92 5 kg   Body mass index is 31 34 kg/m²      I/O   I/O       01/19 0701 - 01/20 0700 01/20 0701 - 01/21 0700 01/21 0701 - 01/22 0700    I V  (mL/kg)  2056 7 (34 9)     IV Piggyback  50     Total Intake(mL/kg)  2106 7 (35 7)     Urine (mL/kg/hr)  314 (0 2)     Total Output   314      Net   +1792 7                   Intake/Output Summary (Last 24 hours) at 01/21/18 0710  Last data filed at 01/21/18 0150   Gross per 24 hour   Intake          2106 67 ml   Output              314 ml   Net          1792 67 ml       Invasive  Devices  Invasive Devices     Peripheral Intravenous Line            Peripheral IV 01/19/18 Left Hand 1 day                Active medications  Scheduled Meds:  cefazolin 1,000 mg Intravenous Q12H   heparin (porcine) 5,000 Units Subcutaneous Q8H [address]   insulin lispro 1-5 Units Subcutaneous Q6H [address]   metroNIDAZOLE 500 mg Intravenous Q8H   morphine injection 2 mg Intravenous Once     Continuous Infusions:    sodium chloride 125 mL/hr Last Rate: 125 mL/hr (01/21/18 0050)     PRN Meds:     HYDROmorphone 0 5 mg Q4H PRN   labetalol 10 mg Q4H PRN       Physical Exam: /58 (BP Location: Right arm)   Pulse 75   Temp (!) 97 4 °F (36 3 °C) (Axillary)   Resp 20   Wt 59 kg (130 lb)   SpO2 95%   BMI 31 34 kg/m²     Physical Exam:  General Appearance: Alert, cooperative, no distress, appears stated age  Lungs: Clear to auscultation bilaterally, respirations unlablored   Heart: Regular rate and rhythm, S1 and S2 normal, no murmur, rub or gallop  Abdomen: Soft, tender in RUQ, non distended, bowel sounds active in all four quadrants,   No masses or organomegaly    Laboratory and Diagnostics:    Results from last 7 days  Lab Units 01/21/18  0518 01/20/18  0457 01/19/18  2139   WBC Thousand/uL 14 73* 14 37* 15 99*   HEMOGLOBIN g/dL 11 4* 12 7 13 2   HEMATOCRIT % 34 0* 37 1 38 5   PLATELETS Thousands/uL 245 218 221   NEUTROS PCT % 82* 85* 88*   MONOS PCT % 8 6 6       Results from last 7 days  Lab Units 01/21/18  0518 01/20/18  0457 01/19/18  2138   SODIUM mmol/L 141 139 134*   POTASSIUM mmol/L 3 1* 3 9 4 6   CHLORIDE mmol/L 111* 107 102   CO2 mmol/L 21 24 25   BUN mg/dL 23 32* 34*   CREATININE mg/dL 0 91 1 27 1 47*   CALCIUM mg/dL 8 7 9 2 9 8   TOTAL PROTEIN g/dL  --  7 5 8 1   BILIRUBIN TOTAL mg/dL  --  0 81 1 47*   ALK PHOS U/L  --  76 79   ALT U/L  --  28 35   AST U/L  --  28 50*   GLUCOSE RANDOM mg/dL 193* 229* 261*       Results from last 7 days  Lab Units 01/21/18  0518   MAGNESIUM mg/dL 2 1     No results found for: PHOS       No results found for: TROPONINT  ABG:No results found for: PHART, NGK8OZF, PO2ART, AYC8BGK, S0WSMSSM, BEART, SOURCE    Blood Culture: No results found for: BLOODCX  Urine Culture:   Lab Results   Component Value Date    URINECX (A) 11/21/2017     >100,000 cfu/ml Alpha Hemolytic Streptococcus NOT Enterococcus    URINECX 8502-0935 cfu/ml Gram Negative Parvez (A) 11/21/2017    URINECX >100,000 cfu/ml Escherichia coli (A) 11/15/2017    URINECX >100,000 cfu/ml Aerococcus urinae (A) 11/15/2017     Sputum Culture: No components found for: SPUTUMCX    Imaging: I have personally reviewed pertinent reports     and I have personally reviewed pertinent films in PACS    VTE Pharmacologic Prophylaxis: Heparin  VTE Mechanical Prophylaxis: sequential compression device

## 2018-01-21 NOTE — SOCIAL WORK
CM attempted to meet w/pt for assessment  Pt is confused  Spoke w/pt's son/RADHA Sarkar [phone] who states pt is resident at [facility] Per son, pt receives assistance in ADLs dressing, bathing  Is able to feed herself  Pt uses wheelchair to ambulate  [facility]  [facility] staff administers medications  Son stated d/c plan is to return to [facility]  Preferred Contact: [place] Angel (son) [phone]  CM reviewed d/c planning process including the following: identifying help at home, patient preference for d/c planning needs, Discharge Lounge, [facility] Meds to Bed program, availability of treatment team to discuss questions or concerns patient and/or family may have regarding understanding medications and recognizing signs and symptoms once discharged  CM also encouraged patient to follow up with all recommended appointments after discharge  Patient advised of importance for patient and family to participate in managing patients medical well being

## 2018-01-21 NOTE — PLAN OF CARE
Problem: DISCHARGE PLANNING - CARE MANAGEMENT  Goal: Discharge to post-acute care or home with appropriate resources  INTERVENTIONS:  - Conduct assessment to determine patient/family and health care team treatment goals, and need for post-acute services based on payer coverage, community resources, and patient preferences, and barriers to discharge  - Address psychosocial, clinical, and financial barriers to discharge as identified in assessment in conjunction with the patient/family and health care team  - Arrange appropriate level of post-acute services according to patient's   needs and preference and payer coverage in collaboration with the physician and health care team  - Communicate with and update the patient/family, physician, and health care team regarding progress on the discharge plan  - Arrange appropriate transportation to post-acute venues  - Return to [facility] for resumption of care  Outcome: Progressing

## 2018-01-21 NOTE — PLAN OF CARE
Problem: Potential for Falls  Goal: Patient will remain free of falls  INTERVENTIONS:  - Assess patient frequently for physical needs  -  Identify cognitive and physical deficits and behaviors that affect risk of falls    -  [place] fall precautions as indicated by assessment   - Educate patient/family on patient safety including physical limitations  - Instruct patient to call for assistance with activity based on assessment  - Modify environment to reduce risk of injury  - Consider OT/PT consult to assist with strengthening/mobility   Outcome: Progressing      Problem: PAIN - ADULT  Goal: Verbalizes/displays adequate comfort level or baseline comfort level  Interventions:  - Encourage patient to monitor pain and request assistance  - Assess pain using appropriate pain scale  - Administer analgesics based on type and severity of pain and evaluate response  - Implement non-pharmacological measures as appropriate and evaluate response  - Consider cultural and social influences on pain and pain management  - Notify physician/advanced practitioner if interventions unsuccessful or patient reports new pain  Outcome: Progressing      Problem: INFECTION - ADULT  Goal: Absence or prevention of progression during hospitalization  INTERVENTIONS:  - Assess and monitor for signs and symptoms of infection  - Monitor lab/diagnostic results  - Monitor all insertion sites, i e  indwelling lines, tubes, and drains  - Monitor endotracheal (as able) and nasal secretions for changes in amount and color  - [place] appropriate cooling/warming therapies per order  - Administer medications as ordered  - Instruct and encourage patient and family to use good hand hygiene technique  - Identify and instruct in appropriate isolation precautions for identified infection/condition  Outcome: Progressing    Goal: Absence of fever/infection during neutropenic period  INTERVENTIONS:  - Monitor WBC  - Implement neutropenic guidelines  Outcome: Progressing      Problem: SAFETY ADULT  Goal: Maintain or return to baseline ADL function  INTERVENTIONS:  -  Assess patient's ability to carry out ADLs; assess patient's baseline for ADL function and identify physical deficits which impact ability to perform ADLs (bathing, care of mouth/teeth, toileting, grooming, dressing, etc )  - Assess/evaluate cause of self-care deficits   - Assess range of motion  - Assess patient's mobility; develop plan if impaired  - Assess patient's need for assistive devices and provide as appropriate  - Encourage maximum independence but intervene and supervise when necessary  ¯ Involve family in performance of ADLs  ¯ Assess for home care needs following discharge   ¯ Request OT consult to assist with ADL evaluation and planning for discharge  ¯ Provide patient education as appropriate  Outcome: Progressing    Goal: Maintain or return mobility status to optimal level  INTERVENTIONS:  - Assess patient's baseline mobility status (ambulation, transfers, stairs, etc )    - Identify cognitive and physical deficits and behaviors that affect mobility  - Identify mobility aids required to assist with transfers and/or ambulation (gait belt, sit-to-stand, lift, walker, cane, etc )  - [place] fall precautions as indicated by assessment  - Record patient progress and toleration of activity level on Mobility SBAR; progress patient to next Phase/Stage  - Instruct patient to call for assistance with activity based on assessment  - Request Rehabilitation consult to assist with strengthening/weightbearing, etc   Outcome: Progressing      Problem: DISCHARGE PLANNING  Goal: Discharge to home or other facility with appropriate resources  INTERVENTIONS:  - Identify barriers to discharge w/patient and caregiver  - Arrange for needed discharge resources and transportation as appropriate  - Identify discharge learning needs (meds, wound care, etc )  - Arrange for interpretive services to assist at discharge as needed  - Refer to Case Management Department for coordinating discharge planning if the patient needs post-hospital services based on physician/advanced practitioner order or complex needs related to functional status, cognitive ability, or social support system  Outcome: Progressing      Problem: Knowledge Deficit  Goal: Patient/family/caregiver demonstrates understanding of disease process, treatment plan, medications, and discharge instructions  Complete learning assessment and assess knowledge base    Interventions:  - Provide teaching at level of understanding  - Provide teaching via preferred learning methods  Outcome: Progressing

## 2018-01-21 NOTE — PROGRESS NOTES
Pt arrived to floor from ER at approximately 2130 1/20/18  Review of MAR by RN revealed pt did not receive ordered heparin or insulin in ER  Both medications given as ordered and scheduled once pt arrived to floor  Dr Samanta rBight aware  No new orders at this time

## 2018-01-22 LAB
ALBUMIN SERPL BCP-MCNC: 1.7 G/DL (ref 3.5–5)
ALP SERPL-CCNC: 150 U/L (ref 46–116)
ALT SERPL W P-5'-P-CCNC: 62 U/L (ref 12–78)
ANION GAP SERPL CALCULATED.3IONS-SCNC: 10 MMOL/L (ref 4–13)
AST SERPL W P-5'-P-CCNC: 311 U/L (ref 5–45)
BASOPHILS # BLD AUTO: 0.01 THOUSANDS/ΜL (ref 0–0.1)
BASOPHILS NFR BLD AUTO: 0 % (ref 0–1)
BILIRUB DIRECT SERPL-MCNC: 0.37 MG/DL (ref 0–0.2)
BILIRUB SERPL-MCNC: 0.7 MG/DL (ref 0.2–1)
BUN SERPL-MCNC: 20 MG/DL (ref 5–25)
CALCIUM SERPL-MCNC: 7.9 MG/DL (ref 8.3–10.1)
CHLORIDE SERPL-SCNC: 114 MMOL/L (ref 100–108)
CO2 SERPL-SCNC: 19 MMOL/L (ref 21–32)
CREAT SERPL-MCNC: 0.89 MG/DL (ref 0.6–1.3)
EOSINOPHIL # BLD AUTO: 0.04 THOUSAND/ΜL (ref 0–0.61)
EOSINOPHIL NFR BLD AUTO: 0 % (ref 0–6)
ERYTHROCYTE [DISTWIDTH] IN BLOOD BY AUTOMATED COUNT: 16.1 % (ref 11.6–15.1)
GFR SERPL CREATININE-BSD FRML MDRD: 59 ML/MIN/1.73SQ M
GLUCOSE SERPL-MCNC: 156 MG/DL (ref 65–140)
GLUCOSE SERPL-MCNC: 163 MG/DL (ref 65–140)
GLUCOSE SERPL-MCNC: 175 MG/DL (ref 65–140)
GLUCOSE SERPL-MCNC: 177 MG/DL (ref 65–140)
GLUCOSE SERPL-MCNC: 208 MG/DL (ref 65–140)
GLUCOSE SERPL-MCNC: 283 MG/DL (ref 65–140)
HCT VFR BLD AUTO: 29.8 % (ref 34.8–46.1)
HGB BLD-MCNC: 9.9 G/DL (ref 11.5–15.4)
LYMPHOCYTES # BLD AUTO: 1.63 THOUSANDS/ΜL (ref 0.6–4.47)
LYMPHOCYTES NFR BLD AUTO: 13 % (ref 14–44)
MCH RBC QN AUTO: 31.3 PG (ref 26.8–34.3)
MCHC RBC AUTO-ENTMCNC: 33.2 G/DL (ref 31.4–37.4)
MCV RBC AUTO: 94 FL (ref 82–98)
MONOCYTES # BLD AUTO: 1.03 THOUSAND/ΜL (ref 0.17–1.22)
MONOCYTES NFR BLD AUTO: 8 % (ref 4–12)
MRSA NOSE QL CULT: NORMAL
NEUTROPHILS # BLD AUTO: 10.16 THOUSANDS/ΜL (ref 1.85–7.62)
NEUTS SEG NFR BLD AUTO: 79 % (ref 43–75)
NRBC BLD AUTO-RTO: 0 /100 WBCS
PLATELET # BLD AUTO: 234 THOUSANDS/UL (ref 149–390)
PMV BLD AUTO: 10.7 FL (ref 8.9–12.7)
POTASSIUM SERPL-SCNC: 3.1 MMOL/L (ref 3.5–5.3)
PROT SERPL-MCNC: 5.9 G/DL (ref 6.4–8.2)
RBC # BLD AUTO: 3.16 MILLION/UL (ref 3.81–5.12)
SODIUM SERPL-SCNC: 143 MMOL/L (ref 136–145)
WBC # BLD AUTO: 12.93 THOUSAND/UL (ref 4.31–10.16)

## 2018-01-22 PROCEDURE — 80048 BASIC METABOLIC PNL TOTAL CA: CPT | Performed by: SURGERY

## 2018-01-22 PROCEDURE — 82948 REAGENT STRIP/BLOOD GLUCOSE: CPT

## 2018-01-22 PROCEDURE — 85025 COMPLETE CBC W/AUTO DIFF WBC: CPT | Performed by: SURGERY

## 2018-01-22 PROCEDURE — 80076 HEPATIC FUNCTION PANEL: CPT | Performed by: SURGERY

## 2018-01-22 RX ORDER — POTASSIUM CHLORIDE 14.9 MG/ML
20 INJECTION INTRAVENOUS
Status: COMPLETED | OUTPATIENT
Start: 2018-01-22 | End: 2018-01-22

## 2018-01-22 RX ORDER — POTASSIUM CHLORIDE 14.9 MG/ML
20 INJECTION INTRAVENOUS
Status: DISPENSED | OUTPATIENT
Start: 2018-01-22 | End: 2018-01-22

## 2018-01-22 RX ORDER — POTASSIUM CHLORIDE 14.9 MG/ML
20 INJECTION INTRAVENOUS ONCE
Status: COMPLETED | OUTPATIENT
Start: 2018-01-22 | End: 2018-01-23

## 2018-01-22 RX ADMIN — METRONIDAZOLE 500 MG: 500 INJECTION, SOLUTION INTRAVENOUS at 18:11

## 2018-01-22 RX ADMIN — HYDROMORPHONE HYDROCHLORIDE 0.5 MG: 1 INJECTION, SOLUTION INTRAMUSCULAR; INTRAVENOUS; SUBCUTANEOUS at 14:49

## 2018-01-22 RX ADMIN — HEPARIN SODIUM 5000 UNITS: 5000 INJECTION, SOLUTION INTRAVENOUS; SUBCUTANEOUS at 06:25

## 2018-01-22 RX ADMIN — HEPARIN SODIUM 5000 UNITS: 5000 INJECTION, SOLUTION INTRAVENOUS; SUBCUTANEOUS at 21:13

## 2018-01-22 RX ADMIN — POTASSIUM CHLORIDE 20 MEQ: 200 INJECTION, SOLUTION INTRAVENOUS at 10:20

## 2018-01-22 RX ADMIN — INSULIN LISPRO 1 UNITS: 100 INJECTION, SOLUTION INTRAVENOUS; SUBCUTANEOUS at 06:25

## 2018-01-22 RX ADMIN — HYDROMORPHONE HYDROCHLORIDE 0.5 MG: 1 INJECTION, SOLUTION INTRAMUSCULAR; INTRAVENOUS; SUBCUTANEOUS at 06:32

## 2018-01-22 RX ADMIN — POTASSIUM CHLORIDE 20 MEQ: 200 INJECTION, SOLUTION INTRAVENOUS at 18:21

## 2018-01-22 RX ADMIN — HYDROMORPHONE HYDROCHLORIDE 0.5 MG: 1 INJECTION, SOLUTION INTRAMUSCULAR; INTRAVENOUS; SUBCUTANEOUS at 00:53

## 2018-01-22 RX ADMIN — INSULIN LISPRO 1 UNITS: 100 INJECTION, SOLUTION INTRAVENOUS; SUBCUTANEOUS at 18:24

## 2018-01-22 RX ADMIN — METRONIDAZOLE 500 MG: 500 INJECTION, SOLUTION INTRAVENOUS at 00:54

## 2018-01-22 RX ADMIN — INSULIN LISPRO 1 UNITS: 100 INJECTION, SOLUTION INTRAVENOUS; SUBCUTANEOUS at 01:00

## 2018-01-22 RX ADMIN — POTASSIUM CHLORIDE 20 MEQ: 200 INJECTION, SOLUTION INTRAVENOUS at 14:18

## 2018-01-22 RX ADMIN — POTASSIUM CHLORIDE 20 MEQ: 200 INJECTION, SOLUTION INTRAVENOUS at 07:31

## 2018-01-22 RX ADMIN — CEFAZOLIN SODIUM 1000 MG: 1 SOLUTION INTRAVENOUS at 12:38

## 2018-01-22 RX ADMIN — METRONIDAZOLE 500 MG: 500 INJECTION, SOLUTION INTRAVENOUS at 10:56

## 2018-01-22 RX ADMIN — HYDROMORPHONE HYDROCHLORIDE 0.5 MG: 1 INJECTION, SOLUTION INTRAMUSCULAR; INTRAVENOUS; SUBCUTANEOUS at 21:17

## 2018-01-22 RX ADMIN — HEPARIN SODIUM 5000 UNITS: 5000 INJECTION, SOLUTION INTRAVENOUS; SUBCUTANEOUS at 14:36

## 2018-01-22 RX ADMIN — SODIUM CHLORIDE 100 ML/HR: 0.9 INJECTION, SOLUTION INTRAVENOUS at 06:32

## 2018-01-22 RX ADMIN — SODIUM CHLORIDE 75 ML/HR: 0.9 INJECTION, SOLUTION INTRAVENOUS at 18:18

## 2018-01-22 NOTE — RESTORATIVE TECHNICIAN NOTE
Restorative Specialist Mobility Note                      Repositioned: Other (Comment) (Rep /sat pt upright in bed  Bed alarm on   Pt callbell, phone/tray within reach )       Sharifa PAREDES, Restorative Technician, [employer]

## 2018-01-22 NOTE — CASE MANAGEMENT
Initial Clinical Review    Admission: Date/Time/Statement: 1/20/18 @ 0024     Orders Placed This Encounter   Procedures    Inpatient Admission     Standing Status:   Standing     Number of Occurrences:   1     Order Specific Question:   Admitting Physician     Answer:   Sofia Segura     Order Specific Question:   Level of Care     Answer:   Med Surg [16]     Order Specific Question:   Estimated length of stay     Answer:   More than 2 Midnights     Order Specific Question:   Certification     Answer:   I certify that inpatient services are medically necessary for this patient for a duration of greater than two midnights  See H&P and MD Progress Notes for additional information about the patient's course of treatment  ED: Date/Time/Mode of Arrival:   ED Arrival Information     Expected Arrival Acuity Means of Arrival Escorted By Service Admission Type    - 1/19/2018 20:25 Urgent Ambulance [facility]-General Urgent    Arrival Complaint    abd pain          Chief Complaint:   Chief Complaint   Patient presents with    Abdominal Pain     abd pain starting today with decreased apetite  c/o vomiting yesterday  History of Illness: Mariposa Brown is a 80 y o  female who presents from her nursing facility with complaints of 1 day of right upper quadrant abdominal pain and decreased oral intake  The patient suffers from dementia therefore the majority of this history was obtained after speaking with her son  The patient suffers from chronic UTI infections and never complains of abd pain therefore when she started to c/o abd pain she was immediately taken to  hospital  Upon assessment she notably had a leukocytosis and elevated TBili of 1 47  Therefore a CTAP was obtained which showed GB distention, multiple stones & wall thickening  Subsequently a RUQ was obtained which was also positive for acute cholecystitis  After the workup however the pt was no longer c/o abd pain   When the patient's findings were explained to her, she was asked if she would want sx if indicated however she said "no"       ED Vital Signs:   ED Triage Vitals   Temperature Pulse Respirations Blood Pressure SpO2   01/19/18 2033 01/19/18 2033 01/19/18 2033 01/19/18 2033 01/19/18 2033   97 6 °F (36 4 °C) 84 16 146/62 95 % RA sat       Temp Source Heart Rate Source Patient Position - Orthostatic VS BP Location FiO2 (%)   01/20/18 2138 01/19/18 2351 01/19/18 2351 01/19/18 2351 --   Oral Monitor Lying Right arm       Pain Score       01/19/18 2033       3        Wt Readings from Last 1 Encounters:   01/19/18 59 kg (130 lb)       Vital Signs (abnormal):  BP range 190/88 - 136/74    Abnormal Labs/Diagnostic Test Results:      Ref Range & Units 1/22/18 0526 1/21/18 0518 1/20/18 0457 1/19/18 2139   WBC 4 31 - 10 16 Thousand/uL 12 93   14 73   14 37   15 99     RBC 3 81 - 5 12 Million/uL 3 16   3 62   3 97  4 10    Hemoglobin 11 5 - 15 4 g/dL 9 9   11 4   12 7  13 2    Hematocrit 34 8 - 46 1 % 29 8   34 0   37 1  38 5    MCV 82 - 98 fL 94  94  94  94    MCH 26 8 - 34 3 pg 31 3  31 5  32 0  32 2    MCHC 31 4 - 37 4 g/dL 33 2  33 5  34 2  34 3    RDW 11 6 - 15 1 % 16 1   15 9   16 2   16 0     MPV 8 9 - 12 7 fL 10 7  11 1  10 7  10 7    Platelets 552 - 818 Thousands/uL 234  245  218  221    nRBC /100 WBCs 0  0  0  0    Neutrophils Relative 43 - 75 % 79   82   85   88     Lymphocytes Relative 14 - 44 % 13   10   9   6     Monocytes Relative 4 - 12 % 8  8  6  6    Eosinophils Relative 0 - 6 % 0  0  0  0    Basophils Relative 0 - 1 % 0  0  0  0    Neutrophils Absolute 1 85 - 7 62 Thousands/µL 10 16   12 06   12 16   14 04     Lymphocytes Absolute 0 60 - 4 47 Thousands/µL 1 63  1 44  1 30  0 91    Monocytes Absolute 0 17 - 1 22 Thousand/µL 1 03  1 14  0 82  0 94    Eosinophils Absolute 0 00 - 0 61 Thousand/µL 0 04  0 01  0 01  0 00    Basophils Absolute 0 00 - 0 10 Thousands/µL 0 01  0 02  0 01  0 02             Ref Range & Units 1/22/18 7759 1/21/18 0518 1/20/18 0457 1/19/18 2138   Sodium 136 - 145 mmol/L 143  141  139  134     Potassium 3 5 - 5 3 mmol/L 3 1   3 1   3 9  4 6CM    Chloride 100 - 108 mmol/L 114   111   107  102    CO2 21 - 32 mmol/L 19   21  24  25    Anion Gap 4 - 13 mmol/L 10  9  8  7    BUN 5 - 25 mg/dL 20  23  32   34     Creatinine 0 60 - 1 30 mg/dL 0 89  0 91CM  1 27CM  1 47CM     Glucose 65 - 140 mg/dL 177   193CM   229CM   261CM     Calcium 8 3 - 10 1 mg/dL 7 9   8 7  9 2  9 8    eGFR ml/min/1 73sq m 59  57  38  32            Urine - Leukocytes - Large - Protein 100- ketones - trace - Blood - Small - Bacteria - Innumerable       ED Treatment:   Medication Administration from 01/19/2018 2025 to 01/20/2018 2103       Date/Time Order Dose Route Action     01/19/2018 2200 sodium chloride 0 9 % bolus 1,000 mL 1,000 mL Intravenous New Bag     01/20/2018 0013 ceFAZolin (ANCEF) IVPB (premix) 2,000 mg 2,000 mg Intravenous New Bag     01/20/2018 1345 sodium chloride 0 9 % infusion 125 mL/hr Intravenous New Bag     01/20/2018 0521 sodium chloride 0 9 % infusion 125 mL/hr Intravenous New Bag     01/20/2018 1228 ceFAZolin (ANCEF) IVPB (premix) 1,000 mg 1,000 mg Intravenous New Bag     01/20/2018 1745 metroNIDAZOLE (FLAGYL) IVPB (premix) 500 mg 500 mg Intravenous New Bag     01/20/2018 1041 metroNIDAZOLE (FLAGYL) IVPB (premix) 500 mg 500 mg Intravenous New Bag     01/20/2018 0523 metroNIDAZOLE (FLAGYL) IVPB (premix) 500 mg 500 mg Intravenous New Bag       Past Medical/Surgical History:   Past Medical History:   Diagnosis Date    Diabetes mellitus ([facility] [address] )     Hyperlipidemia     Hypothyroidism     Macular degeneration     Osteoarthritis        Admitting Diagnosis: Acute cholecystitis [K81 0]  Abdominal pain [R10 9]  RUQ pain [R10 11]  Elevated WBC count [D72 829]    Age/Sex: 80 y o  female    Assessment/Plan:   Assessment:  63-year-old female presenting with acute cholecystitis currently being managed conservatively      Plan:  NPO [email or web address]  Obtain UA   Ancef/ Flagyl  Serial abd exams   Repeat LFTs in am   SQH and venodynes for DVT ppx    Will re-discuss surgical options w/ the pt and her son (POA) in the morning        Admission Orders:  Scheduled Meds:   cefazolin 1,000 mg Intravenous Q12H   heparin (porcine) 5,000 Units Subcutaneous Q8H [address]   insulin lispro 1-5 Units Subcutaneous Q6H [address]   metroNIDAZOLE 500 mg Intravenous Q8H   morphine injection 2 mg Intravenous Once   potassium chloride 20 mEq Intravenous Q2H     Continuous Infusions:   sodium chloride 75 mL/hr Last Rate: 75 mL/hr (01/22/18 1237)     PRN Meds: HYDROmorphone 0 5 mg iv 1/22 x 2     Labetalol iv 1/21 x 1     Nursing Orders - VS q 4 - I & O q shift - Incentive spirometry - Up with assistance - SCD's to le's- Diet clear Liquid s

## 2018-01-22 NOTE — PROGRESS NOTES
Progress Note - General Surgery   Eder Sender Azul Gayle 80 y o  female MRN: [id number]  Unit/Bed#: [facility] 622-01 Encounter: [id number]    Assessment and Plan:  Patient Active Problem List   Diagnosis    Altered mental status    Dementia    Essential hypertension    Diabetes mellitus ([address] )    Stage 3 chronic kidney disease    Falls    Epigastric pain    Cholecystitis with cholelithiasis       Assessment:  51-year-old female presenting with acute cholecystitis currently being managed conservatively     Plan:  Clear liquid diet and advance as tolerated   [email or web address]  Replete electrolytes  Repeat H&H this afternoon   Labetalol PRN for SBP >170 (home meds on hold as pt is NPO)   ancef /flagyl  Serial abd exams   Continue to trend leukocytosis   SQH and venodynes for DVT ppx        Subjective:  Spoke to the patient's son yesterday who is in agreement with the surgical plan of trying to treat the patient with antibiotics rather than proceeding with surgery  Patient's hemoglobin dropped today down to 9 9 from 11 4  Vitals have been stable  WBC count is decreasing     Objective:       Vitals   Vitals:    01/21/18 0302 01/21/18 0651 01/21/18 1532 01/21/18 2300   BP: (!) 180/76 152/58 158/64 140/70   BP Location: Right arm Right arm Right arm    Pulse:  75 78 79   Resp:  20 20 20   Temp:  (!) 97 4 °F (36 3 °C) 97 6 °F (36 4 °C) 97 9 °F (36 6 °C)   TempSrc:  Axillary Oral Oral   SpO2:  95% 95% 94%   Weight:         Temp  Min: 97 4 °F (36 3 °C)  Max: 98 2 °F (36 8 °C)  IBW: -92 5 kg   Body mass index is 31 34 kg/m²      I/O   I/O       01/19 0701 - 01/20 0700 01/20 0701 - 01/21 0700 01/21 0701 - 01/22 0700    I V  (mL/kg)  2056 7 (34 9)     IV Piggyback  50     Total Intake(mL/kg)  2106 7 (35 7)     Urine (mL/kg/hr)  314 (0 2)     Total Output   314      Net   +1792 7                   Intake/Output Summary (Last 24 hours) at 01/22/18 0626  Last data filed at 01/21/18 1900   Gross per 24 hour   Intake              320 ml   Output 250 ml   Net               70 ml       Invasive  Devices  Invasive Devices     Peripheral Intravenous Line            Peripheral IV 01/19/18 Left Hand 2 days                Active medications  Scheduled Meds:    cefazolin 1,000 mg Intravenous Q12H   heparin (porcine) 5,000 Units Subcutaneous Q8H [facility]   insulin lispro 1-5 Units Subcutaneous Q6H [facility]   metroNIDAZOLE 500 mg Intravenous Q8H   morphine injection 2 mg Intravenous Once     Continuous Infusions:    sodium chloride 100 mL/hr Last Rate: 100 mL/hr (01/21/18 1625)     PRN Meds:     HYDROmorphone 0 5 mg Q4H PRN   labetalol 10 mg Q4H PRN       Physical Exam: /70   Pulse 79   Temp 97 9 °F (36 6 °C) (Oral)   Resp 20   Wt 59 kg (130 lb)   SpO2 94%   BMI 31 34 kg/m²     Physical Exam:  General Appearance: Alert, cooperative, no distress, appears stated age  Lungs: Clear to auscultation bilaterally, respirations unlablored   Heart: Regular rate and rhythm, S1 and S2 normal, no murmur, rub or gallop  Abdomen: Soft, tender in RUQ, non distended, bowel sounds active in all four quadrants,   No masses or organomegaly    Laboratory and Diagnostics:    Results from last 7 days  Lab Units 01/22/18  0526 01/21/18  0518 01/20/18  0457   WBC Thousand/uL 12 93* 14 73* 14 37*   HEMOGLOBIN g/dL 9 9* 11 4* 12 7   HEMATOCRIT % 29 8* 34 0* 37 1   PLATELETS Thousands/uL 234 245 218   NEUTROS PCT % 79* 82* 85*   MONOS PCT % 8 8 6       Results from last 7 days  Lab Units 01/21/18  0518 01/20/18  0457 01/19/18  2138   SODIUM mmol/L 141 139 134*   POTASSIUM mmol/L 3 1* 3 9 4 6   CHLORIDE mmol/L 111* 107 102   CO2 mmol/L 21 24 25   BUN mg/dL 23 32* 34*   CREATININE mg/dL 0 91 1 27 1 47*   CALCIUM mg/dL 8 7 9 2 9 8   TOTAL PROTEIN g/dL  --  7 5 8 1   BILIRUBIN TOTAL mg/dL  --  0 81 1 47*   ALK PHOS U/L  --  76 79   ALT U/L  --  28 35   AST U/L  --  28 50*   GLUCOSE RANDOM mg/dL 193* 229* 261*       Results from last 7 days  Lab Units 01/21/18  0518   MAGNESIUM mg/dL 2 1 No results found for: PHOS       No results found for: TROPONINT  ABG:No results found for: PHART, TUV9TSB, PO2ART, FGE0CRI, B8QWTHBE, BEART, SOURCE    Blood Culture: No results found for: BLOODCX  Urine Culture:   Lab Results   Component Value Date    URINECX (A) 11/21/2017     >100,000 cfu/ml Alpha Hemolytic Streptococcus NOT Enterococcus    URINECX 9423-5590 cfu/ml Gram Negative Parvez (A) 11/21/2017    URINECX >100,000 cfu/ml Escherichia coli (A) 11/15/2017    URINECX >100,000 cfu/ml Aerococcus urinae (A) 11/15/2017     Sputum Culture: No components found for: SPUTUMCX    Imaging: I have personally reviewed pertinent reports     and I have personally reviewed pertinent films in PACS    VTE Pharmacologic Prophylaxis: Heparin  VTE Mechanical Prophylaxis: sequential compression device

## 2018-01-22 NOTE — MEDICAL STUDENT
MEDICAL STUDENT NOTE   Progress Note - General Surgery   [facility] Haydee Becerril 80 y o  female MRN: [id number]  Unit/Bed#: [facility] 622-01 Encounter: [id number]    Assessment and Plan:  Patient Active Problem List   Diagnosis    Altered mental status    Dementia    Essential hypertension    Diabetes mellitus ([address] )    Stage 3 chronic kidney disease    Falls    Epigastric pain    Cholecystitis with cholelithiasis       Assessment:  80year old female presenting with acute cholecystitis being managed conservatively    Plan:  Continue conservative management of acute cholecystitis   Diet: Advance to regular  Fluids: Continue  mL/hr  ABx: Continue IV ABx Ancef/Flagyl  Pain: Dilaudid 0 5 mg Q4 PRN   DVT PPx: heparin and SCDs  F/U: CBC, BMP, Urine culture, and MRSA Culture    Subjective:  Patient seen and examined at bedside  Patient denies any acute events overnight  She states her pain is well controlled  She required dilaudid 0 5 mg x1 overnight  She was able to tolerate a clear diet  Objective:     Vitals   Vitals:    01/21/18 0302 01/21/18 0651 01/21/18 1532 01/21/18 2300   BP: (!) 180/76 152/58 158/64 140/70   BP Location: Right arm Right arm Right arm    Pulse:  75 78 79   Resp:  20 20 20   Temp:  (!) 97 4 °F (36 3 °C) 97 6 °F (36 4 °C) 97 9 °F (36 6 °C)   TempSrc:  Axillary Oral Oral   SpO2:  95% 95% 94%   Weight:         Temp  Min: 97 4 °F (36 3 °C)  Max: 98 2 °F (36 8 °C)  IBW: -92 5 kg   Body mass index is 31 34 kg/m²  I/O   I/O       01/20 0701 - 01/21 0700 01/21 0701 - 01/22 0700    P  O   320    I V  (mL/kg) 2056 7 (34 9)     IV Piggyback 50     Total Intake(mL/kg) 2106 7 (35 7) 320 (5 4)    Urine (mL/kg/hr) 314 (0 2) 250 (0 2)    Total Output 314 250    Net +1792 7 +70          Unmeasured Urine Occurrence  2 x          Intake/Output Summary (Last 24 hours) at 01/22/18 0606  Last data filed at 01/21/18 1900   Gross per 24 hour   Intake              320 ml   Output              250 ml   Net 70 ml       Invasive  Devices  Invasive Devices     Peripheral Intravenous Line            Peripheral IV 01/19/18 Left Hand 2 days                Active medications  Scheduled Meds:  cefazolin 1,000 mg Intravenous Q12H   heparin (porcine) 5,000 Units Subcutaneous Q8H [address]   insulin lispro 1-5 Units Subcutaneous Q6H [address]   metroNIDAZOLE 500 mg Intravenous Q8H   morphine injection 2 mg Intravenous Once     Continuous Infusions:    sodium chloride 100 mL/hr Last Rate: 100 mL/hr (01/21/18 1625)     PRN Meds:     HYDROmorphone 0 5 mg Q4H PRN   labetalol 10 mg Q4H PRN       Physical Exam: /70   Pulse 79   Temp 97 9 °F (36 6 °C) (Oral)   Resp 20   Wt 59 kg (130 lb)   SpO2 94%   BMI 31 34 kg/m²     Physical Exam:  General Appearance: Alert, cooperative, no distress, appears stated age  Lungs: Clear to auscultation bilaterally, respirations unlablored   Heart: Regular rate and rhythm, S1 and S2 normal, no murmur, rub or gallop  Abdomen: Soft, tender in RUQ, non distended, bowel sounds active in all four quadrants,   No masses or organomegaly    Laboratory and Diagnostics:    Results from last 7 days  Lab Units 01/21/18  0518 01/20/18  0457 01/19/18  2139   WBC Thousand/uL 14 73* 14 37* 15 99*   HEMOGLOBIN g/dL 11 4* 12 7 13 2   HEMATOCRIT % 34 0* 37 1 38 5   PLATELETS Thousands/uL 245 218 221   NEUTROS PCT % 82* 85* 88*   MONOS PCT % 8 6 6       Results from last 7 days  Lab Units 01/21/18  0518 01/20/18  0457 01/19/18  2138   SODIUM mmol/L 141 139 134*   POTASSIUM mmol/L 3 1* 3 9 4 6   CHLORIDE mmol/L 111* 107 102   CO2 mmol/L 21 24 25   BUN mg/dL 23 32* 34*   CREATININE mg/dL 0 91 1 27 1 47*   CALCIUM mg/dL 8 7 9 2 9 8   TOTAL PROTEIN g/dL  --  7 5 8 1   BILIRUBIN TOTAL mg/dL  --  0 81 1 47*   ALK PHOS U/L  --  76 79   ALT U/L  --  28 35   AST U/L  --  28 50*   GLUCOSE RANDOM mg/dL 193* 229* 261*       Results from last 7 days  Lab Units 01/21/18  0518   MAGNESIUM mg/dL 2 1     No results found for: PHOS No results found for: TROPONINT  ABG:No results found for: PHART, IID9UWP, PO2ART, YBS7ACW, R0WMCRUM, BEART, SOURCE    Blood Culture: No results found for: BLOODCX  Urine Culture:   Lab Results   Component Value Date    URINECX (A) 11/21/2017     >100,000 cfu/ml Alpha Hemolytic Streptococcus NOT Enterococcus    URINECX 0262-4322 cfu/ml Gram Negative Parvez (A) 11/21/2017    URINECX >100,000 cfu/ml Escherichia coli (A) 11/15/2017    URINECX >100,000 cfu/ml Aerococcus urinae (A) 11/15/2017           VTE Pharmacologic Prophylaxis: Heparin  VTE Mechanical Prophylaxis: sequential compression device    [address] Student

## 2018-01-23 ENCOUNTER — APPOINTMENT (INPATIENT)
Dept: RADIOLOGY | Facility: HOSPITAL | Age: 83
DRG: 445 | End: 2018-01-23
Payer: MEDICARE

## 2018-01-23 LAB
ANION GAP SERPL CALCULATED.3IONS-SCNC: 11 MMOL/L (ref 4–13)
BACTERIA UR CULT: ABNORMAL
BACTERIA UR CULT: ABNORMAL
BASOPHILS # BLD AUTO: 0.02 THOUSANDS/ΜL (ref 0–0.1)
BASOPHILS NFR BLD AUTO: 0 % (ref 0–1)
BUN SERPL-MCNC: 14 MG/DL (ref 5–25)
CALCIUM SERPL-MCNC: 8.2 MG/DL (ref 8.3–10.1)
CHLORIDE SERPL-SCNC: 108 MMOL/L (ref 100–108)
CO2 SERPL-SCNC: 19 MMOL/L (ref 21–32)
CREAT SERPL-MCNC: 0.84 MG/DL (ref 0.6–1.3)
EOSINOPHIL # BLD AUTO: 0.22 THOUSAND/ΜL (ref 0–0.61)
EOSINOPHIL NFR BLD AUTO: 2 % (ref 0–6)
ERYTHROCYTE [DISTWIDTH] IN BLOOD BY AUTOMATED COUNT: 16.3 % (ref 11.6–15.1)
GFR SERPL CREATININE-BSD FRML MDRD: 63 ML/MIN/1.73SQ M
GLUCOSE SERPL-MCNC: 159 MG/DL (ref 65–140)
GLUCOSE SERPL-MCNC: 167 MG/DL (ref 65–140)
GLUCOSE SERPL-MCNC: 220 MG/DL (ref 65–140)
GLUCOSE SERPL-MCNC: 229 MG/DL (ref 65–140)
GLUCOSE SERPL-MCNC: 236 MG/DL (ref 65–140)
GLUCOSE SERPL-MCNC: 285 MG/DL (ref 65–140)
HCT VFR BLD AUTO: 31.7 % (ref 34.8–46.1)
HGB BLD-MCNC: 10.7 G/DL (ref 11.5–15.4)
LYMPHOCYTES # BLD AUTO: 1.2 THOUSANDS/ΜL (ref 0.6–4.47)
LYMPHOCYTES NFR BLD AUTO: 11 % (ref 14–44)
MAGNESIUM SERPL-MCNC: 1.8 MG/DL (ref 1.6–2.6)
MCH RBC QN AUTO: 31.9 PG (ref 26.8–34.3)
MCHC RBC AUTO-ENTMCNC: 33.8 G/DL (ref 31.4–37.4)
MCV RBC AUTO: 95 FL (ref 82–98)
MONOCYTES # BLD AUTO: 0.6 THOUSAND/ΜL (ref 0.17–1.22)
MONOCYTES NFR BLD AUTO: 6 % (ref 4–12)
NEUTROPHILS # BLD AUTO: 8.55 THOUSANDS/ΜL (ref 1.85–7.62)
NEUTS SEG NFR BLD AUTO: 81 % (ref 43–75)
NRBC BLD AUTO-RTO: 0 /100 WBCS
PLATELET # BLD AUTO: 248 THOUSANDS/UL (ref 149–390)
PMV BLD AUTO: 11.3 FL (ref 8.9–12.7)
POTASSIUM SERPL-SCNC: 3.7 MMOL/L (ref 3.5–5.3)
RBC # BLD AUTO: 3.35 MILLION/UL (ref 3.81–5.12)
SODIUM SERPL-SCNC: 138 MMOL/L (ref 136–145)
WBC # BLD AUTO: 10.74 THOUSAND/UL (ref 4.31–10.16)

## 2018-01-23 PROCEDURE — 94664 DEMO&/EVAL PT USE INHALER: CPT

## 2018-01-23 PROCEDURE — 80048 BASIC METABOLIC PNL TOTAL CA: CPT | Performed by: SURGERY

## 2018-01-23 PROCEDURE — 94760 N-INVAS EAR/PLS OXIMETRY 1: CPT

## 2018-01-23 PROCEDURE — 71045 X-RAY EXAM CHEST 1 VIEW: CPT

## 2018-01-23 PROCEDURE — 94640 AIRWAY INHALATION TREATMENT: CPT

## 2018-01-23 PROCEDURE — 85025 COMPLETE CBC W/AUTO DIFF WBC: CPT | Performed by: SURGERY

## 2018-01-23 PROCEDURE — 83735 ASSAY OF MAGNESIUM: CPT | Performed by: SURGERY

## 2018-01-23 PROCEDURE — 82948 REAGENT STRIP/BLOOD GLUCOSE: CPT

## 2018-01-23 RX ORDER — ACETAMINOPHEN 325 MG/1
650 TABLET ORAL EVERY 6 HOURS PRN
Status: DISCONTINUED | OUTPATIENT
Start: 2018-01-23 | End: 2018-01-30 | Stop reason: HOSPADM

## 2018-01-23 RX ORDER — FUROSEMIDE 10 MG/ML
10 INJECTION INTRAMUSCULAR; INTRAVENOUS ONCE
Status: COMPLETED | OUTPATIENT
Start: 2018-01-23 | End: 2018-01-23

## 2018-01-23 RX ORDER — MAGNESIUM SULFATE HEPTAHYDRATE 40 MG/ML
2 INJECTION, SOLUTION INTRAVENOUS ONCE
Status: COMPLETED | OUTPATIENT
Start: 2018-01-23 | End: 2018-01-23

## 2018-01-23 RX ORDER — LEVALBUTEROL 1.25 MG/.5ML
1.25 SOLUTION, CONCENTRATE RESPIRATORY (INHALATION)
Status: DISCONTINUED | OUTPATIENT
Start: 2018-01-23 | End: 2018-01-24

## 2018-01-23 RX ORDER — ALBUTEROL SULFATE 2.5 MG/3ML
2.5 SOLUTION RESPIRATORY (INHALATION) EVERY 4 HOURS PRN
Status: DISCONTINUED | OUTPATIENT
Start: 2018-01-23 | End: 2018-01-30 | Stop reason: HOSPADM

## 2018-01-23 RX ORDER — ALBUTEROL SULFATE 2.5 MG/3ML
SOLUTION RESPIRATORY (INHALATION)
Status: COMPLETED
Start: 2018-01-23 | End: 2018-01-23

## 2018-01-23 RX ORDER — SODIUM CHLORIDE FOR INHALATION 0.9 %
3 VIAL, NEBULIZER (ML) INHALATION
Status: DISCONTINUED | OUTPATIENT
Start: 2018-01-23 | End: 2018-01-24

## 2018-01-23 RX ORDER — POTASSIUM CHLORIDE 20 MEQ/1
40 TABLET, EXTENDED RELEASE ORAL ONCE
Status: COMPLETED | OUTPATIENT
Start: 2018-01-23 | End: 2018-01-23

## 2018-01-23 RX ADMIN — METRONIDAZOLE 500 MG: 500 INJECTION, SOLUTION INTRAVENOUS at 18:20

## 2018-01-23 RX ADMIN — ISODIUM CHLORIDE 3 ML: 0.03 SOLUTION RESPIRATORY (INHALATION) at 19:17

## 2018-01-23 RX ADMIN — METRONIDAZOLE 500 MG: 500 INJECTION, SOLUTION INTRAVENOUS at 03:24

## 2018-01-23 RX ADMIN — HEPARIN SODIUM 5000 UNITS: 5000 INJECTION, SOLUTION INTRAVENOUS; SUBCUTANEOUS at 22:09

## 2018-01-23 RX ADMIN — ALBUTEROL SULFATE 2.5 MG: 2.5 SOLUTION RESPIRATORY (INHALATION) at 13:45

## 2018-01-23 RX ADMIN — FUROSEMIDE 10 MG: 10 INJECTION, SOLUTION INTRAMUSCULAR; INTRAVENOUS at 18:00

## 2018-01-23 RX ADMIN — CEFAZOLIN SODIUM 1000 MG: 1 SOLUTION INTRAVENOUS at 23:44

## 2018-01-23 RX ADMIN — MAGNESIUM SULFATE HEPTAHYDRATE 2 G: 40 INJECTION, SOLUTION INTRAVENOUS at 09:31

## 2018-01-23 RX ADMIN — CEFAZOLIN SODIUM 1000 MG: 1 SOLUTION INTRAVENOUS at 11:08

## 2018-01-23 RX ADMIN — HYDROMORPHONE HYDROCHLORIDE 0.5 MG: 1 INJECTION, SOLUTION INTRAMUSCULAR; INTRAVENOUS; SUBCUTANEOUS at 22:12

## 2018-01-23 RX ADMIN — INSULIN LISPRO 1 UNITS: 100 INJECTION, SOLUTION INTRAVENOUS; SUBCUTANEOUS at 06:18

## 2018-01-23 RX ADMIN — HEPARIN SODIUM 5000 UNITS: 5000 INJECTION, SOLUTION INTRAVENOUS; SUBCUTANEOUS at 14:01

## 2018-01-23 RX ADMIN — ALBUTEROL SULFATE 2.5 MG: 2.5 SOLUTION RESPIRATORY (INHALATION) at 18:05

## 2018-01-23 RX ADMIN — HEPARIN SODIUM 5000 UNITS: 5000 INJECTION, SOLUTION INTRAVENOUS; SUBCUTANEOUS at 06:17

## 2018-01-23 RX ADMIN — POTASSIUM CHLORIDE 40 MEQ: 1500 TABLET, EXTENDED RELEASE ORAL at 09:31

## 2018-01-23 RX ADMIN — METRONIDAZOLE 500 MG: 500 INJECTION, SOLUTION INTRAVENOUS at 09:31

## 2018-01-23 RX ADMIN — LEVALBUTEROL HYDROCHLORIDE 1.25 MG: 1.25 SOLUTION, CONCENTRATE RESPIRATORY (INHALATION) at 19:17

## 2018-01-23 RX ADMIN — HYDROMORPHONE HYDROCHLORIDE 0.5 MG: 1 INJECTION, SOLUTION INTRAMUSCULAR; INTRAVENOUS; SUBCUTANEOUS at 06:24

## 2018-01-23 RX ADMIN — INSULIN LISPRO 2 UNITS: 100 INJECTION, SOLUTION INTRAVENOUS; SUBCUTANEOUS at 00:23

## 2018-01-23 RX ADMIN — ACETAMINOPHEN 650 MG: 325 TABLET, FILM COATED ORAL at 18:14

## 2018-01-23 RX ADMIN — CEFAZOLIN SODIUM 1000 MG: 1 SOLUTION INTRAVENOUS at 00:23

## 2018-01-23 NOTE — PROGRESS NOTES
Patient having difficulty breathing, spoke to Red Sx, will come bedside to assess  Will continue to monitor    Vitals:    01/23/18 1744   BP: 120/79   Pulse: 97   Resp: (!) 32   Temp: (!) 100 6 °F (38 1 °C)   SpO2:

## 2018-01-23 NOTE — PROGRESS NOTES
Patient triggered early sepsis alert at 10%  Patient resting comfortably and vital signs are stable  Notified TWAN Tran and Surgical resident (rigo) on call for Dr Gavino Lucio  No new orders received, will continue to monitor

## 2018-01-23 NOTE — PROGRESS NOTES
Called by RN for positive sepsis screening indicators: review of chart reveals pt being treated for acute cholecystitis, vital signs and labs appear to be improving over past 24-48 hours  No apparent clinical indications of deterioration or active sepsis   Discussed with RN and instructed continue with current treatment plan as outlined by primary team

## 2018-01-23 NOTE — PROGRESS NOTES
Patient having difficulty breathing, expiratory wheezes heard, VSS  Patient on 2L Nasal Cannula for comfort  Red Sx at bedside assessing patient  Will continue to monitor      /72 HR 79 RR28 O2 93% on RA  99% 2L O2

## 2018-01-23 NOTE — CASE MANAGEMENT
Continued Stay Review    Date:   1/23/18 Tuesday ACUTE MED SURG LEVEL OF CARE    Vital Signs: /68 (BP Location: Right arm)   Pulse 92   Temp 97 9 °F (36 6 °C) (Oral)   Resp 18   Wt 59 kg (130 lb)   SpO2 93%   BMI 31 34 kg/m²      Blood pressure 132/79, pulse 76, temperature 98 7 °F (37 1 °C), temperature source Oral, resp  rate 22, weight 59 kg (130 lb), SpO2 93 %  ,Body mass index is 31 34 kg/m²        Intake/Output Summary (Last 24 hours) at 01/23/18 4159  Last data filed at 01/23/18 0401    Gross per 24 hour   Intake          4512 08 ml   Output             1300 ml   Net          3212 08 ml       Diet Hilario/CHO Controlled; Consistent Carbohydrate Diet Level 1 (4 carb servings/60 grams CHO/meal); Dysphagia 1-Pureed;  Thin Liquid         Continuous IV Infusions:  IVF sodium chloride 0 9 % infusion at 75 cc/hr D/C        Medications:   Scheduled Meds:   cefazolin 1,000 mg Intravenous Q12H   heparin (porcine) 5,000 Units Subcutaneous Q8H [address]   insulin lispro 1-5 Units Subcutaneous 4x Daily (AC & HS)   metroNIDAZOLE 500 mg Intravenous Q8H   morphine injection 2 mg Intravenous Once       PRN Meds:     NEB Tx with albuterol 2 5 mg q4hrs prn given x 1/ 24 hrs    IV HYDROmorphone 0 5 mg q4hrs prn given x 3/ 24 hrs    labetalol    LABS/Diagnostic Results:   Procedure Component Value Ref Range Date/Time   Fingerstick Glucose (POCT) [[id number]] (Abnormal) Collected: 01/23/18 1208   Lab Status: Final result Updated: 01/23/18 1212    POC Glucose 220 (H) 65 - 140 mg/dl    Fingerstick Glucose (POCT) [[id number]] (Abnormal) Collected: 01/23/18 0617   Lab Status: Final result Updated: 01/23/18 0625    POC Glucose 159 (H) 65 - 140 mg/dl    Basic metabolic panel [[id number]] (Abnormal) Collected: 01/23/18 0507   Lab Status: Final result Specimen: Blood from Arm, Left Updated: 01/23/18 5716    Sodium 138 136 - 145 mmol/L     Potassium 3 7 3 5 - 5 3 mmol/L     Chloride 108 100 - 108 mmol/L     CO2 19 (L) 21 - 32 mmol/L Anion Gap 11 4 - 13 mmol/L     BUN 14 5 - 25 mg/dL     Creatinine 0 84 0 60 - 1 30 mg/dL     Comment: Standardized to IDMS reference method       Glucose 167 (H) 65 - 140 mg/dL             Calcium 8 2 (L) 8 3 - 10 1 mg/dL     eGFR 63 ml/min/1 73sq m      CBC and differential [[id number]] (Abnormal) Collected: 01/23/18 0500   Lab Status: Final result Specimen: Blood from Arm, Left Updated: 01/23/18 0627    WBC 10 74 (H) 4 31 - 10 16 Thousand/uL     RBC 3 35 (L) 3 81 - 5 12 Million/uL     Hemoglobin 10 7 (L) 11 5 - 15 4 g/dL     Hematocrit 31 7 (L) 34 8 - 46 1 %     MCV 95 82 - 98 fL     MCH 31 9 26 8 - 34 3 pg     MCHC 33 8 31 4 - 37 4 g/dL     RDW 16 3 (H) 11 6 - 15 1 %     MPV 11 3 8 9 - 12 7 fL     Platelets 203 400 - 482 Thousands/uL     nRBC 0 /100 WBCs     Neutrophils Relative 81 (H) 43 - 75 %     Lymphocytes Relative 11 (L) 14 - 44 %     Monocytes Relative 6 4 - 12 %     Eosinophils Relative 2 0 - 6 %     Basophils Relative 0 0 - 1 %     Neutrophils Absolute 8 55 (H) 1 85 - 7 62 Thousands/µL     Lymphocytes Absolute 1 20 0 60 - 4 47 Thousands/µL     Monocytes Absolute 0 60 0 17 - 1 22 Thousand/µL     Eosinophils Absolute 0 22 0 00 - 0 61 Thousand/µL     Basophils Absolute 0 02 0 00 - 0 10 Thousands/µL        Age/Sex: 80 y o  female     Assessment Plan:  Assessment:  81 yo F w/ acute cholecystitis- improved w/ conservative management     Plan:  Advance diet- surg soft (diabetic)  Monitor blood glucose on diabetic diet- if hyperglycemia persists will need to increase ISS  F/U AM labs, replete as necessary  PT/OT/OOB  Continue antibx- Ancef/Flagyl        Discharge Plan:   TO BE DETERMINED    *PER  "POSSIBLE PALLIATIVE CARE CONSULT"    CASE MANAGEMENT FOLLOWING CLOSELY FOR ALL DISCHARGE NEEDS

## 2018-01-23 NOTE — PROGRESS NOTES
Notified by nursing of increased shortness of breath  Patient had breathing treatment earlier today, which helped her, around 2 PM  Patient reports she feels unwell  Per nursing, patient also febrile to 100 6     --> respiratory protocol, breathing treatment Q4  --> check CXR  --> tylenol PRN for fevers  --> verified with patient's son, Alie Baker, that she is DNR but accepts endotracheal intubation    Lyla Jackson   [facility]  PGY3

## 2018-01-23 NOTE — RESPIRATORY THERAPY NOTE
RT Protocol Note  Leda Kamara 80 y o  female MRN: [id number]  Unit/Bed#: [facility] 622-01 Encounter: [id number]    Assessment    Principal Problem:    Cholecystitis with cholelithiasis      Home Pulmonary Medications:  none    Past Medical History:   Diagnosis Date    Diabetes mellitus ([address] )     Hyperlipidemia     Hypothyroidism     Macular degeneration     Osteoarthritis      Social History     Social History    Marital status:      Spouse name: N/A    Number of children: N/A    Years of education: N/A     Social History Main Topics    Smoking status: Never Smoker    Smokeless tobacco: Never Used    Alcohol use No    Drug use: No    Sexual activity: Not Asked     Other Topics Concern    None     Social History Narrative    None       Subjective         Objective    Physical Exam:   Assessment Type: Pre-treatment  General Appearance: Alert, Awake  Respiratory Pattern: Normal  Chest Assessment: Chest expansion symmetrical  Bilateral Breath Sounds: Diminished (audible wheezes)    Vitals:  Blood pressure 140/68, pulse 92, temperature 97 9 °F (36 6 °C), temperature source Oral, resp  rate 18, weight 59 kg (130 lb), SpO2 93 %  Imaging and other studies: I have personally reviewed pertinent reports  Plan    Respiratory Plan: Mild Distress pathway        Resp Comments: (P) Pt  with no pulm  hx  No meds at home  Last CXR done on 1/19  presents with wheezing today and mild SOB  Wheezing is more audible upper airway wheezing  Lungs are diminished  UDN ordered q4prn for SOB/wheezing

## 2018-01-23 NOTE — PROGRESS NOTES
Progress Note - General Surgery   Renetta Chiu 80 y o  female MRN: [id number]  Unit/Bed#: [facility] 622-01 Encounter: [id number]    Assessment:  81 yo F w/ acute cholecystitis- improved w/ conservative management    Plan:  Advance diet- surg soft (diabetic)  Monitor blood glucose on diabetic diet- if hyperglycemia persists will need to increase ISS  F/U AM labs, replete as necessary  PT/OT/OOB  Continue antibx- Ancef/Flagyl    Subjective/Objective   Chief Complaint: None    Subjective: No complaints  Denies abdominal pain, no N/V, states she is passing flatus    Objective:     Blood pressure 132/79, pulse 76, temperature 98 7 °F (37 1 °C), temperature source Oral, resp  rate 22, weight 59 kg (130 lb), SpO2 93 %  ,Body mass index is 31 34 kg/m²        Intake/Output Summary (Last 24 hours) at 01/23/18 6464  Last data filed at 01/23/18 0401   Gross per 24 hour   Intake          4512 08 ml   Output             1300 ml   Net          3212 08 ml       Invasive Devices     Peripheral Intravenous Line            Peripheral IV 01/22/18 Right Forearm less than 1 day                Physical Exam:   Gen: A&O, NAD  Cardio: RRR  Lungs: CTAB  Abd: Soft, non distended, non tender in RUQ      Lab, Imaging and other studies:CBC: No results found for: WBC, HGB, HCT, MCV, PLT, ADJUSTEDWBC, MCH, MCHC, RDW, MPV, NRBC, CMP: No results found for: NA, K, CL, CO2, ANIONGAP, BUN, CREATININE, GLUCOSE, CALCIUM, AST, ALT, ALKPHOS, PROT, ALBUMIN, BILITOT, EGFR  VTE Pharmacologic Prophylaxis: Heparin  VTE Mechanical Prophylaxis: sequential compression device

## 2018-01-23 NOTE — RESTORATIVE TECHNICIAN NOTE
Restorative Specialist Mobility Note                      Repositioned: Other (Comment) (Rep /sat pt upright in bed  Bed alarm on  Pt callbell, phone/tray within reach  NC O2 on 2L  )              Anti-Embolism Device On:  Bilateral, Sequential compression devices, below knee     Donnell PAREDES, Restorative Technician, [employer]

## 2018-01-24 ENCOUNTER — APPOINTMENT (INPATIENT)
Dept: RADIOLOGY | Facility: HOSPITAL | Age: 83
DRG: 445 | End: 2018-01-24
Payer: MEDICARE

## 2018-01-24 PROBLEM — R06.02 SHORTNESS OF BREATH: Status: ACTIVE | Noted: 2018-01-24

## 2018-01-24 PROBLEM — E03.9 HYPOTHYROID: Chronic | Status: ACTIVE | Noted: 2018-01-24

## 2018-01-24 PROBLEM — D64.9 ANEMIA: Status: ACTIVE | Noted: 2018-01-24

## 2018-01-24 LAB
ALBUMIN SERPL BCP-MCNC: 2 G/DL (ref 3.5–5)
ALP SERPL-CCNC: 194 U/L (ref 46–116)
ALT SERPL W P-5'-P-CCNC: 24 U/L (ref 12–78)
ANION GAP SERPL CALCULATED.3IONS-SCNC: 10 MMOL/L (ref 4–13)
ANION GAP SERPL CALCULATED.3IONS-SCNC: 10 MMOL/L (ref 4–13)
ANION GAP SERPL CALCULATED.3IONS-SCNC: 9 MMOL/L (ref 4–13)
ANISOCYTOSIS BLD QL SMEAR: PRESENT
ANISOCYTOSIS BLD QL SMEAR: PRESENT
APTT PPP: 41 SECONDS (ref 23–35)
ARTERIAL PATENCY WRIST A: YES
AST SERPL W P-5'-P-CCNC: 49 U/L (ref 5–45)
BACTERIA UR QL AUTO: ABNORMAL /HPF
BASE EX.OXY STD BLDV CALC-SCNC: 96.9 % (ref 60–80)
BASE EXCESS BLDA CALC-SCNC: -2.1 MMOL/L
BASE EXCESS BLDA CALC-SCNC: -2.6 MMOL/L
BASE EXCESS BLDA CALC-SCNC: -3.8 MMOL/L
BASE EXCESS BLDV CALC-SCNC: -2.4 MMOL/L
BASOPHILS # BLD MANUAL: 0 THOUSAND/UL (ref 0–0.1)
BASOPHILS # BLD MANUAL: 0 THOUSAND/UL (ref 0–0.1)
BASOPHILS NFR MAR MANUAL: 0 % (ref 0–1)
BASOPHILS NFR MAR MANUAL: 0 % (ref 0–1)
BILIRUB DIRECT SERPL-MCNC: 0.17 MG/DL (ref 0–0.2)
BILIRUB SERPL-MCNC: 0.29 MG/DL (ref 0.2–1)
BILIRUB UR QL STRIP: NEGATIVE
BUN SERPL-MCNC: 10 MG/DL (ref 5–25)
BUN SERPL-MCNC: 12 MG/DL (ref 5–25)
BUN SERPL-MCNC: 12 MG/DL (ref 5–25)
CA-I BLD-SCNC: 1.08 MMOL/L (ref 1.12–1.32)
CA-I BLD-SCNC: 1.09 MMOL/L (ref 1.12–1.32)
CALCIUM SERPL-MCNC: 7.9 MG/DL (ref 8.3–10.1)
CALCIUM SERPL-MCNC: 8.3 MG/DL (ref 8.3–10.1)
CALCIUM SERPL-MCNC: 8.5 MG/DL (ref 8.3–10.1)
CHLORIDE SERPL-SCNC: 101 MMOL/L (ref 100–108)
CHLORIDE SERPL-SCNC: 105 MMOL/L (ref 100–108)
CHLORIDE SERPL-SCNC: 108 MMOL/L (ref 100–108)
CLARITY UR: CLEAR
CO2 SERPL-SCNC: 21 MMOL/L (ref 21–32)
CO2 SERPL-SCNC: 24 MMOL/L (ref 21–32)
CO2 SERPL-SCNC: 24 MMOL/L (ref 21–32)
COLOR UR: YELLOW
CREAT SERPL-MCNC: 0.99 MG/DL (ref 0.6–1.3)
CREAT SERPL-MCNC: 1.2 MG/DL (ref 0.6–1.3)
CREAT SERPL-MCNC: 1.41 MG/DL (ref 0.6–1.3)
EOSINOPHIL # BLD MANUAL: 0.07 THOUSAND/UL (ref 0–0.4)
EOSINOPHIL # BLD MANUAL: 0.26 THOUSAND/UL (ref 0–0.4)
EOSINOPHIL NFR BLD MANUAL: 1 % (ref 0–6)
EOSINOPHIL NFR BLD MANUAL: 3 % (ref 0–6)
ERYTHROCYTE [DISTWIDTH] IN BLOOD BY AUTOMATED COUNT: 16.5 % (ref 11.6–15.1)
ERYTHROCYTE [DISTWIDTH] IN BLOOD BY AUTOMATED COUNT: 16.5 % (ref 11.6–15.1)
GFR SERPL CREATININE-BSD FRML MDRD: 34 ML/MIN/1.73SQ M
GFR SERPL CREATININE-BSD FRML MDRD: 41 ML/MIN/1.73SQ M
GFR SERPL CREATININE-BSD FRML MDRD: 52 ML/MIN/1.73SQ M
GLUCOSE SERPL-MCNC: 168 MG/DL (ref 65–140)
GLUCOSE SERPL-MCNC: 192 MG/DL (ref 65–140)
GLUCOSE SERPL-MCNC: 198 MG/DL (ref 65–140)
GLUCOSE SERPL-MCNC: 255 MG/DL (ref 65–140)
GLUCOSE SERPL-MCNC: 273 MG/DL (ref 65–140)
GLUCOSE SERPL-MCNC: 359 MG/DL (ref 65–140)
GLUCOSE UR STRIP-MCNC: ABNORMAL MG/DL
HCO3 BLDA-SCNC: 18 MMOL/L (ref 22–28)
HCO3 BLDA-SCNC: 19.7 MMOL/L (ref 22–28)
HCO3 BLDA-SCNC: 20.5 MMOL/L (ref 22–28)
HCO3 BLDV-SCNC: 19 MMOL/L (ref 24–30)
HCT VFR BLD AUTO: 31.5 % (ref 34.8–46.1)
HCT VFR BLD AUTO: 33.2 % (ref 34.8–46.1)
HGB BLD-MCNC: 10.6 G/DL (ref 11.5–15.4)
HGB BLD-MCNC: 11.1 G/DL (ref 11.5–15.4)
HGB UR QL STRIP.AUTO: NEGATIVE
HYALINE CASTS #/AREA URNS LPF: ABNORMAL /LPF
INR PPP: 1.16 (ref 0.86–1.16)
KETONES UR STRIP-MCNC: NEGATIVE MG/DL
LACTATE SERPL-SCNC: 2.5 MMOL/L (ref 0.5–2)
LACTATE SERPL-SCNC: 3.7 MMOL/L (ref 0.5–2)
LEUKOCYTE ESTERASE UR QL STRIP: ABNORMAL
LYMPHOCYTES # BLD AUTO: 0.43 THOUSAND/UL (ref 0.6–4.47)
LYMPHOCYTES # BLD AUTO: 0.63 THOUSAND/UL (ref 0.6–4.47)
LYMPHOCYTES # BLD AUTO: 5 % (ref 14–44)
LYMPHOCYTES # BLD AUTO: 9 % (ref 14–44)
MAGNESIUM SERPL-MCNC: 1.8 MG/DL (ref 1.6–2.6)
MAGNESIUM SERPL-MCNC: 2 MG/DL (ref 1.6–2.6)
MCH RBC QN AUTO: 31.5 PG (ref 26.8–34.3)
MCH RBC QN AUTO: 31.5 PG (ref 26.8–34.3)
MCHC RBC AUTO-ENTMCNC: 33.4 G/DL (ref 31.4–37.4)
MCHC RBC AUTO-ENTMCNC: 33.7 G/DL (ref 31.4–37.4)
MCV RBC AUTO: 94 FL (ref 82–98)
MCV RBC AUTO: 94 FL (ref 82–98)
METAMYELOCYTES NFR BLD MANUAL: 1 % (ref 0–1)
MONOCYTES # BLD AUTO: 0.28 THOUSAND/UL (ref 0–1.22)
MONOCYTES # BLD AUTO: 0.43 THOUSAND/UL (ref 0–1.22)
MONOCYTES NFR BLD: 4 % (ref 4–12)
MONOCYTES NFR BLD: 5 % (ref 4–12)
NASAL CANNULA: 2
NASAL CANNULA: ABNORMAL
NEUTROPHILS # BLD MANUAL: 6.03 THOUSAND/UL (ref 1.85–7.62)
NEUTROPHILS # BLD MANUAL: 7.4 THOUSAND/UL (ref 1.85–7.62)
NEUTS BAND NFR BLD MANUAL: 1 % (ref 0–8)
NEUTS BAND NFR BLD MANUAL: 2 % (ref 0–8)
NEUTS SEG NFR BLD AUTO: 84 % (ref 43–75)
NEUTS SEG NFR BLD AUTO: 85 % (ref 43–75)
NITRITE UR QL STRIP: NEGATIVE
NON-SQ EPI CELLS URNS QL MICRO: ABNORMAL /HPF
NRBC BLD AUTO-RTO: 0 /100 WBCS
NRBC BLD AUTO-RTO: 1 /100 WBCS
NRBC BLD AUTO-RTO: 2 /100 WBC (ref 0–2)
NRBC BLD AUTO-RTO: 3 /100 WBC (ref 0–2)
O2 CT BLDA-SCNC: 14.7 ML/DL (ref 16–23)
O2 CT BLDA-SCNC: 15.5 ML/DL (ref 16–23)
O2 CT BLDA-SCNC: 16 ML/DL (ref 16–23)
O2 CT BLDV-SCNC: 16.1 ML/DL
OXYHGB MFR BLDA: 95.7 % (ref 94–97)
OXYHGB MFR BLDA: 97.1 % (ref 94–97)
OXYHGB MFR BLDA: 98.3 % (ref 94–97)
PCO2 BLDA: 23.7 MM HG (ref 36–44)
PCO2 BLDA: 27.1 MM HG (ref 36–44)
PCO2 BLDA: 28.1 MM HG (ref 36–44)
PCO2 BLDV: 23.9 MM HG (ref 42–50)
PH BLDA: 7.48 [PH] (ref 7.35–7.45)
PH BLDA: 7.48 [PH] (ref 7.35–7.45)
PH BLDA: 7.5 [PH] (ref 7.35–7.45)
PH BLDV: 7.52 [PH] (ref 7.3–7.4)
PH UR STRIP.AUTO: 6 [PH] (ref 4.5–8)
PHOSPHATE SERPL-MCNC: 0.9 MG/DL (ref 2.3–4.1)
PHOSPHATE SERPL-MCNC: 1 MG/DL (ref 2.3–4.1)
PLATELET # BLD AUTO: 286 THOUSANDS/UL (ref 149–390)
PLATELET # BLD AUTO: 305 THOUSANDS/UL (ref 149–390)
PLATELET BLD QL SMEAR: ADEQUATE
PLATELET BLD QL SMEAR: ADEQUATE
PMV BLD AUTO: 10.7 FL (ref 8.9–12.7)
PMV BLD AUTO: 11 FL (ref 8.9–12.7)
PO2 BLDA: 138.4 MM HG (ref 75–129)
PO2 BLDA: 75.5 MM HG (ref 75–129)
PO2 BLDA: 89.4 MM HG (ref 75–129)
PO2 BLDV: 165.1 MM HG (ref 35–45)
POTASSIUM SERPL-SCNC: 2.6 MMOL/L (ref 3.5–5.3)
POTASSIUM SERPL-SCNC: 2.9 MMOL/L (ref 3.5–5.3)
POTASSIUM SERPL-SCNC: 3.9 MMOL/L (ref 3.5–5.3)
PROT SERPL-MCNC: 6.7 G/DL (ref 6.4–8.2)
PROT UR STRIP-MCNC: ABNORMAL MG/DL
PROTHROMBIN TIME: 14.8 SECONDS (ref 12.1–14.4)
RBC # BLD AUTO: 3.37 MILLION/UL (ref 3.81–5.12)
RBC # BLD AUTO: 3.52 MILLION/UL (ref 3.81–5.12)
RBC #/AREA URNS AUTO: ABNORMAL /HPF
RBC MORPH BLD: PRESENT
RBC MORPH BLD: PRESENT
SODIUM SERPL-SCNC: 135 MMOL/L (ref 136–145)
SODIUM SERPL-SCNC: 138 MMOL/L (ref 136–145)
SODIUM SERPL-SCNC: 139 MMOL/L (ref 136–145)
SP GR UR STRIP.AUTO: 1.01 (ref 1–1.03)
SPECIMEN SOURCE: ABNORMAL
TROPONIN I SERPL-MCNC: 0.03 NG/ML
TROPONIN I SERPL-MCNC: 0.04 NG/ML
UROBILINOGEN UR QL STRIP.AUTO: 0.2 E.U./DL
WBC # BLD AUTO: 7.01 THOUSAND/UL (ref 4.31–10.16)
WBC # BLD AUTO: 8.6 THOUSAND/UL (ref 4.31–10.16)
WBC #/AREA URNS AUTO: ABNORMAL /HPF

## 2018-01-24 PROCEDURE — 93005 ELECTROCARDIOGRAM TRACING: CPT

## 2018-01-24 PROCEDURE — 80048 BASIC METABOLIC PNL TOTAL CA: CPT | Performed by: SURGERY

## 2018-01-24 PROCEDURE — 99152 MOD SED SAME PHYS/QHP 5/>YRS: CPT

## 2018-01-24 PROCEDURE — 47490 INCISION OF GALLBLADDER: CPT

## 2018-01-24 PROCEDURE — 83605 ASSAY OF LACTIC ACID: CPT | Performed by: SURGERY

## 2018-01-24 PROCEDURE — 84484 ASSAY OF TROPONIN QUANT: CPT | Performed by: INTERNAL MEDICINE

## 2018-01-24 PROCEDURE — 82948 REAGENT STRIP/BLOOD GLUCOSE: CPT

## 2018-01-24 PROCEDURE — 71045 X-RAY EXAM CHEST 1 VIEW: CPT

## 2018-01-24 PROCEDURE — 93010 ELECTROCARDIOGRAM REPORT: CPT | Performed by: INTERNAL MEDICINE

## 2018-01-24 PROCEDURE — 87040 BLOOD CULTURE FOR BACTERIA: CPT | Performed by: INTERNAL MEDICINE

## 2018-01-24 PROCEDURE — C1769 GUIDE WIRE: HCPCS

## 2018-01-24 PROCEDURE — 85007 BL SMEAR W/DIFF WBC COUNT: CPT | Performed by: SURGERY

## 2018-01-24 PROCEDURE — 86850 RBC ANTIBODY SCREEN: CPT | Performed by: SURGERY

## 2018-01-24 PROCEDURE — 80076 HEPATIC FUNCTION PANEL: CPT | Performed by: SURGERY

## 2018-01-24 PROCEDURE — 85027 COMPLETE CBC AUTOMATED: CPT | Performed by: SURGERY

## 2018-01-24 PROCEDURE — 85610 PROTHROMBIN TIME: CPT | Performed by: SURGERY

## 2018-01-24 PROCEDURE — 86901 BLOOD TYPING SEROLOGIC RH(D): CPT | Performed by: SURGERY

## 2018-01-24 PROCEDURE — 83735 ASSAY OF MAGNESIUM: CPT | Performed by: SURGERY

## 2018-01-24 PROCEDURE — 86900 BLOOD TYPING SEROLOGIC ABO: CPT | Performed by: SURGERY

## 2018-01-24 PROCEDURE — 36600 WITHDRAWAL OF ARTERIAL BLOOD: CPT

## 2018-01-24 PROCEDURE — 84100 ASSAY OF PHOSPHORUS: CPT | Performed by: SURGERY

## 2018-01-24 PROCEDURE — 47490 INCISION OF GALLBLADDER: CPT | Performed by: RADIOLOGY

## 2018-01-24 PROCEDURE — 82805 BLOOD GASES W/O2 SATURATION: CPT | Performed by: SURGERY

## 2018-01-24 PROCEDURE — 85730 THROMBOPLASTIN TIME PARTIAL: CPT | Performed by: SURGERY

## 2018-01-24 PROCEDURE — 82330 ASSAY OF CALCIUM: CPT | Performed by: SURGERY

## 2018-01-24 PROCEDURE — 92610 EVALUATE SWALLOWING FUNCTION: CPT

## 2018-01-24 PROCEDURE — C1729 CATH, DRAINAGE: HCPCS

## 2018-01-24 PROCEDURE — 81001 URINALYSIS AUTO W/SCOPE: CPT | Performed by: SURGERY

## 2018-01-24 PROCEDURE — 99223 1ST HOSP IP/OBS HIGH 75: CPT | Performed by: INTERNAL MEDICINE

## 2018-01-24 PROCEDURE — 94760 N-INVAS EAR/PLS OXIMETRY 1: CPT

## 2018-01-24 PROCEDURE — 94640 AIRWAY INHALATION TREATMENT: CPT

## 2018-01-24 RX ORDER — MIDAZOLAM HYDROCHLORIDE 1 MG/ML
INJECTION INTRAMUSCULAR; INTRAVENOUS CODE/TRAUMA/SEDATION MEDICATION
Status: COMPLETED | OUTPATIENT
Start: 2018-01-24 | End: 2018-01-24

## 2018-01-24 RX ORDER — FUROSEMIDE 10 MG/ML
20 INJECTION INTRAMUSCULAR; INTRAVENOUS ONCE
Status: COMPLETED | OUTPATIENT
Start: 2018-01-24 | End: 2018-01-24

## 2018-01-24 RX ORDER — MIDAZOLAM HYDROCHLORIDE 1 MG/ML
INJECTION INTRAMUSCULAR; INTRAVENOUS
Status: COMPLETED
Start: 2018-01-24 | End: 2018-01-25

## 2018-01-24 RX ORDER — SODIUM CHLORIDE FOR INHALATION 0.9 %
VIAL, NEBULIZER (ML) INHALATION
Status: DISPENSED
Start: 2018-01-24 | End: 2018-01-24

## 2018-01-24 RX ORDER — FUROSEMIDE 10 MG/ML
10 INJECTION INTRAMUSCULAR; INTRAVENOUS ONCE
Status: DISCONTINUED | OUTPATIENT
Start: 2018-01-24 | End: 2018-01-24

## 2018-01-24 RX ORDER — POTASSIUM CHLORIDE 29.8 MG/ML
40 INJECTION INTRAVENOUS ONCE
Status: DISCONTINUED | OUTPATIENT
Start: 2018-01-24 | End: 2018-01-24

## 2018-01-24 RX ORDER — LEVOTHYROXINE SODIUM 0.07 MG/1
75 TABLET ORAL
Status: DISCONTINUED | OUTPATIENT
Start: 2018-01-24 | End: 2018-01-30 | Stop reason: HOSPADM

## 2018-01-24 RX ORDER — ESCITALOPRAM OXALATE 20 MG/1
20 TABLET ORAL DAILY
Status: DISCONTINUED | OUTPATIENT
Start: 2018-01-24 | End: 2018-01-30 | Stop reason: HOSPADM

## 2018-01-24 RX ORDER — LEVALBUTEROL 1.25 MG/.5ML
SOLUTION, CONCENTRATE RESPIRATORY (INHALATION)
Status: DISPENSED
Start: 2018-01-24 | End: 2018-01-24

## 2018-01-24 RX ORDER — AMLODIPINE BESYLATE 2.5 MG/1
2.5 TABLET ORAL DAILY
Status: DISCONTINUED | OUTPATIENT
Start: 2018-01-24 | End: 2018-01-24

## 2018-01-24 RX ORDER — AMLODIPINE BESYLATE 2.5 MG/1
2.5 TABLET ORAL DAILY
Status: DISCONTINUED | OUTPATIENT
Start: 2018-01-25 | End: 2018-01-30 | Stop reason: HOSPADM

## 2018-01-24 RX ORDER — FENTANYL CITRATE 50 UG/ML
INJECTION, SOLUTION INTRAMUSCULAR; INTRAVENOUS
Status: COMPLETED
Start: 2018-01-24 | End: 2018-01-25

## 2018-01-24 RX ORDER — PANTOPRAZOLE SODIUM 20 MG/1
20 TABLET, DELAYED RELEASE ORAL
Status: DISCONTINUED | OUTPATIENT
Start: 2018-01-24 | End: 2018-01-25

## 2018-01-24 RX ORDER — ASPIRIN 81 MG/1
81 TABLET, CHEWABLE ORAL DAILY
Status: DISCONTINUED | OUTPATIENT
Start: 2018-01-24 | End: 2018-01-30 | Stop reason: HOSPADM

## 2018-01-24 RX ORDER — SODIUM CHLORIDE 9 MG/ML
50 INJECTION, SOLUTION INTRAVENOUS CONTINUOUS
Status: DISCONTINUED | OUTPATIENT
Start: 2018-01-24 | End: 2018-01-26

## 2018-01-24 RX ORDER — PRAVASTATIN SODIUM 40 MG
40 TABLET ORAL
Status: DISCONTINUED | OUTPATIENT
Start: 2018-01-24 | End: 2018-01-30 | Stop reason: HOSPADM

## 2018-01-24 RX ORDER — FENTANYL CITRATE 50 UG/ML
INJECTION, SOLUTION INTRAMUSCULAR; INTRAVENOUS CODE/TRAUMA/SEDATION MEDICATION
Status: COMPLETED | OUTPATIENT
Start: 2018-01-24 | End: 2018-01-24

## 2018-01-24 RX ORDER — POTASSIUM CHLORIDE 14.9 MG/ML
20 INJECTION INTRAVENOUS ONCE
Status: COMPLETED | OUTPATIENT
Start: 2018-01-24 | End: 2018-01-25

## 2018-01-24 RX ORDER — SODIUM CHLORIDE FOR INHALATION 0.9 %
3 VIAL, NEBULIZER (ML) INHALATION
Status: DISCONTINUED | OUTPATIENT
Start: 2018-01-25 | End: 2018-01-27

## 2018-01-24 RX ORDER — LEVALBUTEROL 1.25 MG/.5ML
1.25 SOLUTION, CONCENTRATE RESPIRATORY (INHALATION)
Status: DISCONTINUED | OUTPATIENT
Start: 2018-01-25 | End: 2018-01-27

## 2018-01-24 RX ORDER — VANCOMYCIN HYDROCHLORIDE 1 G/200ML
15 INJECTION, SOLUTION INTRAVENOUS EVERY 24 HOURS
Status: DISCONTINUED | OUTPATIENT
Start: 2018-01-24 | End: 2018-01-26

## 2018-01-24 RX ORDER — INSULIN GLARGINE 100 [IU]/ML
7 INJECTION, SOLUTION SUBCUTANEOUS
Status: DISCONTINUED | OUTPATIENT
Start: 2018-01-24 | End: 2018-01-24

## 2018-01-24 RX ADMIN — CEFAZOLIN SODIUM 1000 MG: 1 SOLUTION INTRAVENOUS at 11:30

## 2018-01-24 RX ADMIN — PANTOPRAZOLE SODIUM 20 MG: 20 TABLET, DELAYED RELEASE ORAL at 09:33

## 2018-01-24 RX ADMIN — SODIUM CHLORIDE 1000 ML: 0.9 INJECTION, SOLUTION INTRAVENOUS at 20:34

## 2018-01-24 RX ADMIN — HEPARIN SODIUM 5000 UNITS: 5000 INJECTION, SOLUTION INTRAVENOUS; SUBCUTANEOUS at 05:31

## 2018-01-24 RX ADMIN — LEVALBUTEROL HYDROCHLORIDE 1.25 MG: 1.25 SOLUTION, CONCENTRATE RESPIRATORY (INHALATION) at 14:13

## 2018-01-24 RX ADMIN — CEFEPIME HYDROCHLORIDE 2000 MG: 2 INJECTION, POWDER, FOR SOLUTION INTRAVENOUS at 20:36

## 2018-01-24 RX ADMIN — ASPIRIN 81 MG 81 MG: 81 TABLET ORAL at 09:33

## 2018-01-24 RX ADMIN — LEVALBUTEROL HYDROCHLORIDE 1.25 MG: 1.25 SOLUTION, CONCENTRATE RESPIRATORY (INHALATION) at 07:20

## 2018-01-24 RX ADMIN — LEVOTHYROXINE SODIUM 75 MCG: 75 TABLET ORAL at 09:33

## 2018-01-24 RX ADMIN — AMLODIPINE BESYLATE 2.5 MG: 2.5 TABLET ORAL at 09:33

## 2018-01-24 RX ADMIN — MIDAZOLAM 0.5 MG: 1 INJECTION INTRAMUSCULAR; INTRAVENOUS at 23:55

## 2018-01-24 RX ADMIN — SODIUM CHLORIDE 5 UNITS/HR: 9 INJECTION, SOLUTION INTRAVENOUS at 21:36

## 2018-01-24 RX ADMIN — LEVALBUTEROL HYDROCHLORIDE 1.25 MG: 1.25 SOLUTION, CONCENTRATE RESPIRATORY (INHALATION) at 19:27

## 2018-01-24 RX ADMIN — INSULIN LISPRO 2 UNITS: 100 INJECTION, SOLUTION INTRAVENOUS; SUBCUTANEOUS at 13:46

## 2018-01-24 RX ADMIN — LEVALBUTEROL HYDROCHLORIDE 1.25 MG: 1.25 SOLUTION, CONCENTRATE RESPIRATORY (INHALATION) at 01:25

## 2018-01-24 RX ADMIN — ESCITALOPRAM OXALATE 20 MG: 20 TABLET ORAL at 09:33

## 2018-01-24 RX ADMIN — METRONIDAZOLE 500 MG: 500 INJECTION, SOLUTION INTRAVENOUS at 02:56

## 2018-01-24 RX ADMIN — PRAVASTATIN SODIUM 40 MG: 40 TABLET ORAL at 17:38

## 2018-01-24 RX ADMIN — ISODIUM CHLORIDE 3 ML: 0.03 SOLUTION RESPIRATORY (INHALATION) at 01:25

## 2018-01-24 RX ADMIN — HEPARIN SODIUM 5000 UNITS: 5000 INJECTION, SOLUTION INTRAVENOUS; SUBCUTANEOUS at 13:45

## 2018-01-24 RX ADMIN — FUROSEMIDE 20 MG: 10 INJECTION, SOLUTION INTRAMUSCULAR; INTRAVENOUS at 13:45

## 2018-01-24 RX ADMIN — FENTANYL CITRATE 25 MCG: 50 INJECTION INTRAMUSCULAR; INTRAVENOUS at 23:55

## 2018-01-24 RX ADMIN — FUROSEMIDE 20 MG: 10 INJECTION, SOLUTION INTRAMUSCULAR; INTRAVENOUS at 07:41

## 2018-01-24 RX ADMIN — INSULIN LISPRO 1 UNITS: 100 INJECTION, SOLUTION INTRAVENOUS; SUBCUTANEOUS at 17:37

## 2018-01-24 RX ADMIN — ISODIUM CHLORIDE 3 ML: 0.03 SOLUTION RESPIRATORY (INHALATION) at 14:13

## 2018-01-24 RX ADMIN — SODIUM CHLORIDE 100 ML/HR: 0.9 INJECTION, SOLUTION INTRAVENOUS at 22:17

## 2018-01-24 RX ADMIN — ISODIUM CHLORIDE 3 ML: 0.03 SOLUTION RESPIRATORY (INHALATION) at 07:20

## 2018-01-24 RX ADMIN — ISODIUM CHLORIDE 3 ML: 0.03 SOLUTION RESPIRATORY (INHALATION) at 19:27

## 2018-01-24 RX ADMIN — METRONIDAZOLE 500 MG: 500 INJECTION, SOLUTION INTRAVENOUS at 17:37

## 2018-01-24 RX ADMIN — MIDAZOLAM 0.5 MG: 1 INJECTION INTRAMUSCULAR; INTRAVENOUS at 23:57

## 2018-01-24 RX ADMIN — INSULIN LISPRO 1 UNITS: 100 INJECTION, SOLUTION INTRAVENOUS; SUBCUTANEOUS at 09:32

## 2018-01-24 RX ADMIN — POTASSIUM CHLORIDE 20 MEQ: 200 INJECTION, SOLUTION INTRAVENOUS at 22:18

## 2018-01-24 RX ADMIN — METRONIDAZOLE 500 MG: 500 INJECTION, SOLUTION INTRAVENOUS at 09:29

## 2018-01-24 RX ADMIN — VANCOMYCIN HYDROCHLORIDE 1000 MG: 1 INJECTION, SOLUTION INTRAVENOUS at 21:37

## 2018-01-24 NOTE — PROGRESS NOTES
Notified TWAN Gross (PA for P4 & P5 crit care) in regards to patient's score of 26%  Attending team already notified of sepsis screening by Angelika Lemon RN PA-C evaluated patient and will confer with surgical team and determine any additional interventions necessary  Will continue to monitor and inform team of any changes

## 2018-01-24 NOTE — PROGRESS NOTES
Progress Note - General Surgery   Rocio Stern Vinicio Pretty 80 y o  female MRN: [id number]  Unit/Bed#: [facility] 622-01 Encounter: [id number]    Assessment:  81 yo F with acute cholecystitis, currently on antibiotics since 1/19    Plan:  - respiratory protocol  - possible lasix again today  - wean oxygen  - monitor mental status  - diet as tolerated  - continue antibiotics, transition to PO on discharge  - DVT ppx -- SQH    Subjective/Objective     Subjective: Patient sleeping comfortably, however, tachyneic when woken up  Drowsy and complains of discomfort  Objective:     Vitals: Blood pressure 103/53, pulse 76, temperature 98 °F (36 7 °C), temperature source Axillary, resp  rate 21, weight 59 kg (130 lb), SpO2 96 %  ,Body mass index is 31 34 kg/m²  I/O       01/22 0701 - 01/23 0700 01/23 0701 - 01/24 0700    P  O  1920 600    I V  (mL/kg) 1762 1 (29 9) 411 3 (7)    IV Piggyback 150 300    Total Intake(mL/kg) 3832 1 (65) 1311 3 (22 2)    Urine (mL/kg/hr) 1536 (1 1) 1942 (1 4)    Total Output 1536 1942    Net +2296 1 -630 8          Unmeasured Urine Occurrence 3 x           Physical Exam:  GEN: NAD  HEENT: MMM  CV: RRR  Lung: SOB  Ab: Soft, NT/ND  Extrem: No CCE  Neuro: drowsy    Lab, Imaging and other studies: CBC with diff: No results found for: WBC, HGB, HCT, MCV, PLT, ADJUSTEDWBC, MCH, MCHC, RDW, MPV, NRBC, BMP/CMP: No results found for: NA, K, CL, CO2, ANIONGAP, BUN, CREATININE, GLUCOSE, CALCIUM, AST, ALT, ALKPHOS, PROT, ALBUMIN, BILITOT, EGFR, Magnesium: No results found for: MAG  VTE Pharmacologic Prophylaxis: Heparin  VTE Mechanical Prophylaxis: sequential compression device

## 2018-01-24 NOTE — RESTORATIVE TECHNICIAN NOTE
Restorative Specialist Mobility Note                      Repositioned: Other (Comment) (Rep /sat pt upright in bed  Bed alarm on  Pt callbell, phone/tray within reach  NC O2 on 2L)              Anti-Embolism Device On:  Bilateral, Sequential compression devices, below knee       Laila PAREDES, Restorative Technician, [employer] [facility]

## 2018-01-24 NOTE — PLAN OF CARE
Problem: SLP ADULT - SWALLOWING, IMPAIRED  Goal: Initial SLP swallow eval performed  Outcome: Completed Date Met: 01/24/18

## 2018-01-24 NOTE — PLAN OF CARE
METABOLIC, FLUID AND ELECTROLYTES - ADULT     Fluid balance maintained Not Progressing        RESPIRATORY - ADULT     Achieves optimal ventilation and oxygenation Not Progressing          DISCHARGE PLANNING     Discharge to home or other facility with appropriate resources Progressing        DISCHARGE PLANNING - CARE MANAGEMENT     Discharge to post-acute care or home with appropriate resources Progressing        INFECTION - ADULT     Absence or prevention of progression during hospitalization Progressing     Absence of fever/infection during neutropenic period Progressing        Knowledge Deficit     Patient/family/caregiver demonstrates understanding of disease process, treatment plan, medications, and discharge instructions Progressing        PAIN - ADULT     Verbalizes/displays adequate comfort level or baseline comfort level Progressing        Potential for Falls     Patient will remain free of falls Progressing        Prexisting or High Potential for Compromised Skin Integrity     Skin integrity is maintained or improved Progressing        SAFETY ADULT     Maintain or return to baseline ADL function Progressing     Maintain or return mobility status to optimal level Progressing

## 2018-01-24 NOTE — SPEECH THERAPY NOTE
Speech Language/Pathology  Speech/Language Pathology  Assessment    Patient Name: Kristy Villareal  OEXEV'O Date: 1/24/2018     Problem List  Patient Active Problem List   Diagnosis    Altered mental status    Dementia    Essential hypertension    Diabetes mellitus ([facility] )    Stage 3 chronic kidney disease    Falls    Epigastric pain    Cholecystitis with cholelithiasis     Past Medical History  Past Medical History:   Diagnosis Date    Diabetes mellitus ([facility] )     Hyperlipidemia     Hypothyroidism     Macular degeneration     Osteoarthritis      Past Surgical History  History reviewed  No pertinent surgical history  80 y o  female who presents from her nursing facility with complaints of 1 day of right upper quadrant abdominal pain and decreased oral intake  The patient suffers from dementia therefore the majority of this history was obtained after speaking with her son  The patient suffers from chronic UTI infections and never complains of abd pain therefore when she started to c/o abd pain she was immediately taken to [facility]  Upon assessment she notably had a leukocytosis and elevated TBili of 1 47  Therefore a CTAP was obtained which showed GB distention, multiple stones & wall thickening  Subsequently a RUQ was obtained which was also positive for acute cholecystitis  After the workup however the pt was no longer c/o abd pain  When the patient's findings were explained to her, she was asked if she would want sx if indicated however she said "no"  Surgery was opted out, use of antibiotics to address for now  Reason for consult:  R/o aspiration  Determine safest and least restrictive diet  Coughing w/ meals, diet changed several times  Current diet:  NPO  Premorbid diet[de-identified]  Regular at NH, level 3 w/ thin here, then downgraded to puree/thin here  Previous VBS:  None noted  O2 requirement:  NC, pt w/ labored breathing,  C/o SOB  Voice/Speech:  Mild hoarse, weak, low volume  Social:  [facility]  Follows commands: For oral [facility]  Pt's son states the pt was not following his commands earlier    Cognitive Status:  Lethargic, able to state name, not oriented otherwise  Oral [facility] exam:  Partial dentition  Noted labial weakness on R/L but no gross assymetry  Items administered:  Puree,  honey thick liquid, nectar thick liquid, thin liquids, by tsp/cup, straw as able  Oral stage:  Lip closure: weak, poor seal on cup rim but able to draw from the straw  Mastication: na  Bolus formation: slow, reduced  Bolus control: ?able spill   Transfer: mild/mod reduced  Oral residue: none w/ the limited texture offered      Pharyngeal stage:  Swallow promptness: mild delay  Laryngeal rise: mildly reduced  Wet voice: mild gurgling but not definite wet voicing  Throat clear: mild w/ thin, nt  Cough: mild cough w/ thin  Secondary swallows: attempted w/ all material  Pt struggling to catch breath, needs to coordinate the breathing w/ the swallowing  Esophageal stage:  H/o GERD- son states that the pt had c/o gerd & would take meds previously  Pt w/ belching, gurgling from bottom up noted  Summary:  Pt presents w/ marked trouble swallowing & regulating her swallowing w/ her breathing  Once she swallows the swallow decent however her mentation & difficulty breathing are placing her at risk for aspiration/choking  Education provided to son/pt  Recommendations:  Allow the pt honey thick w/ nsg for now until her mentation improves, breathing improves  Frequent oral care (already done by nsg today, in room)    Positioning:Upright    Aspiration precautions  Reflux precautions    Aspiration precautions posted    Results d/w:  Pt, family, nsg    Goal(s):    Pt will tolerate least restrictive diet w/out s/s aspiration or oral/pharyngeal difficulties

## 2018-01-25 ENCOUNTER — APPOINTMENT (INPATIENT)
Dept: NON INVASIVE DIAGNOSTICS | Facility: HOSPITAL | Age: 83
DRG: 445 | End: 2018-01-25
Payer: MEDICARE

## 2018-01-25 ENCOUNTER — APPOINTMENT (INPATIENT)
Dept: RADIOLOGY | Facility: HOSPITAL | Age: 83
DRG: 445 | End: 2018-01-25
Payer: MEDICARE

## 2018-01-25 LAB
ABO GROUP BLD: NORMAL
ALBUMIN SERPL BCP-MCNC: 1.9 G/DL (ref 3.5–5)
ALP SERPL-CCNC: 161 U/L (ref 46–116)
ALT SERPL W P-5'-P-CCNC: 18 U/L (ref 12–78)
ANION GAP SERPL CALCULATED.3IONS-SCNC: 7 MMOL/L (ref 4–13)
ANISOCYTOSIS BLD QL SMEAR: PRESENT
ARTERIAL PATENCY WRIST A: YES
AST SERPL W P-5'-P-CCNC: 45 U/L (ref 5–45)
ATRIAL RATE: 84 BPM
BASE EXCESS BLDA CALC-SCNC: -2.7 MMOL/L
BASOPHILS # BLD MANUAL: 0 THOUSAND/UL (ref 0–0.1)
BASOPHILS # BLD MANUAL: 0 THOUSAND/UL (ref 0–0.1)
BASOPHILS NFR MAR MANUAL: 0 % (ref 0–1)
BASOPHILS NFR MAR MANUAL: 0 % (ref 0–1)
BILIRUB DIRECT SERPL-MCNC: 0.14 MG/DL (ref 0–0.2)
BILIRUB SERPL-MCNC: 0.34 MG/DL (ref 0.2–1)
BLD GP AB SCN SERPL QL: NEGATIVE
BUN SERPL-MCNC: 9 MG/DL (ref 5–25)
BURR CELLS BLD QL SMEAR: PRESENT
CA-I BLD-SCNC: 1.08 MMOL/L (ref 1.12–1.32)
CALCIUM SERPL-MCNC: 8.3 MG/DL (ref 8.3–10.1)
CHLORIDE SERPL-SCNC: 109 MMOL/L (ref 100–108)
CO2 SERPL-SCNC: 22 MMOL/L (ref 21–32)
CREAT SERPL-MCNC: 0.98 MG/DL (ref 0.6–1.3)
EOSINOPHIL # BLD MANUAL: 0 THOUSAND/UL (ref 0–0.4)
EOSINOPHIL # BLD MANUAL: 0.2 THOUSAND/UL (ref 0–0.4)
EOSINOPHIL NFR BLD MANUAL: 0 % (ref 0–6)
EOSINOPHIL NFR BLD MANUAL: 3 % (ref 0–6)
ERYTHROCYTE [DISTWIDTH] IN BLOOD BY AUTOMATED COUNT: 16.2 % (ref 11.6–15.1)
ERYTHROCYTE [DISTWIDTH] IN BLOOD BY AUTOMATED COUNT: 16.4 % (ref 11.6–15.1)
GFR SERPL CREATININE-BSD FRML MDRD: 52 ML/MIN/1.73SQ M
GIANT PLATELETS BLD QL SMEAR: PRESENT
GLUCOSE SERPL-MCNC: 128 MG/DL (ref 65–140)
GLUCOSE SERPL-MCNC: 130 MG/DL (ref 65–140)
GLUCOSE SERPL-MCNC: 158 MG/DL (ref 65–140)
GLUCOSE SERPL-MCNC: 258 MG/DL (ref 65–140)
GLUCOSE SERPL-MCNC: 274 MG/DL (ref 65–140)
GLUCOSE SERPL-MCNC: 294 MG/DL (ref 65–140)
GLUCOSE SERPL-MCNC: 309 MG/DL (ref 65–140)
HCO3 BLDA-SCNC: 21.1 MMOL/L (ref 22–28)
HCT VFR BLD AUTO: 29 % (ref 34.8–46.1)
HCT VFR BLD AUTO: 29.7 % (ref 34.8–46.1)
HGB BLD-MCNC: 10 G/DL (ref 11.5–15.4)
HGB BLD-MCNC: 9.6 G/DL (ref 11.5–15.4)
LACTATE SERPL-SCNC: 1.2 MMOL/L (ref 0.5–2)
LYMPHOCYTES # BLD AUTO: 0.23 THOUSAND/UL (ref 0.6–4.47)
LYMPHOCYTES # BLD AUTO: 0.54 THOUSAND/UL (ref 0.6–4.47)
LYMPHOCYTES # BLD AUTO: 3 % (ref 14–44)
LYMPHOCYTES # BLD AUTO: 8 % (ref 14–44)
MAGNESIUM SERPL-MCNC: 2.7 MG/DL (ref 1.6–2.6)
MCH RBC QN AUTO: 30.9 PG (ref 26.8–34.3)
MCH RBC QN AUTO: 31.4 PG (ref 26.8–34.3)
MCHC RBC AUTO-ENTMCNC: 33.1 G/DL (ref 31.4–37.4)
MCHC RBC AUTO-ENTMCNC: 33.7 G/DL (ref 31.4–37.4)
MCV RBC AUTO: 93 FL (ref 82–98)
MCV RBC AUTO: 93 FL (ref 82–98)
METAMYELOCYTES NFR BLD MANUAL: 2 % (ref 0–1)
MONOCYTES # BLD AUTO: 0.2 THOUSAND/UL (ref 0–1.22)
MONOCYTES # BLD AUTO: 0.3 THOUSAND/UL (ref 0–1.22)
MONOCYTES NFR BLD: 3 % (ref 4–12)
MONOCYTES NFR BLD: 4 % (ref 4–12)
NEUTROPHILS # BLD MANUAL: 5.83 THOUSAND/UL (ref 1.85–7.62)
NEUTROPHILS # BLD MANUAL: 6.61 THOUSAND/UL (ref 1.85–7.62)
NEUTS BAND NFR BLD MANUAL: 1 % (ref 0–8)
NEUTS SEG NFR BLD AUTO: 86 % (ref 43–75)
NEUTS SEG NFR BLD AUTO: 87 % (ref 43–75)
NON VENT ROOM AIR: 21 %
NRBC BLD AUTO-RTO: 0 /100 WBCS
NRBC BLD AUTO-RTO: 0 /100 WBCS
O2 CT BLDA-SCNC: 14.4 ML/DL (ref 16–23)
OXYHGB MFR BLDA: 96.5 % (ref 94–97)
P AXIS: 51 DEGREES
PCO2 BLDA: 32.8 MM HG (ref 36–44)
PH BLDA: 7.43 [PH] (ref 7.35–7.45)
PHOSPHATE SERPL-MCNC: 2.2 MG/DL (ref 2.3–4.1)
PLATELET # BLD AUTO: 293 THOUSANDS/UL (ref 149–390)
PLATELET # BLD AUTO: 296 THOUSANDS/UL (ref 149–390)
PLATELET BLD QL SMEAR: ADEQUATE
PLATELET BLD QL SMEAR: ADEQUATE
PMV BLD AUTO: 10.6 FL (ref 8.9–12.7)
PMV BLD AUTO: 10.7 FL (ref 8.9–12.7)
PO2 BLDA: 88.5 MM HG (ref 75–129)
POIKILOCYTOSIS BLD QL SMEAR: PRESENT
POTASSIUM SERPL-SCNC: 4.8 MMOL/L (ref 3.5–5.3)
PR INTERVAL: 142 MS
PROT SERPL-MCNC: 6 G/DL (ref 6.4–8.2)
QRS AXIS: 72 DEGREES
QRSD INTERVAL: 83 MS
QT INTERVAL: 383 MS
QTC INTERVAL: 453 MS
RBC # BLD AUTO: 3.11 MILLION/UL (ref 3.81–5.12)
RBC # BLD AUTO: 3.18 MILLION/UL (ref 3.81–5.12)
RBC MORPH BLD: PRESENT
RBC MORPH BLD: PRESENT
RH BLD: POSITIVE
SODIUM SERPL-SCNC: 138 MMOL/L (ref 136–145)
SPECIMEN EXPIRATION DATE: NORMAL
SPECIMEN SOURCE: ABNORMAL
T WAVE AXIS: 44 DEGREES
TSH SERPL DL<=0.05 MIU/L-ACNC: 3.05 UIU/ML (ref 0.36–3.74)
VARIANT LYMPHS # BLD AUTO: 3 %
VENTRICULAR RATE: 84 BPM
WBC # BLD AUTO: 6.78 THOUSAND/UL (ref 4.31–10.16)
WBC # BLD AUTO: 7.51 THOUSAND/UL (ref 4.31–10.16)

## 2018-01-25 PROCEDURE — 36600 WITHDRAWAL OF ARTERIAL BLOOD: CPT

## 2018-01-25 PROCEDURE — 87205 SMEAR GRAM STAIN: CPT | Performed by: SURGERY

## 2018-01-25 PROCEDURE — 82948 REAGENT STRIP/BLOOD GLUCOSE: CPT

## 2018-01-25 PROCEDURE — C9113 INJ PANTOPRAZOLE SODIUM, VIA: HCPCS | Performed by: EMERGENCY MEDICINE

## 2018-01-25 PROCEDURE — 83735 ASSAY OF MAGNESIUM: CPT | Performed by: SURGERY

## 2018-01-25 PROCEDURE — 80048 BASIC METABOLIC PNL TOTAL CA: CPT | Performed by: PHYSICIAN ASSISTANT

## 2018-01-25 PROCEDURE — 84100 ASSAY OF PHOSPHORUS: CPT | Performed by: SURGERY

## 2018-01-25 PROCEDURE — 87070 CULTURE OTHR SPECIMN AEROBIC: CPT | Performed by: SURGERY

## 2018-01-25 PROCEDURE — 93306 TTE W/DOPPLER COMPLETE: CPT | Performed by: INTERNAL MEDICINE

## 2018-01-25 PROCEDURE — 82330 ASSAY OF CALCIUM: CPT | Performed by: SURGERY

## 2018-01-25 PROCEDURE — 94640 AIRWAY INHALATION TREATMENT: CPT

## 2018-01-25 PROCEDURE — 83605 ASSAY OF LACTIC ACID: CPT | Performed by: SURGERY

## 2018-01-25 PROCEDURE — 93306 TTE W/DOPPLER COMPLETE: CPT

## 2018-01-25 PROCEDURE — 94760 N-INVAS EAR/PLS OXIMETRY 1: CPT

## 2018-01-25 PROCEDURE — 85007 BL SMEAR W/DIFF WBC COUNT: CPT | Performed by: SURGERY

## 2018-01-25 PROCEDURE — 84443 ASSAY THYROID STIM HORMONE: CPT | Performed by: INTERNAL MEDICINE

## 2018-01-25 PROCEDURE — 71045 X-RAY EXAM CHEST 1 VIEW: CPT

## 2018-01-25 PROCEDURE — 92526 ORAL FUNCTION THERAPY: CPT

## 2018-01-25 PROCEDURE — 82805 BLOOD GASES W/O2 SATURATION: CPT | Performed by: SURGERY

## 2018-01-25 PROCEDURE — 85027 COMPLETE CBC AUTOMATED: CPT | Performed by: SURGERY

## 2018-01-25 PROCEDURE — 99233 SBSQ HOSP IP/OBS HIGH 50: CPT | Performed by: SURGERY

## 2018-01-25 PROCEDURE — 87081 CULTURE SCREEN ONLY: CPT | Performed by: PHYSICIAN ASSISTANT

## 2018-01-25 PROCEDURE — 0F9430Z DRAINAGE OF GALLBLADDER WITH DRAINAGE DEVICE, PERCUTANEOUS APPROACH: ICD-10-PCS | Performed by: RADIOLOGY

## 2018-01-25 PROCEDURE — 80076 HEPATIC FUNCTION PANEL: CPT | Performed by: SURGERY

## 2018-01-25 RX ORDER — POTASSIUM CHLORIDE 14.9 MG/ML
80 INJECTION INTRAVENOUS ONCE
Status: DISCONTINUED | OUTPATIENT
Start: 2018-01-25 | End: 2018-01-25

## 2018-01-25 RX ORDER — PANTOPRAZOLE SODIUM 40 MG/1
40 INJECTION, POWDER, FOR SOLUTION INTRAVENOUS
Status: DISCONTINUED | OUTPATIENT
Start: 2018-01-25 | End: 2018-01-29

## 2018-01-25 RX ORDER — SODIUM CHLORIDE, SODIUM GLUCONATE, SODIUM ACETATE, POTASSIUM CHLORIDE, MAGNESIUM CHLORIDE, SODIUM PHOSPHATE, DIBASIC, AND POTASSIUM PHOSPHATE .53; .5; .37; .037; .03; .012; .00082 G/100ML; G/100ML; G/100ML; G/100ML; G/100ML; G/100ML; G/100ML
250 INJECTION, SOLUTION INTRAVENOUS ONCE
Status: COMPLETED | OUTPATIENT
Start: 2018-01-25 | End: 2018-01-25

## 2018-01-25 RX ORDER — MAGNESIUM SULFATE HEPTAHYDRATE 40 MG/ML
2 INJECTION, SOLUTION INTRAVENOUS ONCE
Status: COMPLETED | OUTPATIENT
Start: 2018-01-25 | End: 2018-01-25

## 2018-01-25 RX ORDER — MIDAZOLAM HYDROCHLORIDE 1 MG/ML
1 INJECTION INTRAMUSCULAR; INTRAVENOUS ONCE
Status: COMPLETED | OUTPATIENT
Start: 2018-01-25 | End: 2018-01-25

## 2018-01-25 RX ORDER — FENTANYL CITRATE 50 UG/ML
25 INJECTION, SOLUTION INTRAMUSCULAR; INTRAVENOUS ONCE
Status: COMPLETED | OUTPATIENT
Start: 2018-01-25 | End: 2018-01-25

## 2018-01-25 RX ORDER — POTASSIUM CHLORIDE 20MEQ/15ML
40 LIQUID (ML) ORAL ONCE
Status: COMPLETED | OUTPATIENT
Start: 2018-01-25 | End: 2018-01-25

## 2018-01-25 RX ORDER — FUROSEMIDE 10 MG/ML
20 INJECTION INTRAMUSCULAR; INTRAVENOUS ONCE
Status: COMPLETED | OUTPATIENT
Start: 2018-01-25 | End: 2018-01-25

## 2018-01-25 RX ORDER — POTASSIUM CHLORIDE 14.9 MG/ML
20 INJECTION INTRAVENOUS ONCE
Status: DISCONTINUED | OUTPATIENT
Start: 2018-01-25 | End: 2018-01-25

## 2018-01-25 RX ADMIN — CALCIUM GLUCONATE 2 G: 94 INJECTION, SOLUTION INTRAVENOUS at 09:41

## 2018-01-25 RX ADMIN — POTASSIUM PHOSPHATE, MONOBASIC AND POTASSIUM PHOSPHATE, DIBASIC 30 MMOL: 224; 236 INJECTION, SOLUTION INTRAVENOUS at 03:49

## 2018-01-25 RX ADMIN — POTASSIUM CHLORIDE 20 MEQ: 200 INJECTION, SOLUTION INTRAVENOUS at 01:03

## 2018-01-25 RX ADMIN — LEVALBUTEROL HYDROCHLORIDE 1.25 MG: 1.25 SOLUTION, CONCENTRATE RESPIRATORY (INHALATION) at 19:23

## 2018-01-25 RX ADMIN — MAGNESIUM SULFATE HEPTAHYDRATE 2 G: 40 INJECTION, SOLUTION INTRAVENOUS at 03:00

## 2018-01-25 RX ADMIN — FENTANYL CITRATE 25 MCG: 50 INJECTION, SOLUTION INTRAMUSCULAR; INTRAVENOUS at 01:00

## 2018-01-25 RX ADMIN — ISODIUM CHLORIDE 3 ML: 0.03 SOLUTION RESPIRATORY (INHALATION) at 19:24

## 2018-01-25 RX ADMIN — CALCIUM GLUCONATE 1 G: 94 INJECTION, SOLUTION INTRAVENOUS at 03:52

## 2018-01-25 RX ADMIN — CEFEPIME HYDROCHLORIDE 2000 MG: 2 INJECTION, POWDER, FOR SOLUTION INTRAVENOUS at 21:58

## 2018-01-25 RX ADMIN — METRONIDAZOLE 500 MG: 500 INJECTION, SOLUTION INTRAVENOUS at 10:35

## 2018-01-25 RX ADMIN — POTASSIUM CHLORIDE 40 MEQ: 20 SOLUTION ORAL at 03:42

## 2018-01-25 RX ADMIN — METRONIDAZOLE 500 MG: 500 INJECTION, SOLUTION INTRAVENOUS at 17:42

## 2018-01-25 RX ADMIN — HEPARIN SODIUM 5000 UNITS: 5000 INJECTION, SOLUTION INTRAVENOUS; SUBCUTANEOUS at 13:57

## 2018-01-25 RX ADMIN — VANCOMYCIN HYDROCHLORIDE 1000 MG: 1 INJECTION, SOLUTION INTRAVENOUS at 20:10

## 2018-01-25 RX ADMIN — INSULIN LISPRO 3 UNITS: 100 INJECTION, SOLUTION INTRAVENOUS; SUBCUTANEOUS at 20:12

## 2018-01-25 RX ADMIN — LEVALBUTEROL HYDROCHLORIDE 1.25 MG: 1.25 SOLUTION, CONCENTRATE RESPIRATORY (INHALATION) at 07:20

## 2018-01-25 RX ADMIN — PANTOPRAZOLE SODIUM 40 MG: 40 INJECTION, POWDER, FOR SOLUTION INTRAVENOUS at 09:41

## 2018-01-25 RX ADMIN — HEPARIN SODIUM 5000 UNITS: 5000 INJECTION, SOLUTION INTRAVENOUS; SUBCUTANEOUS at 21:22

## 2018-01-25 RX ADMIN — LEVALBUTEROL HYDROCHLORIDE 1.25 MG: 1.25 SOLUTION, CONCENTRATE RESPIRATORY (INHALATION) at 14:22

## 2018-01-25 RX ADMIN — SODIUM CHLORIDE, SODIUM GLUCONATE, SODIUM ACETATE, POTASSIUM CHLORIDE, MAGNESIUM CHLORIDE, SODIUM PHOSPHATE, DIBASIC, AND POTASSIUM PHOSPHATE 250 ML: .53; .5; .37; .037; .03; .012; .00082 INJECTION, SOLUTION INTRAVENOUS at 01:51

## 2018-01-25 RX ADMIN — HEPARIN SODIUM 5000 UNITS: 5000 INJECTION, SOLUTION INTRAVENOUS; SUBCUTANEOUS at 05:39

## 2018-01-25 RX ADMIN — SODIUM CHLORIDE 100 ML/HR: 0.9 INJECTION, SOLUTION INTRAVENOUS at 20:08

## 2018-01-25 RX ADMIN — METRONIDAZOLE 500 MG: 500 INJECTION, SOLUTION INTRAVENOUS at 02:53

## 2018-01-25 RX ADMIN — ISODIUM CHLORIDE 3 ML: 0.03 SOLUTION RESPIRATORY (INHALATION) at 07:20

## 2018-01-25 RX ADMIN — HEPARIN SODIUM 5000 UNITS: 5000 INJECTION, SOLUTION INTRAVENOUS; SUBCUTANEOUS at 00:33

## 2018-01-25 RX ADMIN — ISODIUM CHLORIDE 3 ML: 0.03 SOLUTION RESPIRATORY (INHALATION) at 14:22

## 2018-01-25 RX ADMIN — SODIUM PHOSPHATE, MONOBASIC, MONOHYDRATE 9 MMOL: 276; 142 INJECTION, SOLUTION INTRAVENOUS at 10:34

## 2018-01-25 RX ADMIN — MIDAZOLAM HYDROCHLORIDE 1 MG: 1 INJECTION INTRAMUSCULAR; INTRAVENOUS at 01:00

## 2018-01-25 RX ADMIN — MIDAZOLAM 1 MG: 1 INJECTION INTRAMUSCULAR; INTRAVENOUS at 01:00

## 2018-01-25 RX ADMIN — FUROSEMIDE 20 MG: 10 INJECTION, SOLUTION INTRAMUSCULAR; INTRAVENOUS at 15:10

## 2018-01-25 RX ADMIN — SODIUM CHLORIDE 100 ML/HR: 0.9 INJECTION, SOLUTION INTRAVENOUS at 09:42

## 2018-01-25 NOTE — BRIEF OP NOTE (RAD/CATH)
[address] [place] percutaneous cholecystostomy tube placement Procedure Note    PATIENT NAME: Clair Cazares  : 1931  MRN: [id number]     Pre-op Diagnosis:   1  Acute cholecystitis    2  RUQ pain    3  Elevated WBC count    4  Shortness of breath      Post-op Diagnosis:   1  Acute cholecystitis    2  RUQ pain    3  Elevated WBC count    4  Shortness of breath        Surgeon:   Buddy Clay MD  Assistants:     No qualified resident was available, Resident is only observing    Estimated Blood Loss:  Minimal     Findings:     8 [place] Perc arnol placed with US guidance bedside  Patient tolerated the procedure well  Copious crank case bile was returned  Specimens:  20 mL sample was sent for culture       Complications:  none    Anesthesia: Conscious sedation and Isabella Rosa MD     Date: 2018  Time: 12:07 AM

## 2018-01-25 NOTE — ASSESSMENT & PLAN NOTE
· Presently stable at this point  Presently, patient's serum creatinine is normal   · Monitor patient's input and output, BMP and electrolytes  · Watch out for contrast nephropathy

## 2018-01-25 NOTE — DISCHARGE INSTRUCTIONS
TUBE CARE INSTRUCTIONS    Care after your procedure:    Resume your normal diet  Small sips of flat soda will help with nausea  1  The properly functioning catheter should be forward flushed once (1x) daily with 10ml of normal saline using clean technique  You will be given a prescription for flushes  To flush the tube, clean both connections with alcohol swab  Twist off the drainage bag/ bulb  tubing and twist the saline syringe into the drainage tube and flush  Remove the syringe and twist the drainage bag / bulb tubing tubing back on     2  The drainage bag/bulb may be emptied as necessary  Keep a record of the amount of fluid you drain from your tube  This should be done with clean technique as well  3  A fresh dressing should be applied daily over the tube insertion site  4  As the tube is secured to the skin with only a suture,try not to pull on your tube  Tub baths are not permitted  Showers are permitted if the patient's skin entry site is prevented from getting wet  Similarly, washcloth "baths" are acceptable  Contact Interventional Radiology at [phone] Callie PATIENTS: Contact Interventional Radiology at [phone]) Eric Brooks PATIENTS: Contact Interventional Radiology at [phone]) if:    1  Leakage or large amounts of liquid around the catheter  2  Fever of 101 degrees lasting several hours without other obvious cause (such as sore throat, flu, etc)  3  Persistent nausea or vomiting  4  Diminished drainage, which may be associated with pressure or pain  Or when the     drainage from your tube is less than 10mls for 48 hours  5  Catheter pulled back or falls out  The following pharmacies carry the flush syringes         [facility]                     [facility]  [address] [facility]                         [address] [facility]  Phone [phone]            Phone [phone]   [place] [address]                                [phone]  [address] [facility] [address]                      [employer] [place]  Phone [phone]            Phone [phone]                      Ortiz Leary                                                                                                          [phone]  [facility]  [facility] [address]    [address]  Phone [phone]

## 2018-01-25 NOTE — ASSESSMENT & PLAN NOTE
· Patient is presently tachypneic and having shortness of breath  Patient complains of cough, productive  · Surgery had already given several doses of IV Lasix as well as nebulizations, however, patient's tachypnea and shortness of breath did not improve  · Differential diagnosis for this shortness of breath may include pneumonia, with infiltrates in the x-ray and possibility of aspiration pneumonia as per discussion with surgery, patient has been coughing with food/fluids and as well as with tachypnea patient may have aspirated  Another possibility is pulmonary embolism as patient is high risk for this; as per discussion with the patient's son, patient is wheelchair-bound and mostly mobile; likely also due to atelectasis as found in the chest x-ray; possible congestion, however, no improvement with IV Lasix  Another consideration is that since patient has acute cholecystitis, is the patient septic and that is why she is tachypneic and having shortness of breath? Will send for blood cultures  Agree with the lactic acid  Rule out acute coronary syndrome  · I spoke at length to the patient's son, Umer Rivas and discussed with him our findings and possible diagnostic workups  I explained to him the benefit and risk of CT scan with intravenous dye with her mother having history of chronic kidney disease stage 3  From my discussion with him, he is okay to proceed with the diagnostic tests of CT angiogram   He is okay to proceed with the plans that I discussed with him  · I will have pulmonologist to be on board  · Will continue with nebulizations  Lasix p r n  Lajean Cassette · Check troponins  · Echocardiogram was already ordered  · Check EKG  Previous EKG done on January 19th revealed normal sinus rhythm  · Oxygen  · As per discussion with the patient's son, he is okay with intubation  · I will broaden patient's antibiotics at this point  · I spoke to the surgery resident about this case

## 2018-01-25 NOTE — PROGRESS NOTES
IR physician reviewed latest imaging  No pleural effusion, chronic elevation of right hemidiaphragm   No need for thoracentesis

## 2018-01-25 NOTE — SEPSIS NOTE
Sepsis Note   Rick Duval 80 y o  female MRN: [id number]  Unit/Bed#: [facility] 622-01 Encounter: [id number]            Initial Sepsis Screening     Row Name 01/24/18 2027                Is the patient's history suggestive of a new or worsening infection? (!)  Yes (Proceed)  -WT        Suspected source of infection --   cholecystitis  -WT        Are two or more of the following signs & symptoms of infection both present and new to the patient? (!)  Yes (Proceed)  -WT        Indicate SIRS criteria Tachypnea > 20 resp per min; Tachycardia > 90 bpm  -WT        If the answer is yes to both questions, suspicion of sepsis is present  --        If severe sepsis is present AND tissue hypoperfusion perists in the hour after fluid resuscitation or lactate > 4, the patient meets criteria for SEPTIC SHOCK  --        Are any of the following organ dysfunction criteria present within 6 hours of suspected infection and SIRS criteria that are NOT considered to be chronic conditions? (!)  Yes  -WT        Organ dysfunction Creatinine > 2 0 mg/dL; Lactate > 2 0 mmol/L  -WT        Date of presentation of severe sepsis 01/24/18  -WT        Time of presentation of severe sepsis 2027  -WT        Tissue hypoperfusion persists in the hour after crystalloid fluid administration, evidenced, by either:  --        Was hypotension present within one hour of the conclusion of crystalloid fluid administration? No  -WT        Date of presentation of septic shock  --        Time of presentation of septic shock  --          User Key  (r) = Recorded By, (t) = Taken By, (c) = Cosigned By    [address] Name Provider Type    WT Tawanna Jimenez MD Resident          Giving 1 L normal saline bolus, starting IVF, repleting potassium, transfer to ICU  Likely needs perc arnol vs cholecystectomy  Will re-assess soon  Need to re-check lactic acid after fluids      Carmen Flores MD PGY-4  8:28 PM  01/24/18

## 2018-01-25 NOTE — ASSESSMENT & PLAN NOTE
· In the meantime, since patient is tachypneic and may not be able to take in food and likely will be NPO, I will hold off patient's long-acting insulin as well as short-acting bolus insulin  · Continue insulin sliding scale for now  · We will adjust accordingly

## 2018-01-25 NOTE — SOCIAL WORK
BOOST form completed  Pt has score of 4  Pt resides at [facility] therefore not eligible for  [facility]NA

## 2018-01-25 NOTE — PROGRESS NOTES
01/25/18 2360 [facility]   Psychosocial   Patient Behaviors/Mood Unable to assess   Ability to Express Needs Other (Comment)   Plan of Care   Comments PT blinked for prayer   Provided quiet presence   Assessment Completed by: Unit visit

## 2018-01-25 NOTE — CONSULTS
Consult- Jairon Handy 7/2/1931, 80 y o  female MRN: [id number]    [facility] Encounter: [id number]    Primary Care Provider: Kate Galeano MD   Date and time admitted to hospital: 1/19/2018  8:25 PM      Consults    Shortness of breath   Assessment & Plan    · Patient is presently tachypneic and having shortness of breath  Patient complains of cough, productive  · Surgery had already given several doses of IV Lasix as well as nebulizations, however, patient's tachypnea and shortness of breath did not improve  · Differential diagnosis for this shortness of breath may include pneumonia, with infiltrates in the x-ray and possibility of aspiration pneumonia as per discussion with surgery, patient has been coughing with food/fluids and as well as with tachypnea patient may have aspirated  Another possibility is pulmonary embolism as patient is high risk for this; as per discussion with the patient's son, patient is wheelchair-bound and mostly mobile; likely also due to atelectasis as found in the chest x-ray; possible congestion, however, no improvement with IV Lasix  Another consideration is that since patient has acute cholecystitis, is the patient septic and that is why she is tachypneic and having shortness of breath? Will send for blood cultures  Agree with the lactic acid  Rule out acute coronary syndrome  · I spoke at length to the patient's son, Anila Luna and discussed with him our findings and possible diagnostic workups  I explained to him the benefit and risk of CT scan with intravenous dye with her mother having history of chronic kidney disease stage 3  From my discussion with him, he is okay to proceed with the diagnostic tests of CT angiogram   He is okay to proceed with the plans that I discussed with him  · I will have pulmonologist to be on board  · Will continue with nebulizations  Lasix p r n  Ermelinda Suh · Check troponins  · Echocardiogram was already ordered  · Check EKG  Previous EKG done on January 19th revealed normal sinus rhythm  · Oxygen  · As per discussion with the patient's son, he is okay with intubation  · I will broaden patient's antibiotics at this point  · I spoke to the surgery resident about this case  Hypothyroid   Assessment & Plan    · Check TSH  Anemia   Assessment & Plan    · Mild at this point  No history of anemia  · Monitor patient's CBC  · Transfuse as needed  Stage 3 chronic kidney disease   Assessment & Plan    · Presently stable at this point  Presently, patient's serum creatinine is normal   · Monitor patient's input and output, BMP and electrolytes  · Watch out for contrast nephropathy  Diabetes mellitus ([facility] [address] )   Assessment & Plan    · In the meantime, since patient is tachypneic and may not be able to take in food and likely will be NPO, I will hold off patient's long-acting insulin as well as short-acting bolus insulin  · Continue insulin sliding scale for now  · We will adjust accordingly  Essential hypertension   Assessment & Plan    · Continue blood pressure medication  · With history of chronic kidney disease, I will increase the threshold not to give the medication (will hold medication if systolic blood pressures less than 130)  Dementia   Assessment & Plan    · Supportive care  * Cholecystitis with cholelithiasis   Assessment & Plan    · Management as per surgery  VTE Prophylaxis: Heparin  / sequential compression device     Recommendations for Discharge:  · Patient is to follow up with primary care physician at least 1 to 2 weeks after discharge from here    Counseling / Coordination of Care Time: 1 hour  Greater than 50% of total time spent on patient counseling and coordination of care  Collaboration of Care: Were Recommendations Directly Discussed with Primary Treatment Team? - Yes I spoke to the surgery resident about this case      History of Present Illness:    Ana Hartmann is a 80 y o  female who is originally admitted to the surgery service on 1/19/2018 due to acute cholecystitis with cholelithiasis  We are consulted for evaluation patient shortness of breath  Presently, patient is short of breath and was noted to be tachypneic  According the patient's nurse, patient has been shortness of breath and tachypneic since earlier today  When asked, patient admits to shortness of breath and patient feels very tired  Patient also complains of productive cough  Patient had mild fever at 99 9° F earlier today and had 100 6° F fever yesterday  According to the patient's nurse, patient was given several doses of IV Lasix as well as nebulizations, however, patient is not relieved with her shortness of breath  Presently, patient denies any pains at all  Patient denies any chest pains or any abdominal pains at this point  According to my discussion with the surgery resident, there is a possibility that patient had aspirated as patient was tachypneic even earlier today and had been coughing with food/fluids earlier today  According to my discussion with patient's son, patient is mostly mobile as patient has chronic bilateral lower extremity weakness and mostly wheelchair-bound  Patient's son also told me that lately, her mother always complains that she is tired but no actual shortness of breath at that time  No other complaints  Patient denies any nausea or vomiting  Review of Systems:    Review of Systems     Ten organ system review was done and they were negative except for the ones I mentioned in my HPI  Past Medical and Surgical History:     Past Medical History:   Diagnosis Date    Diabetes mellitus ([facility] [address] )     Hyperlipidemia     Hypothyroidism     Macular degeneration     Osteoarthritis        History reviewed  No pertinent surgical history  Meds/Allergies:    all medications and allergies reviewed    Allergies:    Allergies   Allergen Reactions    Penicillins     Valsartan        Social History:     Marital Status:     Substance Use History:   History   Alcohol Use No     History   Smoking Status    Never Smoker   Smokeless Tobacco    Never Used     History   Drug Use No       Family History:    History reviewed  No pertinent family history  Physical Exam:     Vitals:   Blood Pressure: 135/65 (01/24/18 1935)  Pulse: 92 (01/24/18 1935)  Temperature: 99 2 °F (37 3 °C) (01/24/18 1935)  Temp Source: Oral (01/24/18 1935)  Respirations: 22 (01/24/18 1935)  Weight - Scale: 59 kg (130 lb) (01/19/18 2033)  SpO2: 96 % (01/24/18 1935)    Physical Exam   Constitutional: She is oriented to person, place, and time  She appears distressed  In respiratory distress  HENT:   Head: Normocephalic and atraumatic  Mouth/Throat: Oropharynx is clear and moist  No oropharyngeal exudate  Eyes: Conjunctivae and EOM are normal  Pupils are equal, round, and reactive to light  Right eye exhibits no discharge  Left eye exhibits no discharge  No scleral icterus  Neck: JVD present  No tracheal deviation present  Cardiovascular: Normal rate and regular rhythm  Exam reveals no gallop and no friction rub  No murmur heard  Pulmonary/Chest: No stridor  She is in respiratory distress  She has wheezes  She has no rales  Tachypneic  Decreased breath sounds at the right basal area, with occasional wheezing on that area  Otherwise no wheezing on other areas of auscultation bilaterally  Abdominal: Soft  Bowel sounds are normal  She exhibits no distension  There is no tenderness  There is no rebound and no guarding  Musculoskeletal: She exhibits no edema, tenderness or deformity  Neurological: She is alert and oriented to person, place, and time  No cranial nerve deficit  Skin: Skin is warm  No rash noted  She is not diaphoretic  No erythema  No pallor  Psychiatric: She has a normal mood and affect   Her behavior is normal  Thought content normal  Vitals reviewed  Additional Data:     Lab Results: I have personally reviewed pertinent reports  Results from last 7 days  Lab Units 01/24/18  1841 01/24/18  0508 01/23/18  0507   WBC Thousand/uL 7 01 8 60 10 74*   HEMOGLOBIN g/dL 10 6* 11 1* 10 7*   HEMATOCRIT % 31 5* 33 2* 31 7*   PLATELETS Thousands/uL 305 286 248   NEUTROS PCT %  --   --  81*   LYMPHS PCT %  --   --  11*   LYMPHO PCT %  --  5*  --    MONOS PCT %  --   --  6   MONO PCT MAN %  --  5  --    EOS PCT %  --   --  2   EOSINO PCT MANUAL %  --  3  --        Results from last 7 days  Lab Units 01/24/18  0508  01/22/18  0526   SODIUM mmol/L 139  < > 143   POTASSIUM mmol/L 3 9  < > 3 1*   CHLORIDE mmol/L 108  < > 114*   CO2 mmol/L 21  < > 19*   BUN mg/dL 12  < > 20   CREATININE mg/dL 0 99  < > 0 89   CALCIUM mg/dL 8 5  < > 7 9*   TOTAL PROTEIN g/dL  --   --  5 9*   BILIRUBIN TOTAL mg/dL  --   --  0 70   ALK PHOS U/L  --   --  150*   ALT U/L  --   --  62   AST U/L  --   --  311*   GLUCOSE RANDOM mg/dL 168*  < > 177*   < > = values in this interval not displayed  Results from last 7 days  Lab Units 01/24/18  1841   INR  1 16       Results from last 7 days  Lab Units 01/19/18  2138   TROPONIN I ng/mL <0 02     Lab Results   Component Value Date/Time    HGBA1C 8 9 (H) 11/22/2017 06:35 AM    HGBA1C 6 4 (H) 07/12/2014 09:39 AM       Imaging: I have personally reviewed pertinent reports  XR chest portable   Final Result by Pieter Villalba MD (01/24 1604)      Basilar consolidation representing atelectasis or pneumonia again identified  Probable associated right pleural effusion  Workstation performed: [id number]         XR chest portable   Final Result by Cande Mckeon DO (01/23 2130)      Worsening right basilar subsegmental atelectasis and apical airspace opacity  Suspected small right basilar effusion           Workstation performed: [id number]         XR chest 2 views   Final Result by Maxime Wolf MD (01/20 9540) There are low volumes with associated crowding of the pulmonary vasculature and atelectasis  Increased right lower lung field opacity may be due to atelectasis and/or consolidation  Workstation performed: [id number]         US gallbladder   Final Result by Fidencio Hussein MD (01/19 1633)      Cholelithiasis with gallbladder wall thickening and pericholecystic fluid compatible with acute cholecystitis  The study was marked in [facility] for immediate notification  Workstation performed: [id number]         CT abdomen pelvis wo contrast   Final Result by Ramón Stephenson MD (01/19 9712)         1  Cholelithiasis and evidence of acute cholecystitis  Ultrasound may be helpful for confirmation  No biliary obstruction  2  Few mildly distended loops of fluid-filled small bowel without evidence of obstruction  Possible mild enteritis  Small hiatal hernia  Workstation performed: [id number]         CT chest w wo contrast    (Results Pending)       EKG, Pathology, and Other Studies Reviewed on Admission:   · EKG:  Done on January 19th revealed normal sinus rhythm  Will do another EKG today  ** Please Note: This note has been constructed using a voice recognition system   **

## 2018-01-25 NOTE — NUTRITION
Consider Clear liquid diet and  Ensure Clear Supplement TID  Rec Low Fat diet when safely advanced to solids

## 2018-01-25 NOTE — RESPIRATORY THERAPY NOTE
RT Protocol Note  Tami Montano 80 y o  female MRN: [id number]  Unit/Bed#: ICU 11 Encounter: [id number]    Assessment    Principal Problem:    Cholecystitis with cholelithiasis  Active Problems:    Dementia    Essential hypertension    Diabetes mellitus ([facility] )    Stage 3 chronic kidney disease    Shortness of breath    Anemia    Hypothyroid      Home Pulmonary Medications:  none    Past Medical History:   Diagnosis Date    Diabetes mellitus ([facility] )     Hyperlipidemia     Hypothyroidism     Macular degeneration     Osteoarthritis      Social History     Social History    Marital status:      Spouse name: N/A    Number of children: N/A    Years of education: N/A     Social History Main Topics    Smoking status: Never Smoker    Smokeless tobacco: Never Used    Alcohol use No    Drug use: No    Sexual activity: Not Asked     Other Topics Concern    None     Social History Narrative    None       Subjective    Subjective Data: currently pt in no resp distress, BS diminished otherwise clear, SpO2 97% on 2L nc  Will continue with neb treatments for now, but change to TID  Objective    Physical Exam:   Assessment Type: Assess only  General Appearance: Drowsy  Respiratory Pattern: Normal  Chest Assessment: Chest expansion symmetrical  Bilateral Breath Sounds: Clear, Diminished  O2 Device: nasal cannula    Vitals:  Blood pressure 100/52, pulse 92, temperature (!) 101 °F (38 3 °C), temperature source Oral, resp  rate (!) 32, weight 59 kg (130 lb), SpO2 96 %  Results from last 7 days  Lab Units 01/24/18  1728   PH ART  7 498*   PCO2 ART mm Hg 23 7*   PO2 ART mm Hg 138 4*   HCO3 ART mmol/L 18 0*   BASE EXC ART mmol/L -3 8   O2 CONTENT ART mL/dL 16 0   O2 HGB, ARTERIAL % 98 3*   ABG SOURCE  Radial, Right   MAYKEL TEST  Yes       Imaging and other studies: I have personally reviewed pertinent reports        O2 Device: nasal cannula     Plan: change to TID nebs from Q6    Respiratory Plan: Mild Distress pathway        Resp Comments: pt reassessed upon arrival to ICU room 11  Pt has no pulmonary PMH, takes no home resp meds, no smoking hx

## 2018-01-25 NOTE — PROGRESS NOTES
Progress Note - General Surgery   Renetta Gold 80 y o  female MRN: [id number]  Unit/Bed#: MICU 02 Encounter: [id number]    Assessment and Plan:  Patient Active Problem List   Diagnosis    Altered mental status    Dementia    Essential hypertension    Diabetes mellitus ([address] )    Stage 3 chronic kidney disease    Falls    Epigastric pain    Cholecystitis with cholelithiasis    Shortness of breath    Anemia    Hypothyroid       Assessment:  44-year-old female presenting with acute cholecystitis status post percutaneous cholecystostomy tube placement on 1/24/18 now with a Right sided pleural effusion causing increased work of breathing      Plan:  NPO - sips for comfort   Gabrielle@[employer]  Follow up on am labs and replete electrolytes  Vanco/ cefepime /flagyl abx   Repeat am CXR - possible IR drainage of R pleural effusion if no change in size   Administer 20mg of IV lasix today (Pt positive 5 4L)   Continue to trend leukocytosis   SQH and venodynes for DVT ppx        Subjective:  Patient had her perc colecystostomy drain placed yesterday at bedside  She is currently on 1 L nasal cannula satting at 99%  She wakes and responds appropriately to questions  Objective:       Vitals   Vitals:    01/25/18 0345 01/25/18 0400 01/25/18 0500 01/25/18 0600   BP: 132/54  115/70 112/57   Pulse: 72 76 72 66   Resp: (!) 31 (!) 32 (!) 30 (!) 28   Temp: 98 4 °F (36 9 °C)      TempSrc: Oral      SpO2: 99% 97% 95% 97%   Weight:         Temp  Min: 96 1 °F (35 6 °C)  Max: 101 °F (38 3 °C)  IBW: -92 5 kg   Body mass index is 34 18 kg/m²      I/O   I/O       01/19 0701 - 01/20 0700 01/20 0701 - 01/21 0700 01/21 0701 - 01/22 0700    I V  (mL/kg)  2056 7 (34 9)     IV Piggyback  50     Total Intake(mL/kg)  2106 7 (35 7)     Urine (mL/kg/hr)  314 (0 2)     Total Output   314      Net   +1792 7                   Intake/Output Summary (Last 24 hours) at 01/25/18 0619  Last data filed at 01/25/18 0600   Gross per 24 hour   Intake 2776 38 ml   Output             2488 ml   Net           288 38 ml       Invasive  Devices  Invasive Devices     Peripheral Intravenous Line            Peripheral IV 01/24/18 Left Forearm less than 1 day    Peripheral IV 01/24/18 Right Antecubital less than 1 day    Peripheral IV 01/24/18 Right Forearm less than 1 day          Drain            Closed/Suction Drain Right Abdomen 8 Fr  less than 1 day    Urethral Catheter Latex 16 Fr  less than 1 day                Active medications  Scheduled Meds:    amLODIPine 2 5 mg Oral Daily   aspirin 81 mg Oral Daily   cefepime 2,000 mg Intravenous Q24H   escitalopram 20 mg Oral Daily   heparin (porcine) 5,000 Units Subcutaneous Q8H [address]   levalbuterol 1 25 mg Nebulization TID   levothyroxine 75 mcg Oral Early Morning   metroNIDAZOLE 500 mg Intravenous Q8H   morphine injection 2 mg Intravenous Once   pantoprazole 40 mg Intravenous Q24H [address]   potassium phosphate 30 mmol Intravenous Once   pravastatin 40 mg Oral Daily With Dinner   sodium chloride 3 mL Nebulization TID   vancomycin 15 mg/kg Intravenous Q24H     Continuous Infusions:    sodium chloride 100 mL/hr Last Rate: 100 mL/hr (01/24/18 2245)     PRN Meds:     acetaminophen 650 mg Q6H PRN   albuterol 2 5 mg Q4H PRN   HYDROmorphone 0 5 mg Q4H PRN   labetalol 10 mg Q4H PRN       Physical Exam: /57   Pulse 66   Temp 98 4 °F (36 9 °C) (Oral)   Resp (!) 28   Wt 64 3 kg (141 lb 12 1 oz)   SpO2 97%   BMI 34 18 kg/m²     Physical Exam:  General Appearance: Alert, cooperative, no distress, appears stated age  Lungs: Clear to auscultation bilaterally, respirations unlablored   Heart: Regular rate and rhythm, S1 and S2 normal, no murmur, rub or gallop  Abdomen: Soft, nontender in RUQ, non distended, bowel sounds active in all four quadrants,   No masses or organomegaly - Right cholecystostomy tube in place w/ bilious output     Laboratory and Diagnostics:    Results from last 7 days  Lab Units 01/25/18  0449 01/24/18  6627 01/24/18 1841 01/24/18 0508 01/23/18  0507 01/22/18  0526 01/21/18  0518   WBC Thousand/uL 7 51 6 78 7 01 8 60 10 74* 12 93* 14 73*   HEMOGLOBIN g/dL 9 6* 10 0* 10 6* 11 1* 10 7* 9 9* 11 4*   HEMATOCRIT % 29 0* 29 7* 31 5* 33 2* 31 7* 29 8* 34 0*   PLATELETS Thousands/uL 296 293 305 286 248 234 245   NEUTROS PCT %  --   --   --   --  81* 79* 82*   MONOS PCT %  --   --   --   --  6 8 8   MONO PCT MAN %  --  3* 4 5  --   --   --        Results from last 7 days  Lab Units 01/24/18 2256 01/24/18 1841 01/24/18  0508  01/22/18  0526  01/20/18  0457   SODIUM mmol/L 138 135* 139  < > 143  < > 139   POTASSIUM mmol/L 2 6* 2 9* 3 9  < > 3 1*  < > 3 9   CHLORIDE mmol/L 105 101 108  < > 114*  < > 107   CO2 mmol/L 24 24 21  < > 19*  < > 24   BUN mg/dL 10 12 12  < > 20  < > 32*   CREATININE mg/dL 1 20 1 41* 0 99  < > 0 89  < > 1 27   CALCIUM mg/dL 7 9* 8 3 8 5  < > 7 9*  < > 9 2   TOTAL PROTEIN g/dL  --  6 7  --   --  5 9*  --  7 5   BILIRUBIN TOTAL mg/dL  --  0 29  --   --  0 70  --  0 81   ALK PHOS U/L  --  194*  --   --  150*  --  76   ALT U/L  --  24  --   --  62  --  28   AST U/L  --  49*  --   --  311*  --  28   GLUCOSE RANDOM mg/dL 273* 359* 168*  < > 177*  < > 229*   < > = values in this interval not displayed      Results from last 7 days  Lab Units 01/24/18 2256 01/24/18 1841 01/23/18  0507   MAGNESIUM mg/dL 1 8 2 0 1 8     Lab Results   Component Value Date    PHOS 0 9 (L) 01/24/2018    PHOS 1 0 (L) 01/24/2018        Results from last 7 days  Lab Units 01/24/18  1841   INR  1 16   PTT seconds 41*     No results found for: TROPONINT  ABG:  Lab Results   Component Value Date    PHART 7 426 01/25/2018    GPW8VNA 32 8 (L) 01/25/2018    PO2ART 88 5 01/25/2018    SOH7FFD 21 1 (L) 01/25/2018    BEART -2 7 01/25/2018    SOURCE Radial, Left 01/25/2018       Blood Culture: No results found for: BLOODCX  Urine Culture:   Lab Results   Component Value Date    URINECX 6942-6222 cfu/ml - One colony Escherichia coli (A) 01/21/2018    URINECX 60,000-69,000 cfu/ml Lactobacillus species (A) 01/21/2018    URINECX (A) 11/21/2017     >100,000 cfu/ml Alpha Hemolytic Streptococcus NOT Enterococcus    URINECX 3200-1932 cfu/ml Gram Negative Parvez (A) 11/21/2017    URINECX >100,000 cfu/ml Escherichia coli (A) 11/15/2017    URINECX >100,000 cfu/ml Aerococcus urinae (A) 11/15/2017     Sputum Culture: No components found for: SPUTUMCX    Imaging: I have personally reviewed pertinent reports     and I have personally reviewed pertinent films in PACS    VTE Pharmacologic Prophylaxis: Heparin  VTE Mechanical Prophylaxis: sequential compression device

## 2018-01-25 NOTE — SPEECH THERAPY NOTE
Speech Language/Pathology    Speech/Language Pathology Progress Note    Patient Name: Mariposa Brown  [id number] Date: 1/25/2018           Subjective:  Pt w/ eyes closed but will awaken  Breathing less labored, sats 94-95% to begin  Current Diet: NPO, slp ok'd pleasure feeds ht for comfort w/ nsg 2* poor resp status yesterday  Objective:  Pt seen for ongoing dx dysphagia tx  Remains npo  Awakens w/ verbal cues, pt trialed w/ puree, soft crackers, ht by cup/tsp, nt by cup/tsp/straw, thin by straw (pt w/ difficulty forming seal on the cup but improved w/ straw)  Slow oral processing, reduced manipulation but able to transfer material   Pt w/ mildly increased SOB but no change in O2  Pt w/ clear voicing w/ thin by straw  Education provided to family  Spoke w/ nsg  Assessment:  Pt remains lethargic but breathing has improved & she is able to tolerate regulated soft solids, puree, thin by straw by single sips     Plan/Recommendations:  Begin level 2 dysphagia w/ thin  Speech to f/u

## 2018-01-25 NOTE — PROGRESS NOTES
Progress Note - General Surgery  Renetta Crowder 80 y o  female MRN: [id number]  Unit/Bed#: [facility] 622-01 Encounter: [id number]    Assessment:  80y o -year-old female with severe sepsis likely from acute cholecystitis     Plan:  1  Severe sepsis   - has evidenced by tachycardia over 90, tachypnea over 20, and acute kidney injury 1 41 creatinine from normal baseline earlier in the day and with lactic acidosis up to 3 7   - blood cultures x2   - urinalysis with reflex micro   - start IV fluid resuscitation after crystalloid bolus   - internal medicine consult at that same time as severe sepsis diagnosis made, agree with their plan to broaden antibiotics to cefepime, vancomycin, Flagyl   - discussed with son need to address septic source, believed to be acute cholecystitis     2  Acute cholecystitis   - likely driving patient's respiratory distress abdominal pain as well as severe sepsis   - plan for IR percutaneous cholecystostomy tube placement given patient dementia and family concern for long recovery from operation since and he attempted laparoscopic cholecystectomy has high chance of converting to an open operation     3  Hyperglycemia   - patient with history of diabetes mellitus   - sudden increase in glucose to 359 likely due to severe sepsis   - start insulin infusion    4  Hypokalemia   - likely driven by multiple dosages of Lasix   - will require significant IV fluid resuscitation as well as electrolyte replacement to resuscitate    5  Acute kidney injury   - creatinine went up to 1 41   - probably driven by combination of severe sepsis as well as Lasix   - will recheck creatinine serially    6  Hypophosphatemia   - significant to 1   - replete    7  Hypocalcemia   - replete    8  Lactic acidosis   - lactic acid 3 7, will recheck after fluid bolus    9    Shortness of breath   - likely due to right pleural effusion as well as possible aspiration pneumonitis   - keep NPO this time   - when creatinine improves, may benefit from CTA with PE protocol if there is ongoing concern for pulmonary embolism     10  Aspiration pneumonitis   - chest x-ray appears to have signs of aspiration on it   - will keep patient NPO    11  DVT Prophylaxis   - SQH   - SCDs    12  Disposition   - given severe sepsis with possible need for intubation during resuscitation, will place patient intensive care unit pending IR consult for percutaneous cholecystostomy tube    Nick Henriquez MD PGY-4  8:29 PM  01/24/18      Subjective:  Patient complaint abdominal pain  Chest x-ray this morning right pleural effusion as well as possible right-sided pneumonia versus aspiration pneumonitis  Patient had received Lasix due to concern for fluid overload  Objective:  Patient Vitals for the past 24 hrs:   BP Temp Temp src Pulse Resp SpO2   01/24/18 1935 135/65 99 2 °F (37 3 °C) Oral 92 22 96 %   01/24/18 1928 - - - - - 95 %   01/24/18 1500 132/60 99 9 °F (37 7 °C) Oral 98 (!) 36 95 %   01/24/18 1413 - - - - - 95 %   01/24/18 1125 - 99 4 °F (37 4 °C) Axillary - - -   01/24/18 1100 119/74 (!) 96 1 °F (35 6 °C) Axillary 100 (!) 24 94 %   01/24/18 0936 - - - - (!) 30 96 %   01/24/18 0823 146/61 - - 102 - -   01/24/18 0803 (!) 180/74 - - (!) 107 (!) 30 96 %   01/24/18 0701 140/60 98 4 °F (36 9 °C) Axillary 98 - 98 %   01/24/18 0700 - - - - - 95 %   01/24/18 0125 - - - - - 96 %   01/24/18 0020 103/53 98 °F (36 7 °C) Axillary 76 21 99 %   01/23/18 2142 - - - - - 92 %          Diet Orders            Start     Ordered    01/24/18 1314  Diet NPO  Diet effective now     Question Answer Comment   Diet Type NPO    RD to adjust diet per protocol?  Yes        01/24/18 1313        Intake/Output Summary (Last 24 hours) at 01/24/18 2029  Last data filed at 01/24/18 1935   Gross per 24 hour   Intake              465 ml   Output             2283 ml   Net            -1818 ml   UOP 1 6    Physical Exam:  General:  Uncomfortable and distressed  Cardiovascular: Regular rate and rhythm  Respiratory:  Breath sounds bilateral sound like continual aspiration  Abdomen:  Soft tender right upper quadrant nondistended  Extremities:  No edema    Medications:    [START ON 1/25/2018] amLODIPine 2 5 mg Oral Daily   aspirin 81 mg Oral Daily   cefepime 2,000 mg Intravenous Q24H   escitalopram 20 mg Oral Daily   heparin (porcine) 5,000 Units Subcutaneous Q8H [facility]   insulin lispro 1-5 Units Subcutaneous TID AC   levalbuterol 1 25 mg Nebulization Q6H   levothyroxine 75 mcg Oral Early Morning   metroNIDAZOLE 500 mg Intravenous Q8H   morphine injection 2 mg Intravenous Once   pantoprazole 20 mg Oral Early Morning   potassium chloride 40 mEq Intravenous Once   pravastatin 40 mg Oral Daily With Dinner   sodium chloride 1,000 mL Intravenous Once   sodium chloride 3 mL Nebulization Q6H   vancomycin 15 mg/kg Intravenous Q24H     insulin regular (HumuLIN R,NovoLIN R) infusion 0 3-21 Units/hr   sodium chloride 100 mL/hr     acetaminophen 650 mg Q6H PRN   albuterol 2 5 mg Q4H PRN   HYDROmorphone 0 5 mg Q4H PRN   labetalol 10 mg Q4H PRN     Laboratory results:   CBC:   Lab Results   Component Value Date    WBC 6 78 01/24/2018    HGB 10 0 (L) 01/24/2018    HCT 29 7 (L) 01/24/2018    MCV 93 01/24/2018     01/24/2018    MCH 31 4 01/24/2018    MCHC 33 7 01/24/2018    RDW 16 2 (H) 01/24/2018    MPV 10 7 01/24/2018    NRBC 0 01/24/2018   , CMP:   Lab Results   Component Value Date     01/24/2018    K 2 6 (LL) 01/24/2018     01/24/2018    CO2 24 01/24/2018    ANIONGAP 9 01/24/2018    BUN 10 01/24/2018    CREATININE 1 20 01/24/2018    GLUCOSE 273 (H) 01/24/2018    CALCIUM 7 9 (L) 01/24/2018    AST 49 (H) 01/24/2018    ALT 24 01/24/2018    ALKPHOS 194 (H) 01/24/2018    PROT 6 7 01/24/2018    BILITOT 0 29 01/24/2018    EGFR 41 01/24/2018   , Coagulation:   Lab Results   Component Value Date    INR 1 16 01/24/2018   , Urinalysis:   Lab Results   Component Value Date    COLORU Yellow 01/24/2018 CLARITYU Clear 01/24/2018    SPECGRAV 1 010 01/24/2018    PHUR 6 0 01/24/2018    LEUKOCYTESUR Small (A) 01/24/2018    NITRITE Negative 01/24/2018    PROTEINUA 30 (1+) (A) 01/24/2018    GLUCOSEU 250 (1/4%) (A) 01/24/2018    KETONESU Negative 01/24/2018    BILIRUBINUR Negative 01/24/2018    BLOODU Negative 01/24/2018   , Amylase: No results found for: AMYLASE, Lipase: No results found for: LIPASE    VTE Pharmacologic Prophylaxis: Heparin  VTE Mechanical Prophylaxis: sequential compression device

## 2018-01-25 NOTE — PROGRESS NOTES
History and Physical - Critical Care  Jovanni Palmer 80 y o  female MRN: [id number]  [facility]: [id number]     Reason for Admission / Chief Complaint: pleural effusion, tachypnea      History of Present Illness:  Jovanni Palmer is a 80 y o  female who presents w ICU for hemodynamic / respiratory monitoring following possible aspiration event  She has been admitted for 5 days, initially presented w RUQ pain, noted to have acute cholecystitis but pt not interested in surgery  Pain resolved, managed medically  She underwent percutaneous cholecystostomy tube on 1/24 and developed R sided pleural effusion and increased wob prompting ICU transfer  History obtained from chart  Past Medical History:  Past Medical History:   Diagnosis Date    Diabetes mellitus ([facility] [address] )     Hyperlipidemia     Hypothyroidism     Macular degeneration     Osteoarthritis         Past Surgical History:  History reviewed  No pertinent surgical history  Past Family History:  History reviewed  No pertinent family history  Social History:  History   Smoking Status    Never Smoker   Smokeless Tobacco    Never Used     History   Alcohol Use No     History   Drug Use No     Marital Status:    Exercise History: unknown     Medications:  Current Facility-Administered Medications   Medication Dose Route Frequency    acetaminophen (TYLENOL) tablet 650 mg  650 mg Oral Q6H PRN    albuterol inhalation solution 2 5 mg  2 5 mg Nebulization Q4H PRN    amLODIPine (NORVASC) tablet 2 5 mg  2 5 mg Oral Daily    aspirin chewable tablet 81 mg  81 mg Oral Daily    calcium gluconate 2 g in sodium chloride 0 9 % 100 mL IVPB  2 g Intravenous Once    cefepime (MAXIPIME) 2,000 mg in dextrose 5 % 50 mL IVPB  2,000 mg Intravenous Q24H    escitalopram (LEXAPRO) tablet 20 mg  20 mg Oral Daily    heparin (porcine) subcutaneous injection 5,000 Units  5,000 Units Subcutaneous Q8H [address]    HYDROmorphone (DILAUDID) injection 0 5 mg  0 5 mg Intravenous Q4H PRN    labetalol (NORMODYNE) injection 10 mg  10 mg Intravenous Q4H PRN    levalbuterol (XOPENEX) inhalation solution 1 25 mg  1 25 mg Nebulization TID    levothyroxine tablet 75 mcg  75 mcg Oral Early Morning    metroNIDAZOLE (FLAGYL) IVPB (premix) 500 mg  500 mg Intravenous Q8H    morphine injection 2 mg  2 mg Intravenous Once    pantoprazole (PROTONIX) injection 40 mg  40 mg Intravenous Q24H ROEL    potassium phosphate 30 mmol in sodium chloride 0 9 % 250 mL infusion  30 mmol Intravenous Once    pravastatin (PRAVACHOL) tablet 40 mg  40 mg Oral Daily With Dinner    sodium chloride 0 9 % infusion  100 mL/hr Intravenous Continuous    sodium chloride 0 9 % inhalation solution 3 mL  3 mL Nebulization TID    vancomycin (VANCOCIN) IVPB (premix) 1,000 mg  15 mg/kg Intravenous Q24H     Home medications:  Prior to Admission medications    Medication Sig Start Date End Date Taking?  Authorizing Provider   acetaminophen (TYLENOL) 325 mg tablet Take 3 tablets by mouth every 8 (eight) hours 11/27/17  Yes Darrel Mcallister, DO   amLODIPine (NORVASC) 2 5 mg tablet Take 1 tablet by mouth daily 11/28/17  Yes Darrel Mcallister, DO   aspirin 81 mg chewable tablet Chew 1 tablet daily 11/28/17  Yes Darrel Mcallister, DO   cholecalciferol (VITAMIN D3) 1,000 units tablet Take 1,000 Units by mouth daily   Yes Historical Provider, MD   cyanocobalamin (VITAMIN B-12) 1,000 mcg tablet Take 1,000 mcg by mouth daily   Yes Historical Provider, MD   escitalopram (LEXAPRO) 20 mg tablet Take 20 mg by mouth daily   Yes Historical Provider, MD   insulin glargine (LANTUS) 100 units/mL subcutaneous injection Inject 7 Units under the skin daily at bedtime 11/27/17  Yes Darrel Mcallister, DO   insulin lispro (HumaLOG) 100 units/mL injection Inject 3 Units under the skin 3 (three) times a day with meals 11/27/17  Yes Darrel Mcallister, DO   levothyroxine 50 mcg tablet Take 75 mcg by mouth daily   Yes Historical Provider, MD   lidocaine (LIDODERM) 5 % Place 1 patch on the skin daily Remove & Discard patch within 12 hours or as directed by MD 17  Yes Ronal Herrera DO   Linagliptin (TRADJENTA) 5 MG TABS Take 5 mg by mouth daily   Yes Historical Provider, MD   nystatin (MYCOSTATIN) powder Apply topically 3 (three) times a day 17  Yes Ronal Herrera DO   omeprazole (PriLOSEC) 20 mg delayed release capsule Take 20 mg by mouth daily   Yes Historical Provider, MD   rosuvastatin (CRESTOR) 5 mg tablet Take 5 mg by mouth daily   Yes Historical Provider, MD     Allergies: Allergies   Allergen Reactions    Penicillins     Valsartan         ROS:   Review of Systems   Unable to perform ROS: Dementia   All other systems reviewed and are negative  Vitals:  Vitals:    18 0400 18 0500 18 0600 18 0720   BP:  115/70 112/57    Pulse: 76 72 66    Resp: (!) 32 (!) 30 (!) 28    Temp:       TempSrc:       SpO2: 97% 95% 97% 97%   Weight:         Temperature:   Temp (24hrs), Av °F (37 2 °C), Min:96 1 °F (35 6 °C), Max:101 °F (38 3 °C)    Current: Temperature: 98 4 °F (36 9 °C)     Weights:   IBW: -92 5 kg  Body mass index is 34 18 kg/m²  Hemodynamic Monitoring:  N/A     Non-Invasive/Invasive Ventilation Settings:  Respiratory    Lab Data (Last 4 hours)       0449            pH, Arterial       7 426           pCO2, Arterial       (!)32 8           pO2, Arterial       88 5           HCO3, Arterial       (!)21 1           Base Excess, Arterial       -2 7                O2/Vent Data     None              Lab Results   Component Value Date    PHART 7 426 2018    QHM2GIP 32 8 (L) 2018    PO2ART 88 5 2018    HOO7BTX 21 1 (L) 2018    BEART -2 7 2018    SOURCE Radial, Left 2018     SpO2: SpO2: 97 %     Physical Exam:  Physical Exam   Constitutional: She is oriented to person, place, and time  She appears well-developed and well-nourished  No distress     HENT:   Head: Normocephalic and atraumatic  Neck: Normal range of motion  Neck supple  Pulmonary/Chest: Effort normal and breath sounds normal    Abdominal: Soft  There is tenderness (reports pain of RUQ, per arnol drain in place)  Musculoskeletal: Normal range of motion  spont equal movement all extremities    Neurological: She is alert and oriented to person, place, and time  Dementia GCS 14 - confused  Answers all questions "yes" or "i dont know"   Skin: She is not diaphoretic  Labs:    Results from last 7 days  Lab Units 01/25/18  0449 01/24/18  2256 01/24/18  1841  01/23/18  0507 01/22/18  0526 01/21/18  0518   WBC Thousand/uL 7 51 6 78 7 01  < > 10 74* 12 93* 14 73*   HEMOGLOBIN g/dL 9 6* 10 0* 10 6*  < > 10 7* 9 9* 11 4*   HEMATOCRIT % 29 0* 29 7* 31 5*  < > 31 7* 29 8* 34 0*   PLATELETS Thousands/uL 296 293 305  < > 248 234 245   NEUTROS PCT %  --   --   --   --  81* 79* 82*   MONOS PCT %  --   --   --   --  6 8 8   MONO PCT MAN % 4 3* 4  < >  --   --   --    < > = values in this interval not displayed  Results from last 7 days  Lab Units 01/25/18  0628 01/24/18  2256 01/24/18  1841  01/22/18  0526   SODIUM mmol/L 138 138 135*  < > 143   POTASSIUM mmol/L 4 8 2 6* 2 9*  < > 3 1*   CHLORIDE mmol/L 109* 105 101  < > 114*   CO2 mmol/L 22 24 24  < > 19*   BUN mg/dL 9 10 12  < > 20   CREATININE mg/dL 0 98 1 20 1 41*  < > 0 89   CALCIUM mg/dL 8 3 7 9* 8 3  < > 7 9*   TOTAL PROTEIN g/dL 6 0*  --  6 7  --  5 9*   BILIRUBIN TOTAL mg/dL 0 34  --  0 29  --  0 70   ALK PHOS U/L 161*  --  194*  --  150*   ALT U/L 18  --  24  --  62   AST U/L 45  --  49*  --  311*   GLUCOSE RANDOM mg/dL 158* 273* 359*  < > 177*   < > = values in this interval not displayed      Results from last 7 days  Lab Units 01/25/18  0628 01/24/18  2256 01/24/18  1841   MAGNESIUM mg/dL 2 7* 1 8 2 0       Results from last 7 days  Lab Units 01/25/18  0628 01/24/18  2256 01/24/18  1841   PHOSPHORUS mg/dL 2 2* 0 9* 1 0*        Results from last 7 days  Lab Units 01/24/18  1841   INR  1 16   PTT seconds 41*       Results from last 7 days  Lab Units 01/25/18  0451   LACTIC ACID mmol/L 1 2       0  Lab Value Date/Time   TROPONINI 0 04 01/24/2018 2256   TROPONINI 0 03 01/24/2018 2027   TROPONINI <0 02 01/19/2018 2138   TROPONINI <0 02 11/22/2017 1214        Imaging:   XR chest portable   Final Result      Basilar consolidation representing atelectasis or pneumonia again identified  Probable associated right pleural effusion  Workstation performed: [id number]         XR chest portable   Final Result      Worsening right basilar subsegmental atelectasis and apical airspace opacity  Suspected small right basilar effusion  Workstation performed: [id number]         XR chest 2 views   Final Result      There are low volumes with associated crowding of the pulmonary vasculature and atelectasis  Increased right lower lung field opacity may be due to atelectasis and/or consolidation  Workstation performed: [id number]         US gallbladder   Final Result      Cholelithiasis with gallbladder wall thickening and pericholecystic fluid compatible with acute cholecystitis  The study was marked in [facility] for immediate notification  Workstation performed: [id number]         CT abdomen pelvis wo contrast   Final Result         1  Cholelithiasis and evidence of acute cholecystitis  Ultrasound may be helpful for confirmation  No biliary obstruction  2  Few mildly distended loops of fluid-filled small bowel without evidence of obstruction  Possible mild enteritis  Small hiatal hernia  Workstation performed: [id number]         IR tube placement arnol    (Results Pending)   XR chest portable    (Results Pending)   XR chest portable    (Results Pending)      I have personally reviewed pertinent reports  and I have personally reviewed pertinent films in PACS  EKG: as per tele NSR no ectopy This was personally reviewed by myself     Micro:  Lab Results   Component Value Date    URINECX 7455-1597 cfu/ml - One colony Escherichia coli (A) 01/21/2018    URINECX 60,000-69,000 cfu/ml Lactobacillus species (A) 01/21/2018    URINECX (A) 11/21/2017     >100,000 cfu/ml Alpha Hemolytic Streptococcus NOT Enterococcus    URINECX 8615-8417 cfu/ml Gram Negative Parvez (A) 11/21/2017       Assessment:     As above in HPI    Principal Problem:    Cholecystitis with cholelithiasis  Active Problems:    Dementia    Essential hypertension    Diabetes mellitus (HCC)    Stage 3 chronic kidney disease    Shortness of breath    Anemia    Hypothyroid          Plan:                  Neuro:     Dementia - lexapro   Analgesia - tylenol 650 mg PO Q6 (0/24), dilaudid 0 5mg Q4 PRN (0/24)                 CV:    HTN / HLD - asa, norvasc QD, labetalol 10mg Q4 PRN (0/24), Pravachol QD   ECHO pending    Lactic acidosis ON - endpoints cleared                  Lung:    IS to prevent atelectasis    PRN nebs, pulm toilet    No O2 requirements at this time    Aspiration pna - precautions, abx                  GI:    Cholecystitis s/p per arnol tube - monitor drain, surgery following    Address need for PEG given aspiration event    ppx - protonix    Bowel regimen - 1 BM / 24                  FEN:    Electrolytes - Monitor/trend - replete based on deficiencies    Nutrition - NPO, sips for comfort     Fluid - mIVF  / hr                  :    Received dose of lasix w minimal response - additional diuresis today     Goal volume negative 500cc to 1 L                  ID:    Aspiration pneumonia - Vanc, cefepime, flagyl, mrsa swab                 Heme:    ppx - SQH, SCDs                  Endo:    Hypothyroid - levothyroxine    DM - poct glucose                  Msk/Skin:    Repetitive repositioning and off-loading to prevent skin break down and ulcerative formation                 Disposition:    Transfer to SD level of care        VTE Pharmacologic Prophylaxis: Sequential compression device (Venodyne)  and Heparin  VTE Mechanical Prophylaxis: sequential compression device     Invasive lines and devices: Invasive Devices     Peripheral Intravenous Line            Peripheral IV 01/24/18 Left Forearm less than 1 day    Peripheral IV 01/24/18 Right Antecubital less than 1 day    Peripheral IV 01/24/18 Right Forearm less than 1 day          Drain            Closed/Suction Drain Right Abdomen 8 Fr  less than 1 day    Urethral Catheter Latex 16 Fr  less than 1 day                 Code Status: Level 2 - DNAR: but accepts endotracheal intubation  POA:    POLST:       Given critical illness, patient length of stay will require greater than two midnights  Counseling / Coordination of Care  Total Critical Care time spent 20 minutes excluding procedures, teaching and family updates  Portions of the record may have been created with voice recognition software  Occasional wrong word or "sound a like" substitutions may have occurred due to the inherent limitations of voice recognition software  Read the chart carefully and recognize, using context, where substitutions have occurred          Monserrat Hernández PA-C

## 2018-01-25 NOTE — ASSESSMENT & PLAN NOTE
· Continue blood pressure medication  · With history of chronic kidney disease, I will increase the threshold not to give the medication (will hold medication if systolic blood pressures less than 130)

## 2018-01-25 NOTE — PROGRESS NOTES
I reviewed the laboratories that were done  Patient has elevated lactic acid level  Patient's creatinine has bump up to 1 4  Patient has hypokalemia  Patient's tachypnea is probably due to severe sepsis secondary to acute cholecystitis  I spoke to surgery service including their attending about this case  We will hold off patient's CT scan of the chest with IV dye at this point due to the elevated serum creatinine at the same time this is likely severe sepsis  They will provide patient with IV fluid hydration  Once serum creatinine/kidney function improves and if patient still tachypneic despite management patient's sepsis, for possible CT scan of the chest to rule out PE  With hypokalemia, IV potassium was already ordered  Continue with present intravenous antibiotics  As per my discussion with the surgery service, patient will be moved to the ICU  I reviewed patient's EKG that was just done right now and it did not reveal any specific ischemic changes  I again spoke to patient's son, Gertrude Zamora, and gave updates  I told him that patient's prognosis is very guarded at this point  He understood this  I also spoke to our physician's assistant who will be working overnight, about this case

## 2018-01-26 ENCOUNTER — APPOINTMENT (INPATIENT)
Dept: RADIOLOGY | Facility: HOSPITAL | Age: 83
DRG: 445 | End: 2018-01-26
Payer: MEDICARE

## 2018-01-26 LAB
ANION GAP SERPL CALCULATED.3IONS-SCNC: 7 MMOL/L (ref 4–13)
ANISOCYTOSIS BLD QL SMEAR: PRESENT
BASOPHILS # BLD MANUAL: 0.07 THOUSAND/UL (ref 0–0.1)
BASOPHILS NFR MAR MANUAL: 1 % (ref 0–1)
BUN SERPL-MCNC: 8 MG/DL (ref 5–25)
CALCIUM SERPL-MCNC: 8.1 MG/DL (ref 8.3–10.1)
CHLORIDE SERPL-SCNC: 104 MMOL/L (ref 100–108)
CO2 SERPL-SCNC: 26 MMOL/L (ref 21–32)
CREAT SERPL-MCNC: 0.97 MG/DL (ref 0.6–1.3)
EOSINOPHIL # BLD MANUAL: 0.27 THOUSAND/UL (ref 0–0.4)
EOSINOPHIL NFR BLD MANUAL: 4 % (ref 0–6)
ERYTHROCYTE [DISTWIDTH] IN BLOOD BY AUTOMATED COUNT: 16.7 % (ref 11.6–15.1)
GFR SERPL CREATININE-BSD FRML MDRD: 53 ML/MIN/1.73SQ M
GLUCOSE SERPL-MCNC: 140 MG/DL (ref 65–140)
GLUCOSE SERPL-MCNC: 161 MG/DL (ref 65–140)
GLUCOSE SERPL-MCNC: 163 MG/DL (ref 65–140)
GLUCOSE SERPL-MCNC: 178 MG/DL (ref 65–140)
GLUCOSE SERPL-MCNC: 320 MG/DL (ref 65–140)
HCT VFR BLD AUTO: 32.4 % (ref 34.8–46.1)
HGB BLD-MCNC: 10.9 G/DL (ref 11.5–15.4)
LYMPHOCYTES # BLD AUTO: 0.87 THOUSAND/UL (ref 0.6–4.47)
LYMPHOCYTES # BLD AUTO: 13 % (ref 14–44)
MCH RBC QN AUTO: 31.6 PG (ref 26.8–34.3)
MCHC RBC AUTO-ENTMCNC: 33.6 G/DL (ref 31.4–37.4)
MCV RBC AUTO: 94 FL (ref 82–98)
METAMYELOCYTES NFR BLD MANUAL: 1 % (ref 0–1)
MONOCYTES # BLD AUTO: 0.54 THOUSAND/UL (ref 0–1.22)
MONOCYTES NFR BLD: 8 % (ref 4–12)
MRSA NOSE QL CULT: NORMAL
MYELOCYTES NFR BLD MANUAL: 4 % (ref 0–1)
NEUTROPHILS # BLD MANUAL: 4.63 THOUSAND/UL (ref 1.85–7.62)
NEUTS SEG NFR BLD AUTO: 69 % (ref 43–75)
NRBC BLD AUTO-RTO: 1 /100 WBCS
PLATELET # BLD AUTO: 336 THOUSANDS/UL (ref 149–390)
PLATELET BLD QL SMEAR: ADEQUATE
PMV BLD AUTO: 11 FL (ref 8.9–12.7)
POTASSIUM SERPL-SCNC: 3.7 MMOL/L (ref 3.5–5.3)
RBC # BLD AUTO: 3.45 MILLION/UL (ref 3.81–5.12)
RBC MORPH BLD: PRESENT
SODIUM SERPL-SCNC: 137 MMOL/L (ref 136–145)
VANCOMYCIN TROUGH SERPL-MCNC: 14.2 UG/ML (ref 10–20)
WBC # BLD AUTO: 6.71 THOUSAND/UL (ref 4.31–10.16)

## 2018-01-26 PROCEDURE — 85027 COMPLETE CBC AUTOMATED: CPT | Performed by: PHYSICIAN ASSISTANT

## 2018-01-26 PROCEDURE — 99223 1ST HOSP IP/OBS HIGH 75: CPT | Performed by: INTERNAL MEDICINE

## 2018-01-26 PROCEDURE — 71045 X-RAY EXAM CHEST 1 VIEW: CPT

## 2018-01-26 PROCEDURE — 94640 AIRWAY INHALATION TREATMENT: CPT

## 2018-01-26 PROCEDURE — 94669 MECHANICAL CHEST WALL OSCILL: CPT

## 2018-01-26 PROCEDURE — 85007 BL SMEAR W/DIFF WBC COUNT: CPT | Performed by: PHYSICIAN ASSISTANT

## 2018-01-26 PROCEDURE — 82948 REAGENT STRIP/BLOOD GLUCOSE: CPT

## 2018-01-26 PROCEDURE — 80048 BASIC METABOLIC PNL TOTAL CA: CPT | Performed by: PHYSICIAN ASSISTANT

## 2018-01-26 PROCEDURE — 94760 N-INVAS EAR/PLS OXIMETRY 1: CPT

## 2018-01-26 PROCEDURE — 99232 SBSQ HOSP IP/OBS MODERATE 35: CPT | Performed by: NURSE PRACTITIONER

## 2018-01-26 PROCEDURE — 80202 ASSAY OF VANCOMYCIN: CPT | Performed by: PHYSICIAN ASSISTANT

## 2018-01-26 PROCEDURE — C9113 INJ PANTOPRAZOLE SODIUM, VIA: HCPCS | Performed by: EMERGENCY MEDICINE

## 2018-01-26 RX ADMIN — ISODIUM CHLORIDE 3 ML: 0.03 SOLUTION RESPIRATORY (INHALATION) at 07:59

## 2018-01-26 RX ADMIN — HEPARIN SODIUM 5000 UNITS: 5000 INJECTION, SOLUTION INTRAVENOUS; SUBCUTANEOUS at 05:27

## 2018-01-26 RX ADMIN — CEFAZOLIN SODIUM 500 MG: 1 INJECTION, POWDER, FOR SOLUTION INTRAMUSCULAR; INTRAVENOUS at 10:05

## 2018-01-26 RX ADMIN — LEVALBUTEROL HYDROCHLORIDE 1.25 MG: 1.25 SOLUTION, CONCENTRATE RESPIRATORY (INHALATION) at 20:43

## 2018-01-26 RX ADMIN — METRONIDAZOLE 500 MG: 500 INJECTION, SOLUTION INTRAVENOUS at 03:03

## 2018-01-26 RX ADMIN — LEVALBUTEROL HYDROCHLORIDE 1.25 MG: 1.25 SOLUTION, CONCENTRATE RESPIRATORY (INHALATION) at 07:59

## 2018-01-26 RX ADMIN — PRAVASTATIN SODIUM 40 MG: 40 TABLET ORAL at 15:48

## 2018-01-26 RX ADMIN — INSULIN LISPRO 1 UNITS: 100 INJECTION, SOLUTION INTRAVENOUS; SUBCUTANEOUS at 06:22

## 2018-01-26 RX ADMIN — METRONIDAZOLE 500 MG: 500 INJECTION, SOLUTION INTRAVENOUS at 17:24

## 2018-01-26 RX ADMIN — METRONIDAZOLE 500 MG: 500 INJECTION, SOLUTION INTRAVENOUS at 10:57

## 2018-01-26 RX ADMIN — INSULIN LISPRO 3 UNITS: 100 INJECTION, SOLUTION INTRAVENOUS; SUBCUTANEOUS at 16:59

## 2018-01-26 RX ADMIN — CEFAZOLIN SODIUM 500 MG: 1 INJECTION, POWDER, FOR SOLUTION INTRAMUSCULAR; INTRAVENOUS at 20:23

## 2018-01-26 RX ADMIN — ASPIRIN 81 MG 81 MG: 81 TABLET ORAL at 08:51

## 2018-01-26 RX ADMIN — ISODIUM CHLORIDE 3 ML: 0.03 SOLUTION RESPIRATORY (INHALATION) at 13:55

## 2018-01-26 RX ADMIN — ISODIUM CHLORIDE 3 ML: 0.03 SOLUTION RESPIRATORY (INHALATION) at 20:43

## 2018-01-26 RX ADMIN — ESCITALOPRAM OXALATE 20 MG: 20 TABLET ORAL at 08:51

## 2018-01-26 RX ADMIN — PANTOPRAZOLE SODIUM 40 MG: 40 INJECTION, POWDER, FOR SOLUTION INTRAVENOUS at 08:51

## 2018-01-26 RX ADMIN — LEVOTHYROXINE SODIUM 75 MCG: 75 TABLET ORAL at 05:27

## 2018-01-26 RX ADMIN — LEVALBUTEROL HYDROCHLORIDE 1.25 MG: 1.25 SOLUTION, CONCENTRATE RESPIRATORY (INHALATION) at 13:55

## 2018-01-26 RX ADMIN — HEPARIN SODIUM 5000 UNITS: 5000 INJECTION, SOLUTION INTRAVENOUS; SUBCUTANEOUS at 14:26

## 2018-01-26 NOTE — PROGRESS NOTES
01/26/18 1100   Clinical Encounter Type   Visited With Patient   Crisis Visit Critical Care   Jain Encounters   Jain Needs Prayer     Cultivated a relationship of care and support  Provided care

## 2018-01-26 NOTE — SPEECH THERAPY NOTE
Speech Language/Pathology  Attempted to wake pt for breakfast, pt stated she was not hungry, did not want to eat or drink  Breathing appears more labored today  Please offer po when pt is alert, requesting  Oral care as she is currently mouth breathing

## 2018-01-26 NOTE — PROGRESS NOTES
Progress Note - General Surgery   Renetta Landeros Daily 80 y o  female MRN: [id number]  Unit/Bed#: -01 Encounter: [id number]    Assessment:  81yo F 59-year-old female presenting with acute cholecystitis status post percutaneous cholecystostomy tube placement on 1/24/18 with recent respiratory distress    - Much improved from resp standpoint  - Cx- no polys or bacteria    Plan:  S&S recs appreciated- start Dysphagia 2 w/ thin liquids  IR note appreciated- no need for thoracentesis  PT/OT, IS, encourage cough  Continue perc arnol drain  On Cefepime, Flagyl, Vanc- will discuss tailoring antibx as no cx growth  SQH for DVT ppx      Subjective/Objective   Chief Complaint: None    Subjective: States she is hungry  Mentions some abdominal pain  Much more interactive and alert  On RA, no resp distress    Objective:     Blood pressure 117/56, pulse 67, temperature 97 6 °F (36 4 °C), temperature source Oral, resp  rate (!) 23, height 4' 7" (1 397 m), weight 64 1 kg (141 lb 5 oz), SpO2 95 %  ,Body mass index is 32 84 kg/m²  Intake/Output Summary (Last 24 hours) at 01/26/18 4533  Last data filed at 01/26/18 6289   Gross per 24 hour   Intake          1976 66 ml   Output             3435 ml   Net         -1458 34 ml       Invasive Devices     Peripheral Intravenous Line            Peripheral IV 01/24/18 Left Forearm 1 day    Peripheral IV 01/24/18 Right Antecubital 1 day    Peripheral IV 01/24/18 Right Forearm 1 day          Drain            Closed/Suction Drain Right Abdomen 8 Fr  1 day    Urethral Catheter Latex 16 Fr  1 day                Physical Exam:   Gen: A&O, NAD  Cardio: RRR  Lungs: CTAB  Abd: Soft, non distended, minimally tender in RUQ   Drain w/ light bilious fluid      Lab, Imaging and other studies:  CBC:   Lab Results   Component Value Date    WBC 6 71 01/26/2018    HGB 10 9 (L) 01/26/2018    HCT 32 4 (L) 01/26/2018    MCV 94 01/26/2018     01/26/2018    MCH 31 6 01/26/2018    MCHC 33 6 01/26/2018 RDW 16 7 (H) 01/26/2018    MPV 11 0 01/26/2018   , CMP:   Lab Results   Component Value Date     01/26/2018    K 3 7 01/26/2018     01/26/2018    CO2 26 01/26/2018    ANIONGAP 7 01/26/2018    BUN 8 01/26/2018    CREATININE 0 97 01/26/2018    GLUCOSE 163 (H) 01/26/2018    CALCIUM 8 1 (L) 01/26/2018    AST 45 01/25/2018    ALT 18 01/25/2018    ALKPHOS 161 (H) 01/25/2018    PROT 6 0 (L) 01/25/2018    BILITOT 0 34 01/25/2018    EGFR 53 01/26/2018     VTE Pharmacologic Prophylaxis: Heparin  VTE Mechanical Prophylaxis: sequential compression device

## 2018-01-26 NOTE — CONSULTS
Consultation - Palliative and Supportive Care   Derek Osorio 80 y o  female [id number]    Assessment:     Patient Active Problem List   Diagnosis    Altered mental status    Dementia    Essential hypertension    Diabetes mellitus ([facility] [address] )    Stage 3 chronic kidney disease    Falls    Epigastric pain    Cholecystitis with cholelithiasis    Shortness of breath    Anemia    Hypothyroid       Plan:  1  Goals -    - Pt is not competent  Pt's son Hoa Stevenson drives decisional making apparatus   - The role of Palliative Care introduced to Vito    - At this time, Hoa Stevenson agrees to continuation of current medical cares and conservative management of symptoms in attempt to optimize her medical status and return to [facility]   - Ultimately he desires for his mother to be comfortable and not suffer so if current treatments fail to improve her clinical status he would be interested in learning more about hospice services  He confirms that his mother would not want aggressive medical interventions and agrees to DNR/DNI  He would not be inclined to pursue a feeding tube or dialysis    - POLST form introduced and purpose explained  - PSC will plan to meet with Vito at bedside this weekend to help answer any additional questions and complete POLST form  Code Status: DNR/DNI - Level 3   Power of :  Pt's son Vito   Advance Directive / Living Will: not available   POLST: Introduced  Will plan to complete prior to D/C         We appreciate the invitation to be involved in this patient's care  We will follow  Please do not hesitate to reach our on call provider through our clinic answering service at  should you have acute symptom control concerns  Terrence Villegas  [address] and Supportive Care  Pager: [phone]     IDENTIFICATION:  Inpatient consult to Palliative Care  Consult performed by: Dhaval Gonsalves ordered by: Rita Cassidy Requesting Consult: Nano Mendoza MD  Reason for Consult / Principal Problem: goals of care  Hx and PE limited by: other     HISTORY OF PRESENT ILLNESS:       Chandra Kim is a 80 y o  female who presents from [address]  with RUQ pain workup revealed acute cholecystitis  She declined surgery underwent a percutaneous cholecystostomy tube on 1/24 and subsequently developed worsening shortness of breath  She has been in and out of ICU for management of respiratory distress  CXR reveals basilar atelectasis and small rt pleural effusion  She has a PMH of dementia, DM, CKD stage III  She has had multiple ED visits and a couple of hospitalizations over the past 5 months for recurrent  falls, UTI's  PSC was consulted to discuss goals of care  Pt was seen at bedside, No family members present at time of visit  She was actively receiving chest PT during my initial visit  Per nursing, she has been lethargic, not eating well and has been unable to effectively clear her secretions  After her Chest PT ended, pt as more awake and alert  She was able to cough and say "I'm hungry"  She denied having any pain or discomfort  I called and spoke with pt's son Shaista Monte over the phone and introduced the role of palliative care  Vito stated that he has noticed a progressive decline over the past year in his mother's health  We discussed his mom's current clinical status and options for ongoing cares  At this time, he would like to continue with current medical management of her symptoms with hope to optimize her so that she can return to [facility] where she has been a resident for the past several months  However, he does state "I don't want my mom to suffer"  He confirms that his mom would not wish to undergo cardiac resuscitation or be kept alive on a breathing machine  Nor would she wish to undergo any aggressive or invasive procedures such as a feeding tube or dialysis   I explained the purpose of a POLST form to document these wishes  I also briefly introduced hospice as an alternative to consider if she continues to decline and he would prefer to focus on comfort measures only  Many questions were answered and he did admit to feeling overwhelmed with the amount of information he was given  I offered so set up time to meet in person to further discuss his concerns, answer questions and complete POLST  Review of Systems   Unable to perform ROS: dementia       Past Medical History:   Diagnosis Date    Dementia     Diabetes mellitus ([address] )     Hyperlipidemia     Hypothyroidism     Macular degeneration     Osteoarthritis      History reviewed  No pertinent surgical history  Social History     Social History    Marital status:      Spouse name: N/A    Number of children: N/A    Years of education: N/A     Occupational History    Not on file  Social History Main Topics    Smoking status: Never Smoker    Smokeless tobacco: Never Used    Alcohol use No    Drug use: No    Sexual activity: Not on file     Other Topics Concern    Not on file     Social History Narrative    No narrative on file     Family History   Problem Relation Age of Onset    Family history unknown: Yes       MEDICATIONS / ALLERGIES:    all current active meds have been reviewed    Allergies   Allergen Reactions    Penicillins     Valsartan        OBJECTIVE:    Physical Exam  Physical Exam   Constitutional: No distress  Alert   HENT:   Head: Normocephalic and atraumatic  Mouth/Throat: Oropharynx is clear and moist  No oropharyngeal exudate  Eyes: No scleral icterus  Cardiovascular: Normal rate and regular rhythm  Pulmonary/Chest: No respiratory distress  Receiving Chest PT   Abdominal: Soft  Bowel sounds are normal    Neurological: She is alert  Oriented to self   Skin: Skin is warm and dry  She is not diaphoretic  Psychiatric: She has a normal mood and affect   Her behavior is normal        Lab Results:   CBC:   Lab Results Component Value Date    WBC 6 71 01/26/2018    HGB 10 9 (L) 01/26/2018    HCT 32 4 (L) 01/26/2018    MCV 94 01/26/2018     01/26/2018    MCH 31 6 01/26/2018    MCHC 33 6 01/26/2018    RDW 16 7 (H) 01/26/2018    MPV 11 0 01/26/2018    NRBC 1 01/26/2018   , CMP:   Lab Results   Component Value Date     01/26/2018    K 3 7 01/26/2018     01/26/2018    CO2 26 01/26/2018    ANIONGAP 7 01/26/2018    BUN 8 01/26/2018    CREATININE 0 97 01/26/2018    GLUCOSE 163 (H) 01/26/2018    CALCIUM 8 1 (L) 01/26/2018    EGFR 53 01/26/2018     Imaging Studies: reviewed  EKG, Pathology, and Other Studies: reviewed  Counseling / Coordination of Care  Total floor / unit time spent today 60 minutes  Greater than 50% of total time was spent with the patient and / or family counseling and / or coordination of care   A description of the counseling / coordination of care: chart review, bedside assessment of pt, discussion with son

## 2018-01-26 NOTE — CONSULTS
Consultation - Pulmonary Medicine   Marixa Angel Ruchi Apple 80 y o  female MRN: [id number]  Unit/Bed#: -01 Encounter: [id number]      Assessment/Plan:    Acute respiratory insufficiency, multifactorial due to below   Use oxygen as needed to keep saturations greater than or equal to 88%  Out of bed as tolerated  Encourage upright positioning  Add incentive spirometer and flutter valve  Abnormal chest x-ray:  Basilar atelectasis with mild vascular congestion and concern for aspiration pneumonitis versus pneumonia   Continue antibiotics with Flagyl and cefazolin to complete seven days unless longer duration needed from surgical standpoint  Follow-up MRSA culture and blood cultures  Likely unable to send sputum culture  Add incentive spirometer and flutter valve as above  Encourage pulmonary hygiene and continue Xopenex TID  Received Lasix x1 yesterday  Continue volume management per primary team   Appears euvolemic at present  Acute cholecystitis   Status post percutaneous drain placement  Management per surgical team     Dementia with concern for dysphagia/aspiration   Speech therapy saw and recommended least restrictive diet  Continue aspiration precautions  Outpatient follow-up and further imaging pending hospital course  History of Present Illness   Physician Requesting Consult: Mikaela Wong MD  Reason for Consult / Principal Problem:  Shortness of breath  Hx and PE limited by:  Patient has dementia  Chief Complaint:  Shortness of breath per medical record; unable to elicit from patient  HPI: Jairon Handy is a 80 y o  female who presented to [place] [address] from nursing facility for right upper quadrant abdominal pain and decreased p o  intake  History is unable to be obtained from the patient due to dementia; therefore, history is obtained from the medical record    She was admitted with acute cholecystitis and was conservatively managed with IV hydration and antibiotics  She underwent percutaneous cholecystotomy tube on January 25th  Pulmonary is consulted for shortness of breath/increased work of breathing  At present, the patient denies any complaints  She is sleeping and awakens to name  She denies any chest pain or leg pain  Aside from this, further review of systems unable to be obtained  Inpatient consult to Pulmonology  Consult performed by: Jaspal Anderson ordered by: Jorge Alberto Winkler        Review of Systems   Unable to perform ROS: Dementia     Historical Information   Past Medical History:   Diagnosis Date    Dementia     Diabetes mellitus ([address] )     Hyperlipidemia     Hypothyroidism     Macular degeneration     Osteoarthritis      History reviewed  No pertinent surgical history    Social History   History   Alcohol Use No     History   Drug Use No     History   Smoking Status    Never Smoker   Smokeless Tobacco    Never Used     Family History:   Family History   Problem Relation Age of Onset    Family history unknown: Yes       Meds/Allergies   all current active meds have been reviewed, pertinent pulmonary meds have been reviewed, current meds:   Current Facility-Administered Medications   Medication Dose Route Frequency    acetaminophen (TYLENOL) tablet 650 mg  650 mg Oral Q6H PRN    albuterol inhalation solution 2 5 mg  2 5 mg Nebulization Q4H PRN    amLODIPine (NORVASC) tablet 2 5 mg  2 5 mg Oral Daily    aspirin chewable tablet 81 mg  81 mg Oral Daily    ceFAZolin (ANCEF) 500 mg in sodium chloride 0 9% 50 ml IVPB  500 mg Intravenous Q12H    escitalopram (LEXAPRO) tablet 20 mg  20 mg Oral Daily    heparin (porcine) subcutaneous injection 5,000 Units  5,000 Units Subcutaneous Q8H [address]    HYDROmorphone (DILAUDID) injection 0 5 mg  0 5 mg Intravenous Q4H PRN    insulin lispro (HumaLOG) 100 units/mL subcutaneous injection 1-5 Units  1-5 Units Subcutaneous TID AC    labetalol (NORMODYNE) injection 10 mg  10 mg Intravenous Q4H PRN    levalbuterol (XOPENEX) inhalation solution 1 25 mg  1 25 mg Nebulization TID    levothyroxine tablet 75 mcg  75 mcg Oral Early Morning    metroNIDAZOLE (FLAGYL) IVPB (premix) 500 mg  500 mg Intravenous Q8H    pantoprazole (PROTONIX) injection 40 mg  40 mg Intravenous Q24H ROEL    pravastatin (PRAVACHOL) tablet 40 mg  40 mg Oral Daily With Dinner    sodium chloride 0 9 % inhalation solution 3 mL  3 mL Nebulization TID    and PTA meds:   Prior to Admission Medications   Prescriptions Last Dose Informant Patient Reported? Taking?    Linagliptin (TRADJENTA) 5 MG TABS   Yes Yes   Sig: Take 5 mg by mouth daily   acetaminophen (TYLENOL) 325 mg tablet   No Yes   Sig: Take 3 tablets by mouth every 8 (eight) hours   amLODIPine (NORVASC) 2 5 mg tablet   No Yes   Sig: Take 1 tablet by mouth daily   aspirin 81 mg chewable tablet   No Yes   Sig: Chew 1 tablet daily   cholecalciferol (VITAMIN D3) 1,000 units tablet   Yes Yes   Sig: Take 1,000 Units by mouth daily   cyanocobalamin (VITAMIN B-12) 1,000 mcg tablet   Yes Yes   Sig: Take 1,000 mcg by mouth daily   escitalopram (LEXAPRO) 20 mg tablet   Yes Yes   Sig: Take 20 mg by mouth daily   insulin glargine (LANTUS) 100 units/mL subcutaneous injection   No Yes   Sig: Inject 7 Units under the skin daily at bedtime   insulin lispro (HumaLOG) 100 units/mL injection   No Yes   Sig: Inject 3 Units under the skin 3 (three) times a day with meals   levothyroxine 50 mcg tablet   Yes Yes   Sig: Take 75 mcg by mouth daily   lidocaine (LIDODERM) 5 %   No Yes   Sig: Place 1 patch on the skin daily Remove & Discard patch within 12 hours or as directed by MD   nystatin (MYCOSTATIN) powder   No Yes   Sig: Apply topically 3 (three) times a day   omeprazole (PriLOSEC) 20 mg delayed release capsule   Yes Yes   Sig: Take 20 mg by mouth daily   rosuvastatin (CRESTOR) 5 mg tablet   Yes Yes   Sig: Take 5 mg by mouth daily      Facility-Administered Medications: None       Allergies Allergen Reactions    Penicillins     Valsartan        Objective   Vitals: Blood pressure 107/61, pulse 73, temperature 97 5 °F (36 4 °C), temperature source Oral, resp  rate 18, height 4' 7" (1 397 m), weight 64 1 kg (141 lb 5 oz), SpO2 95 %  RA,Body mass index is 32 84 kg/m²  Intake/Output Summary (Last 24 hours) at 01/26/18 1033  Last data filed at 01/26/18 3410   Gross per 24 hour   Intake             1545 ml   Output             2950 ml   Net            -1405 ml     Invasive Devices     Peripheral Intravenous Line            Peripheral IV 01/24/18 Left Forearm 2 days    Peripheral IV 01/24/18 Right Antecubital 1 day    Peripheral IV 01/24/18 Right Forearm 1 day          Drain            Closed/Suction Drain Right Abdomen 8 Fr  1 day    Urethral Catheter Latex 16 Fr  1 day                Physical Exam   Constitutional: She appears well-developed and well-nourished  She is sleeping  Non-toxic appearance  No distress  HENT:   Head: Normocephalic and atraumatic  Mouth/Throat: No oropharyngeal exudate  Eyes: EOM are normal  No scleral icterus  Neck: Neck supple  No JVD present  No tracheal deviation present  Cardiovascular: Normal rate, regular rhythm, S1 normal and S2 normal   Exam reveals no gallop and no friction rub  No murmur heard  Pulmonary/Chest: Effort normal  No accessory muscle usage or stridor  No tachypnea  No respiratory distress  She has no decreased breath sounds  She has no wheezes  She has no rhonchi  She has rales  She exhibits no tenderness  Abdominal: Soft  Bowel sounds are normal  She exhibits no distension  There is no tenderness  There is no rebound and no guarding  Musculoskeletal: She exhibits no edema  Neurological: She has normal strength  No sensory deficit  GCS eye subscore is 4  GCS verbal subscore is 5  GCS motor subscore is 6  Skin: Skin is warm and dry  No abrasion, no ecchymosis, no lesion and no rash noted  She is not diaphoretic   No cyanosis or erythema  Psychiatric: Her speech is normal        Lab Results:     MRSA Culture pending    Blood Cultures NG x 24 hours    CBC:   Lab Results   Component Value Date    WBC 6 71 01/26/2018    HGB 10 9 (L) 01/26/2018    HCT 32 4 (L) 01/26/2018    MCV 94 01/26/2018     01/26/2018    MCH 31 6 01/26/2018    MCHC 33 6 01/26/2018    RDW 16 7 (H) 01/26/2018    MPV 11 0 01/26/2018    NRBC 1 01/26/2018     CMP:   Lab Results   Component Value Date     01/26/2018    K 3 7 01/26/2018     01/26/2018    CO2 26 01/26/2018    ANIONGAP 7 01/26/2018    BUN 8 01/26/2018    CREATININE 0 97 01/26/2018    GLUCOSE 163 (H) 01/26/2018    CALCIUM 8 1 (L) 01/26/2018    EGFR 53 01/26/2018     Imaging Studies: I have personally reviewed pertinent reports   , I have personally reviewed pertinent films in PACS and Chest x-ray shows chronic elevation of the right hemidiaphragm with atelectasis and mild vascular congestion, though lung volumes are low    EKG, Pathology, and Other Studies: None    Pulmonary Results (PFTs, PSG): None    VTE Prophylaxis: Sequential compression device (Venodyne)  and Heparin    Code Status: Level 2 - DNAR: but accepts endotracheal intubation    None    Portions of the record may have been created with voice recognition software  Occasional wrong word or "sound a like" substitutions may have occurred due to the inherent limitations of voice recognition software  Read the chart carefully and recognize, using context, where substitutions have occurred

## 2018-01-26 NOTE — PROGRESS NOTES
Patient seen and examined  I discussed with her [place] about her current clinical status  I am concerned that she is having significant respiratory distress  We discussed in depth as the whether she would like aggressive measures such as intubation  They were unclear on what that meant when they first agreed to level DNR 2  As per the family, they state that she would not want aggressive measures and would prefer not to go on a ventilator  I will change the code status to level 3 and discuss with surgical team   In addition I will place a palliative care consult as well

## 2018-01-27 LAB
GLUCOSE SERPL-MCNC: 111 MG/DL (ref 65–140)
GLUCOSE SERPL-MCNC: 161 MG/DL (ref 65–140)
GLUCOSE SERPL-MCNC: 176 MG/DL (ref 65–140)
GLUCOSE SERPL-MCNC: 236 MG/DL (ref 65–140)
GLUCOSE SERPL-MCNC: 262 MG/DL (ref 65–140)

## 2018-01-27 PROCEDURE — 99232 SBSQ HOSP IP/OBS MODERATE 35: CPT | Performed by: INTERNAL MEDICINE

## 2018-01-27 PROCEDURE — C9113 INJ PANTOPRAZOLE SODIUM, VIA: HCPCS | Performed by: EMERGENCY MEDICINE

## 2018-01-27 PROCEDURE — 94669 MECHANICAL CHEST WALL OSCILL: CPT

## 2018-01-27 PROCEDURE — 94640 AIRWAY INHALATION TREATMENT: CPT

## 2018-01-27 PROCEDURE — 99231 SBSQ HOSP IP/OBS SF/LOW 25: CPT | Performed by: INTERNAL MEDICINE

## 2018-01-27 PROCEDURE — 99232 SBSQ HOSP IP/OBS MODERATE 35: CPT | Performed by: SURGERY

## 2018-01-27 PROCEDURE — 94760 N-INVAS EAR/PLS OXIMETRY 1: CPT

## 2018-01-27 PROCEDURE — 82948 REAGENT STRIP/BLOOD GLUCOSE: CPT

## 2018-01-27 RX ORDER — OXYCODONE HCL 5 MG/5 ML
2 SOLUTION, ORAL ORAL EVERY 6 HOURS PRN
Status: DISCONTINUED | OUTPATIENT
Start: 2018-01-27 | End: 2018-01-30 | Stop reason: HOSPADM

## 2018-01-27 RX ADMIN — ASPIRIN 81 MG 81 MG: 81 TABLET ORAL at 08:42

## 2018-01-27 RX ADMIN — CEFAZOLIN SODIUM 500 MG: 1 INJECTION, POWDER, FOR SOLUTION INTRAMUSCULAR; INTRAVENOUS at 21:14

## 2018-01-27 RX ADMIN — METRONIDAZOLE 500 MG: 500 INJECTION, SOLUTION INTRAVENOUS at 17:16

## 2018-01-27 RX ADMIN — LEVOTHYROXINE SODIUM 75 MCG: 75 TABLET ORAL at 06:51

## 2018-01-27 RX ADMIN — PANTOPRAZOLE SODIUM 40 MG: 40 INJECTION, POWDER, FOR SOLUTION INTRAVENOUS at 08:42

## 2018-01-27 RX ADMIN — HEPARIN SODIUM 5000 UNITS: 5000 INJECTION, SOLUTION INTRAVENOUS; SUBCUTANEOUS at 21:10

## 2018-01-27 RX ADMIN — PRAVASTATIN SODIUM 40 MG: 40 TABLET ORAL at 16:45

## 2018-01-27 RX ADMIN — AMLODIPINE BESYLATE 2.5 MG: 2.5 TABLET ORAL at 08:42

## 2018-01-27 RX ADMIN — METRONIDAZOLE 500 MG: 500 INJECTION, SOLUTION INTRAVENOUS at 01:51

## 2018-01-27 RX ADMIN — LEVALBUTEROL HYDROCHLORIDE 1.25 MG: 1.25 SOLUTION, CONCENTRATE RESPIRATORY (INHALATION) at 07:10

## 2018-01-27 RX ADMIN — CEFAZOLIN SODIUM 500 MG: 1 INJECTION, POWDER, FOR SOLUTION INTRAMUSCULAR; INTRAVENOUS at 08:54

## 2018-01-27 RX ADMIN — ISODIUM CHLORIDE 3 ML: 0.03 SOLUTION RESPIRATORY (INHALATION) at 07:10

## 2018-01-27 RX ADMIN — INSULIN LISPRO 1 UNITS: 100 INJECTION, SOLUTION INTRAVENOUS; SUBCUTANEOUS at 16:43

## 2018-01-27 RX ADMIN — METRONIDAZOLE 500 MG: 500 INJECTION, SOLUTION INTRAVENOUS at 10:56

## 2018-01-27 RX ADMIN — INSULIN LISPRO 2 UNITS: 100 INJECTION, SOLUTION INTRAVENOUS; SUBCUTANEOUS at 06:54

## 2018-01-27 RX ADMIN — ESCITALOPRAM OXALATE 20 MG: 20 TABLET ORAL at 08:43

## 2018-01-27 RX ADMIN — HEPARIN SODIUM 5000 UNITS: 5000 INJECTION, SOLUTION INTRAVENOUS; SUBCUTANEOUS at 14:21

## 2018-01-27 NOTE — PROGRESS NOTES
Progress Note - General Surgery   Grace Starr 80 y o  female MRN: [id number]  Unit/Bed#: -01 Encounter: [id number]    Assessment:  79 yo F female presenting with acute cholecystitis status post percutaneous cholecystostomy tube placement on 1/24/18 with recent respiratory distress    Plan:  Continue dysphagia diet as per S&S recs  Palliative consult appreciated- Level 3 DNR  Breathing treatments per Respiratory, monitor ABG  On Ancef/Flagyl- will not need to go home on antibx   Continue IR drain on D/C  F/U CM for dispo planning    Subjective/Objective   Chief Complaint: None    Subjective:  No major complaints- little annoyances or aches and pains  Overall much improved, unlabored resps    Objective:     Blood pressure 120/63, pulse 66, temperature 98 2 °F (36 8 °C), resp  rate 18, height 4' 7" (1 397 m), weight 64 1 kg (141 lb 5 oz), SpO2 93 %  ,Body mass index is 32 84 kg/m²        Intake/Output Summary (Last 24 hours) at 01/27/18 0552  Last data filed at 01/27/18 0300   Gross per 24 hour   Intake           805 83 ml   Output             1260 ml   Net          -454 17 ml       Invasive Devices     Peripheral Intravenous Line            Peripheral IV 01/24/18 Left Forearm 2 days    Peripheral IV 01/24/18 Right Antecubital 2 days    Peripheral IV 01/24/18 Right Forearm 2 days          Drain            Closed/Suction Drain Right Abdomen 8 Fr  2 days    Urethral Catheter Latex 16 Fr  2 days                Physical Exam:   Gen: A&O, NAD  Cardio: RRR  Lungs: CTAB  Abd: Soft, non distended, very minimally tender in RUQ    Lab, Imaging and other studies:CBC: No results found for: WBC, HGB, HCT, MCV, PLT, ADJUSTEDWBC, MCH, MCHC, RDW, MPV, NRBC, CMP: No results found for: NA, K, CL, CO2, ANIONGAP, BUN, CREATININE, GLUCOSE, CALCIUM, AST, ALT, ALKPHOS, PROT, ALBUMIN, BILITOT, EGFR  VTE Pharmacologic Prophylaxis: Heparin  VTE Mechanical Prophylaxis: sequential compression device

## 2018-01-27 NOTE — CASE MANAGEMENT
Continued Stay Review    Date: 1/27    Vital Signs: /60 (BP Location: Right arm)   Pulse 67   Temp 98 3 °F (36 8 °C) (Oral)   Resp 20   Ht 4' 7" (1 397 m) Comment: per allscripts 4/21/17  Wt 64 1 kg (141 lb 5 oz)   SpO2 94%   BMI 32 84 kg/m²     Medications:   Scheduled Meds:   amLODIPine 2 5 mg Oral Daily   aspirin 81 mg Oral Daily   cefazolin 500 mg Intravenous Q12H   escitalopram 20 mg Oral Daily   heparin (porcine) 5,000 Units Subcutaneous Q8H [address]   insulin lispro 1-5 Units Subcutaneous TID AC   levothyroxine 75 mcg Oral Early Morning   metroNIDAZOLE 500 mg Intravenous Q8H   pantoprazole 40 mg Intravenous Q24H [address]   pravastatin 40 mg Oral Daily With Dinner     Continuous Infusions:    PRN Meds:   acetaminophen    albuterol    labetalol    oxyCODONE    Abnormal Labs/Diagnostic Results:   Glucose  320, 161, 111  1/26 H/H 10 9/32 4  Age/Sex: 80 y o  female     Assessment/Plan:     79 yo F female presenting with acute cholecystitis status post percutaneous cholecystostomy tube placement on 1/24/18 with recent respiratory distress       Plan:  Continue Dysphagia diet  Continue IR perc arnol drain- will be D/C'd with drain  Monitor respiratory status, aspiration precautions  PRN pain control  Continue kidd- chronic  CM for dispo- nearing discharge readiness  Discharge Plan: TBD

## 2018-01-27 NOTE — PROGRESS NOTES
Progress Note - General Surgery   Rocio Stern Vinicio Pretty 80 y o  female MRN: [id number]  Unit/Bed#: -01 Encounter: [id number]    Assessment:  81 yo F female presenting with acute cholecystitis status post percutaneous cholecystostomy tube placement on 1/24/18 with recent respiratory distress    - Doing much better    Plan:  Continue Dysphagia diet  Continue IR perc arnol drain- will be D/C'd with drain  Monitor respiratory status, aspiration precautions  PRN pain control  Continue kidd- chronic  CM for dispo- nearing discharge readiness    Subjective/Objective   Chief Complaint: None    Subjective: Mild abdominal pain but otherwise comfortable, no N/V    Objective:     Blood pressure 120/63, pulse 66, temperature 98 2 °F (36 8 °C), resp  rate 18, height 4' 7" (1 397 m), weight 64 1 kg (141 lb 5 oz), SpO2 93 %  ,Body mass index is 32 84 kg/m²        Intake/Output Summary (Last 24 hours) at 01/27/18 0602  Last data filed at 01/27/18 0300   Gross per 24 hour   Intake           805 83 ml   Output             1260 ml   Net          -454 17 ml       Invasive Devices     Peripheral Intravenous Line            Peripheral IV 01/24/18 Left Forearm 2 days    Peripheral IV 01/24/18 Right Antecubital 2 days    Peripheral IV 01/24/18 Right Forearm 2 days          Drain            Closed/Suction Drain Right Abdomen 8 Fr  2 days    Urethral Catheter Latex 16 Fr  2 days                Physical Exam:   Gen: A&O, NAD  Cardio: RRR  Lungs: CTAB  Abd: Soft, non distended, slightly tender, perc arnol drain w/ serobilious output      Lab, Imaging and other studies:CBC: No results found for: WBC, HGB, HCT, MCV, PLT, ADJUSTEDWBC, MCH, MCHC, RDW, MPV, NRBC, CMP: No results found for: NA, K, CL, CO2, ANIONGAP, BUN, CREATININE, GLUCOSE, CALCIUM, AST, ALT, ALKPHOS, PROT, ALBUMIN, BILITOT, EGFR  VTE Pharmacologic Prophylaxis: Heparin  VTE Mechanical Prophylaxis: sequential compression device

## 2018-01-27 NOTE — PLAN OF CARE
Problem: Potential for Falls  Goal: Patient will remain free of falls  INTERVENTIONS:  - Assess patient frequently for physical needs  -  Identify cognitive and physical deficits and behaviors that affect risk of falls    -  [place] fall precautions as indicated by assessment   - Educate patient/family on patient safety including physical limitations  - Instruct patient to call for assistance with activity based on assessment  - Modify environment to reduce risk of injury  - Consider OT/PT consult to assist with strengthening/mobility   Outcome: Progressing      Problem: PAIN - ADULT  Goal: Verbalizes/displays adequate comfort level or baseline comfort level  Interventions:  - Encourage patient to monitor pain and request assistance  - Assess pain using appropriate pain scale (i e  FLACC, 0-10)  - Administer analgesics based on type and severity of pain and evaluate response  - Implement non-pharmacological measures as appropriate and evaluate response  - Consider cultural and social influences on pain and pain management  - Notify physician/advanced practitioner if interventions unsuccessful or patient reports new pain    Outcome: Progressing      Problem: INFECTION - ADULT  Goal: Absence or prevention of progression during hospitalization  INTERVENTIONS:  - Assess and monitor for signs and symptoms of infection  - Monitor lab/diagnostic results  - Monitor all insertion sites, i e  IV site  - [place] appropriate cooling/warming therapies per order  - Administer medications as ordered  - Instruct and encourage patient and family to use good hand hygiene technique  - Identify and instruct in appropriate isolation precautions for identified infection/condition    Outcome: Progressing    Goal: Absence of fever/infection during neutropenic period  INTERVENTIONS:  - Monitor WBC  - Implement neutropenic guidelines   Outcome: Progressing      Problem: SAFETY ADULT  Goal: Maintain or return to baseline ADL function  INTERVENTIONS:  -  Assess patient's ability to carry out ADLs; assess patient's baseline for ADL function and identify physical deficits which impact ability to perform ADLs (bathing, care of mouth/teeth, toileting, grooming, dressing, etc )  - Assess/evaluate cause of self-care deficits   - Assess range of motion  - Assess patient's mobility; develop plan if impaired  - Assess patient's need for assistive devices and provide as appropriate  - Encourage maximum independence but intervene and supervise when necessary  ¯ Involve family in performance of ADLs  ¯ Assess for home care needs following discharge   ¯ Request OT consult to assist with ADL evaluation and planning for discharge  ¯ Provide patient education as appropriate   Outcome: Progressing    Goal: Maintain or return mobility status to optimal level  INTERVENTIONS:  - Assess patient's baseline mobility status (ambulation, transfers, stairs, etc )    - Identify cognitive and physical deficits and behaviors that affect mobility  - Identify mobility aids required to assist with transfers and/or ambulation (gait belt, sit-to-stand, lift, walker, cane, etc )  - [place] fall precautions as indicated by assessment  - Record patient progress and toleration of activity level on Mobility SBAR; progress patient to next Phase/Stage  - Instruct patient to call for assistance with activity based on assessment  - Request Rehabilitation consult to assist with strengthening/weightbearing, etc    Outcome: Progressing      Problem: DISCHARGE PLANNING  Goal: Discharge to home or other facility with appropriate resources  INTERVENTIONS:  - Identify barriers to discharge w/patient and caregiver  - Arrange for needed discharge resources and transportation as appropriate  - Identify discharge learning needs (meds, wound care, etc )  - Arrange for interpretive services to assist at discharge as needed  - Refer to Case Management Department for coordinating discharge planning if the patient needs post-hospital services based on physician/advanced practitioner order or complex needs related to functional status, cognitive ability, or social support system   Outcome: Progressing      Problem: Knowledge Deficit  Goal: Patient/family/caregiver demonstrates understanding of disease process, treatment plan, medications, and discharge instructions  Complete learning assessment and assess knowledge base    Interventions:  - Provide teaching at level of understanding  - Provide teaching via preferred learning methods   Outcome: Progressing      Problem: Prexisting or High Potential for Compromised Skin Integrity  Goal: Skin integrity is maintained or improved  INTERVENTIONS:  - Identify patients at risk for skin breakdown  - Assess and monitor skin integrity  - Assess and monitor nutrition and hydration status  - Monitor labs (i e  albumin)  - Assess for incontinence   - Turn and reposition patient  - Assist with mobility/ambulation  - Relieve pressure over bony prominences  - Avoid friction and shearing  - Provide appropriate hygiene as needed including keeping skin clean and dry  - Evaluate need for skin moisturizer/barrier cream  - Collaborate with interdisciplinary team (i e  Nutrition, Rehabilitation, etc )   - Patient/family teaching   Outcome: Progressing      Problem: DISCHARGE PLANNING - CARE MANAGEMENT  Goal: Discharge to post-acute care or home with appropriate resources  INTERVENTIONS:  - Conduct assessment to determine patient/family and health care team treatment goals, and need for post-acute services based on payer coverage, community resources, and patient preferences, and barriers to discharge  - Address psychosocial, clinical, and financial barriers to discharge as identified in assessment in conjunction with the patient/family and health care team  - Arrange appropriate level of post-acute services according to patient's   needs and preference and payer coverage in collaboration with the physician and health care team  - Communicate with and update the patient/family, physician, and health care team regarding progress on the discharge plan  - Arrange appropriate transportation to post-acute venues  - Return to [facility] for resumption of care   Outcome: Progressing      Problem: RESPIRATORY - ADULT  Goal: Achieves optimal ventilation and oxygenation  INTERVENTIONS:  - Assess for changes in respiratory status  - Assess for changes in mentation and behavior  - Position to facilitate oxygenation and minimize respiratory effort  - Oxygen administration by appropriate delivery method based on oxygen saturation (per order) or ABGs  - Initiate smoking cessation education as indicated  - Encourage broncho-pulmonary hygiene including cough, deep breathe, Incentive Spirometry  - Assess the need for suctioning and aspirate as needed  - Assess and instruct to report SOB or any respiratory difficulty  - Respiratory Therapy support as indicated   Outcome: Progressing      Problem: METABOLIC, FLUID AND ELECTROLYTES - ADULT  Goal: Fluid balance maintained  INTERVENTIONS:  - Monitor labs and assess for signs and symptoms of volume excess or deficit  - Monitor I/O and WT  - Instruct patient on fluid and nutrition as appropriate   Outcome: Progressing      Problem: Nutrition/Hydration-ADULT  Goal: Nutrient/Hydration intake appropriate for improving, restoring or maintaining nutritional needs  Monitor and assess patient's nutrition/hydration status for malnutrition (ex- brittle hair, bruises, dry skin, pale skin and conjunctiva, muscle wasting, smooth red tongue, and disorientation)  Collaborate with interdisciplinary team and initiate plan and interventions as ordered  Monitor patient's weight and dietary intake as ordered or per policy  Utilize nutrition screening tool and intervene per policy   Determine patient's food preferences and provide high-protein, high-caloric foods as appropriate       INTERVENTIONS:  - Monitor oral intake, urinary output, labs, and treatment plans  - Assess nutrition and hydration status and recommend course of action  - Evaluate amount of meals eaten  - Assist patient with eating if necessary   - Allow adequate time for meals  - Recommend/ encourage appropriate diets, oral nutritional supplements, and vitamin/mineral supplements  - Order, calculate, and assess calorie counts as needed  - Recommend, monitor, and adjust tube feedings and TPN/PPN based on assessed needs  - Assess need for intravenous fluids  - Provide specific nutrition/hydration education as appropriate  - Include patient/family/caregiver in decisions related to nutrition   Outcome: Progressing

## 2018-01-27 NOTE — PROGRESS NOTES
Progress Note - Pulmonary   Renetta Hernandez Geoffrey 80 y o  female MRN: [id number]  Unit/Bed#: -01 Encounter: [id number]      Assessment/Plan:  · Acute respiratory insufficiency - improved  · Abnormal chest x-ray with evidence of atelectasis and possible aspiration pneumonia - dysphagia diet  Antibiotics per primary service    We will sign off  Please call if needed  Subjective:   Patient is feeling better today  Denies significant cough or sputum production    Objective:     Vitals: Blood pressure 129/60, pulse 67, temperature 98 3 °F (36 8 °C), temperature source Oral, resp  rate 20, height 4' 7" (1 397 m), weight 64 1 kg (141 lb 5 oz), SpO2 94 % , room air, Body mass index is 32 84 kg/m²  Intake/Output Summary (Last 24 hours) at 01/27/18 1504  Last data filed at 01/27/18 1430   Gross per 24 hour   Intake              740 ml   Output             1660 ml   Net             -920 ml     Physical Exam:      General:  Awake, alert, no distress   HEENT: PERRL, EOMI, moist mucosa    Heart:  Regular rate and rhythm, no murmur   Lungs: Poor inspiratory effort but clear   Abdomen: Soft, nontender, normal bowel sounds   Extremities: No clubbing, cyanosis or edema    Labs: I have personally reviewed pertinent lab results  ,   Results from last 7 days  Lab Units 01/26/18 0445 01/25/18 0449 01/24/18  2256   WBC Thousand/uL 6 71 7 51 6 78   HEMOGLOBIN g/dL 10 9* 9 6* 10 0*   HEMATOCRIT % 32 4* 29 0* 29 7*   PLATELETS Thousands/uL 336 296 293           Results from last 7 days  Lab Units 01/26/18  0445 01/25/18  0628 01/24/18  2256 01/24/18  1841  01/22/18  0526   SODIUM mmol/L 137 138 138 135*  < > 143   POTASSIUM mmol/L 3 7 4 8 2 6* 2 9*  < > 3 1*   CHLORIDE mmol/L 104 109* 105 101  < > 114*   CO2 mmol/L 26 22 24 24  < > 19*   BUN mg/dL 8 9 10 12  < > 20   CREATININE mg/dL 0 97 0 98 1 20 1 41*  < > 0 89   CALCIUM mg/dL 8 1* 8 3 7 9* 8 3  < > 7 9*   TOTAL PROTEIN g/dL  --  6 0*  --  6 7  --  5 9*   BILIRUBIN TOTAL mg/dL --  0 34  --  0 29  --  0 70   ALK PHOS U/L  --  161*  --  194*  --  150*   ALT U/L  --  18  --  24  --  62   AST U/L  --  45  --  49*  --  311*   GLUCOSE RANDOM mg/dL 163* 158* 273* 359*  < > 177*   < > = values in this interval not displayed      Results from last 7 days  Lab Units 01/24/18  1841   INR  1 16   PTT seconds 41*       Results from last 7 days  Lab Units 01/25/18  0628 01/24/18  2256 01/24/18  1841   MAGNESIUM mg/dL 2 7* 1 8 2 0

## 2018-01-27 NOTE — PROGRESS NOTES
Progress note - Palliative and Supportive Care   Tameka Gold 80 y o  female [id number]    Assessment:   Altered mental status    Dementia    Essential hypertension    Diabetes mellitus ([address] )    Stage 3 chronic kidney disease    Falls    Epigastric pain    Cholecystitis with cholelithiasis    Shortness of breath    Anemia    Hypothyroid     Plan:  1  D/C IV Dilaudid and use low dose Roxicodone PRN for pain  Encouraged use of Tylenol first    2  Goals - Presented son with POLST form and reviewed it in detail  Addressed all of his questions regarding the form  His daughter will be in this evening and he will review it with her and then he would like to complete it  Explained that we could complete this tomorrow or if she is discharged- we can arrange an outpatient appointment  Contact information provided  Code Status: DNR/DNI - Level 3   Power of :  presumed to be son by [place] 169   Advance Directive / Living Will: no   POLST:  no      Interval history:       Patient doing quite well today, denies complaints  Is starting to take in PO  Discharge planning started       MEDICATIONS / ALLERGIES:    all current active meds have been reviewed and current meds:   Current Facility-Administered Medications   Medication Dose Route Frequency    acetaminophen (TYLENOL) tablet 650 mg  650 mg Oral Q6H PRN    albuterol inhalation solution 2 5 mg  2 5 mg Nebulization Q4H PRN    amLODIPine (NORVASC) tablet 2 5 mg  2 5 mg Oral Daily    aspirin chewable tablet 81 mg  81 mg Oral Daily    ceFAZolin (ANCEF) 500 mg in sodium chloride 0 9% 50 ml IVPB  500 mg Intravenous Q12H    escitalopram (LEXAPRO) tablet 20 mg  20 mg Oral Daily    heparin (porcine) subcutaneous injection 5,000 Units  5,000 Units Subcutaneous Q8H [facility]    insulin lispro (HumaLOG) 100 units/mL subcutaneous injection 1-5 Units  1-5 Units Subcutaneous TID AC    labetalol (NORMODYNE) injection 10 mg  10 mg Intravenous Q4H PRN    levothyroxine tablet 75 mcg  75 mcg Oral Early Morning    metroNIDAZOLE (FLAGYL) IVPB (premix) 500 mg  500 mg Intravenous Q8H    oxyCODONE (ROXICODONE) oral solution 2 mg  2 mg Oral Q6H PRN    pantoprazole (PROTONIX) injection 40 mg  40 mg Intravenous Q24H ROEL    pravastatin (PRAVACHOL) tablet 40 mg  40 mg Oral Daily With Dinner       Allergies   Allergen Reactions    Penicillins     Valsartan        OBJECTIVE:    Physical Exam  Physical Exam   Constitutional: No distress  HENT:   Head: Normocephalic and atraumatic  Right Ear: External ear normal    Left Ear: External ear normal    Eyes: Right eye exhibits no discharge  Left eye exhibits no discharge  Cardiovascular: Normal rate and intact distal pulses  Pulmonary/Chest: Effort normal and breath sounds normal  No respiratory distress  Abdominal: Soft  She exhibits no distension  Perc arnol   Musculoskeletal: She exhibits no edema  Neurological: She is alert  Skin: Skin is warm and dry  There is pallor  Nursing note and vitals reviewed  Lab Results: I have personally reviewed pertinent labs  , CBC: No results found for: WBC, HGB, HCT, MCV, PLT, ADJUSTEDWBC, MCH, MCHC, RDW, MPV, NRBC, CMP: No results found for: NA, K, CL, CO2, ANIONGAP, BUN, CREATININE, GLUCOSE, CALCIUM, AST, ALT, ALKPHOS, PROT, ALBUMIN, BILITOT, EGFR  Imaging Studies: reviewed  EKG, Pathology, and Other Studies: reviewed    Counseling / Coordination of Care  Total floor / unit time spent today 25 minutes  Greater than 50% of total time was spent with the patient and / or family counseling and / or coordination of care   A description of the counseling / coordination of care: POLST review, symptom assessment

## 2018-01-28 ENCOUNTER — APPOINTMENT (INPATIENT)
Dept: RADIOLOGY | Facility: HOSPITAL | Age: 83
DRG: 445 | End: 2018-01-28
Payer: MEDICARE

## 2018-01-28 LAB
BACTERIA SPEC BFLD CULT: NO GROWTH
GLUCOSE SERPL-MCNC: 181 MG/DL (ref 65–140)
GLUCOSE SERPL-MCNC: 204 MG/DL (ref 65–140)
GLUCOSE SERPL-MCNC: 206 MG/DL (ref 65–140)
GLUCOSE SERPL-MCNC: 243 MG/DL (ref 65–140)
GRAM STN SPEC: NORMAL

## 2018-01-28 PROCEDURE — 71045 X-RAY EXAM CHEST 1 VIEW: CPT

## 2018-01-28 PROCEDURE — 94640 AIRWAY INHALATION TREATMENT: CPT

## 2018-01-28 PROCEDURE — 94760 N-INVAS EAR/PLS OXIMETRY 1: CPT

## 2018-01-28 PROCEDURE — C9113 INJ PANTOPRAZOLE SODIUM, VIA: HCPCS | Performed by: EMERGENCY MEDICINE

## 2018-01-28 PROCEDURE — 82948 REAGENT STRIP/BLOOD GLUCOSE: CPT

## 2018-01-28 RX ADMIN — HEPARIN SODIUM 5000 UNITS: 5000 INJECTION, SOLUTION INTRAVENOUS; SUBCUTANEOUS at 14:25

## 2018-01-28 RX ADMIN — INSULIN LISPRO 1 UNITS: 100 INJECTION, SOLUTION INTRAVENOUS; SUBCUTANEOUS at 16:30

## 2018-01-28 RX ADMIN — ASPIRIN 81 MG 81 MG: 81 TABLET ORAL at 08:17

## 2018-01-28 RX ADMIN — METRONIDAZOLE 500 MG: 500 INJECTION, SOLUTION INTRAVENOUS at 04:41

## 2018-01-28 RX ADMIN — INSULIN LISPRO 1 UNITS: 100 INJECTION, SOLUTION INTRAVENOUS; SUBCUTANEOUS at 06:36

## 2018-01-28 RX ADMIN — ALBUTEROL SULFATE 2.5 MG: 2.5 SOLUTION RESPIRATORY (INHALATION) at 18:21

## 2018-01-28 RX ADMIN — HEPARIN SODIUM 5000 UNITS: 5000 INJECTION, SOLUTION INTRAVENOUS; SUBCUTANEOUS at 05:00

## 2018-01-28 RX ADMIN — LEVOTHYROXINE SODIUM 75 MCG: 75 TABLET ORAL at 05:00

## 2018-01-28 RX ADMIN — AMLODIPINE BESYLATE 2.5 MG: 2.5 TABLET ORAL at 08:16

## 2018-01-28 RX ADMIN — ALBUTEROL SULFATE 2.5 MG: 2.5 SOLUTION RESPIRATORY (INHALATION) at 13:55

## 2018-01-28 RX ADMIN — HEPARIN SODIUM 5000 UNITS: 5000 INJECTION, SOLUTION INTRAVENOUS; SUBCUTANEOUS at 21:05

## 2018-01-28 RX ADMIN — PANTOPRAZOLE SODIUM 40 MG: 40 INJECTION, POWDER, FOR SOLUTION INTRAVENOUS at 08:17

## 2018-01-28 RX ADMIN — INSULIN LISPRO 2 UNITS: 100 INJECTION, SOLUTION INTRAVENOUS; SUBCUTANEOUS at 11:12

## 2018-01-28 RX ADMIN — PRAVASTATIN SODIUM 40 MG: 40 TABLET ORAL at 16:27

## 2018-01-28 RX ADMIN — ESCITALOPRAM OXALATE 20 MG: 20 TABLET ORAL at 08:16

## 2018-01-28 NOTE — PROGRESS NOTES
Progress Note - General Surgery   Inna Huerta Tahmina 80 y o  female MRN: [id number]  Unit/Bed#: -01 Encounter: [id number]    Assessment:  81 yo F female presenting with acute cholecystitis status post percutaneous cholecystostomy tube placement on 1/24/18 with recent respiratory distress  Improved  Plan:  Dysphagia diet  D/C antibx  PT/OT/OOB  F/U CM, patient is medically ready for D/C  Will go home/rehab w/ drain, f/u 4-6 wks    Subjective/Objective   Chief Complaint: None    Subjective: No complaints, breathing on RA, no abd pain    Objective:     Blood pressure 139/63, pulse 67, temperature 97 6 °F (36 4 °C), temperature source Oral, resp  rate 20, height 4' 7" (1 397 m), weight 64 1 kg (141 lb 5 oz), SpO2 94 %  ,Body mass index is 32 84 kg/m²  Intake/Output Summary (Last 24 hours) at 01/28/18 0606  Last data filed at 01/27/18 2100   Gross per 24 hour   Intake              780 ml   Output             1094 ml   Net             -314 ml       Invasive Devices     Peripheral Intravenous Line            Peripheral IV 01/24/18 Left Forearm 3 days    Peripheral IV 01/24/18 Right Antecubital 3 days    Peripheral IV 01/24/18 Right Forearm 3 days          Drain            Closed/Suction Drain Right Abdomen 8 Fr  3 days                Physical Exam:   Gen: A&O, NAD  Cardio: RRR  Lungs: CTAB  Abd: Soft, non distended, non tender   Perc arnol w/ small about serobilious fluid    Lab, Imaging and other studies:CBC: No results found for: WBC, HGB, HCT, MCV, PLT, ADJUSTEDWBC, MCH, MCHC, RDW, MPV, NRBC, CMP: No results found for: NA, K, CL, CO2, ANIONGAP, BUN, CREATININE, GLUCOSE, CALCIUM, AST, ALT, ALKPHOS, PROT, ALBUMIN, BILITOT, EGFR  VTE Pharmacologic Prophylaxis: Heparin  VTE Mechanical Prophylaxis: sequential compression device

## 2018-01-28 NOTE — PROGRESS NOTES
Pt  Wheezing and labored breathing  Respiratory called  To give breathing treatment  will continue to monitor

## 2018-01-28 NOTE — PLAN OF CARE
Problem: Potential for Falls  Goal: Patient will remain free of falls  INTERVENTIONS:  - Assess patient frequently for physical needs  -  Identify cognitive and physical deficits and behaviors that affect risk of falls    -  [place] fall precautions as indicated by assessment   - Educate patient/family on patient safety including physical limitations  - Instruct patient to call for assistance with activity based on assessment  - Modify environment to reduce risk of injury  - Consider OT/PT consult to assist with strengthening/mobility   Outcome: Progressing      Problem: PAIN - ADULT  Goal: Verbalizes/displays adequate comfort level or baseline comfort level  Interventions:  - Encourage patient to monitor pain and request assistance  - Assess pain using appropriate pain scale (i e  FLACC, 0-10)  - Administer analgesics based on type and severity of pain and evaluate response  - Implement non-pharmacological measures as appropriate and evaluate response  - Consider cultural and social influences on pain and pain management  - Notify physician/advanced practitioner if interventions unsuccessful or patient reports new pain    Outcome: Progressing      Problem: INFECTION - ADULT  Goal: Absence or prevention of progression during hospitalization  INTERVENTIONS:  - Assess and monitor for signs and symptoms of infection  - Monitor lab/diagnostic results  - Monitor all insertion sites, i e  IV site  - [place] appropriate cooling/warming therapies per order  - Administer medications as ordered  - Instruct and encourage patient and family to use good hand hygiene technique  - Identify and instruct in appropriate isolation precautions for identified infection/condition    Outcome: Progressing    Goal: Absence of fever/infection during neutropenic period  INTERVENTIONS:  - Monitor WBC  - Implement neutropenic guidelines   Outcome: Progressing      Problem: SAFETY ADULT  Goal: Maintain or return to baseline ADL function  INTERVENTIONS:  -  Assess patient's ability to carry out ADLs; assess patient's baseline for ADL function and identify physical deficits which impact ability to perform ADLs (bathing, care of mouth/teeth, toileting, grooming, dressing, etc )  - Assess/evaluate cause of self-care deficits   - Assess range of motion  - Assess patient's mobility; develop plan if impaired  - Assess patient's need for assistive devices and provide as appropriate  - Encourage maximum independence but intervene and supervise when necessary  ¯ Involve family in performance of ADLs  ¯ Assess for home care needs following discharge   ¯ Request OT consult to assist with ADL evaluation and planning for discharge  ¯ Provide patient education as appropriate   Outcome: Progressing    Goal: Maintain or return mobility status to optimal level  INTERVENTIONS:  - Assess patient's baseline mobility status (ambulation, transfers, stairs, etc )    - Identify cognitive and physical deficits and behaviors that affect mobility  - Identify mobility aids required to assist with transfers and/or ambulation (gait belt, sit-to-stand, lift, walker, cane, etc )  - [place] fall precautions as indicated by assessment  - Record patient progress and toleration of activity level on Mobility SBAR; progress patient to next Phase/Stage  - Instruct patient to call for assistance with activity based on assessment  - Request Rehabilitation consult to assist with strengthening/weightbearing, etc    Outcome: Progressing

## 2018-01-29 LAB
BACTERIA BLD CULT: NORMAL
BACTERIA BLD CULT: NORMAL
GLUCOSE SERPL-MCNC: 123 MG/DL (ref 65–140)
GLUCOSE SERPL-MCNC: 134 MG/DL (ref 65–140)
GLUCOSE SERPL-MCNC: 171 MG/DL (ref 65–140)
GLUCOSE SERPL-MCNC: 212 MG/DL (ref 65–140)
GLUCOSE SERPL-MCNC: 215 MG/DL (ref 65–140)

## 2018-01-29 PROCEDURE — 82948 REAGENT STRIP/BLOOD GLUCOSE: CPT

## 2018-01-29 PROCEDURE — C9113 INJ PANTOPRAZOLE SODIUM, VIA: HCPCS | Performed by: EMERGENCY MEDICINE

## 2018-01-29 PROCEDURE — 99232 SBSQ HOSP IP/OBS MODERATE 35: CPT | Performed by: SURGERY

## 2018-01-29 RX ORDER — PANTOPRAZOLE SODIUM 40 MG/1
40 TABLET, DELAYED RELEASE ORAL
Status: DISCONTINUED | OUTPATIENT
Start: 2018-01-30 | End: 2018-01-30 | Stop reason: HOSPADM

## 2018-01-29 RX ADMIN — HEPARIN SODIUM 5000 UNITS: 5000 INJECTION, SOLUTION INTRAVENOUS; SUBCUTANEOUS at 05:23

## 2018-01-29 RX ADMIN — AMLODIPINE BESYLATE 2.5 MG: 2.5 TABLET ORAL at 08:30

## 2018-01-29 RX ADMIN — INSULIN LISPRO 2 UNITS: 100 INJECTION, SOLUTION INTRAVENOUS; SUBCUTANEOUS at 11:28

## 2018-01-29 RX ADMIN — HEPARIN SODIUM 5000 UNITS: 5000 INJECTION, SOLUTION INTRAVENOUS; SUBCUTANEOUS at 21:21

## 2018-01-29 RX ADMIN — HEPARIN SODIUM 5000 UNITS: 5000 INJECTION, SOLUTION INTRAVENOUS; SUBCUTANEOUS at 13:52

## 2018-01-29 RX ADMIN — PANTOPRAZOLE SODIUM 40 MG: 40 INJECTION, POWDER, FOR SOLUTION INTRAVENOUS at 08:26

## 2018-01-29 RX ADMIN — ASPIRIN 81 MG 81 MG: 81 TABLET ORAL at 08:26

## 2018-01-29 RX ADMIN — LEVOTHYROXINE SODIUM 75 MCG: 75 TABLET ORAL at 05:26

## 2018-01-29 RX ADMIN — ESCITALOPRAM OXALATE 20 MG: 20 TABLET ORAL at 08:27

## 2018-01-29 RX ADMIN — PRAVASTATIN SODIUM 40 MG: 40 TABLET ORAL at 16:36

## 2018-01-29 NOTE — NUTRITION
01/29/18 1343   Recommendations/Interventions   Interventions Diet: order adjustment   Nutrition Recommendations Lab - consider order (specify)  (Replete Phos, Monitor Mg)

## 2018-01-29 NOTE — PROGRESS NOTES
Progress Note - Rick Duval 1931, 80 y o  female MRN: [id number]    Unit/Bed#: -01 Encounter: [id number]    Primary Care Provider: Zion Tran MD   Date and time admitted to hospital: 2018  8:25 PM        * Cholecystitis with cholelithiasis   Assessment & Plan    - Status post percutaneous cholecystostomy drain placement on 2018   - Continue level 2 Dysphagia diet with thi liquids  - Completed entire course of antibiotics  - Continue PT and OT with recommendations for rehab  - Stable for discharge with drain when bed available; planned follow-up in 4-6 weeks  Dementia   Assessment & Plan    - Regulate sleep/wake cycle  Subjective/Objective     Subjective: No issues overnight per RN  Patient tolerating diet well  She offers no complaints  Objective:     Blood pressure 134/65, pulse 63, temperature 98 2 °F (36 8 °C), temperature source Oral, resp  rate 18, height 4' 7" (1 397 m), weight 64 1 kg (141 lb 5 oz), SpO2 95 %  ,Body mass index is 32 84 kg/m²  Temp (24hrs), Av 1 °F (36 7 °C), Min:97 8 °F (36 6 °C), Max:98 6 °F (37 °C)  Current: Temperature: 98 2 °F (36 8 °C)      Intake/Output Summary (Last 24 hours) at 18 0547  Last data filed at 18 0000   Gross per 24 hour   Intake              300 ml   Output              665 ml   Net             -365 ml       Invasive Devices     Peripheral Intravenous Line            Peripheral IV 18 Left Antecubital less than 1 day          Drain            Closed/Suction Drain Right Abdomen 8 Fr  4 days                Physical Exam:    GENERAL APPEARANCE: Patient in no acute distress  HEENT: NCAT; PERRL, EOMs intact; Mucous membranes moist  CV: Regular rate and rhythm; + S1, S2; no murmur/gallops/rubs appreciated  LUNGS: Clear to auscultation; no wheezes/rales/rhonci  ABD: NABS; soft; non-distended; non-tender; percutaneous cholecystostomy drain with bilious output    EXT: +2 pulses bilaterally upper & lower extremities; no clubbing/cyanosis/edema  NEURO: GCS 14 (confused); no focal neurologic deficits; neurovascularly intact  SKIN: Warm, dry and well perfused; no rash; no jaundice  Lab, Imaging and other studies:I have personally reviewed pertinent lab results      VTE Pharmacologic Prophylaxis: Heparin  VTE Mechanical Prophylaxis: sequential compression device    Jonathon Burton PA-C  1/29/2018 5:48 AM

## 2018-01-29 NOTE — ASSESSMENT & PLAN NOTE
- Status post percutaneous cholecystostomy drain placement on 1/25/2018   - Continue level 2 Dysphagia diet with thi liquids  - Completed entire course of antibiotics  - Continue PT and OT with recommendations for rehab  - Stable for discharge with drain when bed available; planned follow-up in 4-6 weeks

## 2018-01-29 NOTE — PROGRESS NOTES
The pantoprazole has / have been converted to Oral per [facility] for Adults as approved by the Pharmacy and Therapeutics Committee  The patient met all eligible criteria:  3 Age = 25years old   2) Received at least one dose of the IV form   3) Receiving at least one other scheduled oral/enteral medication   4) Tolerating an oral/enteral diet   and did not have any exclusions:   1) Critical care patient   2) Active GI bleed (IF assessing H2RAs or PPIs)   3) Continuous tube feeding (IF assessing cipro, doxycycline, levofloxacin, minocycline, rifampin, or voriconazole)   4) Receiving PO vancomycin (IF assessing metronidazole)   5) Persistent nausea and/or vomiting   6) Ileus or gastrointestinal obstruction   7) Disha/nasogastric tube set for continuous suction   8) Specific order not to automatically convert to PO (in the order's comments or if discussed in the most recent Infectious Disease or primary team's progress notes)

## 2018-01-30 VITALS
TEMPERATURE: 97.7 F | RESPIRATION RATE: 16 BRPM | BODY MASS INDEX: 32.7 KG/M2 | HEIGHT: 55 IN | WEIGHT: 141.31 LBS | OXYGEN SATURATION: 96 % | SYSTOLIC BLOOD PRESSURE: 130 MMHG | DIASTOLIC BLOOD PRESSURE: 58 MMHG | HEART RATE: 66 BPM

## 2018-01-30 LAB
GLUCOSE SERPL-MCNC: 211 MG/DL (ref 65–140)
GLUCOSE SERPL-MCNC: 216 MG/DL (ref 65–140)

## 2018-01-30 PROCEDURE — 94760 N-INVAS EAR/PLS OXIMETRY 1: CPT

## 2018-01-30 PROCEDURE — 99238 HOSP IP/OBS DSCHRG MGMT 30/<: CPT | Performed by: SURGERY

## 2018-01-30 PROCEDURE — 82948 REAGENT STRIP/BLOOD GLUCOSE: CPT

## 2018-01-30 RX ADMIN — ESCITALOPRAM OXALATE 20 MG: 20 TABLET ORAL at 08:06

## 2018-01-30 RX ADMIN — ASPIRIN 81 MG 81 MG: 81 TABLET ORAL at 08:06

## 2018-01-30 RX ADMIN — HEPARIN SODIUM 5000 UNITS: 5000 INJECTION, SOLUTION INTRAVENOUS; SUBCUTANEOUS at 05:06

## 2018-01-30 RX ADMIN — LEVOTHYROXINE SODIUM 75 MCG: 75 TABLET ORAL at 05:06

## 2018-01-30 RX ADMIN — INSULIN LISPRO 2 UNITS: 100 INJECTION, SOLUTION INTRAVENOUS; SUBCUTANEOUS at 11:12

## 2018-01-30 RX ADMIN — INSULIN LISPRO 2 UNITS: 100 INJECTION, SOLUTION INTRAVENOUS; SUBCUTANEOUS at 08:08

## 2018-01-30 RX ADMIN — AMLODIPINE BESYLATE 2.5 MG: 2.5 TABLET ORAL at 08:06

## 2018-01-30 RX ADMIN — PANTOPRAZOLE SODIUM 40 MG: 40 TABLET, DELAYED RELEASE ORAL at 05:06

## 2018-01-30 NOTE — SOCIAL WORK
Pt is for dc today back to [address] [facility] at [place] [address] via [employer]  Call to pts son Krissy Sifuentes and Lm regarding this information  RN aware of dc as well  Spoke to Seferino Torres in [facility] and she is aware as well  Folder completed with ambulance form, face sheet, and transfer form

## 2018-01-30 NOTE — DISCHARGE SUMMARY
Discharge Summary - General Surgery   Ernie Richards Liam Holter 80 y o  female MRN: [id number]  Unit/Bed#: -01 Encounter: [id number]    Admission Date: 1/19/2018     Discharge Date: 01/30/18    Admitting Diagnosis: Acute cholecystitis [K81 0]  Abdominal pain [R10 9]  RUQ pain [R10 11]  Elevated WBC count [D72 829]    Discharge Diagnosis: Acute cholecystitis    Attending: Dr Michael Olea Physician(s): Dr Debora Mccray, pulmonology  Dr Jocelynn Hernandez, palliative care    Procedures Performed:   1/25 Percutaneous cholecystostomy tube placement - IR    Pathology: None    Hospital Course:  22-year-old female presents from a nursing facility with complaints of 1 day of right upper quadrant pain  Imaging and workup in the emergency department revealed acute cholecystitis  She was admitted to the general surgery service, made NPO, underwent IV fluid resuscitation  She was also started on IV antibiotics  By hospital day 1, she had improved significantly  She was started on a clear liquid diet by hospital day 2  On hospital day 3, the patient developed shortness of breath  She was evaluated with chest x-ray, and her son was informed of her respiratory status  He was agreeable to potential intubation at the time  She initially responded to respiratory therapy treatments, but further deteriorated on hospital day 4, requiring transfer to the ICU  It was thought that her increasing respiratory distress was secondary to pain from her gallbladder, so she underwent percutaneous coli cystostomy tube placement by interventional Radiology on 01/25/2018  Her pain improved, and her respiratory status improved  She was again started on a p o  diet and transferred to the floor  She continued to improve  She was evaluated by Physical therapy and Occupational therapy, as well as case management    She was ultimately discharged to [facility] on 01/30/2000 18 with instructions to follow up with General surgery for management of the percutaneous cholecystostomy tube  Condition at Discharge: good     Discharge instructions/Information to patient and family:   See after visit summary for information provided to patient and family  Provisions for Follow-Up Care:  See after visit summary for information related to follow-up care and any pertinent home health orders  Disposition: [facility] care [facility]    Planned Readmission: No    Discharge Statement   I spent 30 minutes discharging the patient  This time was spent on the day of discharge  I had direct contact with the patient on the day of discharge  Additional documentation is required if more than 30 minutes were spent on discharge  Discharge Medications:  See after visit summary for reconciled discharge medications provided to patient and family

## 2018-01-30 NOTE — PROGRESS NOTES
Progress Note - General Surgery   Desmond Scherer Rayne Barnard 80 y o  female MRN: [id number]  Unit/Bed#: -01 Encounter: [id number]    Assessment:  81 yo F female presenting with acute cholecystitis status post percutaneous cholecystostomy tube placement on 1/24/18 with recent respiratory distress  Doing well  Plan:  Dysphagia diet as tolerated  Mobilize patient  Dispo planning  F/U CM, patient is medically ready for D/C  Will go home/rehab w/ drain, f/u 4-6 wks    Subjective/Objective   Chief Complaint: None    Subjective: The no acute events overnight  Patient is tolerating a diet    Objective:     Blood pressure 134/60, pulse 64, temperature 97 7 °F (36 5 °C), temperature source Oral, resp  rate 18, height 4' 7" (1 397 m), weight 64 1 kg (141 lb 5 oz), SpO2 98 %  ,Body mass index is 32 84 kg/m²  Intake/Output Summary (Last 24 hours) at 01/30/18 0827  Last data filed at 01/30/18 0434   Gross per 24 hour   Intake             1260 ml   Output             1266 ml   Net               -6 ml       Invasive Devices     Peripheral Intravenous Line            Peripheral IV 01/28/18 Left Antecubital 1 day          Drain            Closed/Suction Drain Right Abdomen 8 Fr  5 days                Physical Exam:   Gen: NAD  Cardio: RRR  Lungs:  Nonlabored respirations on room air  Abd: Soft, non distended, non tender   Perc arnol w/ bilious drainage    Lab, Imaging and other studies:CBC: No results found for: WBC, HGB, HCT, MCV, PLT, ADJUSTEDWBC, MCH, MCHC, RDW, MPV, NRBC, CMP: No results found for: NA, K, CL, CO2, ANIONGAP, BUN, CREATININE, GLUCOSE, CALCIUM, AST, ALT, ALKPHOS, PROT, ALBUMIN, BILITOT, EGFR  VTE Pharmacologic Prophylaxis: Heparin  VTE Mechanical Prophylaxis: sequential compression device

## 2018-02-01 LAB
ATRIAL RATE: 94 BPM
P AXIS: 37 DEGREES
PR INTERVAL: 156 MS
QRS AXIS: 54 DEGREES
QRSD INTERVAL: 62 MS
QT INTERVAL: 366 MS
QTC INTERVAL: 457 MS
T WAVE AXIS: 92 DEGREES
VENTRICULAR RATE: 94 BPM

## 2018-02-15 ENCOUNTER — HOSPITAL ENCOUNTER (INPATIENT)
Facility: HOSPITAL | Age: 83
LOS: 1 days | Discharge: NURSING, REHAB OR LONG-TERM CARE | DRG: 921 | End: 2018-02-17
Attending: EMERGENCY MEDICINE | Admitting: SURGERY
Payer: MEDICARE

## 2018-02-15 DIAGNOSIS — T83.020A: Primary | ICD-10-CM

## 2018-02-15 LAB
ALBUMIN SERPL BCP-MCNC: 2.6 G/DL (ref 3.5–5)
ALP SERPL-CCNC: 99 U/L (ref 46–116)
ALT SERPL W P-5'-P-CCNC: 13 U/L (ref 12–78)
ANION GAP SERPL CALCULATED.3IONS-SCNC: 9 MMOL/L (ref 4–13)
AST SERPL W P-5'-P-CCNC: 15 U/L (ref 5–45)
BASOPHILS # BLD AUTO: 0.02 THOUSANDS/ΜL (ref 0–0.1)
BASOPHILS NFR BLD AUTO: 0 % (ref 0–1)
BILIRUB SERPL-MCNC: 0.26 MG/DL (ref 0.2–1)
BUN SERPL-MCNC: 18 MG/DL (ref 5–25)
CALCIUM SERPL-MCNC: 8.9 MG/DL (ref 8.3–10.1)
CHLORIDE SERPL-SCNC: 103 MMOL/L (ref 100–108)
CO2 SERPL-SCNC: 26 MMOL/L (ref 21–32)
CREAT SERPL-MCNC: 1.06 MG/DL (ref 0.6–1.3)
EOSINOPHIL # BLD AUTO: 0.31 THOUSAND/ΜL (ref 0–0.61)
EOSINOPHIL NFR BLD AUTO: 2 % (ref 0–6)
ERYTHROCYTE [DISTWIDTH] IN BLOOD BY AUTOMATED COUNT: 15.1 % (ref 11.6–15.1)
GFR SERPL CREATININE-BSD FRML MDRD: 48 ML/MIN/1.73SQ M
GLUCOSE SERPL-MCNC: 209 MG/DL (ref 65–140)
HCT VFR BLD AUTO: 35.3 % (ref 34.8–46.1)
HGB BLD-MCNC: 11.9 G/DL (ref 11.5–15.4)
LYMPHOCYTES # BLD AUTO: 2.13 THOUSANDS/ΜL (ref 0.6–4.47)
LYMPHOCYTES NFR BLD AUTO: 17 % (ref 14–44)
MCH RBC QN AUTO: 32.4 PG (ref 26.8–34.3)
MCHC RBC AUTO-ENTMCNC: 33.7 G/DL (ref 31.4–37.4)
MCV RBC AUTO: 96 FL (ref 82–98)
MONOCYTES # BLD AUTO: 1.23 THOUSAND/ΜL (ref 0.17–1.22)
MONOCYTES NFR BLD AUTO: 10 % (ref 4–12)
NEUTROPHILS # BLD AUTO: 8.98 THOUSANDS/ΜL (ref 1.85–7.62)
NEUTS SEG NFR BLD AUTO: 71 % (ref 43–75)
NRBC BLD AUTO-RTO: 0 /100 WBCS
PLATELET # BLD AUTO: 295 THOUSANDS/UL (ref 149–390)
PMV BLD AUTO: 10.1 FL (ref 8.9–12.7)
POTASSIUM SERPL-SCNC: 3.5 MMOL/L (ref 3.5–5.3)
PROT SERPL-MCNC: 8 G/DL (ref 6.4–8.2)
RBC # BLD AUTO: 3.67 MILLION/UL (ref 3.81–5.12)
SODIUM SERPL-SCNC: 138 MMOL/L (ref 136–145)
WBC # BLD AUTO: 12.76 THOUSAND/UL (ref 4.31–10.16)

## 2018-02-15 PROCEDURE — 85025 COMPLETE CBC W/AUTO DIFF WBC: CPT | Performed by: EMERGENCY MEDICINE

## 2018-02-15 PROCEDURE — 36415 COLL VENOUS BLD VENIPUNCTURE: CPT | Performed by: EMERGENCY MEDICINE

## 2018-02-15 PROCEDURE — 99222 1ST HOSP IP/OBS MODERATE 55: CPT | Performed by: SURGERY

## 2018-02-15 PROCEDURE — 0FW4X0Z REVISION OF DRAINAGE DEVICE IN GALLBLADDER, EXTERNAL APPROACH: ICD-10-PCS | Performed by: SURGERY

## 2018-02-15 PROCEDURE — 99285 EMERGENCY DEPT VISIT HI MDM: CPT

## 2018-02-15 PROCEDURE — 80053 COMPREHEN METABOLIC PANEL: CPT | Performed by: EMERGENCY MEDICINE

## 2018-02-15 RX ORDER — ACETAMINOPHEN 325 MG/1
650 TABLET ORAL EVERY 6 HOURS PRN
Status: DISCONTINUED | OUTPATIENT
Start: 2018-02-15 | End: 2018-02-17 | Stop reason: HOSPADM

## 2018-02-15 RX ORDER — INSULIN GLARGINE 100 [IU]/ML
7 INJECTION, SOLUTION SUBCUTANEOUS
Status: DISCONTINUED | OUTPATIENT
Start: 2018-02-15 | End: 2018-02-17 | Stop reason: HOSPADM

## 2018-02-15 RX ORDER — PRAVASTATIN SODIUM 40 MG
40 TABLET ORAL
Status: DISCONTINUED | OUTPATIENT
Start: 2018-02-16 | End: 2018-02-17 | Stop reason: HOSPADM

## 2018-02-15 RX ORDER — PANTOPRAZOLE SODIUM 20 MG/1
20 TABLET, DELAYED RELEASE ORAL
Status: DISCONTINUED | OUTPATIENT
Start: 2018-02-16 | End: 2018-02-17 | Stop reason: HOSPADM

## 2018-02-15 RX ORDER — ESCITALOPRAM OXALATE 20 MG/1
20 TABLET ORAL DAILY
Status: DISCONTINUED | OUTPATIENT
Start: 2018-02-16 | End: 2018-02-17 | Stop reason: HOSPADM

## 2018-02-15 RX ORDER — MELATONIN
1000 DAILY
Status: DISCONTINUED | OUTPATIENT
Start: 2018-02-16 | End: 2018-02-17 | Stop reason: HOSPADM

## 2018-02-15 RX ORDER — CHOLECALCIFEROL (VITAMIN D3) 125 MCG
1000 CAPSULE ORAL DAILY
Status: DISCONTINUED | OUTPATIENT
Start: 2018-02-16 | End: 2018-02-17 | Stop reason: HOSPADM

## 2018-02-15 RX ORDER — ASPIRIN 81 MG/1
81 TABLET, CHEWABLE ORAL DAILY
Status: DISCONTINUED | OUTPATIENT
Start: 2018-02-16 | End: 2018-02-17 | Stop reason: HOSPADM

## 2018-02-15 RX ORDER — HEPARIN SODIUM 5000 [USP'U]/ML
5000 INJECTION, SOLUTION INTRAVENOUS; SUBCUTANEOUS EVERY 8 HOURS SCHEDULED
Status: DISCONTINUED | OUTPATIENT
Start: 2018-02-15 | End: 2018-02-17 | Stop reason: HOSPADM

## 2018-02-15 RX ORDER — AMLODIPINE BESYLATE 2.5 MG/1
2.5 TABLET ORAL DAILY
Status: DISCONTINUED | OUTPATIENT
Start: 2018-02-16 | End: 2018-02-17 | Stop reason: HOSPADM

## 2018-02-15 RX ORDER — LIDOCAINE HYDROCHLORIDE 10 MG/ML
5 INJECTION, SOLUTION EPIDURAL; INFILTRATION; INTRACAUDAL; PERINEURAL ONCE
Status: COMPLETED | OUTPATIENT
Start: 2018-02-15 | End: 2018-02-15

## 2018-02-15 RX ORDER — ONDANSETRON 2 MG/ML
4 INJECTION INTRAMUSCULAR; INTRAVENOUS EVERY 6 HOURS PRN
Status: DISCONTINUED | OUTPATIENT
Start: 2018-02-15 | End: 2018-02-17 | Stop reason: HOSPADM

## 2018-02-15 RX ORDER — LEVOTHYROXINE SODIUM 0.07 MG/1
75 TABLET ORAL DAILY
Status: DISCONTINUED | OUTPATIENT
Start: 2018-02-16 | End: 2018-02-17 | Stop reason: HOSPADM

## 2018-02-15 RX ADMIN — LIDOCAINE HYDROCHLORIDE 5 ML: 10 INJECTION, SOLUTION EPIDURAL; INFILTRATION; INTRACAUDAL; PERINEURAL at 22:42

## 2018-02-16 ENCOUNTER — APPOINTMENT (OUTPATIENT)
Dept: RADIOLOGY | Facility: HOSPITAL | Age: 83
DRG: 921 | End: 2018-02-16
Payer: MEDICARE

## 2018-02-16 PROBLEM — T85.518A CHOLECYSTOSTOMY TUBE DYSFUNCTION: Status: ACTIVE | Noted: 2018-02-16

## 2018-02-16 LAB
ABO GROUP BLD: NORMAL
ALBUMIN SERPL BCP-MCNC: 1.9 G/DL (ref 3.5–5)
ALP SERPL-CCNC: 91 U/L (ref 46–116)
ALT SERPL W P-5'-P-CCNC: 8 U/L (ref 12–78)
ANION GAP SERPL CALCULATED.3IONS-SCNC: 7 MMOL/L (ref 4–13)
AST SERPL W P-5'-P-CCNC: 12 U/L (ref 5–45)
BASOPHILS # BLD AUTO: 0.06 THOUSANDS/ΜL (ref 0–0.1)
BASOPHILS NFR BLD AUTO: 1 % (ref 0–1)
BILIRUB SERPL-MCNC: 0.45 MG/DL (ref 0.2–1)
BLD GP AB SCN SERPL QL: NEGATIVE
BUN SERPL-MCNC: 15 MG/DL (ref 5–25)
CALCIUM SERPL-MCNC: 9 MG/DL (ref 8.3–10.1)
CHLORIDE SERPL-SCNC: 106 MMOL/L (ref 100–108)
CO2 SERPL-SCNC: 24 MMOL/L (ref 21–32)
CREAT SERPL-MCNC: 0.88 MG/DL (ref 0.6–1.3)
EOSINOPHIL # BLD AUTO: 0.4 THOUSAND/ΜL (ref 0–0.61)
EOSINOPHIL NFR BLD AUTO: 4 % (ref 0–6)
ERYTHROCYTE [DISTWIDTH] IN BLOOD BY AUTOMATED COUNT: 14.9 % (ref 11.6–15.1)
GFR SERPL CREATININE-BSD FRML MDRD: 60 ML/MIN/1.73SQ M
GLUCOSE SERPL-MCNC: 108 MG/DL (ref 65–140)
GLUCOSE SERPL-MCNC: 120 MG/DL (ref 65–140)
GLUCOSE SERPL-MCNC: 123 MG/DL (ref 65–140)
GLUCOSE SERPL-MCNC: 149 MG/DL (ref 65–140)
GLUCOSE SERPL-MCNC: 156 MG/DL (ref 65–140)
GLUCOSE SERPL-MCNC: 170 MG/DL (ref 65–140)
HCT VFR BLD AUTO: 34.3 % (ref 34.8–46.1)
HGB BLD-MCNC: 11.2 G/DL (ref 11.5–15.4)
INR PPP: 1.09 (ref 0.86–1.16)
LYMPHOCYTES # BLD AUTO: 2.91 THOUSANDS/ΜL (ref 0.6–4.47)
LYMPHOCYTES NFR BLD AUTO: 26 % (ref 14–44)
MCH RBC QN AUTO: 31.5 PG (ref 26.8–34.3)
MCHC RBC AUTO-ENTMCNC: 32.7 G/DL (ref 31.4–37.4)
MCV RBC AUTO: 96 FL (ref 82–98)
MONOCYTES # BLD AUTO: 0.95 THOUSAND/ΜL (ref 0.17–1.22)
MONOCYTES NFR BLD AUTO: 8 % (ref 4–12)
NEUTROPHILS # BLD AUTO: 6.95 THOUSANDS/ΜL (ref 1.85–7.62)
NEUTS SEG NFR BLD AUTO: 61 % (ref 43–75)
NRBC BLD AUTO-RTO: 0 /100 WBCS
PLATELET # BLD AUTO: 295 THOUSANDS/UL (ref 149–390)
PMV BLD AUTO: 10.5 FL (ref 8.9–12.7)
POTASSIUM SERPL-SCNC: 3.2 MMOL/L (ref 3.5–5.3)
PROT SERPL-MCNC: 7.3 G/DL (ref 6.4–8.2)
PROTHROMBIN TIME: 14.1 SECONDS (ref 12.1–14.4)
RBC # BLD AUTO: 3.56 MILLION/UL (ref 3.81–5.12)
RH BLD: POSITIVE
SODIUM SERPL-SCNC: 137 MMOL/L (ref 136–145)
SPECIMEN EXPIRATION DATE: NORMAL
WBC # BLD AUTO: 11.36 THOUSAND/UL (ref 4.31–10.16)

## 2018-02-16 PROCEDURE — 47531 INJECTION FOR CHOLANGIOGRAM: CPT

## 2018-02-16 PROCEDURE — 85610 PROTHROMBIN TIME: CPT | Performed by: SURGERY

## 2018-02-16 PROCEDURE — 47531 INJECTION FOR CHOLANGIOGRAM: CPT | Performed by: RADIOLOGY

## 2018-02-16 PROCEDURE — 82948 REAGENT STRIP/BLOOD GLUCOSE: CPT

## 2018-02-16 PROCEDURE — 86901 BLOOD TYPING SEROLOGIC RH(D): CPT | Performed by: SURGERY

## 2018-02-16 PROCEDURE — 80053 COMPREHEN METABOLIC PANEL: CPT | Performed by: SURGERY

## 2018-02-16 PROCEDURE — 86900 BLOOD TYPING SEROLOGIC ABO: CPT | Performed by: SURGERY

## 2018-02-16 PROCEDURE — 85025 COMPLETE CBC W/AUTO DIFF WBC: CPT | Performed by: SURGERY

## 2018-02-16 PROCEDURE — 86850 RBC ANTIBODY SCREEN: CPT | Performed by: SURGERY

## 2018-02-16 RX ORDER — POTASSIUM CHLORIDE 14.9 MG/ML
20 INJECTION INTRAVENOUS
Status: COMPLETED | OUTPATIENT
Start: 2018-02-16 | End: 2018-02-16

## 2018-02-16 RX ADMIN — VITAMIN D, TAB 1000IU (100/BT) 1000 UNITS: 25 TAB at 09:26

## 2018-02-16 RX ADMIN — HEPARIN SODIUM 5000 UNITS: 5000 INJECTION, SOLUTION INTRAVENOUS; SUBCUTANEOUS at 23:17

## 2018-02-16 RX ADMIN — ESCITALOPRAM OXALATE 20 MG: 20 TABLET ORAL at 09:27

## 2018-02-16 RX ADMIN — IOHEXOL 4 ML: 300 INJECTION, SOLUTION INTRAVENOUS at 18:02

## 2018-02-16 RX ADMIN — HEPARIN SODIUM 5000 UNITS: 5000 INJECTION, SOLUTION INTRAVENOUS; SUBCUTANEOUS at 14:06

## 2018-02-16 RX ADMIN — INSULIN GLARGINE 7 UNITS: 100 INJECTION, SOLUTION SUBCUTANEOUS at 23:17

## 2018-02-16 RX ADMIN — AMLODIPINE BESYLATE 2.5 MG: 2.5 TABLET ORAL at 09:26

## 2018-02-16 RX ADMIN — POTASSIUM CHLORIDE 20 MEQ: 200 INJECTION, SOLUTION INTRAVENOUS at 12:05

## 2018-02-16 RX ADMIN — CYANOCOBALAMIN TAB 500 MCG 1000 MCG: 500 TAB at 09:26

## 2018-02-16 RX ADMIN — POTASSIUM CHLORIDE 20 MEQ: 200 INJECTION, SOLUTION INTRAVENOUS at 09:20

## 2018-02-16 RX ADMIN — HEPARIN SODIUM 5000 UNITS: 5000 INJECTION, SOLUTION INTRAVENOUS; SUBCUTANEOUS at 01:23

## 2018-02-16 RX ADMIN — LEVOTHYROXINE SODIUM 75 MCG: 75 TABLET ORAL at 05:58

## 2018-02-16 RX ADMIN — INSULIN GLARGINE 7 UNITS: 100 INJECTION, SOLUTION SUBCUTANEOUS at 01:23

## 2018-02-16 RX ADMIN — PANTOPRAZOLE SODIUM 20 MG: 20 TABLET, DELAYED RELEASE ORAL at 05:58

## 2018-02-16 RX ADMIN — INSULIN LISPRO 1 UNITS: 100 INJECTION, SOLUTION INTRAVENOUS; SUBCUTANEOUS at 18:41

## 2018-02-16 RX ADMIN — ASPIRIN 81 MG 81 MG: 81 TABLET ORAL at 09:26

## 2018-02-16 RX ADMIN — PRAVASTATIN SODIUM 40 MG: 40 TABLET ORAL at 18:40

## 2018-02-16 RX ADMIN — ACETAMINOPHEN 650 MG: 325 TABLET, FILM COATED ORAL at 18:40

## 2018-02-16 RX ADMIN — ACETAMINOPHEN 650 MG: 325 TABLET, FILM COATED ORAL at 09:42

## 2018-02-16 RX ADMIN — HEPARIN SODIUM 5000 UNITS: 5000 INJECTION, SOLUTION INTRAVENOUS; SUBCUTANEOUS at 05:58

## 2018-02-16 NOTE — PROCEDURES
General Surgery Drain Stitch Placement    RUQ percutaneous cholecystostomy tube partially dislodged with prolene suture around tubing  This stitch was cut off  Alcohol was used to prep the skin  1% lidocaine w/ epinephrine 5mL was infiltrated around exist point on skin and using a 3-0 silk suture the tube was re-stitched to the skin  Patient tolerated well at bedside in ED      Gina Coy MD PGY-4  11:12 PM  02/15/18

## 2018-02-16 NOTE — ED PROVIDER NOTES
History  Chief Complaint   Patient presents with    Post-op Problem     pt has arnol, sent here bc stitches look out of place  pt has no pain     81 yo F presents to ED with possible dislodged perc arnol tube  Tube was placed by IR on 1/25/18 for acute cholecystectomy  Pt has no complaints at this time  Nursing facility noticed that "stitches looked out of place " Pt unable to contribute to history due to dementia  No information provided from nursing facility as to how much the tube has been draining  Prior to Admission Medications   Prescriptions Last Dose Informant Patient Reported? Taking?    Linagliptin (TRADJENTA) 5 MG TABS   Yes No   Sig: Take 5 mg by mouth daily   acetaminophen (TYLENOL) 325 mg tablet   No No   Sig: Take 3 tablets by mouth every 8 (eight) hours   amLODIPine (NORVASC) 2 5 mg tablet   No No   Sig: Take 1 tablet by mouth daily   aspirin 81 mg chewable tablet   No No   Sig: Chew 1 tablet daily   cholecalciferol (VITAMIN D3) 1,000 units tablet   Yes No   Sig: Take 1,000 Units by mouth daily   cyanocobalamin (VITAMIN B-12) 1,000 mcg tablet   Yes No   Sig: Take 1,000 mcg by mouth daily   escitalopram (LEXAPRO) 20 mg tablet   Yes No   Sig: Take 20 mg by mouth daily   insulin glargine (LANTUS) 100 units/mL subcutaneous injection   No No   Sig: Inject 7 Units under the skin daily at bedtime   insulin lispro (HumaLOG) 100 units/mL injection   No No   Sig: Inject 3 Units under the skin 3 (three) times a day with meals   levothyroxine 50 mcg tablet   Yes No   Sig: Take 75 mcg by mouth daily   lidocaine (LIDODERM) 5 %   No No   Sig: Place 1 patch on the skin daily Remove & Discard patch within 12 hours or as directed by MD   nystatin (MYCOSTATIN) powder   No No   Sig: Apply topically 3 (three) times a day   omeprazole (PriLOSEC) 20 mg delayed release capsule   Yes No   Sig: Take 20 mg by mouth daily   rosuvastatin (CRESTOR) 5 mg tablet   Yes No   Sig: Take 5 mg by mouth daily Facility-Administered Medications: None       Past Medical History:   Diagnosis Date    Dementia     Diabetes mellitus ([address] )     Hyperlipidemia     Hypothyroidism     Macular degeneration     Osteoarthritis        History reviewed  No pertinent surgical history  Family History   Problem Relation Age of Onset    Family history unknown: Yes     I have reviewed and agree with the history as documented  Social History   Substance Use Topics    Smoking status: Never Smoker    Smokeless tobacco: Never Used    Alcohol use No        Review of Systems   Unable to perform ROS: Dementia       Physical Exam  ED Triage Vitals   Temperature Pulse Respirations Blood Pressure SpO2   02/15/18 2039 02/15/18 2039 02/15/18 2039 02/15/18 2039 02/15/18 2039   98 7 °F (37 1 °C) 89 18 127/97 97 %      Temp Source Heart Rate Source Patient Position - Orthostatic VS BP Location FiO2 (%)   02/15/18 2039 02/15/18 2039 02/15/18 2131 02/15/18 2039 --   Oral Monitor Lying Right arm       Pain Score       02/15/18 2039       No Pain           Orthostatic Vital Signs  Vitals:    02/15/18 2039 02/15/18 2131 02/15/18 2200   BP: 127/97 150/66 137/63   Pulse: 89 96 97   Patient Position - Orthostatic VS:  Lying        Physical Exam   Constitutional: She appears well-developed and well-nourished  No distress  HENT:   Head: Normocephalic and atraumatic  Mouth/Throat: Oropharynx is clear and moist    Eyes: Conjunctivae and EOM are normal  Right eye exhibits no discharge  Left eye exhibits no discharge  Neck: Normal range of motion  Neck supple  Pulmonary/Chest: Effort normal  No respiratory distress  She exhibits no tenderness  Abdominal: Soft  Bowel sounds are normal  There is no tenderness  There is no rebound and no guarding  Abdominal binder in place and removed  Perc arnol tube suture appears to have been cut  Suture on tube is some distance away from entry site of tube, so unsure how far arnol tube has been pulled out  No surrounding erythema at entry site, nontender to palpation   Neurological: She is alert  Skin: Skin is warm and dry  Nursing note and vitals reviewed        ED Medications  Medications   ondansetron (ZOFRAN) injection 4 mg (not administered)   heparin (porcine) subcutaneous injection 5,000 Units (not administered)   acetaminophen (TYLENOL) tablet 650 mg (not administered)   amLODIPine (NORVASC) tablet 2 5 mg (not administered)   aspirin chewable tablet 81 mg (not administered)   cholecalciferol (VITAMIN D3) tablet 1,000 Units (not administered)   cyanocobalamin (VITAMIN B-12) tablet 1,000 mcg (not administered)   escitalopram (LEXAPRO) tablet 20 mg (not administered)   insulin glargine (LANTUS) subcutaneous injection 7 Units (not administered)   levothyroxine tablet 75 mcg (not administered)   pantoprazole (PROTONIX) EC tablet 20 mg (not administered)   pravastatin (PRAVACHOL) tablet 40 mg (not administered)   insulin lispro (HumaLOG) 100 units/mL subcutaneous injection 1-5 Units (not administered)   lidocaine (PF) (XYLOCAINE-MPF) 1 % injection 5 mL (5 mL Infiltration Given 2/15/18 2242)       Diagnostic Studies  Results Reviewed     Procedure Component Value Units Date/Time    Platelet count [[id number]]     Lab Status:  No result Specimen:  Blood     Comprehensive metabolic panel [[id number]]  (Abnormal) Collected:  02/15/18 2139    Lab Status:  Final result Specimen:  Blood from Arm, Right Updated:  02/15/18 2213     Sodium 138 mmol/L      Potassium 3 5 mmol/L      Chloride 103 mmol/L      CO2 26 mmol/L      Anion Gap 9 mmol/L      BUN 18 mg/dL      Creatinine 1 06 mg/dL      Glucose 209 (H) mg/dL      Calcium 8 9 mg/dL      AST 15 U/L      ALT 13 U/L      Alkaline Phosphatase 99 U/L      Total Protein 8 0 g/dL      Albumin 2 6 (L) g/dL      Total Bilirubin 0 26 mg/dL      eGFR 48 ml/min/1 73sq m     Narrative:         National Kidney Disease Education Program recommendations are as follows:  GFR calculation is accurate only with a steady state creatinine  Chronic Kidney disease less than 60 ml/min/1 73 sq  meters  Kidney failure less than 15 ml/min/1 73 sq  meters  CBC and differential [[id number]]  (Abnormal) Collected:  02/15/18 2139    Lab Status:  Final result Specimen:  Blood from Arm, Right Updated:  02/15/18 2153     WBC 12 76 (H) Thousand/uL      RBC 3 67 (L) Million/uL      Hemoglobin 11 9 g/dL      Hematocrit 35 3 %      MCV 96 fL      MCH 32 4 pg      MCHC 33 7 g/dL      RDW 15 1 %      MPV 10 1 fL      Platelets 965 Thousands/uL      nRBC 0 /100 WBCs      Neutrophils Relative 71 %      Lymphocytes Relative 17 %      Monocytes Relative 10 %      Eosinophils Relative 2 %      Basophils Relative 0 %      Neutrophils Absolute 8 98 (H) Thousands/µL      Lymphocytes Absolute 2 13 Thousands/µL      Monocytes Absolute 1 23 (H) Thousand/µL      Eosinophils Absolute 0 31 Thousand/µL      Basophils Absolute 0 02 Thousands/µL                  IR consult    (Results Pending)         Procedures  Procedures      Phone Consults  ED Phone Contact    ED Course  ED Course                                MDM  Number of Diagnoses or Management Options  Cystostomy care [facility]):   Diagnosis management comments: Red surgery consulted and will admit for drain check by IR      CritCare Time    Disposition  Final diagnoses:   Displacement of cystostomy catheter, initial encounter [facility])     Time reflects when diagnosis was documented in both MDM as applicable and the Disposition within this note     Time User Action Codes Description Comment    2/15/2018  9:16 PM Arielle Vazquez, [address] [Z43 5] Cystostomy care [facility])     2/15/2018 10:46 PM Eddie Enter Add [T83 020A] Displacement of cystostomy catheter, initial encounter ([facility] [address] )     2/15/2018 10:47 PM Eddie Enter Modify [T83 020A] [facility] catheter[facility]     2/15/2018 10:47 PM Eddie Enter Remove [Z43 5] Cystostomy care [facility])       ED Disposition     ED Disposition Condition Comment    Admit  Case was discussed with red surgery and the patient's admission status was agreed to be Admission Status: observation status to the service of Dr Arnaud Hill   Follow-up Information    None       Patient's Medications   Discharge Prescriptions    No medications on file     No discharge procedures on file  ED Provider  Attending physically available and evaluated Mairajosef Saini  SCAR managed the patient along with the ED Attending      Electronically Signed by         Alisha Mejia MD  02/15/18 Peterson Petit MD  02/15/18 2231

## 2018-02-16 NOTE — PROGRESS NOTES
Progress Note - General Surgery  Inna Huerta Tahmina 80 y o  female MRN: [id number]  Unit/Bed#: CW3 351-02 Encounter: [id number]    Assessment:  80y o -year-old female with partially dislodged perc arnol drain 3 weeks post op    Plan:  1  Dislodged percutaneous cholecystostomy tube              - will re-suture tube to skin              - IR for drain check today              - if drain is dislodged, will remove              - otherwise pt hopefully can return to [facility] later today              - discussed w/ son     2  DVT prophylaxis              - SQ              - SCDs     3  Disposition              - med Yana Madrigal MD PGY-4  5:33 AM  02/16/18      Subjective:  No acute events since admission  Sleeping well  Objective:  Patient Vitals for the past 24 hrs:   BP Temp Temp src Pulse Resp SpO2 Weight   02/16/18 0311 118/64 98 °F (36 7 °C) Oral 90 18 95 % -   02/15/18 2326 143/79 98 5 °F (36 9 °C) Oral 97 18 98 % -   02/15/18 2200 137/63 - - 97 20 97 % -   02/15/18 2131 150/66 - - 96 20 97 % -   02/15/18 2039 127/97 98 7 °F (37 1 °C) Oral 89 18 97 % 59 kg (130 lb)          Diet Orders            Start     Ordered    02/15/18 2243  Diet Dysphagia/Modified Consistency; Dysphagia 2-Mechanical Soft; Dysphagia 2-Mechanical Soft; Thin Liquid  Diet effective now     Question Answer Comment   Diet Type Dysphagia/Modified Consistency    Dysphagia/Modified Consistency Dysphagia 2-Mechanical Soft    Other Restriction(s): Dysphagia 2-Mechanical Soft    Liquid Modifier Thin Liquid    RD to adjust diet per protocol?  Yes        02/15/18 2244      No intake or output data in the 24 hours ending 02/16/18 0533     Physical Exam:  General: NAD  Cardiovascular: RRR  Respiratory: breath sounds b/l  Abdomen: soft, NT, ND, drain in RUQ w/ bile  Extremities: no edema    Medications:    Current Facility-Administered Medications:  acetaminophen 650 mg Oral Q6H PRN Tylor Anderson MD   amLODIPine 2 5 mg Oral Daily Maicol Face, MD   aspirin 81 mg Oral Daily [place] Face, MD   cholecalciferol 1,000 Units Oral Daily Maicol Face, MD   cyanocobalamin 1,000 mcg Oral Daily [place] Face, MD   escitalopram 20 mg Oral Daily [place] Face, MD   heparin (porcine) 5,000 Units Subcutaneous [facility] Maicol Face, MD   insulin glargine 7 Units Subcutaneous HS Maicol Face, MD   insulin lispro 1-5 Units [facility] Maicol Face, MD   levothyroxine 75 mcg Oral Daily Maicol Face, MD   ondansetron 4 mg Intravenous Q6H PRN Maicol Face, MD   pantoprazole 20 mg Oral Early Morning Maicol Face, MD   pravastatin 40 mg Oral Daily With Saul Espino MD      acetaminophen 650 mg Q6H PRN   ondansetron 4 mg Q6H PRN     Laboratory results:   CBC:   Lab Results   Component Value Date    WBC 11 36 (H) 02/16/2018    HGB 11 2 (L) 02/16/2018    HCT 34 3 (L) 02/16/2018    MCV 96 02/16/2018     02/16/2018    MCH 31 5 02/16/2018    MCHC 32 7 02/16/2018    RDW 14 9 02/16/2018    MPV 10 5 02/16/2018    NRBC 0 02/16/2018   , CMP:   Lab Results   Component Value Date     02/16/2018    K 3 2 (L) 02/16/2018     02/16/2018    CO2 24 02/16/2018    ANIONGAP 7 02/16/2018    BUN 15 02/16/2018    CREATININE 0 88 02/16/2018    GLUCOSE 149 (H) 02/16/2018    CALCIUM 9 0 02/16/2018    AST 12 02/16/2018    ALT 8 (L) 02/16/2018    ALKPHOS 91 02/16/2018    PROT 7 3 02/16/2018    BILITOT 0 45 02/16/2018    EGFR 60 02/16/2018   , Coagulation:   Lab Results   Component Value Date    INR 1 09 02/16/2018   , Urinalysis: No results found for: Hettie Davison, SPECGRAV, PHUR, LEUKOCYTESUR, NITRITE, PROTEINUA, GLUCOSEU, KETONESU, BILIRUBINUR, BLOODU, Amylase: No results found for: AMYLASE, Lipase: No results found for: LIPASE    VTE Pharmacologic Prophylaxis: Heparin  VTE Mechanical Prophylaxis: sequential compression device

## 2018-02-16 NOTE — H&P
H&P Exam - General Surgery   Renetta Gold 80 y o  female MRN: [id number]  Unit/Bed#: ED 22 Encounter: [id number]    Assessment/Plan     Assessment:  80year-old female 3 weeks after percutaneous cholecystostomy tube placement with slight dislodgement of tube    Plan:  1  Dislodged percutaneous cholecystostomy tube   - will re-suture tube to skin   - IR for drain check   - if drain is dislodged, will remove   - otherwise pt hopefully can return to [address]   - discussed w/ son    2  DVT prophylaxis   - [facility]   - SCDs    3  Disposition   - med surg    Pavel Perdue MD PGY-4  10:41 PM  02/15/18        History of Present Illness     HPI:  Clair Cazares is a 80 y o  female who presents 3 weeks after percutaneous cholecystostomy tube placement for acute cholecystitis  She has since finished antibiotics and is currently at [facility]  She has no symptoms however time while being turned her percutaneous cholecystostomy drain was slightly dislodged  She was sent to the emergency department  She has dementia does difficult to assess her, however according to her son she has not been having any problems  Review of Systems   Unable to perform ROS: Dementia       Historical Information   Past Medical History:   Diagnosis Date    Dementia     Diabetes mellitus ([facility] [address] )     Hyperlipidemia     Hypothyroidism     Macular degeneration     Osteoarthritis      History reviewed  No pertinent surgical history    Social History   History   Alcohol Use No     History   Drug Use No     History   Smoking Status    Never Smoker   Smokeless Tobacco    Never Used     Family History: non-contributory    Meds/Allergies   all medications and allergies reviewed  Allergies   Allergen Reactions    Penicillins     Valsartan        Objective   First Vitals:   Blood Pressure: 127/97 (02/15/18 2039)  Pulse: 89 (02/15/18 2039)  Temperature: 98 7 °F (37 1 °C) (02/15/18 2039)  Temp Source: Oral (02/15/18 2039)  Respirations: 18 (02/15/18 2039)  Weight - Scale: 59 kg (130 lb) (02/15/18 2039)  SpO2: 97 % (02/15/18 2039)    Current Vitals:   Blood Pressure: 137/63 (02/15/18 2200)  Pulse: 97 (02/15/18 2200)  Temperature: 98 7 °F (37 1 °C) (02/15/18 2039)  Temp Source: Oral (02/15/18 2039)  Respirations: 20 (02/15/18 2200)  Weight - Scale: 59 kg (130 lb) (02/15/18 2039)  SpO2: 97 % (02/15/18 2200)    No intake or output data in the 24 hours ending 02/15/18 2235    Invasive Devices     Peripheral Intravenous Line            Peripheral IV 02/15/18 Right Antecubital less than 1 day          Drain            Closed/Suction Drain Right Abdomen 8 Fr  21 days                Physical Exam   Constitutional: She is oriented to person, place, and time  She appears well-developed and well-nourished  HENT:   Head: Normocephalic and atraumatic  Eyes: Pupils are equal, round, and reactive to light  Neck: Normal range of motion  No tracheal deviation present  Cardiovascular: Normal rate and regular rhythm  Pulmonary/Chest: Effort normal  She has no wheezes  Abdominal: Soft  There is no tenderness  Perc arnol drain w/ bile, slightly pulled out from skin but still in place   Musculoskeletal: Normal range of motion  She exhibits no edema  Neurological: She is alert and oriented to person, place, and time  No cranial nerve deficit  Skin: Skin is warm  No rash noted  Psychiatric: She has a normal mood and affect         Lab Results:   CBC:   Lab Results   Component Value Date    WBC 12 76 (H) 02/15/2018    HGB 11 9 02/15/2018    HCT 35 3 02/15/2018    MCV 96 02/15/2018     02/15/2018    MCH 32 4 02/15/2018    MCHC 33 7 02/15/2018    RDW 15 1 02/15/2018    MPV 10 1 02/15/2018    NRBC 0 02/15/2018   , CMP:   Lab Results   Component Value Date     02/15/2018    K 3 5 02/15/2018     02/15/2018    CO2 26 02/15/2018    ANIONGAP 9 02/15/2018    BUN 18 02/15/2018    CREATININE 1 06 02/15/2018    GLUCOSE 209 (H) 02/15/2018    CALCIUM 8 9 02/15/2018    AST 15 02/15/2018    ALT 13 02/15/2018    ALKPHOS 99 02/15/2018    PROT 8 0 02/15/2018    BILITOT 0 26 02/15/2018    EGFR 48 02/15/2018   , Coagulation: No results found for: PT, INR, APTT, Urinalysis: No results found for: Nickola Michael, SPECGRAV, PHUR, LEUKOCYTESUR, NITRITE, PROTEINUA, GLUCOSEU, KETONESU, BILIRUBINUR, BLOODU, Amylase: No results found for: AMYLASE, Lipase: No results found for: LIPASE  Imaging: I have personally reviewed pertinent films in PACS  EKG, Pathology, and Other Studies: I have personally reviewed pertinent films in PACS    Code Status: Prior  Advance Directive and Living Will:      Power of :    POLST:      Counseling / Coordination of Care  Total floor / unit time spent today 30 minutes  Greater than 50% of total time was spent with the patient and / or family counseling and / or coordination of care  A description of the counseling / coordination of care: plan of care

## 2018-02-16 NOTE — SEDATION DOCUMENTATION
Right abdominal drainage tube checked in IR by Dr Baltazar Vinny  Tube to remain intact and continue care as ordered

## 2018-02-16 NOTE — SOCIAL WORK
CM spoke to the patient's son, Amol Rasmussen ([phone]) to confirm that the patient is a long term resident of [address] [place] [address]  Per Arabella Will, plan to DC patient back to [address] [place] [address] either this evening or Saturday morning  Mello amenable to plan  [facility] referral to [address] [place] [address]  CM continues to follow

## 2018-02-16 NOTE — CASE MANAGEMENT
Initial Clinical Review    Admission: Date/Time/Statement: Observation 2/15 @ 2242    Orders Placed This Encounter   Procedures    Place in Observation     Standing Status:   Standing     Number of Occurrences:   1     Order Specific Question:   Admitting Physician     Answer:   Dorys Rucker [[id number]]     Order Specific Question:   Level of Care     Answer:   Med Surg [[id number]]       ED: Date/Time/Mode of Arrival:   ED Arrival Information     Expected Arrival Acuity Means of Arrival Escorted By Service Admission Type    - 2/15/2018 20:36 Urgent Ambulance [facility] Surgery-General Urgent    Arrival Complaint    Post op problem          Chief Complaint:   Chief Complaint   Patient presents with    Post-op Problem     pt has arnol, sent here bc stitches look out of place  pt has no pain       History of Illness:  80 y o  female who presents 3 weeks after percutaneous cholecystostomy tube placement for acute cholecystitis  She has since finished antibiotics and is currently at [facility]  She has no symptoms, however time while being turned her percutaneous cholecystostomy drain was slightly dislodged  She was sent to the emergency department  She has dementia does difficult to assess her, however according to her son she has not been having any problems  ED Vital Signs:   ED Triage Vitals   Temperature Pulse Respirations Blood Pressure SpO2   02/15/18 2039 02/15/18 2039 02/15/18 2039 02/15/18 2039 02/15/18 2039   98 7 °F (37 1 °C) 89 18 127/97 97 %      Temp Source Heart Rate Source Patient Position - Orthostatic VS BP Location FiO2 (%)   02/15/18 2039 02/15/18 2039 02/15/18 2131 02/15/18 2039 --   Oral Monitor Lying Right arm       Pain Score       02/15/18 2039       No Pain        Wt Readings from Last 1 Encounters:   02/15/18 59 kg (130 lb)       Vital Signs (abnormal):  WNL    Abnormal Labs:   02/15/18 2139     WBC 4 31 - 10 16 Thousand/uL 12 76     RBC 3 81 - 5 12 Million/uL 3 67     Hemoglobin 11 5 - 15 4 g/dL 11 9    Hematocrit 34 8 - 46 1 % 35 3      Potassium 3 5 - 5 3 mmol/L 3 2       Diagnostic Test Results:     ED Treatment:   Medication Administration from 02/15/2018 2036 to 02/15/2018 2302       Date/Time Order Dose Route Action     02/15/2018 2242 lidocaine (PF) (XYLOCAINE-MPF) 1 % injection 5 mL 5 mL Infiltration Given          Past Medical/Surgical History: Active Ambulatory Problems     Diagnosis Date Noted    Altered mental status 11/21/2017    Dementia 11/21/2017    Essential hypertension 11/21/2017    [facility] 11/21/2017    Stage 3 chronic kidney disease 11/21/2017    Falls 11/22/2017    Epigastric pain 11/22/2017    Cholecystitis with cholelithiasis 01/21/2018    Shortness of breath 01/24/2018    Anemia 01/24/2018    Hypothyroid 01/24/2018     Resolved Ambulatory Problems     Diagnosis Date Noted    UTI (urinary tract infection) 11/21/2017    Hyperkalemia 11/21/2017     Past Medical History:   Diagnosis Date    Dementia     Diabetes mellitus ([place] [address] )     Hyperlipidemia     Hypothyroidism     Macular degeneration     Osteoarthritis        Admitting Diagnosis: Post-op pain [G89 18]  Displacement of cystostomy catheter, initial encounter ([place] [address] ) [T83 020A]    Age/Sex: 80 y o  female      Assessment:  58-year-old female 3 weeks after percutaneous cholecystostomy tube placement with slight dislodgement of tube     Plan:  1  Dislodged percutaneous cholecystostomy tube              - will re-suture tube to skin              - IR for drain check              - if drain is dislodged, will remove              - otherwise pt hopefully can return to [address]              - discussed w/ son     2  DVT prophylaxis              - [facility]              - SCDs     3   Disposition              - med surg        Admission Orders:  IR Tube check  Scheduled Meds:   Current Facility-Administered Medications:  amLODIPine 2 5 mg Oral Daily   aspirin 81 mg Oral Daily   cholecalciferol 1,000 Units Oral Daily   cyanocobalamin 1,000 mcg Oral Daily   escitalopram 20 mg Oral Daily   heparin (porcine) 5,000 Units Subcutaneous Q8H [address]   insulin glargine 7 Units Subcutaneous HS   insulin lispro 1-5 Units Subcutaneous TID AC   levothyroxine 75 mcg Oral Daily   pantoprazole 20 mg Oral Early Morning   potassium chloride 20 mEq Intravenous Q2H   pravastatin 40 mg Oral Daily With Dinner     Continuous Infusions:    PRN Meds:   acetaminophen    ondansetron

## 2018-02-17 VITALS
OXYGEN SATURATION: 97 % | WEIGHT: 130 LBS | BODY MASS INDEX: 30.21 KG/M2 | DIASTOLIC BLOOD PRESSURE: 72 MMHG | HEART RATE: 76 BPM | RESPIRATION RATE: 16 BRPM | SYSTOLIC BLOOD PRESSURE: 143 MMHG | TEMPERATURE: 97.9 F

## 2018-02-17 LAB — GLUCOSE SERPL-MCNC: 96 MG/DL (ref 65–140)

## 2018-02-17 PROCEDURE — 82948 REAGENT STRIP/BLOOD GLUCOSE: CPT

## 2018-02-17 RX ADMIN — HEPARIN SODIUM 5000 UNITS: 5000 INJECTION, SOLUTION INTRAVENOUS; SUBCUTANEOUS at 05:35

## 2018-02-17 RX ADMIN — AMLODIPINE BESYLATE 2.5 MG: 2.5 TABLET ORAL at 09:08

## 2018-02-17 RX ADMIN — LEVOTHYROXINE SODIUM 75 MCG: 75 TABLET ORAL at 05:35

## 2018-02-17 RX ADMIN — ESCITALOPRAM OXALATE 20 MG: 20 TABLET ORAL at 09:08

## 2018-02-17 RX ADMIN — PANTOPRAZOLE SODIUM 20 MG: 20 TABLET, DELAYED RELEASE ORAL at 05:35

## 2018-02-17 RX ADMIN — ASPIRIN 81 MG 81 MG: 81 TABLET ORAL at 09:08

## 2018-02-17 RX ADMIN — CYANOCOBALAMIN TAB 500 MCG 1000 MCG: 500 TAB at 09:08

## 2018-02-17 RX ADMIN — VITAMIN D, TAB 1000IU (100/BT) 1000 UNITS: 25 TAB at 09:08

## 2018-02-17 NOTE — PHYSICIAN ADVISOR
This patient is a 80 y o  y/o female who is admitted to the hospital for Post-op Problem (pt has arnol, sent here bc stitches look out of place  pt has no pain)   The patient presented to the ED on 2/15/18 at 2036 and was admitted to the hospital on 2/15/2018 2037  History of Present Illness includes: the patient had a prior admission from 1/19-1/30 for acute cholecystitis  The patient was treated with IV abx and on that admission she underwent percutaneous cholecystostomy tube  The patient is at the nursing home and while being turned her PC tube was slightly dislodged  Vital signs in the ER are as follows   ED Triage Vitals   Temperature Pulse Respirations Blood Pressure SpO2   02/15/18 2039 02/15/18 2039 02/15/18 2039 02/15/18 2039 02/15/18 2039   98 7 °F (37 1 °C) 89 18 127/97 97 %      Temp Source Heart Rate Source Patient Position - Orthostatic VS BP Location FiO2 (%)   02/15/18 2039 02/15/18 2039 02/15/18 2131 02/15/18 2039 --   Oral Monitor Lying Right arm       Pain Score       02/15/18 2039       No Pain           On exam there is no abdominal tenderness  The PC drain is draining with bile, in place but slightly pulled out from skin  WBC is 12 76, HGB is 11 9  Cr is 1 06  The patient is being admitted due to a dislodged PCN tube  The drain will be sutured and IR consultation for drain check  The patient underwent IR drainage check and the tube is patent  The patient on 2/16 was noted to be hypokalemic and received IV potassium replacement  This patient is appropriate for INPATIENT admission as her length of stay is expected to be at least 2 midnights and the patient will pass the 2 MN threshold  In addition to the [facility] tube check the patient received IV K replacement for hypokalemia    This admission is UNRELATED to her prior admission; the dislodgment occurred at the nursing home as the patient was being turned as per H and P

## 2018-02-17 NOTE — ED ATTENDING ATTESTATION
Shelia Arcos DO, saw and evaluated the patient  I have discussed the patient with the resident/non-physician practitioner and agree with the resident's/non-physician practitioner's findings, Plan of Care, and MDM as documented in the resident's/non-physician practitioner's note, except where noted  All available labs and Radiology studies were reviewed  At this point I agree with the current assessment done in the Emergency Department  I have conducted an independent evaluation of this patient a history and physical is as follows:    Patient is an 55-year-old female presenting for a dislodged percutaneous cholecystectomy tube  Patient has a small area where a stitch came loose and a small area of dehiscence  Concern if the tube was subsequently dislodged not in the proper place for drainage  Will have patient evaluated by surgery and possible interventional radiology for drain check  Will defer CT at this time  Patient denies any fevers or chills, pain, nausea vomiting, chest pain, shortness of breath, otherwise negative review of systems  Area otherwise appears clean dry and intact with no areas of purulence or fluctuance or crepitus  Patient be admitted to the surgical service      Critical Care Time  CritCare Time    Procedures

## 2018-02-17 NOTE — PROGRESS NOTES
Progress Note - General Surgery   Amina Puckett 80 y o  female MRN: [id number]  Unit/Bed#: CW3 351-02 Encounter: [id number]    Assessment:  80y o -year-old female with partially dislodged perc arnol drain 3 weeks post op  S/P drain check, appeared to be in place in the lumen of the GB    Plan:  Continue drain   Dysphagia diet  D/C back to [facility] transport set for 9:30 AM    Subjective/Objective   Chief Complaint: None    Subjective: No complaints  S/P IR drain check yesterady, kept in place    Objective:     Blood pressure 144/67, pulse 75, temperature 98 2 °F (36 8 °C), temperature source Oral, resp  rate 18, weight 59 kg (130 lb), SpO2 96 %  ,Body mass index is 30 21 kg/m²  Intake/Output Summary (Last 24 hours) at 02/17/18 0556  Last data filed at 02/16/18 1900   Gross per 24 hour   Intake              720 ml   Output                0 ml   Net              720 ml       Invasive Devices     Peripheral Intravenous Line            Peripheral IV 02/15/18 Right Antecubital 1 day    Peripheral IV 02/16/18 Left Arm less than 1 day          Drain            Nephrostomy Right -- days    Closed/Suction Drain Right Abdomen 8 Fr  23 days                Physical Exam:   Gen: A&O, NAD  Cardio: RRR  Lungs: CTAB  Abd: Soft, non distended, non tender   Drain to RUQ, small amount sero-bilious fluid      Lab, Imaging and other studies:CBC: No results found for: WBC, HGB, HCT, MCV, PLT, ADJUSTEDWBC, MCH, MCHC, RDW, MPV, NRBC, CMP: No results found for: NA, K, CL, CO2, ANIONGAP, BUN, CREATININE, GLUCOSE, CALCIUM, AST, ALT, ALKPHOS, PROT, ALBUMIN, BILITOT, EGFR  VTE Pharmacologic Prophylaxis: Heparin  VTE Mechanical Prophylaxis: sequential compression device

## 2018-02-17 NOTE — CASE MANAGEMENT
Was OBSERVATION 2/14/18 @2244, CONVERTED TO INPATIENT ADMISSION 2/17/18 @0939 BASED ON PHYSICIAN ADVISOR DETERMINATION  REQUIRED IV POTASSIUM REPLACEMENT FOR HYPOKALEMIA AND REQUIRED A 2 MIDNIGHT STAY    02/17/18 0939  Inpatient Admission Once     Transfer Service: Surgery-General       Question Answer Comment   Admitting Physician Yovany Chaney [facility] Med Surg    Estimated length of stay More than 2 Midnights    Certification I certify that inpatient services are medically necessary for this patient for a duration of greater than two midnights  See H&P and MD Progress Notes for additional information about the patient's course of treatment  02/17/18 1994     Patient is being discharged today

## 2018-02-17 NOTE — DISCHARGE SUMMARY
Discharge Summary - Colten Lou 80 y o  female MRN: [id number]    Unit/Bed#: [facility] 351-02 Encounter: [id number]    Admission Date: 2/15/2018   Discharge Date: 2/17/2018    Admitting Diagnosis: Post-op pain [G89 18]  Displacement of cystostomy catheter, initial encounter ([facility] ) [T83 020A]    HPI: Colten Lou is a 80 y o  female who presents 3 weeks after percutaneous cholecystostomy tube placement for acute cholecystitis  She has since finished antibiotics and is currently at [facility]  She has no symptoms however time while being turned her percutaneous cholecystostomy drain was slightly dislodged  She was sent to the emergency department  She has dementia does difficult to assess her, however according to her son she has not been having any problems  Procedures Performed:   2/16 IR drain check    Hospital Course: In the ED the drain was re-sutured in it's current location  The patient's home meds were all continued, and IR drain check was ordered for the following day  On IR drain check the tube appeared to be in appropriate position, so it was left in place  The patient was unable to return to [facility] that night (too late to set up transport) however was arranged for discharge early in the AM on 2/17  She was discharged in good condition to [facility] (previous place of residence) with the drain in place  She is to follow up in 4 weeks with general surgery  Significant Findings, Care, Treatment and Services Provided: as ablve    Complications: None    Discharge Diagnosis: Cholecystostomy tube malfunction    Resolved Problems  Date Reviewed: 1/29/2018    None          Condition at Discharge: fair     Discharge instructions/Information to patient and family:   See after visit summary for information provided to patient and family  Provisions for Follow-Up Care:  See after visit summary for information related to follow-up care and any pertinent home health orders        Disposition: Skilled nursing facility at [facility] Readmission: No    Discharge Statement   I spent 30 minutes discharging the patient  This time was spent on the day of discharge  I had direct contact with the patient on the day of discharge  Additional documentation is required if more than 30 minutes were spent on discharge  Discharge Medications:  See after visit summary for reconciled discharge medications provided to patient and family

## 2018-02-17 NOTE — SOCIAL WORK
Per Sharron Brittle with [facility] patient is medically clear for d/c back to [facility]  CM was unable to find transport this evening  Transport arranged for tomorrow 2/17 at 9:30 am with Moon Cai at [facility]  CM spoke with [place] at [facility] and patient's son Orestes Pompa ([phone]) to notify them of transport time  Facility contacts completed in [facility]  Medical necessity completed

## 2018-03-01 ENCOUNTER — OFFICE VISIT (OUTPATIENT)
Dept: SURGERY | Facility: CLINIC | Age: 83
End: 2018-03-01
Payer: MEDICARE

## 2018-03-01 VITALS — TEMPERATURE: 99.5 F | DIASTOLIC BLOOD PRESSURE: 64 MMHG | SYSTOLIC BLOOD PRESSURE: 128 MMHG

## 2018-03-01 DIAGNOSIS — K80.18 CALCULUS OF GALLBLADDER WITH CHOLECYSTITIS OF OTHER ACUITY WITHOUT OBSTRUCTION: Primary | ICD-10-CM

## 2018-03-01 PROCEDURE — 99214 OFFICE O/P EST MOD 30 MIN: CPT | Performed by: SURGERY

## 2018-03-01 NOTE — PROGRESS NOTES
Assessment/Plan:    Calculus of gallbladder with cholecystitis of other acuity without obstruction      P: Will get scheduled with IR for drain removal     Would not offer Lap choly as pt is high risk secondary to frailty  Subjective:      Patient ID: Elmo Barahona is a 80 y o  female  HPI - Pt had perc choly placed after failed conservative management of acute cholecystitis  Pt is high risk for david-operative complications based on frailty index  At this time she has no complaints c/w cholecystitis  Recent per choly tube check demonstrated patent cystic duct  The following portions of the patient's history were reviewed and updated as appropriate: allergies, current medications, past family history, past medical history, past social history, past surgical history and problem list     Review of Systems   Constitutional:        Pt with malaise  Poor appetite   HENT: Negative  Eyes: Negative  Respiratory: Negative  Cardiovascular: Negative  Gastrointestinal: Negative for abdominal pain and nausea  Genitourinary:        Chronic UTI   Musculoskeletal: Negative  Skin: Negative  Neurological: Negative  Psychiatric/Behavioral:        Says she is "never happy"         Objective:      /64 (BP Location: Right arm, Patient Position: Sitting, Cuff Size: Standard)   Temp 99 5 °F (37 5 °C) (Tympanic)          Physical Exam   Constitutional: She is oriented to person, place, and time  No distress  HENT:   Head: Normocephalic and atraumatic  Eyes: Conjunctivae are normal  Right eye exhibits no discharge  No scleral icterus  Neck: Neck supple  No tracheal deviation present  Cardiovascular: Normal rate and regular rhythm  Pulmonary/Chest: Effort normal and breath sounds normal  She has no wheezes  Abdominal: Soft  She exhibits no distension  There is no tenderness  There is no rebound  Perc choly in place and draining bile   Musculoskeletal: She exhibits no tenderness     Wheel chair bound    Neurological: She is alert and oriented to person, place, and time  No cranial nerve deficit  Skin: Skin is warm and dry  No rash noted  She is not diaphoretic     Psychiatric:   Showing signs of depression

## 2018-03-08 ENCOUNTER — HOSPITAL ENCOUNTER (OUTPATIENT)
Dept: RADIOLOGY | Facility: HOSPITAL | Age: 83
Discharge: HOME | End: 2018-03-08
Admitting: RADIOLOGY
Payer: MEDICARE

## 2018-03-08 DIAGNOSIS — K81.9 CHOLECYSTITIS: ICD-10-CM

## 2018-03-08 PROCEDURE — 47536 EXCHANGE BILIARY DRG CATH: CPT

## 2018-03-08 PROCEDURE — C1769 GUIDE WIRE: HCPCS

## 2018-03-08 PROCEDURE — 47531 INJECTION FOR CHOLANGIOGRAM: CPT | Performed by: RADIOLOGY

## 2018-03-08 PROCEDURE — C1894 INTRO/SHEATH, NON-LASER: HCPCS

## 2018-03-08 RX ADMIN — IOHEXOL 15 ML: 300 INJECTION, SOLUTION INTRAVENOUS at 09:39

## 2018-03-19 NOTE — PLAN OF CARE
DISCHARGE PLANNING     Discharge to home or other facility with appropriate resources Progressing        DISCHARGE PLANNING - CARE MANAGEMENT     Discharge to post-acute care or home with appropriate resources Progressing        INFECTION - ADULT     Absence or prevention of progression during hospitalization Progressing     Absence of fever/infection during neutropenic period Progressing        Knowledge Deficit     Patient/family/caregiver demonstrates understanding of disease process, treatment plan, medications, and discharge instructions Progressing        NEUROSENSORY - ADULT     Achieves stable or improved neurological status Progressing     Absence of seizures Progressing     Remains free of injury related to seizures activity Progressing     Achieves maximal functionality and self care Progressing        Nutrition/Hydration-ADULT     Nutrient/Hydration intake appropriate for improving, restoring or maintaining nutritional needs Progressing        PAIN - ADULT     Verbalizes/displays adequate comfort level or baseline comfort level Progressing        Potential for Falls     Patient will remain free of falls Progressing        Prexisting or High Potential for Compromised Skin Integrity     Skin integrity is maintained or improved Progressing        SAFETY ADULT     Maintain or return to baseline ADL function Progressing     Maintain or return mobility status to optimal level Progressing hair removal not indicated

## 2018-05-03 ENCOUNTER — APPOINTMENT (INPATIENT)
Dept: RADIOLOGY | Facility: HOSPITAL | Age: 83
DRG: 871 | End: 2018-05-03
Payer: MEDICARE

## 2018-05-03 ENCOUNTER — ANESTHESIA (INPATIENT)
Dept: PERIOP | Facility: HOSPITAL | Age: 83
DRG: 871 | End: 2018-05-03
Payer: MEDICARE

## 2018-05-03 ENCOUNTER — ANESTHESIA EVENT (INPATIENT)
Dept: PERIOP | Facility: HOSPITAL | Age: 83
DRG: 871 | End: 2018-05-03
Payer: MEDICARE

## 2018-05-03 ENCOUNTER — APPOINTMENT (EMERGENCY)
Dept: RADIOLOGY | Facility: HOSPITAL | Age: 83
DRG: 871 | End: 2018-05-03
Payer: MEDICARE

## 2018-05-03 ENCOUNTER — HOSPITAL ENCOUNTER (INPATIENT)
Facility: HOSPITAL | Age: 83
LOS: 4 days | Discharge: NURSING, REHAB OR LONG-TERM CARE | DRG: 871 | End: 2018-05-07
Attending: EMERGENCY MEDICINE | Admitting: SURGERY
Payer: MEDICARE

## 2018-05-03 DIAGNOSIS — K81.9 CHOLECYSTITIS: ICD-10-CM

## 2018-05-03 DIAGNOSIS — K83.09 CHOLANGITIS: ICD-10-CM

## 2018-05-03 DIAGNOSIS — K80.00 CALCULUS OF GALLBLADDER WITH ACUTE CHOLECYSTITIS WITHOUT OBSTRUCTION: Primary | ICD-10-CM

## 2018-05-03 LAB
ABO GROUP BLD: NORMAL
ALBUMIN SERPL BCP-MCNC: 2 G/DL (ref 3.5–5)
ALBUMIN SERPL BCP-MCNC: 2 G/DL (ref 3.5–5)
ALBUMIN SERPL BCP-MCNC: 2.4 G/DL (ref 3.5–5)
ALP SERPL-CCNC: 408 U/L (ref 46–116)
ALP SERPL-CCNC: 455 U/L (ref 46–116)
ALP SERPL-CCNC: 623 U/L (ref 46–116)
ALT SERPL W P-5'-P-CCNC: 427 U/L (ref 12–78)
ALT SERPL W P-5'-P-CCNC: 473 U/L (ref 12–78)
ALT SERPL W P-5'-P-CCNC: 599 U/L (ref 12–78)
ANION GAP SERPL CALCULATED.3IONS-SCNC: 10 MMOL/L (ref 4–13)
ANION GAP SERPL CALCULATED.3IONS-SCNC: 11 MMOL/L (ref 4–13)
ANION GAP SERPL CALCULATED.3IONS-SCNC: 13 MMOL/L (ref 4–13)
ANISOCYTOSIS BLD QL SMEAR: PRESENT
APTT PPP: 26 SECONDS (ref 23–35)
APTT PPP: 30 SECONDS (ref 23–35)
AST SERPL W P-5'-P-CCNC: 1273 U/L (ref 5–45)
AST SERPL W P-5'-P-CCNC: 1847 U/L (ref 5–45)
AST SERPL W P-5'-P-CCNC: 987 U/L (ref 5–45)
ATRIAL RATE: 134 BPM
BACTERIA UR QL AUTO: ABNORMAL /HPF
BASE EX.OXY STD BLDV CALC-SCNC: 62.6 % (ref 60–80)
BASE EX.OXY STD BLDV CALC-SCNC: 70.3 % (ref 60–80)
BASE EX.OXY STD BLDV CALC-SCNC: 83.4 % (ref 60–80)
BASE EXCESS BLDV CALC-SCNC: -10 MMOL/L
BASE EXCESS BLDV CALC-SCNC: -4.3 MMOL/L
BASE EXCESS BLDV CALC-SCNC: -8.3 MMOL/L
BASOPHILS # BLD AUTO: 0.01 THOUSANDS/ΜL (ref 0–0.1)
BASOPHILS # BLD MANUAL: 0.18 THOUSAND/UL (ref 0–0.1)
BASOPHILS NFR BLD AUTO: 0 % (ref 0–1)
BASOPHILS NFR MAR MANUAL: 1 % (ref 0–1)
BILIRUB SERPL-MCNC: 2.47 MG/DL (ref 0.2–1)
BILIRUB SERPL-MCNC: 2.55 MG/DL (ref 0.2–1)
BILIRUB SERPL-MCNC: 3.19 MG/DL (ref 0.2–1)
BILIRUB UR QL STRIP: NEGATIVE
BLD GP AB SCN SERPL QL: NEGATIVE
BODY TEMPERATURE: 100.8 DEGREES FEHRENHEIT
BUN SERPL-MCNC: 20 MG/DL (ref 5–25)
BUN SERPL-MCNC: 21 MG/DL (ref 5–25)
BUN SERPL-MCNC: 23 MG/DL (ref 5–25)
CALCIUM SERPL-MCNC: 8.2 MG/DL (ref 8.3–10.1)
CALCIUM SERPL-MCNC: 8.2 MG/DL (ref 8.3–10.1)
CALCIUM SERPL-MCNC: 9.1 MG/DL (ref 8.3–10.1)
CHLORIDE SERPL-SCNC: 110 MMOL/L (ref 100–108)
CHLORIDE SERPL-SCNC: 113 MMOL/L (ref 100–108)
CHLORIDE SERPL-SCNC: 114 MMOL/L (ref 100–108)
CLARITY UR: ABNORMAL
CO2 SERPL-SCNC: 16 MMOL/L (ref 21–32)
CO2 SERPL-SCNC: 18 MMOL/L (ref 21–32)
CO2 SERPL-SCNC: 20 MMOL/L (ref 21–32)
COLOR UR: YELLOW
CREAT SERPL-MCNC: 1.25 MG/DL (ref 0.6–1.3)
CREAT SERPL-MCNC: 1.29 MG/DL (ref 0.6–1.3)
CREAT SERPL-MCNC: 1.37 MG/DL (ref 0.6–1.3)
EOSINOPHIL # BLD AUTO: 0.01 THOUSAND/ΜL (ref 0–0.61)
EOSINOPHIL # BLD MANUAL: 0 THOUSAND/UL (ref 0–0.4)
EOSINOPHIL NFR BLD AUTO: 0 % (ref 0–6)
EOSINOPHIL NFR BLD MANUAL: 0 % (ref 0–6)
ERYTHROCYTE [DISTWIDTH] IN BLOOD BY AUTOMATED COUNT: 15.3 % (ref 11.6–15.1)
ERYTHROCYTE [DISTWIDTH] IN BLOOD BY AUTOMATED COUNT: 15.5 % (ref 11.6–15.1)
ERYTHROCYTE [DISTWIDTH] IN BLOOD BY AUTOMATED COUNT: 15.5 % (ref 11.6–15.1)
GFR SERPL CREATININE-BSD FRML MDRD: 35 ML/MIN/1.73SQ M
GFR SERPL CREATININE-BSD FRML MDRD: 38 ML/MIN/1.73SQ M
GFR SERPL CREATININE-BSD FRML MDRD: 39 ML/MIN/1.73SQ M
GLUCOSE SERPL-MCNC: 262 MG/DL (ref 65–140)
GLUCOSE SERPL-MCNC: 264 MG/DL (ref 65–140)
GLUCOSE SERPL-MCNC: 289 MG/DL (ref 65–140)
GLUCOSE SERPL-MCNC: 326 MG/DL (ref 65–140)
GLUCOSE UR STRIP-MCNC: NEGATIVE MG/DL
HCO3 BLDV-SCNC: 16.3 MMOL/L (ref 24–30)
HCO3 BLDV-SCNC: 17.4 MMOL/L (ref 24–30)
HCO3 BLDV-SCNC: 20.3 MMOL/L (ref 24–30)
HCT VFR BLD AUTO: 30.7 % (ref 34.8–46.1)
HCT VFR BLD AUTO: 32 % (ref 34.8–46.1)
HCT VFR BLD AUTO: 37.7 % (ref 34.8–46.1)
HGB BLD-MCNC: 10.2 G/DL (ref 11.5–15.4)
HGB BLD-MCNC: 12.6 G/DL (ref 11.5–15.4)
HGB BLD-MCNC: 9.9 G/DL (ref 11.5–15.4)
HGB UR QL STRIP.AUTO: ABNORMAL
HYALINE CASTS #/AREA URNS LPF: ABNORMAL /LPF
INR PPP: 1.14 (ref 0.86–1.16)
INR PPP: 1.3 (ref 0.86–1.16)
KETONES UR STRIP-MCNC: NEGATIVE MG/DL
LACTATE SERPL-SCNC: 5 MMOL/L (ref 0.5–2)
LACTATE SERPL-SCNC: 5.3 MMOL/L (ref 0.5–2)
LACTATE SERPL-SCNC: 5.6 MMOL/L (ref 0.5–2)
LACTATE SERPL-SCNC: 5.7 MMOL/L (ref 0.5–2)
LACTATE SERPL-SCNC: 7.8 MMOL/L (ref 0.5–2)
LEUKOCYTE ESTERASE UR QL STRIP: ABNORMAL
LIPASE SERPL-CCNC: 79 U/L (ref 73–393)
LYMPHOCYTES # BLD AUTO: 1.1 THOUSAND/UL (ref 0.6–4.47)
LYMPHOCYTES # BLD AUTO: 1.49 THOUSANDS/ΜL (ref 0.6–4.47)
LYMPHOCYTES # BLD AUTO: 6 % (ref 14–44)
LYMPHOCYTES NFR BLD AUTO: 20 % (ref 14–44)
MACROCYTES BLD QL AUTO: PRESENT
MAGNESIUM SERPL-MCNC: 1.5 MG/DL (ref 1.6–2.6)
MAGNESIUM SERPL-MCNC: 1.5 MG/DL (ref 1.6–2.6)
MCH RBC QN AUTO: 32.2 PG (ref 26.8–34.3)
MCH RBC QN AUTO: 33 PG (ref 26.8–34.3)
MCH RBC QN AUTO: 33.4 PG (ref 26.8–34.3)
MCHC RBC AUTO-ENTMCNC: 31.9 G/DL (ref 31.4–37.4)
MCHC RBC AUTO-ENTMCNC: 32.2 G/DL (ref 31.4–37.4)
MCHC RBC AUTO-ENTMCNC: 33.4 G/DL (ref 31.4–37.4)
MCV RBC AUTO: 100 FL (ref 82–98)
MCV RBC AUTO: 101 FL (ref 82–98)
MCV RBC AUTO: 102 FL (ref 82–98)
MONOCYTES # BLD AUTO: 0.05 THOUSAND/ΜL (ref 0.17–1.22)
MONOCYTES # BLD AUTO: 0.91 THOUSAND/UL (ref 0–1.22)
MONOCYTES NFR BLD AUTO: 1 % (ref 4–12)
MONOCYTES NFR BLD: 5 % (ref 4–12)
NASAL CANNULA: 2
NEUTROPHILS # BLD AUTO: 6.06 THOUSANDS/ΜL (ref 1.85–7.62)
NEUTROPHILS # BLD MANUAL: 16.07 THOUSAND/UL (ref 1.85–7.62)
NEUTS BAND NFR BLD MANUAL: 7 % (ref 0–8)
NEUTS SEG NFR BLD AUTO: 79 % (ref 43–75)
NEUTS SEG NFR BLD AUTO: 81 % (ref 43–75)
NITRITE UR QL STRIP: NEGATIVE
NON-SQ EPI CELLS URNS QL MICRO: ABNORMAL /HPF
NRBC BLD AUTO-RTO: 0 /100 WBCS
NRBC BLD AUTO-RTO: 0 /100 WBCS
O2 CT BLDV-SCNC: 11 ML/DL
O2 CT BLDV-SCNC: 14.5 ML/DL
O2 CT BLDV-SCNC: 8.9 ML/DL
P AXIS: 51 DEGREES
PCO2 BLDV: 35.7 MM HG (ref 42–50)
PCO2 BLDV: 36.1 MM HG (ref 42–50)
PCO2 BLDV: 37.1 MM HG (ref 42–50)
PH BLDV: 7.26 [PH] (ref 7.3–7.4)
PH BLDV: 7.3 [PH] (ref 7.3–7.4)
PH BLDV: 7.37 [PH] (ref 7.3–7.4)
PH UR STRIP.AUTO: 5.5 [PH] (ref 4.5–8)
PLATELET # BLD AUTO: 181 THOUSANDS/UL (ref 149–390)
PLATELET # BLD AUTO: 200 THOUSANDS/UL (ref 149–390)
PLATELET # BLD AUTO: 280 THOUSANDS/UL (ref 149–390)
PLATELET BLD QL SMEAR: ADEQUATE
PMV BLD AUTO: 10.9 FL (ref 8.9–12.7)
PMV BLD AUTO: 11.7 FL (ref 8.9–12.7)
PMV BLD AUTO: 11.8 FL (ref 8.9–12.7)
PO2 BLDV: 36 MM HG (ref 35–45)
PO2 BLDV: 41.9 MM HG (ref 35–45)
PO2 BLDV: 51.7 MM HG (ref 35–45)
POIKILOCYTOSIS BLD QL SMEAR: PRESENT
POTASSIUM SERPL-SCNC: 3.3 MMOL/L (ref 3.5–5.3)
POTASSIUM SERPL-SCNC: 3.3 MMOL/L (ref 3.5–5.3)
POTASSIUM SERPL-SCNC: 4.8 MMOL/L (ref 3.5–5.3)
PR INTERVAL: 176 MS
PROT SERPL-MCNC: 6.5 G/DL (ref 6.4–8.2)
PROT SERPL-MCNC: 6.6 G/DL (ref 6.4–8.2)
PROT SERPL-MCNC: 8 G/DL (ref 6.4–8.2)
PROT UR STRIP-MCNC: ABNORMAL MG/DL
PROTHROMBIN TIME: 14.6 SECONDS (ref 12.1–14.4)
PROTHROMBIN TIME: 16.3 SECONDS (ref 12.1–14.4)
QRS AXIS: 57 DEGREES
QRSD INTERVAL: 74 MS
QT INTERVAL: 276 MS
QTC INTERVAL: 412 MS
RBC # BLD AUTO: 3 MILLION/UL (ref 3.81–5.12)
RBC # BLD AUTO: 3.17 MILLION/UL (ref 3.81–5.12)
RBC # BLD AUTO: 3.77 MILLION/UL (ref 3.81–5.12)
RBC #/AREA URNS AUTO: ABNORMAL /HPF
RBC MORPH BLD: PRESENT
RH BLD: POSITIVE
SODIUM SERPL-SCNC: 140 MMOL/L (ref 136–145)
SODIUM SERPL-SCNC: 142 MMOL/L (ref 136–145)
SODIUM SERPL-SCNC: 143 MMOL/L (ref 136–145)
SP GR UR STRIP.AUTO: 1.01 (ref 1–1.03)
SPECIMEN EXPIRATION DATE: NORMAL
T WAVE AXIS: 28 DEGREES
TROPONIN I SERPL-MCNC: 0.2 NG/ML
TROPONIN I SERPL-MCNC: 0.29 NG/ML
TROPONIN I SERPL-MCNC: 0.33 NG/ML
TROPONIN I SERPL-MCNC: 0.34 NG/ML
UROBILINOGEN UR QL STRIP.AUTO: 1 E.U./DL
VENTRICULAR RATE: 134 BPM
WBC # BLD AUTO: 15.96 THOUSAND/UL (ref 4.31–10.16)
WBC # BLD AUTO: 18.26 THOUSAND/UL (ref 4.31–10.16)
WBC # BLD AUTO: 7.64 THOUSAND/UL (ref 4.31–10.16)
WBC #/AREA URNS AUTO: ABNORMAL /HPF

## 2018-05-03 PROCEDURE — 83690 ASSAY OF LIPASE: CPT | Performed by: EMERGENCY MEDICINE

## 2018-05-03 PROCEDURE — 96361 HYDRATE IV INFUSION ADD-ON: CPT

## 2018-05-03 PROCEDURE — 84484 ASSAY OF TROPONIN QUANT: CPT | Performed by: PHYSICIAN ASSISTANT

## 2018-05-03 PROCEDURE — 83735 ASSAY OF MAGNESIUM: CPT | Performed by: EMERGENCY MEDICINE

## 2018-05-03 PROCEDURE — 83605 ASSAY OF LACTIC ACID: CPT | Performed by: STUDENT IN AN ORGANIZED HEALTH CARE EDUCATION/TRAINING PROGRAM

## 2018-05-03 PROCEDURE — 93005 ELECTROCARDIOGRAM TRACING: CPT

## 2018-05-03 PROCEDURE — 84484 ASSAY OF TROPONIN QUANT: CPT | Performed by: STUDENT IN AN ORGANIZED HEALTH CARE EDUCATION/TRAINING PROGRAM

## 2018-05-03 PROCEDURE — C1769 GUIDE WIRE: HCPCS | Performed by: INTERNAL MEDICINE

## 2018-05-03 PROCEDURE — 36415 COLL VENOUS BLD VENIPUNCTURE: CPT | Performed by: EMERGENCY MEDICINE

## 2018-05-03 PROCEDURE — 82948 REAGENT STRIP/BLOOD GLUCOSE: CPT

## 2018-05-03 PROCEDURE — 85730 THROMBOPLASTIN TIME PARTIAL: CPT | Performed by: EMERGENCY MEDICINE

## 2018-05-03 PROCEDURE — 74328 X-RAY BILE DUCT ENDOSCOPY: CPT

## 2018-05-03 PROCEDURE — 99222 1ST HOSP IP/OBS MODERATE 55: CPT | Performed by: INTERNAL MEDICINE

## 2018-05-03 PROCEDURE — 71045 X-RAY EXAM CHEST 1 VIEW: CPT

## 2018-05-03 PROCEDURE — 85610 PROTHROMBIN TIME: CPT | Performed by: EMERGENCY MEDICINE

## 2018-05-03 PROCEDURE — 43276 ERCP STENT EXCHANGE W/DILATE: CPT | Performed by: INTERNAL MEDICINE

## 2018-05-03 PROCEDURE — C2617 STENT, NON-COR, TEM W/O DEL: HCPCS | Performed by: INTERNAL MEDICINE

## 2018-05-03 PROCEDURE — 87086 URINE CULTURE/COLONY COUNT: CPT | Performed by: EMERGENCY MEDICINE

## 2018-05-03 PROCEDURE — 0FC98ZZ EXTIRPATION OF MATTER FROM COMMON BILE DUCT, VIA NATURAL OR ARTIFICIAL OPENING ENDOSCOPIC: ICD-10-PCS | Performed by: INTERNAL MEDICINE

## 2018-05-03 PROCEDURE — 84484 ASSAY OF TROPONIN QUANT: CPT | Performed by: EMERGENCY MEDICINE

## 2018-05-03 PROCEDURE — 80053 COMPREHEN METABOLIC PANEL: CPT | Performed by: PHYSICIAN ASSISTANT

## 2018-05-03 PROCEDURE — 36620 INSERTION CATHETER ARTERY: CPT

## 2018-05-03 PROCEDURE — 87040 BLOOD CULTURE FOR BACTERIA: CPT | Performed by: EMERGENCY MEDICINE

## 2018-05-03 PROCEDURE — 85025 COMPLETE CBC W/AUTO DIFF WBC: CPT | Performed by: EMERGENCY MEDICINE

## 2018-05-03 PROCEDURE — 80053 COMPREHEN METABOLIC PANEL: CPT | Performed by: EMERGENCY MEDICINE

## 2018-05-03 PROCEDURE — 86850 RBC ANTIBODY SCREEN: CPT | Performed by: EMERGENCY MEDICINE

## 2018-05-03 PROCEDURE — 87077 CULTURE AEROBIC IDENTIFY: CPT | Performed by: EMERGENCY MEDICINE

## 2018-05-03 PROCEDURE — BF101ZZ FLUOROSCOPY OF BILE DUCTS USING LOW OSMOLAR CONTRAST: ICD-10-PCS | Performed by: INTERNAL MEDICINE

## 2018-05-03 PROCEDURE — 83605 ASSAY OF LACTIC ACID: CPT | Performed by: PHYSICIAN ASSISTANT

## 2018-05-03 PROCEDURE — 82805 BLOOD GASES W/O2 SATURATION: CPT | Performed by: PHYSICIAN ASSISTANT

## 2018-05-03 PROCEDURE — 82805 BLOOD GASES W/O2 SATURATION: CPT | Performed by: EMERGENCY MEDICINE

## 2018-05-03 PROCEDURE — 83605 ASSAY OF LACTIC ACID: CPT | Performed by: EMERGENCY MEDICINE

## 2018-05-03 PROCEDURE — 96365 THER/PROPH/DIAG IV INF INIT: CPT

## 2018-05-03 PROCEDURE — 83735 ASSAY OF MAGNESIUM: CPT | Performed by: PHYSICIAN ASSISTANT

## 2018-05-03 PROCEDURE — 81001 URINALYSIS AUTO W/SCOPE: CPT | Performed by: EMERGENCY MEDICINE

## 2018-05-03 PROCEDURE — 85007 BL SMEAR W/DIFF WBC COUNT: CPT | Performed by: PHYSICIAN ASSISTANT

## 2018-05-03 PROCEDURE — 99223 1ST HOSP IP/OBS HIGH 75: CPT | Performed by: SURGERY

## 2018-05-03 PROCEDURE — 99285 EMERGENCY DEPT VISIT HI MDM: CPT

## 2018-05-03 PROCEDURE — 85027 COMPLETE CBC AUTOMATED: CPT | Performed by: PHYSICIAN ASSISTANT

## 2018-05-03 PROCEDURE — 74176 CT ABD & PELVIS W/O CONTRAST: CPT

## 2018-05-03 PROCEDURE — 87186 SC STD MICRODIL/AGAR DIL: CPT | Performed by: EMERGENCY MEDICINE

## 2018-05-03 PROCEDURE — 86900 BLOOD TYPING SEROLOGIC ABO: CPT | Performed by: EMERGENCY MEDICINE

## 2018-05-03 PROCEDURE — C2625 STENT, NON-COR, TEM W/DEL SY: HCPCS | Performed by: INTERNAL MEDICINE

## 2018-05-03 PROCEDURE — 96375 TX/PRO/DX INJ NEW DRUG ADDON: CPT

## 2018-05-03 PROCEDURE — 85027 COMPLETE CBC AUTOMATED: CPT | Performed by: EMERGENCY MEDICINE

## 2018-05-03 PROCEDURE — 86901 BLOOD TYPING SEROLOGIC RH(D): CPT | Performed by: EMERGENCY MEDICINE

## 2018-05-03 PROCEDURE — 87081 CULTURE SCREEN ONLY: CPT | Performed by: STUDENT IN AN ORGANIZED HEALTH CARE EDUCATION/TRAINING PROGRAM

## 2018-05-03 PROCEDURE — 36556 INSERT NON-TUNNEL CV CATH: CPT | Performed by: SURGERY

## 2018-05-03 PROCEDURE — 93010 ELECTROCARDIOGRAM REPORT: CPT | Performed by: INTERNAL MEDICINE

## 2018-05-03 DEVICE — BILIARY STENT
Type: IMPLANTABLE DEVICE | Site: BILE DUCT | Status: NON-FUNCTIONAL
Brand: ADVANIX™ BILIARY
Removed: 2018-08-16

## 2018-05-03 RX ORDER — VANCOMYCIN HYDROCHLORIDE 1 G/200ML
15 INJECTION, SOLUTION INTRAVENOUS ONCE
Status: COMPLETED | OUTPATIENT
Start: 2018-05-03 | End: 2018-05-03

## 2018-05-03 RX ORDER — SUCCINYLCHOLINE CHLORIDE 20 MG/ML
INJECTION INTRAMUSCULAR; INTRAVENOUS AS NEEDED
Status: DISCONTINUED | OUTPATIENT
Start: 2018-05-03 | End: 2018-05-03 | Stop reason: SURG

## 2018-05-03 RX ORDER — DOCUSATE SODIUM 100 MG/1
100 CAPSULE, LIQUID FILLED ORAL 2 TIMES DAILY
Status: DISCONTINUED | OUTPATIENT
Start: 2018-05-03 | End: 2018-05-07 | Stop reason: HOSPADM

## 2018-05-03 RX ORDER — LIDOCAINE HYDROCHLORIDE 10 MG/ML
INJECTION, SOLUTION INFILTRATION; PERINEURAL AS NEEDED
Status: DISCONTINUED | OUTPATIENT
Start: 2018-05-03 | End: 2018-05-03 | Stop reason: SURG

## 2018-05-03 RX ORDER — SODIUM CHLORIDE 9 MG/ML
125 INJECTION, SOLUTION INTRAVENOUS CONTINUOUS
Status: DISCONTINUED | OUTPATIENT
Start: 2018-05-03 | End: 2018-05-03

## 2018-05-03 RX ORDER — POTASSIUM CHLORIDE 14.9 MG/ML
20 INJECTION INTRAVENOUS ONCE
Status: COMPLETED | OUTPATIENT
Start: 2018-05-03 | End: 2018-05-03

## 2018-05-03 RX ORDER — ONDANSETRON 2 MG/ML
INJECTION INTRAMUSCULAR; INTRAVENOUS AS NEEDED
Status: DISCONTINUED | OUTPATIENT
Start: 2018-05-03 | End: 2018-05-03 | Stop reason: SURG

## 2018-05-03 RX ORDER — SODIUM CHLORIDE, SODIUM LACTATE, POTASSIUM CHLORIDE, CALCIUM CHLORIDE 600; 310; 30; 20 MG/100ML; MG/100ML; MG/100ML; MG/100ML
INJECTION, SOLUTION INTRAVENOUS CONTINUOUS PRN
Status: DISCONTINUED | OUTPATIENT
Start: 2018-05-03 | End: 2018-05-03 | Stop reason: SURG

## 2018-05-03 RX ORDER — NOREPINEPHRINE BITARTRATE 1 MG/ML
INJECTION, SOLUTION INTRAVENOUS
Status: COMPLETED
Start: 2018-05-03 | End: 2018-05-03

## 2018-05-03 RX ORDER — POLYETHYLENE GLYCOL 3350 17 G/17G
17 POWDER, FOR SOLUTION ORAL DAILY PRN
Status: DISCONTINUED | OUTPATIENT
Start: 2018-05-03 | End: 2018-05-07 | Stop reason: HOSPADM

## 2018-05-03 RX ORDER — SODIUM CHLORIDE 9 MG/ML
125 INJECTION, SOLUTION INTRAVENOUS CONTINUOUS
Status: DISCONTINUED | OUTPATIENT
Start: 2018-05-03 | End: 2018-05-04

## 2018-05-03 RX ORDER — HEPARIN SODIUM 5000 [USP'U]/ML
5000 INJECTION, SOLUTION INTRAVENOUS; SUBCUTANEOUS EVERY 8 HOURS SCHEDULED
Status: DISCONTINUED | OUTPATIENT
Start: 2018-05-03 | End: 2018-05-03

## 2018-05-03 RX ORDER — LIDOCAINE HYDROCHLORIDE 10 MG/ML
INJECTION, SOLUTION EPIDURAL; INFILTRATION; INTRACAUDAL; PERINEURAL
Status: COMPLETED
Start: 2018-05-03 | End: 2018-05-03

## 2018-05-03 RX ORDER — ONDANSETRON 2 MG/ML
4 INJECTION INTRAMUSCULAR; INTRAVENOUS EVERY 6 HOURS PRN
Status: DISCONTINUED | OUTPATIENT
Start: 2018-05-03 | End: 2018-05-07 | Stop reason: HOSPADM

## 2018-05-03 RX ORDER — CHLORHEXIDINE GLUCONATE 0.12 MG/ML
15 RINSE ORAL EVERY 12 HOURS SCHEDULED
Status: DISCONTINUED | OUTPATIENT
Start: 2018-05-03 | End: 2018-05-03

## 2018-05-03 RX ORDER — EPHEDRINE SULFATE 50 MG/ML
INJECTION, SOLUTION INTRAVENOUS AS NEEDED
Status: DISCONTINUED | OUTPATIENT
Start: 2018-05-03 | End: 2018-05-03 | Stop reason: SURG

## 2018-05-03 RX ORDER — PROPOFOL 10 MG/ML
INJECTION, EMULSION INTRAVENOUS AS NEEDED
Status: DISCONTINUED | OUTPATIENT
Start: 2018-05-03 | End: 2018-05-03 | Stop reason: SURG

## 2018-05-03 RX ORDER — SODIUM CHLORIDE, SODIUM GLUCONATE, SODIUM ACETATE, POTASSIUM CHLORIDE, MAGNESIUM CHLORIDE, SODIUM PHOSPHATE, DIBASIC, AND POTASSIUM PHOSPHATE .53; .5; .37; .037; .03; .012; .00082 G/100ML; G/100ML; G/100ML; G/100ML; G/100ML; G/100ML; G/100ML
1000 INJECTION, SOLUTION INTRAVENOUS ONCE
Status: COMPLETED | OUTPATIENT
Start: 2018-05-03 | End: 2018-05-04

## 2018-05-03 RX ORDER — SERTRALINE HYDROCHLORIDE 25 MG/1
25 TABLET, FILM COATED ORAL DAILY
COMMUNITY
End: 2020-11-16 | Stop reason: ALTCHOICE

## 2018-05-03 RX ORDER — 0.9 % SODIUM CHLORIDE 0.9 %
3 VIAL (ML) INJECTION AS NEEDED
Status: DISCONTINUED | OUTPATIENT
Start: 2018-05-03 | End: 2018-05-07 | Stop reason: HOSPADM

## 2018-05-03 RX ORDER — SERTRALINE HYDROCHLORIDE 20 MG/ML
25 SOLUTION ORAL DAILY
Status: DISCONTINUED | OUTPATIENT
Start: 2018-05-03 | End: 2018-05-07 | Stop reason: HOSPADM

## 2018-05-03 RX ORDER — POTASSIUM CHLORIDE 29.8 MG/ML
40 INJECTION INTRAVENOUS ONCE
Status: COMPLETED | OUTPATIENT
Start: 2018-05-04 | End: 2018-05-04

## 2018-05-03 RX ORDER — INSULIN GLARGINE 100 [IU]/ML
7 INJECTION, SOLUTION SUBCUTANEOUS
Status: DISCONTINUED | OUTPATIENT
Start: 2018-05-03 | End: 2018-05-03

## 2018-05-03 RX ORDER — AMLODIPINE BESYLATE 2.5 MG/1
2.5 TABLET ORAL DAILY
Status: DISCONTINUED | OUTPATIENT
Start: 2018-05-03 | End: 2018-05-07 | Stop reason: HOSPADM

## 2018-05-03 RX ORDER — MAGNESIUM SULFATE HEPTAHYDRATE 40 MG/ML
2 INJECTION, SOLUTION INTRAVENOUS ONCE
Status: COMPLETED | OUTPATIENT
Start: 2018-05-03 | End: 2018-05-03

## 2018-05-03 RX ORDER — HEPARIN SODIUM 5000 [USP'U]/ML
5000 INJECTION, SOLUTION INTRAVENOUS; SUBCUTANEOUS EVERY 8 HOURS SCHEDULED
Status: DISCONTINUED | OUTPATIENT
Start: 2018-05-03 | End: 2018-05-07 | Stop reason: HOSPADM

## 2018-05-03 RX ORDER — ONDANSETRON 2 MG/ML
4 INJECTION INTRAMUSCULAR; INTRAVENOUS ONCE
Status: COMPLETED | OUTPATIENT
Start: 2018-05-03 | End: 2018-05-03

## 2018-05-03 RX ORDER — ONDANSETRON 2 MG/ML
INJECTION INTRAMUSCULAR; INTRAVENOUS
Status: DISPENSED
Start: 2018-05-03 | End: 2018-05-03

## 2018-05-03 RX ORDER — LIDOCAINE HYDROCHLORIDE 10 MG/ML
5 INJECTION, SOLUTION EPIDURAL; INFILTRATION; INTRACAUDAL; PERINEURAL ONCE
Status: COMPLETED | OUTPATIENT
Start: 2018-05-03 | End: 2018-05-03

## 2018-05-03 RX ADMIN — SUCCINYLCHOLINE CHLORIDE 100 MG: 20 INJECTION, SOLUTION INTRAMUSCULAR; INTRAVENOUS at 15:41

## 2018-05-03 RX ADMIN — ONDANSETRON 4 MG: 2 INJECTION INTRAMUSCULAR; INTRAVENOUS at 09:30

## 2018-05-03 RX ADMIN — SODIUM CHLORIDE, SODIUM GLUCONATE, SODIUM ACETATE, POTASSIUM CHLORIDE, MAGNESIUM CHLORIDE, SODIUM PHOSPHATE, DIBASIC, AND POTASSIUM PHOSPHATE 1000 ML: .53; .5; .37; .037; .03; .012; .00082 INJECTION, SOLUTION INTRAVENOUS at 22:41

## 2018-05-03 RX ADMIN — CEFEPIME HYDROCHLORIDE 2000 MG: 2 INJECTION, POWDER, FOR SOLUTION INTRAVENOUS at 10:40

## 2018-05-03 RX ADMIN — SODIUM CHLORIDE, SODIUM LACTATE, POTASSIUM CHLORIDE, AND CALCIUM CHLORIDE 1000 ML: .6; .31; .03; .02 INJECTION, SOLUTION INTRAVENOUS at 19:20

## 2018-05-03 RX ADMIN — LIDOCAINE HYDROCHLORIDE 50 MG: 10 INJECTION, SOLUTION INFILTRATION; PERINEURAL at 15:41

## 2018-05-03 RX ADMIN — ONDANSETRON 4 MG: 2 INJECTION INTRAMUSCULAR; INTRAVENOUS at 15:43

## 2018-05-03 RX ADMIN — PROPOFOL 100 MG: 10 INJECTION, EMULSION INTRAVENOUS at 15:41

## 2018-05-03 RX ADMIN — POTASSIUM CHLORIDE 20 MEQ: 200 INJECTION, SOLUTION INTRAVENOUS at 19:42

## 2018-05-03 RX ADMIN — METRONIDAZOLE 500 MG: 500 INJECTION, SOLUTION INTRAVENOUS at 12:00

## 2018-05-03 RX ADMIN — HEPARIN SODIUM 5000 UNITS: 5000 INJECTION, SOLUTION INTRAVENOUS; SUBCUTANEOUS at 21:07

## 2018-05-03 RX ADMIN — DEXAMETHASONE SODIUM PHOSPHATE 5 MG: 10 INJECTION INTRAMUSCULAR; INTRAVENOUS at 15:43

## 2018-05-03 RX ADMIN — SODIUM CHLORIDE 1000 ML: 0.9 INJECTION, SOLUTION INTRAVENOUS at 13:29

## 2018-05-03 RX ADMIN — NOREPINEPHRINE BITARTRATE 15 MCG/MIN: 1 INJECTION, SOLUTION, CONCENTRATE INTRAVENOUS at 15:59

## 2018-05-03 RX ADMIN — VANCOMYCIN HYDROCHLORIDE 1000 MG: 1 INJECTION, SOLUTION INTRAVENOUS at 13:12

## 2018-05-03 RX ADMIN — NOREPINEPHRINE BITARTRATE 10 MCG/MIN: 1 INJECTION INTRAVENOUS at 17:40

## 2018-05-03 RX ADMIN — POTASSIUM CHLORIDE 20 MEQ: 200 INJECTION, SOLUTION INTRAVENOUS at 21:07

## 2018-05-03 RX ADMIN — LIDOCAINE HYDROCHLORIDE 1 ML: 10 INJECTION, SOLUTION EPIDURAL; INFILTRATION; INTRACAUDAL; PERINEURAL at 22:09

## 2018-05-03 RX ADMIN — SODIUM CHLORIDE 1000 ML: 0.9 INJECTION, SOLUTION INTRAVENOUS at 09:45

## 2018-05-03 RX ADMIN — ACETAMINOPHEN 975 MG: 650 SUPPOSITORY RECTAL at 10:00

## 2018-05-03 RX ADMIN — NOREPINEPHRINE BITARTRATE 14 MCG/MIN: 1 INJECTION INTRAVENOUS at 22:00

## 2018-05-03 RX ADMIN — EPHEDRINE SULFATE 10 MG: 50 INJECTION, SOLUTION INTRAMUSCULAR; INTRAVENOUS; SUBCUTANEOUS at 15:46

## 2018-05-03 RX ADMIN — NOREPINEPHRINE BITARTRATE 4000 MCG: 1 INJECTION INTRAVENOUS at 17:46

## 2018-05-03 RX ADMIN — SODIUM CHLORIDE, SODIUM LACTATE, POTASSIUM CHLORIDE, AND CALCIUM CHLORIDE: .6; .31; .03; .02 INJECTION, SOLUTION INTRAVENOUS at 15:22

## 2018-05-03 RX ADMIN — MAGNESIUM SULFATE HEPTAHYDRATE 2 G: 40 INJECTION, SOLUTION INTRAVENOUS at 19:43

## 2018-05-03 RX ADMIN — INSULIN LISPRO 2 UNITS: 100 INJECTION, SOLUTION INTRAVENOUS; SUBCUTANEOUS at 19:36

## 2018-05-03 RX ADMIN — HYDROMORPHONE HYDROCHLORIDE 0.5 MG: 1 INJECTION, SOLUTION INTRAMUSCULAR; INTRAVENOUS; SUBCUTANEOUS at 22:40

## 2018-05-03 RX ADMIN — LIDOCAINE HYDROCHLORIDE 5 ML: 10 INJECTION, SOLUTION EPIDURAL; INFILTRATION; INTRACAUDAL; PERINEURAL at 21:10

## 2018-05-03 RX ADMIN — SODIUM CHLORIDE 1000 ML: 0.9 INJECTION, SOLUTION INTRAVENOUS at 10:40

## 2018-05-03 RX ADMIN — EPHEDRINE SULFATE 10 MG: 50 INJECTION, SOLUTION INTRAMUSCULAR; INTRAVENOUS; SUBCUTANEOUS at 16:06

## 2018-05-03 RX ADMIN — SODIUM CHLORIDE 125 ML/HR: 0.9 INJECTION, SOLUTION INTRAVENOUS at 16:52

## 2018-05-03 RX ADMIN — METRONIDAZOLE 500 MG: 500 INJECTION, SOLUTION INTRAVENOUS at 19:49

## 2018-05-03 NOTE — OP NOTE
**** GI/ENDOSCOPY REPORT ****     INTRODUCTION: Endoscopic Retrograde Cholangiopancreatography - A 80  year-old female patient presents for an inpatient Endoscopic Retrograde   Cholangiopancreatography at [address] [place] [address]  INDICATIONS: Cholangitis  CONSENT:  The benefits, risks, and alternatives to the procedure were   discussed and informed consent was obtained from the patient's son  PREPARATION:  EKG, pulse, pulse oximetry and blood pressure were monitored   throughout the procedure  ASA Classification: Class 4 - Patient has severe   systemic disturbance that is life threatening with or without surgery  MEDICATIONS: Anesthesia-check records     PROCEDURE:  The endoscope was passed with ease through the mouth under   direct visualization and advanced to the duodenum  The scope was withdrawn   and the mucosa was carefully examined  FINDINGS:  The ampulla was visualized  The appearance was suggestive of an   impacted stone  The first attempt at cannulation (via the major papilla)   of the bile duct was performed and entered superficially with a   sphincterotome with a guidewire  Minimal contrast was injected under   fluoroscopic guidance partially filling the common bile duct  Radiographs   were taken  A sphincterotomy was performed with a sphincterotome  balloon   sweeps were done with a 9-12 mm extraction balloon inflated 12 mm  A large   amount of pus, debris, small stones were removed  Repeated balloon sweeps   did not reveal any residual stones or sludge  A 10 Fr 5 cm long plastic   angled plastic stent was placed in the area  The procedure was then   terminated  COMPLICATIONS: There were no complications  IMPRESSIONS:  Successful ERCP with sphincterotomy and pus extraction and   stent placement for patient with acute ascending cholangitis  RECOMMENDATIONS:  Continue antibiotics  continue monitor closely in ICU     Repeat ERCP in the next 4-6 weeks for stent removal an duct clearance  ESTIMATED BLOOD LOSS:     PROCEDURE CODES:     ICD-9 Codes:     ICD-10 Codes:     PERFORMED BY: IMNA Larose  on 05/03/2018  Version 1, electronically signed by MINA Larose  on 05/03/2018 at   19:43

## 2018-05-03 NOTE — SEPSIS NOTE
Sepsis Note   Nathaly Webster 80 y o  female MRN: [id number]  Unit/Bed#: ED 27 Encounter: [id number]            Initial Sepsis Screening     Row Name 05/03/18 0955                Is the patient's history suggestive of a new or worsening infection? (!)  Yes (Proceed)  -GR        Suspected source of infection urinary tract infection  -GR        Are two or more of the following signs & symptoms of infection both present and new to the patient? (!)  Yes (Proceed)  -GR        Indicate SIRS criteria Hyperthemia > 38 3C (100 9F); Altered mental status; Tachycardia > 90 bpm;Tachypnea > 20 resp per min  -GR        If the answer is yes to both questions, suspicion of sepsis is present          If severe sepsis is present AND tissue hypoperfusion perists in the hour after fluid resuscitation or lactate > 4, the patient meets criteria for SEPTIC SHOCK          Are any of the following organ dysfunction criteria present within 6 hours of suspected infection and SIRS criteria that are NOT considered to be chronic conditions? (!)  Yes  -GR        Organ dysfunction          Date of presentation of severe sepsis          Time of presentation of severe sepsis          Tissue hypoperfusion persists in the hour after crystalloid fluid administration, evidenced, by either:          Was hypotension present within one hour of the conclusion of crystalloid fluid administration?  No  -GR        Date of presentation of septic shock          Time of presentation of septic shock            User Key  (r) = Recorded By, (t) = Taken By, (c) = Cosigned By    [address] Name Provider Marcella Interiano MD Physician

## 2018-05-03 NOTE — PROGRESS NOTES
Accept Note - Critical Care   Renetta Scott Abed 80 y o  female MRN: [id number]  Unit/Bed#: ED 27 Encounter: [id number]  Patient Active Problem List   Diagnosis    Altered mental status    Dementia    Essential hypertension    Diabetes mellitus ([facility] [address] )    Stage 3 chronic kidney disease    Falls    Epigastric pain    Cholecystitis with cholelithiasis    Shortness of breath    Anemia    Hypothyroid    Cholecystostomy tube dysfunction     Assessment: Cholangitis, Severe sepsis, CALE, Proctitis     Plan:      Neuro   1  Acute encephalopathy 2/2 Severe sepsis  -Q4hr neuro exams, continue ABX as started in ED, continue to monitor vitals and labs as stated below  -Maintaining airway, not intubated at this time    2  Analgesia  -Fentanyl gtts 2 5ml/hr, PRN dilaudid 0 5mg Q4hrs    3  Tachycardia, 2/2 sepsis  -Resolving in ED with Fluid bolus    CV   1  Elevated Troponin at 0 20 Nealley type II  -trend  -If continued elevation with consult Cards    2  Hypertension, essential  - Monitor Bp via Bp cuff  - Continue home dose Norvasc 2 5 mg daily  - Continue to monitor hemodynamics    3  Dyslipidemia  - Continue home dose Crestor 5 mg daily    Pulm   1  Right lung base on CT suggestive of either atelectasis or pneumonia in the right lung   -Monitor with morning CXRs  -currently 98% on RA, Protecting airway  monitor and maintain O2 sat >94%   -once mental status improves encourage spirometry and pul toilet   -Monitor VBGs    GI   1  Cholecystitis 2/2 choledocholithiasis   - Red Surgery uncertain of Pt's ability to have an operation due to her fraility, monitor CBC, LA, CMP,   - GI consulted for possible ERCP  - Discuss possible percutaneous coli cystostomy tube placement   - Abx therapy continued  - LA 5 0 --> 5 7  trend, Lipase 79    2  Transaminitis 2/2 to #1  -AST/ALT - 1847/56, Alk Phos 623  -Tend with CMP    3  Hiatal hernia  -monitor as outpatient    4  Proctitis  -Continue abx    5   GERD/Stress ulcer ppx   Prilosec 20mg BID  On Prilosec 20mg daily at home Bowel regimen: senna, Colace, miralax PRN  FEN    NS 30ml/kg bolus in ed,  ml/hr, replete lytes PRN, NPO  1  Hypoalbuminemia at 2 0  -monitor with CMP, possible repletion in am       1  CALE  -Cr 1 37, GFR 35  Baseline GFR is 48- 60  -continue to monitor CMP, monitor UOP    2  CKD stage 3  -monitor as above    ID   1  Sepsis 2/2 Cholangitis  -Continue Cefepime, vanc, zosyn  -monitor CBC and temp  -no leukocytosis, febrile in ed at 104 at 102 in SICU  - Cont to monitor vitals  Heme  DVT ppx SQH, SCDs  Hgb stable    Endo   1  Diabetes  -SSI Algorithm 2  -Monitor POCT glucose Q6 hrs, Last BS was 262 on CMP, BS range goal 110-180    2  Hypothyroidism  -previous Dx, No Synthroid Rx since 1/2017  Will discuss with family        MSK/Skin  Pressure ulcer ppx, frequent offloading    Chief Complaint: AMS with septic cholangitis     HPI: Walter Rush is a 80 y o  female who presented to the ED on 5/3/18 from a nursing facility with fever and abdominal pain and altered mental status  Patient is nonverbal currently and unable to obtain any history from the patient  Per ER note: EMS and the son the patient is at a nursing home started having some abdominal pain earlier today and some vomiting and became less talkative  Patient's son states that yesterday he spent time with his mother and she had no acute complaints at that time  Patient's baseline mental status waxes and wanes per the son  Patient with a history of encephalopathy, chronic kidney disease, dementia, diabetes, and hypothyroidism    Physical Exam: Physical Exam   Constitutional: No distress  HENT:   Head: Normocephalic  Eyes: Conjunctivae are normal  Pupils are equal, round, and reactive to light  Neck: Normal range of motion  Neck supple  No JVD present  No thyromegaly present  Cardiovascular: Regular rhythm  Tachycardia present  No murmur heard    Pulmonary/Chest: Effort normal and breath sounds normal  No respiratory distress  She has no wheezes  She has no rales  She exhibits no tenderness  Abdominal: Soft  Bowel sounds are normal  She exhibits distension  She exhibits no mass  There is tenderness  There is no guarding  Musculoskeletal: She exhibits no edema, tenderness or deformity  Neurological: She is alert  No cranial nerve deficit  GCS eye subscore is 4  GCS verbal subscore is 5  GCS motor subscore is 6  PT GCS 15 and alert, however, confused and tired appearing   Skin: Skin is warm  Capillary refill takes 2 to 3 seconds  She is not diaphoretic  Nursing note and vitals reviewed  Vitals:    18 1137 18 1151 18 1258 18 1315   BP: 161/62 165/62 130/60 135/59   BP Location: Right arm Right arm Right arm Right arm   Pulse: (!) 124 (!) 116 101 (!) 106   Resp: 22 22 22 22   Temp:    (!) 102 6 °F (39 2 °C)   TempSrc:    Tympanic Core   SpO2: 98% 97% 97% 97%   Weight:       Height:                 Temperature:   Temp (24hrs), Av 3 °F (39 6 °C), Min:102 6 °F (39 2 °C), Max:103 9 °F (39 9 °C)    Current: Temperature: (!) 102 6 °F (39 2 °C)    Weights:   IBW: 34 kg    Body mass index is 30 23 kg/m²    Weight (last 2 days)     Date/Time   Weight    18 0956  59 (130 07)              Hemodynamic Monitoring:  N/A     Non-Invasive/Invasive Ventilation Settings:  Respiratory    Lab Data (Last 4 hours)    None         O2/Vent Data (Last 4 hours)    None              No results found for: PHART, CIP1XVV, PO2ART, IZG6ETB, M1CFAAPV, BEART, SOURCE  SpO2: SpO2: 97 %    Intake and Outputs:  I/O       701 -  07 07 -  07 07 -  0700    IV Piggyback   2293 3    Total Intake(mL/kg)   2293 3 (38 9)    Net     +2293 3                  Nutrition:   NPO    Labs:     Results from last 7 days  Lab Units 18  1011   WBC Thousand/uL 7 64   HEMOGLOBIN g/dL 12 6   HEMATOCRIT % 37 7   PLATELETS Thousands/uL 280   NEUTROS PCT % 79*   MONOS PCT % 1*      Results from last 7 days  Lab Units 05/03/18  1240   SODIUM mmol/L 140   POTASSIUM mmol/L 4 8   CHLORIDE mmol/L 110*   CO2 mmol/L 20*   BUN mg/dL 23   CREATININE mg/dL 1 37*   CALCIUM mg/dL 9 1   TOTAL PROTEIN g/dL 8 0   BILIRUBIN TOTAL mg/dL 3 19*   ALK PHOS U/L 623*   ALT U/L 599*   AST U/L 1,847*   GLUCOSE RANDOM mg/dL 262*                Results from last 7 days  Lab Units 05/03/18  1045   INR  1 14   PTT seconds 26       Results from last 7 days  Lab Units 05/03/18  1240   LACTIC ACID mmol/L 5 7*       0  Lab Value Date/Time   TROPONINI 0 20 (H) 05/03/2018 1240   TROPONINI 0 04 01/24/2018 2256   TROPONINI 0 03 01/24/2018 2027   TROPONINI <0 02 01/19/2018 2138   TROPONINI <0 02 11/22/2017 1214       Imaging:   I have personally reviewed pertinent reports  EKG: Sinus tach with no ST wave changes  No signs of AMI    Micro:  Lab Results   Component Value Date    BLOODCX No Growth After 5 Days  01/24/2018    BLOODCX No Growth After 5 Days  01/24/2018    URINECX 6873-3136 cfu/ml - One colony Escherichia coli (A) 01/21/2018    URINECX 60,000-69,000 cfu/ml Lactobacillus species (A) 01/21/2018    URINECX (A) 11/21/2017     >100,000 cfu/ml Alpha Hemolytic Streptococcus NOT Enterococcus    URINECX 9216-4677 cfu/ml Gram Negative Parvez (A) 11/21/2017       Allergies: Allergies   Allergen Reactions    Penicillins     Valsartan        Medications:   Scheduled Meds:  Current Facility-Administered Medications:  ondansetron        sodium chloride (PF) 3 mL Intravenous PRN Brett Millard MD    sodium chloride 1,000 mL Intravenous Once Brett Millard MD Last Rate: 1,000 mL (05/03/18 1329)   vancomycin 15 mg/kg Intravenous Once Brett Millard MD Last Rate: 1,000 mg (05/03/18 1312)     Continuous Infusions:   PRN Meds:    sodium chloride (PF) 3 mL PRN       VTE Pharmacologic Prophylaxis: Heparin  VTE Mechanical Prophylaxis: sequential compression device    Invasive lines and devices:   Invasive Devices Peripheral Intravenous Line            Peripheral IV 05/03/18 Left Arm less than 1 day    Peripheral IV 05/03/18 Left Wrist less than 1 day    Peripheral IV 05/03/18 Right Arm less than 1 day          Drain            Nephrostomy Right -- days                      Code Status: Prior     Portions of the record may have been created with voice recognition software  Occasional wrong word or "sound a like" substitutions may have occurred due to the inherent limitations of voice recognition software  Read the chart carefully and recognize, using context, where substitutions have occurred       [facility], DO

## 2018-05-03 NOTE — CONSULTS
Consultation - [facility]  Sera Fuentes 80 y o  female MRN: [id number]  Unit/Bed#: ED 32 Encounter: [id number]         Reason for Consult / Principal Problem:  Suspected cholangitis     HPI: Adeline Aguilar is an 80-year-old female with history of acute cholecystitis status post percutaneous cholecystostomy tube placement on 01/24  Patient underwent tube check on 03/08 to assess for biliary tree patency and possible tube removal as patient is not a surgical candidate  Sludge was noted in the gallbladder however the cystic and common bile ducts were widely patent  The cholecystostomy tube was removed  Patient presents from her nursing home as she was reporting abdominal pain, vomiting and becoming less verbal   On admission, CT of the abdomen revealed cholelithiasis and recurrent acute cholecystitis  It was noted that the common bile duct was mildly dilated 10 mm with small calcified stones in the distal common bile duct  AST 1,847, , Alk Phos 623, Bili 3 19  Lactic acidosis noted  Patient only answers to yes and no  She is somewhat verbal but difficult to understand  When asked about abdominal pain, she does reply yes  She is unable to point where  She is resting with her arms folded on her abdomen in the ED during our encounter  Per our records, she is on 81 mg ASA  Review of Systems: Unable to obtain secondary to mental status  Historical Information   Past Medical History:   Diagnosis Date    Cholelithiasis     Dementia     Diabetes mellitus ([facility] [address]     Hyperlipidemia     Hypothyroidism     Macular degeneration     Osteoarthritis      History reviewed  No pertinent surgical history    Social History   History   Alcohol Use No     History   Drug Use No     History   Smoking Status    Never Smoker   Smokeless Tobacco    Never Used     Family History   Problem Relation Age of Onset    Family history unknown: Yes        Meds/Allergies     Current Facility-Administered Medications   Medication Dose Route Frequency    chlorhexidine (PERIDEX) 0 12 % oral rinse 15 mL  15 mL Swish & Spit Q12H [address]    heparin (porcine) subcutaneous injection 5,000 Units  5,000 Units Subcutaneous Q8H [address]    insulin lispro (HumaLOG) 100 units/mL subcutaneous injection 1-5 Units  1-5 Units Subcutaneous Q6H [address]    omeprazole (PRILOSEC) suspension 2 mg/mL  20 mg Oral Daily    ondansetron (ZOFRAN) 4 mg/2 mL injection **AcuDose Override Pull**        sertraline (ZOLOFT) oral concentrated solution 25 mg  25 mg Oral Daily    sodium chloride (PF) 0 9 % injection 3 mL  3 mL Intravenous PRN       Allergies   Allergen Reactions    Penicillins     Valsartan          Objective     Blood pressure 127/64, pulse 101, temperature (!) 102 6 °F (39 2 °C), temperature source Tympanic Core, resp  rate 22, height 4' 7" (1 397 m), weight 59 kg (130 lb 1 1 oz), SpO2 96 %  Intake/Output Summary (Last 24 hours) at 05/03/18 1442  Last data filed at 05/03/18 1412   Gross per 24 hour   Intake          2493 33 ml   Output                0 ml   Net          2493 33 ml         PHYSICAL EXAM:      General Appearance:   Cooperative, and in no respiratory distress   HEENT:   Normocephalic, atraumatic      Neck:  Supple, symmetrical, trachea midline   Lungs:   Clear to auscultation bilaterally anteriorly   Heart[de-identified]   Regular rate and rhythm   Abdomen:   Soft, non-tender, non-distended; normal bowel sounds; no masses, no organomegaly    Genitalia:   Deferred    Rectal:   Deferred    Extremities:  No cyanosis, clubbing or edema                  Lab Results:     Results from last 7 days  Lab Units 05/03/18  1011   WBC Thousand/uL 7 64   HEMOGLOBIN g/dL 12 6   HEMATOCRIT % 37 7   PLATELETS Thousands/uL 280   NEUTROS PCT % 79*   LYMPHS PCT % 20   MONOS PCT % 1*   EOS PCT % 0       Results from last 7 days  Lab Units 05/03/18  1240   SODIUM mmol/L 140   POTASSIUM mmol/L 4 8   CHLORIDE mmol/L 110*   CO2 mmol/L 20*   BUN mg/dL 23 CREATININE mg/dL 1 37*   CALCIUM mg/dL 9 1   TOTAL PROTEIN g/dL 8 0   BILIRUBIN TOTAL mg/dL 3 19*   ALK PHOS U/L 623*   ALT U/L 599*   AST U/L 1,847*   GLUCOSE RANDOM mg/dL 262*       Results from last 7 days  Lab Units 05/03/18  1045   INR  1 14       Results from last 7 days  Lab Units 05/03/18  1240   LIPASE u/L 79       Imaging Studies: I have personally reviewed pertinent imaging studies  Ct Abdomen Pelvis Wo Contrast    Result Date: 5/3/2018  Impression: Noncontrast CT findings most consistent with cholelithiasis and recurrent acute cholecystitis  Choledocholithiasis with mild enlargement of common bile duct measuring up to 10 mm but no significant intrahepatic biliary dilatation noted  No pericholecystic abscess  No abdominal or pelvic abscess  No free intraperitoneal air  Findings at the right lung base on CT and on earlier performed chest x-ray suggestive of either atelectasis or pneumonia in the right lung  Small to moderate hiatal hernia  Large bolus of stool in the rectum with mild rectal wall thickening and perirectal edema suggesting mild stercoral proctitis  Workstation performed: [id number]     Xr Chest Portable - 1 View    Result Date: 5/3/2018  Impression: Diminished inspiration  No acute cardiopulmonary disease  Workstation performed: [id number]       ASSESSMENT and PLAN:      1) Acute cholecystitis with cholangitis - Per CT, small stones in the distal CBD  High suspicion for cholangitis given pain, fever, jaundice and change in mental status  After Dr Wellington Cooper reviewed the case, plan is for ERCP this afternoon  I spoke to Gregor, patient's son, who I explained the procedure to  Dr Wellington Cooper will call him for consent and discuss risks  - ERCP in OR  - Hold heparin and anticoagulation for procedure   - IV antibiotics   - Continue to trend LFTs  - Patient will be admitted to ICU      The patient was seen and examined by Dr Wellington Cooper, all cobb medical decisions were made with Dr Wellington Cooper    Thank you for allowing us to participate in the care of this pleasant patient  We will follow up with you closely

## 2018-05-03 NOTE — ED PROVIDER NOTES
Emergency Department Note- Belgica Alcala 80 y o  female MRN: [id number]    Unit/Bed#: ED 27 Encounter: [id number]        History of Present Illness   HPI:  Belgica Alcala is a 80 y o  female who presents with Fever and abdominal pain and altered mental status  Patient is nonverbal currently and unable to obtain any history from the patient  Per EMS and the son the patient is at a nursing home started having some abdominal pain earlier today and some vomiting and became less talkative  Patient's son states that yesterday he spent time with his mother and she had no acute complaints at that time  Patient's baseline mental status waxes and wanes per the son  Patient's son also states that she is susceptible to urinary tract infections  Patient with a history of encephalopathy chronic kidney disease dementia diabetes hypothyroidism  REVIEW OF SYSTEMS     unobtainable due to patient's mental status    All systems reviewed and negative except as noted above or in HPI         Historical Information   Past Medical History:   Diagnosis Date    Cholelithiasis     Dementia     Diabetes mellitus ([facility] [address]     Hyperlipidemia     Hypothyroidism     Macular degeneration     Osteoarthritis      No past surgical history on file  Social History   History   Alcohol Use No     History   Drug Use No     History   Smoking Status    Never Smoker   Smokeless Tobacco    Never Used     Family History:   Family History   Problem Relation Age of Onset    Family history unknown: Yes       Meds/Allergies     (Not in a hospital admission)  Allergies   Allergen Reactions    Penicillins     Valsartan        Objective   Vitals: There were no vitals taken for this visit      PHYSICAL EXAM     General Appearance: alert  nonverbal,  Mild distress  moderately toxic appearing  Skin:  Warm, dry, intact  HEENT: atraumatic, normocephalic, eomi, perll   Neck: Supple, no JVD, no lymphadenopathy, trachea midline, no bruit  Cardiac: rrr, no murmurs, rub, gallops  Pulmonary: lungs cta, no wheezes, rales, rhonchi  Gastrointestinal: abdomen soft  Diffusely tender, good bs, no mass or bruits, no cva tenderness  Extremities: no pedal edema, good pulses, no calf tenderness, no clubbing, no cyanosis  Neuro:  no focal motor or sensory deficits, cn intact  Psych:  Normal mood and affect, normal judgement and insight      Lab Results: Lab Results: I have personally reviewed pertinent lab results  Imaging: I have personally reviewed pertinent reports  EKG, Pathology, and Other Studies: I have personally reviewed pertinent films in PACS    Assessment/Plan     ED Medical Decision Making:   patient currently in severe sepsis will perform a septic workup and include a CT of the abdomen pelvis  If the patient brought antibiotics and IV fluids  I discussed the case with the patient's son and he states that she is DNR however she is okay for intubation and lines and pressors if needed  Portions of the record may have been created with voice recognition software  Occasional wrong word or "sound a like" substitutions may have occurred due to the inherent limitations of voice recognition software  Read the chart carefully and recognize, using context, where substitutions have occurred  Chyna Toure MD  05/03/18 0955    ECG 12 Lead Documentation  Date/Time: today/date: 5/3/2018  Performed by: Jessica Sheridan  Authorized by: Jessica Sheridan     ECG reviewed by me, the ED Provider: yes    Patient location:  ED   Previous ECG:  Compared to current, no change   Rate:  ECG rate assessment: normal    Rhythm: sinus rhythm    Ectopy:  : none    QRS axis:  Normal  Intervals: normal   Q waves: None   ST segments:  Normal  T waves: normal      Impression:   Sinus tachycardia     patient with acute cholecystitis on CT scan    Reviewed old records and patient has had this issue previously for which she had an IR tube placement and was TMs not a surgical candidate  Have discussed the case with surgery they will admit the patient to the surgical ICU  Patient's lactate continues to rise despite 2 L of fluid will give 1/3 L of normal saline  Patient remains hemodynamically stable with improved heart rate and temperature  Critical care time for this patient is 38 minutes not including time for procedures or time spent updating the family           Sandy Romeo MD  05/03/18 3664

## 2018-05-03 NOTE — SEPSIS NOTE
Sepsis Note   Nayely Head 80 y o  female MRN: [id number]  Unit/Bed#: ED 27 Encounter: [id number]            Initial Sepsis Screening     Row Name 05/03/18 0955                Is the patient's history suggestive of a new or worsening infection? (!)  Yes (Proceed)  -GR        Suspected source of infection urinary tract infection  -GR        Are two or more of the following signs & symptoms of infection both present and new to the patient? (!)  Yes (Proceed)  -GR        Indicate SIRS criteria Hyperthemia > 38 3C (100 9F); Altered mental status; Tachycardia > 90 bpm;Tachypnea > 20 resp per min  -GR        If the answer is yes to both questions, suspicion of sepsis is present          If severe sepsis is present AND tissue hypoperfusion perists in the hour after fluid resuscitation or lactate > 4, the patient meets criteria for SEPTIC SHOCK          Are any of the following organ dysfunction criteria present within 6 hours of suspected infection and SIRS criteria that are NOT considered to be chronic conditions? (!)  Yes  -GR        Organ dysfunction          Date of presentation of severe sepsis          Time of presentation of severe sepsis          Tissue hypoperfusion persists in the hour after crystalloid fluid administration, evidenced, by either:          Was hypotension present within one hour of the conclusion of crystalloid fluid administration? No  -GR        Date of presentation of septic shock          Time of presentation of septic shock            User Key  (r) = Recorded By, (t) = Taken By, (c) = Cosigned By    Initials Name Provider Type    Marybeth Ramachandran MD Physician               Default Flowsheet Data (last 720 hours)      Sepsis Reassessment     Row Name 05/03/18 1253                   Volume Status and Tissue Perfusion Post Fluid Resuscitation- Must Document ALL of the Following:    Vital Signs Reviewed Yes  -GR        Cardio (!)  Normal S1/S2; Regular rate and rhythm; Tachycardia  -GR Pulmonary Normal effort  -GR        Capillary Refill Brisk  -GR        Peripheral Pulses          Skin Warm  -GR           *OR*   Intensive Monitoring- Must Document Two * of the Following Four *:    Vital Signs Reviewed Yes  -GR        * Central Venous Pressure (CVP or RAP)          * Central Venous Oxygen (SVO2, ScvO2 or Oxygen saturation via central catheter)          * Bedside Cardiovascular US in IVC diameter and % collapse          * Passive Leg Raise OR Crystalloid Challenge            User Key  (r) = Recorded By, (t) = Taken By, (c) = Cosigned By    Initials Name Provider Type    Tanner Alcazar MD Physician

## 2018-05-03 NOTE — OR NURSING
Pt is has dementia and was unable to answer question other then the headache she was c/o  Consents were obtained from telephone via son by both anesthesia provider and MD doing operation  Pt vitals obtained    Pt with dementia

## 2018-05-03 NOTE — PROCEDURES
Central Line Insertion  Date/Time: 5/3/2018 7:03 PM  Performed by: Carrillo Royal by: Spencer Mixon     Patient location:  Bedside  Other Assisting Provider: Yes (comment) (Jac Acuna)    Consent:     Consent obtained:  Verbal (From Luis Felipe GARCIA)  Universal protocol:     Procedure explained and questions answered to patient or proxy's satisfaction: yes      Relevant documents present and verified: yes      Imaging studies available: yes      Required blood products, implants, devices, and special equipment available: yes      Site/side marked: yes      Immediately prior to procedure, a time out was called: yes      Patient identity confirmed:  Arm band and hospital-assigned identification number  Pre-procedure details:     Hand hygiene: Hand hygiene performed prior to insertion      Sterile barrier technique: All elements of maximal sterile technique followed      Skin preparation:  2% chlorhexidine    Skin preparation agent: Skin preparation agent completely dried prior to procedure    Indications:     Central line indications: medications requiring central line and hemodynamic monitoring    Anesthesia (see MAR for exact dosages):      Anesthesia method:  Local infiltration    Local anesthetic:  Lidocaine 1% w/o epi  Procedure details:     Location:  Left internal jugular    Vessel type: vein      Laterality:  Left    Approach: percutaneous technique used      Patient position:  Reverse Trendelenburg    Catheter type:  Triple lumen 20cm    Landmarks identified: yes      Ultrasound guidance: yes      Sterile ultrasound techniques: Sterile gel and sterile probe covers were used      Number of attempts:  1    Successful placement: yes    Post-procedure details:     Post-procedure:  Dressing applied and line sutured    Assessment:  Blood return through all ports, no pneumothorax on x-ray and placement verified by x-ray (Will withdrawl line 5cm for proper placement)    Post-procedure complications: none Patient tolerance of procedure: Tolerated well, no immediate complications  Comments:      Central line too deep on CXR, no ptx, no ectopy  Withdrawn 5cm sterilely, will repeat CXR before use

## 2018-05-03 NOTE — H&P
H&P Exam - General Surgery   [facility] 80 y o  female MRN: [id number]  Unit/Bed#: OR POOL Encounter: [id number]    Assessment/Plan     Assessment:  80 y o  F w/ sepsis 2/2 cholangitis, UTI    CALE  Troponin elevation likely 2/2 sepsis    Plan:  NPO  ICU admit  Broad spectrum antibiotics  GI consult  Aggressive volume resuscitation    History of Present Illness     HPI:  Nathaly Webster is a 80 y o  female who presents with AMS and fever  Patient with known history of cholecystitis and was hospitalized in January where she underwent percutaneous cholecystostomy placement  She was seen as outpatient in March, where she underwent cholangiogram that showed a patent cystic duct  This was then removed  Per report, the patient was lethargic complaining of abdominal pain earlier today  She presented to the emergency department with a fever of 103 5  Her LFTs showed significant elevation as well as elevated bilirubin  She underwent CT scan of the abdomen pelvis that demonstrated cholecystitis as well as choledocholithiasis  Review of Systems  Unable be to obtain as patient is encephalopathic with altered mental status    Historical Information   Past Medical History:   Diagnosis Date    Cholelithiasis     Dementia     Diabetes mellitus ([facility] [address] )     Hyperlipidemia     Hypothyroidism     Macular degeneration     Osteoarthritis      History reviewed  No pertinent surgical history    Social History   History   Alcohol Use No     History   Drug Use No     History   Smoking Status    Never Smoker   Smokeless Tobacco    Never Used     Family History: non-contributory    Meds/Allergies   all medications and allergies reviewed  Allergies   Allergen Reactions    Penicillins     Valsartan        Objective   First Vitals:   Blood Pressure: (!) 197/97 (05/03/18 0956)  Pulse: (!) 145 (05/03/18 0956)  Temperature: (!) 103 9 °F (39 9 °C) (05/03/18 0956)  Temp Source: Rectal (05/03/18 0956)  Respirations: 22 (05/03/18 5872)  Height: 4' 7" (139 7 cm) (05/03/18 0956)  Weight - Scale: 59 kg (130 lb 1 1 oz) (05/03/18 0956)  SpO2: 98 % (05/03/18 0956)    Current Vitals:   Blood Pressure: 101/78 (05/03/18 1526)  Pulse: 94 (05/03/18 1526)  Temperature: (!) 100 8 °F (38 2 °C) (05/03/18 1526)  Temp Source: Tympanic (05/03/18 1526)  Respirations: 22 (05/03/18 1526)  Height: 4' 8" (142 2 cm) (05/03/18 1500)  Weight - Scale: 56 1 kg (123 lb 10 9 oz) (05/03/18 1500)  SpO2: 100 % (05/03/18 1526)      Intake/Output Summary (Last 24 hours) at 05/03/18 1646  Last data filed at 05/03/18 1501   Gross per 24 hour   Intake          3493 33 ml   Output              875 ml   Net          2618 33 ml       Invasive Devices     Peripheral Intravenous Line            Peripheral IV 05/03/18 Left Arm less than 1 day    Peripheral IV 05/03/18 Left Wrist less than 1 day          Drain            Nephrostomy Right -- days    Urethral Catheter Temperature probe less than 1 day                Physical Exam   Constitutional: She appears distressed  Eyes: Pupils are equal, round, and reactive to light  Cardiovascular:   Sinus tachycardia   Pulmonary/Chest: Effort normal    Abdominal: Soft  She exhibits no distension  There is tenderness  There is no rebound and no guarding  Diffuse tenderness   Musculoskeletal: Normal range of motion  Skin: Skin is warm and dry         Lab Results:   CBC:   Lab Results   Component Value Date    WBC 15 96 (H) 05/03/2018    HGB 10 2 (L) 05/03/2018    HCT 32 0 (L) 05/03/2018     (H) 05/03/2018     05/03/2018    MCH 32 2 05/03/2018    MCHC 31 9 05/03/2018    RDW 15 3 (H) 05/03/2018    MPV 10 9 05/03/2018    NRBC 0 05/03/2018   , CMP:   Lab Results   Component Value Date     05/03/2018    K 3 3 (L) 05/03/2018     (H) 05/03/2018    CO2 18 (L) 05/03/2018    ANIONGAP 11 05/03/2018    BUN 21 05/03/2018    CREATININE 1 25 05/03/2018    GLUCOSE 289 (H) 05/03/2018    CALCIUM 8 2 (L) 05/03/2018    AST 1,273 (H) 05/03/2018     (H) 05/03/2018    ALKPHOS 455 (H) 05/03/2018    PROT 6 6 05/03/2018    BILITOT 2 55 (H) 05/03/2018    EGFR 39 05/03/2018   , Coagulation:   Lab Results   Component Value Date    INR 1 30 (H) 05/03/2018     Imaging: I have personally reviewed pertinent reports  and I have personally reviewed pertinent films in PACS  EKG, Pathology, and Other Studies: I have personally reviewed pertinent reports        Code Status: Level 2 - DNAR: but accepts endotracheal intubation  Advance Directive and Living Will:      Power of :    POLST:

## 2018-05-03 NOTE — ANESTHESIA POSTPROCEDURE EVALUATION
Post-Op Assessment Note      CV Status:  Stable    Mental Status:  Alert and awake    Hydration Status:  Euvolemic    PONV Controlled:  Controlled    Airway Patency:  Patent    Post Op Vitals Reviewed: Yes          Staff: CRNA       Comments: PT transported to ICU and report given to [facility]          BP   134/61   Temp  101 7 F   Pulse  95   Resp   23   SpO2   100%

## 2018-05-03 NOTE — PROGRESS NOTES
Post-op Note - Critical Care   Jeffrey Slice 80 y o  female MRN: [id number]  Unit/Bed#: ICU 11 Encounter: [id number]    Assessment:   Patient Active Problem List   Diagnosis    Altered mental status    Dementia    Essential hypertension    Diabetes mellitus ([address] )    Stage 3 chronic kidney disease    Falls    Epigastric pain    Cholecystitis with cholelithiasis    Shortness of breath    Anemia    Hypothyroid    Cholecystostomy tube dysfunction    Cholecystitis         Plan: Critical Care Management as outlined below:     Neuro:  Wakes to voice  Follows commands  Remains somewhat sleepy  No focal deficits   P r n  Tylenol and Dilaudid for now  CV:  Currently hemodynamically normal  Left arm IV appears infiltrated  Required pressors intraop  Obtain new IV access and monitor blood pressure closely  Lung:  No acute issues  Currently on simple mask  Aggressive pulmonary toilet  GI:  Status post ERCP  Abdominal exam benign  Follow-up postop labs  FEN:  Continue maintenance IV fluids  NPO for now  Follow-up postop labs  :  Follow urine output  UA with probable UTI  Continue antibiotics  ID:  Afebrile  Meet sepsis criteria for UTI and acute cholecystitis  Continue antibiotics  Monitor closely  Heme:  Follow-up postop labs  Endo:  Continue sliding scale insulin coverage  Discontinue Lantus and Q a c  coverage  Msk/Skin:  No skin breakdown  Frequent repositioning and offloading     Disposition:  Manage in ICU     ______________________________________________________________________    Chief Complaint:  None given      HPI/24hr events:  Status post ERCP    ______________________________________________________________________    Physical Exam:   Physical Exam   Constitutional: She is oriented to person, place, and time  She appears well-developed  She is sleeping and cooperative  She is easily aroused  Non-toxic appearance  She appears ill  No distress  Eyes: Conjunctivae are normal  Pupils are equal, round, and reactive to light  Cardiovascular: Normal rate, regular rhythm, intact distal pulses and normal pulses  No extrasystoles are present  Pulmonary/Chest: Effort normal and breath sounds normal  No accessory muscle usage  No tachypnea  No respiratory distress  Abdominal: Soft  Normal appearance  She exhibits no distension  There is no tenderness  Neurological: She is oriented to person, place, and time and easily aroused  She has normal strength  GCS eye subscore is 4  GCS verbal subscore is 5  GCS motor subscore is 6  Skin: Skin is warm, dry and intact              ______________________________________________________________________  Vitals:    18 1500 18 1526 18 1652 18 1712   BP: 126/77 101/78     BP Location: Right arm      Pulse: 104 94     Resp: 22 22     Temp: (!) 101 8 °F (38 8 °C) (!) 100 8 °F (38 2 °C)     TempSrc: Probe Tympanic     SpO2: 97% 100% 99% 98%   Weight: 56 1 kg (123 lb 10 9 oz)      Height: 4' 8" (1 422 m)                 Temperature:   Temp (24hrs), Av 3 °F (39 1 °C), Min:100 8 °F (38 2 °C), Max:103 9 °F (39 9 °C)    Current Temperature: (!) 100 8 °F (38 2 °C)  Weights:   IBW: 36 3 kg    Body mass index is 27 73 kg/m²    Weight (last 2 days)     Date/Time   Weight    18 1500  56 1 (123 68)    18 0956  59 (130 07)            Hemodynamic Monitoring:  N/A     Non-Invasive/Invasive Ventilation Settings:  Respiratory    Lab Data (Last 4 hours)    None         O2/Vent Data (Last 4 hours)    None              No results found for: PHART, KFS7EVW, PO2ART, CCT2LBL, V7XGYFAA, BEART, SOURCE  SpO2: SpO2: 98 %, SpO2 Activity: SpO2 Activity: At Rest, SpO2 Device: O2 Device: Nasal cannula  Intake and Outputs:  I/O        07 07 -  07    I V  (mL/kg)   500 (8 9)    IV Piggyback   3493 3    Total Intake(mL/kg)   3993 3 (71 2)    Urine (mL/kg/hr)   875    Stool   0    Total Output     875    Net     +3118 3           Unmeasured Stool Occurrence   1 x          Nutrition:        Diet Orders            Start     Ordered    05/03/18 1710  Diet NPO; Sips with meds  Diet effective now     Question Answer Comment   Diet Type NPO    NPO Except: Sips with meds    RD to adjust diet per protocol? No        05/03/18 1714          Labs:     Results from last 7 days  Lab Units 05/03/18  1452 05/03/18  1011   WBC Thousand/uL 15 96* 7 64   HEMOGLOBIN g/dL 10 2* 12 6   HEMATOCRIT % 32 0* 37 7   PLATELETS Thousands/uL 181 280   NEUTROS PCT %  --  79*   MONOS PCT %  --  1*      Results from last 7 days  Lab Units 05/03/18  1451 05/03/18  1240   SODIUM mmol/L 143 140   POTASSIUM mmol/L 3 3* 4 8   CHLORIDE mmol/L 114* 110*   CO2 mmol/L 18* 20*   BUN mg/dL 21 23   CREATININE mg/dL 1 25 1 37*   CALCIUM mg/dL 8 2* 9 1   TOTAL PROTEIN g/dL 6 6 8 0   BILIRUBIN TOTAL mg/dL 2 55* 3 19*   ALK PHOS U/L 455* 623*   ALT U/L 473* 599*   AST U/L 1,273* 1,847*   GLUCOSE RANDOM mg/dL 289* 262*       Results from last 7 days  Lab Units 05/03/18  1451   MAGNESIUM mg/dL 1 5*            Results from last 7 days  Lab Units 05/03/18  1451 05/03/18  1045   INR  1 30* 1 14   PTT seconds 30 26       Results from last 7 days  Lab Units 05/03/18  1452   LACTIC ACID mmol/L 5 6*       0  Lab Value Date/Time   TROPONINI 0 34 (H) 05/03/2018 1452   TROPONINI 0 20 (H) 05/03/2018 1240   TROPONINI 0 04 01/24/2018 2256   TROPONINI 0 03 01/24/2018 2027   TROPONINI <0 02 01/19/2018 2138   TROPONINI <0 02 11/22/2017 1214         Micro:  Lab Results   Component Value Date    BLOODCX No Growth After 5 Days  01/24/2018    BLOODCX No Growth After 5 Days   01/24/2018    URINECX 6521-2935 cfu/ml - One colony Escherichia coli (A) 01/21/2018    URINECX 60,000-69,000 cfu/ml Lactobacillus species (A) 01/21/2018    URINECX (A) 11/21/2017     >100,000 cfu/ml Alpha Hemolytic Streptococcus NOT Enterococcus    URINECX 1073-9874 cfu/ml Gram Negative Parvez (A) 11/21/2017     Allergies: Allergies   Allergen Reactions    Penicillins     Valsartan      Medications:   Scheduled Meds:  Current Facility-Administered Medications:  amLODIPine 2 5 mg Oral Daily Barryterrance Burden, DO    cefepime 2,000 mg Intravenous Q12H Wilner Shultz MD    docusate sodium 100 mg Oral BID Barry L Jenise, DO    heparin (porcine) 5,000 Units Subcutaneous [facility] Wilner Shultz MD    HYDROmorphone 0 5 mg Intravenous Q4H PRN Barry L Fidencioland, DO    insulin lispro 1-5 Units Subcutaneous Q6H [address] Barry L Jenise, DO    metroNIDAZOLE 500 mg Intravenous Q8H Wilner Shultz MD    omeprazole (PRILOSEC) suspension 2 mg/mL 20 mg Oral BID With Meals Barry L Jenise, DO    ondansetron        ondansetron 4 mg Intravenous Q6H PRN Wilner Shultz MD    polyethylene glycol 17 g Oral Daily PRN [facility], DO    sertraline 25 mg Oral Daily [facility], DO    sodium chloride (PF) 3 mL Intravenous PRN Shashank Nina MD    sodium chloride 125 mL/hr Intravenous Continuous Wilner Shultz MD Last Rate: 125 mL/hr (05/03/18 1652)     Continuous Infusions:  sodium chloride 125 mL/hr Last Rate: 125 mL/hr (05/03/18 1652)     PRN Meds:    HYDROmorphone 0 5 mg Q4H PRN   ondansetron 4 mg Q6H PRN   polyethylene glycol 17 g Daily PRN   sodium chloride (PF) 3 mL PRN     VTE Pharmacologic Prophylaxis: Heparin  VTE Mechanical Prophylaxis: sequential compression device  Invasive lines and devices: Invasive Devices     Peripheral Intravenous Line            Peripheral IV 05/03/18 Left Arm less than 1 day    Peripheral IV 05/03/18 Left Wrist less than 1 day          Drain            Nephrostomy Right -- days    Urethral Catheter Temperature probe less than 1 day                   Counseling / Coordination of Care  Total Critical Care time spent 22 minutes excluding procedures, teaching and family updates        Code Status: Level 2 - DNAR: but accepts endotracheal intubation    Portions of the record may have been created with voice recognition software  Occasional wrong word or "sound a like" substitutions may have occurred due to the inherent limitations of voice recognition software  Read the chart carefully and recognize, using context, where substitutions have occurred      Arcelia Rasmussen PA-C

## 2018-05-03 NOTE — ANESTHESIA PREPROCEDURE EVALUATION
Review of Systems/Medical History  Patient summary reviewed  Chart reviewed  No history of anesthetic complications     Cardiovascular  EKG reviewed, Hyperlipidemia, Hypertension controlled,    Pulmonary  Negative pulmonary ROS        GI/Hepatic      Comment: Cholecystitis s/p tube placement in January, tube clogged and now patient has become septic     Kidney disease CKD, Chronic kidney disease stage 3,        Endo/Other  Diabetes type 2 , History of thyroid disease , hypothyroidism,      GYN  Negative gynecology ROS          Hematology  Anemia ,     Musculoskeletal    Arthritis     Neurology  Negative neurology ROS      Psychology     Dementia           Physical Exam    Airway    Mallampati score: II  TM Distance: >3 FB  Neck ROM: full     Dental   No notable dental hx     Cardiovascular  Rhythm: regular, Rate: normal, Cardiovascular exam normal    Pulmonary  Pulmonary exam normal Breath sounds clear to auscultation,     Other Findings        Anesthesia Plan  ASA Score- 3 Emergent    Anesthesia Type- general with ASA Monitors  Additional Monitors:   Airway Plan: ETT  Plan Factors-    Induction- intravenous  Postoperative Plan-     Informed Consent- Anesthetic plan and risks discussed with patient, son and healthcare power of  Keo Coyle  I personally reviewed this patient with the CRNA  Discussed and agreed on the Anesthesia Plan with the CRNA  Miller Tyson           Recent labs personally reviewed:  Lab Results   Component Value Date    WBC 7 64 05/03/2018    HGB 12 6 05/03/2018     05/03/2018     Lab Results   Component Value Date     05/03/2018    K 3 3 (L) 05/03/2018    BUN 21 05/03/2018    CREATININE 1 25 05/03/2018    GLUCOSE 289 (H) 05/03/2018     Lab Results   Component Value Date    PTT 30 05/03/2018      Lab Results   Component Value Date    INR 1 30 (H) 05/03/2018       Blood type A    Lab Results   Component Value Date    HGBA1C 8 9 (H) 11/22/2017       IEndy, MD, have personally seen and evaluated the patient prior to anesthetic care  I have reviewed the pre-anesthetic record, and other medical records if appropriate to the anesthetic care  If a CRNA is involved in the case, I have reviewed the CRNA assessment, if present, and agree  Risks/benefits and alternatives discussed with patient including possible PONV, sore throat, and possibility of rare anesthetic and surgical emergencies

## 2018-05-04 ENCOUNTER — APPOINTMENT (INPATIENT)
Dept: RADIOLOGY | Facility: HOSPITAL | Age: 83
DRG: 871 | End: 2018-05-04
Payer: MEDICARE

## 2018-05-04 LAB
ALBUMIN SERPL BCP-MCNC: 1.8 G/DL (ref 3.5–5)
ALP SERPL-CCNC: 363 U/L (ref 46–116)
ALT SERPL W P-5'-P-CCNC: 341 U/L (ref 12–78)
ANION GAP SERPL CALCULATED.3IONS-SCNC: 8 MMOL/L (ref 4–13)
ANION GAP SERPL CALCULATED.3IONS-SCNC: 8 MMOL/L (ref 4–13)
ANISOCYTOSIS BLD QL SMEAR: PRESENT
ANISOCYTOSIS BLD QL SMEAR: PRESENT
AST SERPL W P-5'-P-CCNC: 577 U/L (ref 5–45)
BASE EXCESS BLDA CALC-SCNC: -10.3 MMOL/L
BASE EXCESS BLDA CALC-SCNC: -10.4 MMOL/L
BASE EXCESS BLDA CALC-SCNC: -8.6 MMOL/L
BASOPHILS # BLD MANUAL: 0 THOUSAND/UL (ref 0–0.1)
BASOPHILS # BLD MANUAL: 0 THOUSAND/UL (ref 0–0.1)
BASOPHILS NFR MAR MANUAL: 0 % (ref 0–1)
BASOPHILS NFR MAR MANUAL: 0 % (ref 0–1)
BILIRUB DIRECT SERPL-MCNC: 1.87 MG/DL (ref 0–0.2)
BILIRUB SERPL-MCNC: 1.93 MG/DL (ref 0.2–1)
BUN SERPL-MCNC: 18 MG/DL (ref 5–25)
BUN SERPL-MCNC: 18 MG/DL (ref 5–25)
CA-I BLD-SCNC: 1.15 MMOL/L (ref 1.12–1.32)
CA-I BLD-SCNC: 1.17 MMOL/L (ref 1.12–1.32)
CALCIUM SERPL-MCNC: 7.6 MG/DL (ref 8.3–10.1)
CALCIUM SERPL-MCNC: 7.6 MG/DL (ref 8.3–10.1)
CHLORIDE SERPL-SCNC: 117 MMOL/L (ref 100–108)
CHLORIDE SERPL-SCNC: 122 MMOL/L (ref 100–108)
CO2 SERPL-SCNC: 17 MMOL/L (ref 21–32)
CO2 SERPL-SCNC: 18 MMOL/L (ref 21–32)
CREAT SERPL-MCNC: 0.97 MG/DL (ref 0.6–1.3)
CREAT SERPL-MCNC: 1.03 MG/DL (ref 0.6–1.3)
EOSINOPHIL # BLD MANUAL: 0 THOUSAND/UL (ref 0–0.4)
EOSINOPHIL # BLD MANUAL: 0 THOUSAND/UL (ref 0–0.4)
EOSINOPHIL NFR BLD MANUAL: 0 % (ref 0–6)
EOSINOPHIL NFR BLD MANUAL: 0 % (ref 0–6)
ERYTHROCYTE [DISTWIDTH] IN BLOOD BY AUTOMATED COUNT: 15.4 % (ref 11.6–15.1)
ERYTHROCYTE [DISTWIDTH] IN BLOOD BY AUTOMATED COUNT: 15.6 % (ref 11.6–15.1)
GFR SERPL CREATININE-BSD FRML MDRD: 49 ML/MIN/1.73SQ M
GFR SERPL CREATININE-BSD FRML MDRD: 53 ML/MIN/1.73SQ M
GLUCOSE SERPL-MCNC: 124 MG/DL (ref 65–140)
GLUCOSE SERPL-MCNC: 131 MG/DL (ref 65–140)
GLUCOSE SERPL-MCNC: 139 MG/DL (ref 65–140)
GLUCOSE SERPL-MCNC: 150 MG/DL (ref 65–140)
GLUCOSE SERPL-MCNC: 172 MG/DL (ref 65–140)
GLUCOSE SERPL-MCNC: 220 MG/DL (ref 65–140)
GLUCOSE SERPL-MCNC: 237 MG/DL (ref 65–140)
GLUCOSE SERPL-MCNC: 265 MG/DL (ref 65–140)
GLUCOSE SERPL-MCNC: 273 MG/DL (ref 65–140)
GLUCOSE SERPL-MCNC: 281 MG/DL (ref 65–140)
GLUCOSE SERPL-MCNC: 77 MG/DL (ref 65–140)
HCO3 BLDA-SCNC: 14.8 MMOL/L (ref 22–28)
HCO3 BLDA-SCNC: 15.4 MMOL/L (ref 22–28)
HCO3 BLDA-SCNC: 15.7 MMOL/L (ref 22–28)
HCT VFR BLD AUTO: 27.7 % (ref 34.8–46.1)
HCT VFR BLD AUTO: 28.2 % (ref 34.8–46.1)
HGB BLD-MCNC: 9 G/DL (ref 11.5–15.4)
HGB BLD-MCNC: 9.1 G/DL (ref 11.5–15.4)
LACTATE SERPL-SCNC: 1.6 MMOL/L (ref 0.5–2)
LACTATE SERPL-SCNC: 2.4 MMOL/L (ref 0.5–2)
LACTATE SERPL-SCNC: 2.8 MMOL/L (ref 0.5–2)
LYMPHOCYTES # BLD AUTO: 1.36 THOUSAND/UL (ref 0.6–4.47)
LYMPHOCYTES # BLD AUTO: 1.57 THOUSAND/UL (ref 0.6–4.47)
LYMPHOCYTES # BLD AUTO: 4 % (ref 14–44)
LYMPHOCYTES # BLD AUTO: 5 % (ref 14–44)
MAGNESIUM SERPL-MCNC: 2.4 MG/DL (ref 1.6–2.6)
MAGNESIUM SERPL-MCNC: 2.5 MG/DL (ref 1.6–2.6)
MCH RBC QN AUTO: 32.3 PG (ref 26.8–34.3)
MCH RBC QN AUTO: 32.4 PG (ref 26.8–34.3)
MCHC RBC AUTO-ENTMCNC: 32.3 G/DL (ref 31.4–37.4)
MCHC RBC AUTO-ENTMCNC: 32.5 G/DL (ref 31.4–37.4)
MCV RBC AUTO: 100 FL (ref 82–98)
MCV RBC AUTO: 100 FL (ref 82–98)
METAMYELOCYTES NFR BLD MANUAL: 2 % (ref 0–1)
METAMYELOCYTES NFR BLD MANUAL: 3 % (ref 0–1)
MONOCYTES # BLD AUTO: 0.63 THOUSAND/UL (ref 0–1.22)
MONOCYTES # BLD AUTO: 1.02 THOUSAND/UL (ref 0–1.22)
MONOCYTES NFR BLD: 2 % (ref 4–12)
MONOCYTES NFR BLD: 3 % (ref 4–12)
MYELOCYTES NFR BLD MANUAL: 1 % (ref 0–1)
NASAL CANNULA: 2
NEUTROPHILS # BLD MANUAL: 28.66 THOUSAND/UL (ref 1.85–7.62)
NEUTROPHILS # BLD MANUAL: 30.21 THOUSAND/UL (ref 1.85–7.62)
NEUTS BAND NFR BLD MANUAL: 11 % (ref 0–8)
NEUTS BAND NFR BLD MANUAL: 7 % (ref 0–8)
NEUTS SEG NFR BLD AUTO: 80 % (ref 43–75)
NEUTS SEG NFR BLD AUTO: 82 % (ref 43–75)
NRBC BLD AUTO-RTO: 0 /100 WBCS
NRBC BLD AUTO-RTO: 0 /100 WBCS
O2 CT BLDA-SCNC: 12.5 ML/DL (ref 16–23)
O2 CT BLDA-SCNC: 13.4 ML/DL (ref 16–23)
O2 CT BLDA-SCNC: 13.4 ML/DL (ref 16–23)
OXYHGB MFR BLDA: 94.5 % (ref 94–97)
OXYHGB MFR BLDA: 96 % (ref 94–97)
OXYHGB MFR BLDA: 96.1 % (ref 94–97)
PCO2 BLDA: 28.1 MM HG (ref 36–44)
PCO2 BLDA: 29.7 MM HG (ref 36–44)
PCO2 BLDA: 33.5 MM HG (ref 36–44)
PH BLDA: 7.28 [PH] (ref 7.35–7.45)
PH BLDA: 7.31 [PH] (ref 7.35–7.45)
PH BLDA: 7.37 [PH] (ref 7.35–7.45)
PHOSPHATE SERPL-MCNC: 0.9 MG/DL (ref 2.3–4.1)
PLATELET # BLD AUTO: 196 THOUSANDS/UL (ref 149–390)
PLATELET # BLD AUTO: 200 THOUSANDS/UL (ref 149–390)
PLATELET BLD QL SMEAR: ADEQUATE
PLATELET BLD QL SMEAR: ADEQUATE
PMV BLD AUTO: 11.1 FL (ref 8.9–12.7)
PMV BLD AUTO: 11.1 FL (ref 8.9–12.7)
PO2 BLDA: 75.4 MM HG (ref 75–129)
PO2 BLDA: 88.5 MM HG (ref 75–129)
PO2 BLDA: 95.4 MM HG (ref 75–129)
POIKILOCYTOSIS BLD QL SMEAR: PRESENT
POTASSIUM SERPL-SCNC: 4.1 MMOL/L (ref 3.5–5.3)
POTASSIUM SERPL-SCNC: 5 MMOL/L (ref 3.5–5.3)
PROT SERPL-MCNC: 6.4 G/DL (ref 6.4–8.2)
RBC # BLD AUTO: 2.78 MILLION/UL (ref 3.81–5.12)
RBC # BLD AUTO: 2.82 MILLION/UL (ref 3.81–5.12)
RBC MORPH BLD: PRESENT
RBC MORPH BLD: PRESENT
SODIUM SERPL-SCNC: 143 MMOL/L (ref 136–145)
SODIUM SERPL-SCNC: 147 MMOL/L (ref 136–145)
SPECIMEN SOURCE: ABNORMAL
SPECIMEN SOURCE: ABNORMAL
TROPONIN I SERPL-MCNC: 0.19 NG/ML
WBC # BLD AUTO: 31.49 THOUSAND/UL (ref 4.31–10.16)
WBC # BLD AUTO: 33.94 THOUSAND/UL (ref 4.31–10.16)

## 2018-05-04 PROCEDURE — 47490 INCISION OF GALLBLADDER: CPT

## 2018-05-04 PROCEDURE — 99291 CRITICAL CARE FIRST HOUR: CPT | Performed by: EMERGENCY MEDICINE

## 2018-05-04 PROCEDURE — 87070 CULTURE OTHR SPECIMN AEROBIC: CPT | Performed by: RADIOLOGY

## 2018-05-04 PROCEDURE — 82330 ASSAY OF CALCIUM: CPT | Performed by: STUDENT IN AN ORGANIZED HEALTH CARE EDUCATION/TRAINING PROGRAM

## 2018-05-04 PROCEDURE — 84484 ASSAY OF TROPONIN QUANT: CPT | Performed by: EMERGENCY MEDICINE

## 2018-05-04 PROCEDURE — 84100 ASSAY OF PHOSPHORUS: CPT | Performed by: STUDENT IN AN ORGANIZED HEALTH CARE EDUCATION/TRAINING PROGRAM

## 2018-05-04 PROCEDURE — 0F9430Z DRAINAGE OF GALLBLADDER WITH DRAINAGE DEVICE, PERCUTANEOUS APPROACH: ICD-10-PCS | Performed by: RADIOLOGY

## 2018-05-04 PROCEDURE — 83605 ASSAY OF LACTIC ACID: CPT | Performed by: STUDENT IN AN ORGANIZED HEALTH CARE EDUCATION/TRAINING PROGRAM

## 2018-05-04 PROCEDURE — 99152 MOD SED SAME PHYS/QHP 5/>YRS: CPT

## 2018-05-04 PROCEDURE — C1729 CATH, DRAINAGE: HCPCS

## 2018-05-04 PROCEDURE — 87186 SC STD MICRODIL/AGAR DIL: CPT | Performed by: RADIOLOGY

## 2018-05-04 PROCEDURE — 99232 SBSQ HOSP IP/OBS MODERATE 35: CPT | Performed by: INTERNAL MEDICINE

## 2018-05-04 PROCEDURE — 47490 INCISION OF GALLBLADDER: CPT | Performed by: RADIOLOGY

## 2018-05-04 PROCEDURE — 92610 EVALUATE SWALLOWING FUNCTION: CPT

## 2018-05-04 PROCEDURE — 82805 BLOOD GASES W/O2 SATURATION: CPT | Performed by: STUDENT IN AN ORGANIZED HEALTH CARE EDUCATION/TRAINING PROGRAM

## 2018-05-04 PROCEDURE — 85007 BL SMEAR W/DIFF WBC COUNT: CPT | Performed by: STUDENT IN AN ORGANIZED HEALTH CARE EDUCATION/TRAINING PROGRAM

## 2018-05-04 PROCEDURE — 93005 ELECTROCARDIOGRAM TRACING: CPT

## 2018-05-04 PROCEDURE — 83735 ASSAY OF MAGNESIUM: CPT | Performed by: STUDENT IN AN ORGANIZED HEALTH CARE EDUCATION/TRAINING PROGRAM

## 2018-05-04 PROCEDURE — 82948 REAGENT STRIP/BLOOD GLUCOSE: CPT

## 2018-05-04 PROCEDURE — 80048 BASIC METABOLIC PNL TOTAL CA: CPT | Performed by: STUDENT IN AN ORGANIZED HEALTH CARE EDUCATION/TRAINING PROGRAM

## 2018-05-04 PROCEDURE — 99152 MOD SED SAME PHYS/QHP 5/>YRS: CPT | Performed by: RADIOLOGY

## 2018-05-04 PROCEDURE — C1769 GUIDE WIRE: HCPCS

## 2018-05-04 PROCEDURE — 82805 BLOOD GASES W/O2 SATURATION: CPT | Performed by: EMERGENCY MEDICINE

## 2018-05-04 PROCEDURE — 87205 SMEAR GRAM STAIN: CPT | Performed by: RADIOLOGY

## 2018-05-04 PROCEDURE — 80076 HEPATIC FUNCTION PANEL: CPT | Performed by: SURGERY

## 2018-05-04 PROCEDURE — 87077 CULTURE AEROBIC IDENTIFY: CPT | Performed by: RADIOLOGY

## 2018-05-04 PROCEDURE — 85027 COMPLETE CBC AUTOMATED: CPT | Performed by: STUDENT IN AN ORGANIZED HEALTH CARE EDUCATION/TRAINING PROGRAM

## 2018-05-04 RX ORDER — MIDAZOLAM HYDROCHLORIDE 1 MG/ML
INJECTION INTRAMUSCULAR; INTRAVENOUS CODE/TRAUMA/SEDATION MEDICATION
Status: COMPLETED | OUTPATIENT
Start: 2018-05-04 | End: 2018-05-04

## 2018-05-04 RX ORDER — FENTANYL CITRATE 50 UG/ML
INJECTION, SOLUTION INTRAMUSCULAR; INTRAVENOUS CODE/TRAUMA/SEDATION MEDICATION
Status: COMPLETED | OUTPATIENT
Start: 2018-05-04 | End: 2018-05-04

## 2018-05-04 RX ORDER — SODIUM CHLORIDE, SODIUM LACTATE, POTASSIUM CHLORIDE, CALCIUM CHLORIDE 600; 310; 30; 20 MG/100ML; MG/100ML; MG/100ML; MG/100ML
125 INJECTION, SOLUTION INTRAVENOUS CONTINUOUS
Status: DISCONTINUED | OUTPATIENT
Start: 2018-05-04 | End: 2018-05-05

## 2018-05-04 RX ADMIN — HEPARIN SODIUM 5000 UNITS: 5000 INJECTION, SOLUTION INTRAVENOUS; SUBCUTANEOUS at 06:14

## 2018-05-04 RX ADMIN — NOREPINEPHRINE BITARTRATE 13 MCG/MIN: 1 INJECTION INTRAVENOUS at 01:39

## 2018-05-04 RX ADMIN — METRONIDAZOLE 500 MG: 500 INJECTION, SOLUTION INTRAVENOUS at 11:38

## 2018-05-04 RX ADMIN — HEPARIN SODIUM 5000 UNITS: 5000 INJECTION, SOLUTION INTRAVENOUS; SUBCUTANEOUS at 14:06

## 2018-05-04 RX ADMIN — MIDAZOLAM 0.5 MG: 1 INJECTION INTRAMUSCULAR; INTRAVENOUS at 12:26

## 2018-05-04 RX ADMIN — CEFEPIME HYDROCHLORIDE 1000 MG: 1 INJECTION, SOLUTION INTRAVENOUS at 10:26

## 2018-05-04 RX ADMIN — DOCUSATE SODIUM 100 MG: 100 CAPSULE, LIQUID FILLED ORAL at 17:57

## 2018-05-04 RX ADMIN — SODIUM PHOSPHATE, MONOBASIC, MONOHYDRATE 12 MMOL: 276; 142 INJECTION, SOLUTION INTRAVENOUS at 13:27

## 2018-05-04 RX ADMIN — SODIUM CHLORIDE 5 UNITS/HR: 9 INJECTION, SOLUTION INTRAVENOUS at 00:13

## 2018-05-04 RX ADMIN — INSULIN LISPRO 3 UNITS: 100 INJECTION, SOLUTION INTRAVENOUS; SUBCUTANEOUS at 18:13

## 2018-05-04 RX ADMIN — SODIUM CHLORIDE, SODIUM LACTATE, POTASSIUM CHLORIDE, AND CALCIUM CHLORIDE 125 ML/HR: .6; .31; .03; .02 INJECTION, SOLUTION INTRAVENOUS at 13:27

## 2018-05-04 RX ADMIN — POTASSIUM CHLORIDE 40 MEQ: 400 INJECTION, SOLUTION INTRAVENOUS at 00:17

## 2018-05-04 RX ADMIN — HEPARIN SODIUM 5000 UNITS: 5000 INJECTION, SOLUTION INTRAVENOUS; SUBCUTANEOUS at 22:50

## 2018-05-04 RX ADMIN — MIDAZOLAM 0.5 MG: 1 INJECTION INTRAMUSCULAR; INTRAVENOUS at 12:16

## 2018-05-04 RX ADMIN — IOHEXOL 2 ML: 300 INJECTION, SOLUTION INTRAVENOUS at 12:36

## 2018-05-04 RX ADMIN — SODIUM CHLORIDE, SODIUM LACTATE, POTASSIUM CHLORIDE, AND CALCIUM CHLORIDE 500 ML: .6; .31; .03; .02 INJECTION, SOLUTION INTRAVENOUS at 15:00

## 2018-05-04 RX ADMIN — HYDROMORPHONE HYDROCHLORIDE 0.5 MG: 1 INJECTION, SOLUTION INTRAMUSCULAR; INTRAVENOUS; SUBCUTANEOUS at 09:10

## 2018-05-04 RX ADMIN — METRONIDAZOLE 500 MG: 500 INJECTION, SOLUTION INTRAVENOUS at 03:29

## 2018-05-04 RX ADMIN — FENTANYL CITRATE 25 MCG: 50 INJECTION INTRAMUSCULAR; INTRAVENOUS at 12:16

## 2018-05-04 RX ADMIN — CEFEPIME HYDROCHLORIDE 1000 MG: 1 INJECTION, SOLUTION INTRAVENOUS at 22:50

## 2018-05-04 RX ADMIN — FENTANYL CITRATE 25 MCG: 50 INJECTION INTRAMUSCULAR; INTRAVENOUS at 12:26

## 2018-05-04 RX ADMIN — SODIUM CHLORIDE, SODIUM LACTATE, POTASSIUM CHLORIDE, AND CALCIUM CHLORIDE 1000 ML: .6; .31; .03; .02 INJECTION, SOLUTION INTRAVENOUS at 07:00

## 2018-05-04 RX ADMIN — HYDROMORPHONE HYDROCHLORIDE 0.5 MG: 1 INJECTION, SOLUTION INTRAMUSCULAR; INTRAVENOUS; SUBCUTANEOUS at 14:06

## 2018-05-04 NOTE — CASE MANAGEMENT
Initial Clinical Review    Admission: Date/Time/Statement: 5/3/18 @ 1401     Orders Placed This Encounter   Procedures    Inpatient Admission     Standing Status:   Standing     Number of Occurrences:   1     Order Specific Question:   Admitting Physician     Answer:   Toya Antony     Order Specific Question:   Level of Care     Answer:   Critical Care [15]     Order Specific Question:   Estimated length of stay     Answer:   More than 2 Midnights     Order Specific Question:   Certification     Answer:   I certify that inpatient services are medically necessary for this patient for a duration of greater than two midnights  See H&P and MD Progress Notes for additional information about the patient's course of treatment  ED: Date/Time/Mode of Arrival:   ED Arrival Information     Expected Arrival Acuity Means of Arrival Escorted By Service Admission Type    5/3/2018 08:58 5/3/2018 09:18 [facility] [place]) Surgery-General Emergency    Arrival Complaint    altered mental status/abdominal pain          Chief Complaint:   Chief Complaint   Patient presents with    Altered Mental Status     per nursing home patient had change in mental status, abdominal pain and vomiting  History of Illness:      Luberta Nageotte is a 80 y o  female who presents with AMS and fever  Patient with known history of cholecystitis and was hospitalized in January where she underwent percutaneous cholecystostomy placement  She was seen as outpatient in March, where she underwent cholangiogram that showed a patent cystic duct  This was then removed  Per report, the patient was lethargic complaining of abdominal pain earlier today  She presented to the emergency department with a fever of 103 5  Her LFTs showed significant elevation as well as elevated bilirubin  She underwent CT scan of the abdomen pelvis that demonstrated cholecystitis as well as choledocholithiasis          ED Vital Signs:   ED Triage Vitals   Temperature Pulse Respirations Blood Pressure SpO2   05/03/18 0956 05/03/18 0956 05/03/18 0956 05/03/18 0956 05/03/18 0956   (!) 103 9 °F (39 9 °C) (!) 145 22 (!) 197/97 98 %         Pain Score       05/03/18 1258       No Pain        Wt Readings from Last 1 Encounters:   05/03/18 56 1 kg (123 lb 10 9 oz)       Vital Signs (abnormal):   Date/Time  Temp  Pulse  Resp  BP  MAP (mmHg)    SpO2  O2 Device  Patient Position - Orthostatic VS        05/03/18 2200  98 6 °F (37 °C)  86   28  125/63  81   97 %  --  --    05/03/18 2100  97 9 °F (36 6 °C)  80   24  114/50  70   97 %  --  --    05/03/18 2000  98 2 °F (36 8 °C)  86   27  107/64  85   95 %  Nasal cannula  Lying    05/03/18 1930  99 7 °F (37 6 °C)  86   26  102/53  76   97 %  --  --    05/03/18 1920  99 7 °F (37 6 °C)  82   37  91/53  60   98 %  --  --    05/03/18 1910  99 °F (37 2 °C)  84   48  100/55  76   99 %  --  --    05/03/18 1901  99 3 °F (37 4 °C)  90   26  118/67  93   97 %  --  --    05/03/18 1846  100 °F (37 8 °C)  90   33  111/56  77   99 %  --  --    05/03/18 1831  100 4 °F (38 °C)  90   27  142/70  97   98 %  --  --    05/03/18 1815  100 4 °F (38 °C)  86   28  111/62  76   98 %  --  --    05/03/18 1810  100 4 °F (38 °C)  86   26  120/53  73   94 %  --  --    05/03/18 1748  --  80   24   100/47  64   --  --  --    05/03/18 1526   100 8 °F (38 2 °C)  94  22  101/78  --   100 %  None (Room air)  --    05/03/18 1500   101 8 °F (38 8 °C)  104  22  126/77  --   97 %  None (Room air)  Lying    05/03/18 1424   101 5 °F (38 6 °C)  98  --  105/52  75   96 %  --  --    05/03/18 1410  --  101  22  127/64  --   96 %  None (Room air)  Lying    05/03/18 1315   102 6 °F (39 2 °C)   106  22  135/59  --   97 %  None (Room air)              Abnormal Labs/Diagnostic Test Results:     Lab Units 05/03/18  1240   CHLORIDE mmol/L 110*   CO2 mmol/L 20*   CREATININE mg/dL 1 37*   BILIRUBIN TOTAL mg/dL 3 19*   ALK PHOS U/L 623*   ALT U/L 599*   AST U/L 1,847*   GLUCOSE RANDOM mg/dL 262     Indwelling Palomo Catheter     RBC, UA 4-10 (A) /hpf    WBC, UA Innumerable (A) /hpf    Bacteria, UA Innumerable (A) /hpf      pH, Duc 7 373    pCO2, Duc 35 7 (L) mm Hg    pO2, Duc 51 7 (H) mm Hg    HCO3, Duc 20 3 (L) mmol/L    O2 HGB, VENOUS 83 4 (H) %      LACTIC ACID 5 7 (HH)     Troponin I   1 0 20 (H)     Troponin I    2 0 34 (H)     Left     WBC 15 96 (H) Thousand/uL    RBC 3 17 (L) Million/uL    Hemoglobin 10 2 (L) g/dL    Hematocrit 32 0 (L) %     (H) fL    RDW 15 3 (H) %         Ct Abdomen Pelvis Wo Contrast  Impression: Noncontrast CT findings most consistent with cholelithiasis and recurrent acute cholecystitis  Choledocholithiasis with mild enlargement of common bile duct measuring up to 10 mm    Findings at the right lung base on CT and on earlier performed chest x-ray suggestive of either atelectasis or pneumonia in the right lung  Small to moderate hiatal hernia  Large bolus of stool in the rectum with mild rectal wall thickening and perirectal edema suggesting mild stercoral proctitis    ED Treatment:   Medication Administration from 05/03/2018 0858 to 05/03/2018 1459       Date/Time Order Dose Route     05/03/2018 0930 ondansetron (ZOFRAN) injection 4 mg 4 mg Intravenous     05/03/2018 0945 sodium chloride 0 9 % bolus 1,000 mL 1,000 mL Intravenous     05/03/2018 1040 sodium chloride 0 9 % bolus 1,000 mL 1,000 mL Intravenous     05/03/2018 1040 cefepime (MAXIPIME) 2 g/50 mL dextrose IVPB 2,000 mg Intravenous     05/03/2018 1312 vancomycin (VANCOCIN) IVPB (premix) 1,000 mg 1,000 mg Intravenous     05/03/2018 1200 metroNIDAZOLE (FLAGYL) IVPB (premix) 500 mg 500 mg Intravenous     05/03/2018 1000 acetaminophen (TYLENOL) rectal suppository 975 mg 975 mg Rectal     05/03/2018 1329 sodium chloride 0 9 % bolus 1,000 mL 1,000 mL Intravenous       Past Medical/Surgical History:    Active Ambulatory Problems     Diagnosis Date Noted    Altered mental status 11/21/2017    Dementia 11/21/2017    Essential hypertension 11/21/2017    [facility] 11/21/2017    Stage 3 chronic kidney disease 11/21/2017    Falls 11/22/2017    Epigastric pain 11/22/2017    Cholecystitis with cholelithiasis 01/21/2018    Shortness of breath 01/24/2018    Anemia 01/24/2018    Hypothyroid 01/24/2018    Cholecystostomy tube dysfunction 02/16/2018     Resolved Ambulatory Problems     Diagnosis Date Noted    UTI (urinary tract infection) 11/21/2017    Hyperkalemia 11/21/2017     Past Medical History:    Cholelithiasis     Dementia     [facility]     Hyperlipidemia     Hypothyroidism     Macular degeneration     Osteoarthritis        Admitting Diagnosis: Cholangitis [K83 0]  Altered mental status [R41 82]  Calculus of gallbladder with acute cholecystitis without obstruction [K80 00]    Age/Sex: 80 y o  female    Assessment/Plan:  General Information     Case: [id number] Anesthesia Start Date/Time: 05/03/18 1536   Procedure: ENDOSCOPIC RETROGRADE CHOLANGIOPANCREATOGRAPHY (ERCP) (N/A Abdomen)   Anesthesia type: general     COMPLICATIONS: There were no complications  IMPRESSIONS:  Successful ERCP with sphincterotomy and pus extraction and   stent placement for patient with acute ascending cholangitis  RECOMMENDATIONS:  Continue antibiotics  continue monitor closely in ICU  Repeat ERCP in the next 4-6 weeks for stent removal an duct clearance  CRITICAL CARE     1) Acute cholecystitis with cholangitis - Per CT, small stones in the distal CBD  High suspicion for cholangitis given pain, fever, jaundice and change in mental status  After Dr Lulu Zayas reviewed the case, plan is for ERCP this afternoon  I spoke to Gregor, patient's son, who I explained the procedure to  Dr Lulu Zayas will call him for consent and discuss risks    - ERCP in OR  - Hold heparin and anticoagulation for procedure   - IV antibiotics   - Continue to trend LFTs  - Patient will be admitted to ICU    Admission Orders:    BODY FLUID CULTURE PENDING    BLOOD CULTURE PENDING   Percchole drain in place      Scheduled Meds:   Current Facility-Administered Medications:  amLODIPine 2 5 mg Oral Daily   cefepime 1,000 mg Intravenous Q12H   docusate sodium 100 mg Oral BID   heparin (porcine) 5,000 Units Subcutaneous Q8H [address]   HYDROmorphone 0 2 mg Intravenous Q4H PRN   HYDROmorphone 0 5 mg Intravenous Q4H PRN   insulin lispro 1-5 Units Subcutaneous Q6H [address]   lactated ringers 125 mL/hr Intravenous Continuous   norepinephrine 1-30 mcg/min Intravenous Titrated   omeprazole (PRILOSEC) suspension 2 mg/mL 20 mg Oral BID With Meals   ondansetron 4 mg Intravenous Q6H PRN   polyethylene glycol 17 g Oral Daily PRN   sertraline 25 mg Oral Daily   sodium chloride (PF) 3 mL Intravenous PRN   sodium phosphate 12 mmol Intravenous Once     Continuous Infusions:   lactated ringers 125 mL/hr   norepinephrine 1-30 mcg/min     PRN Meds: HYDROmorphone    HYDROmorphone  IV  5 MG   5-3 2240 5-4   0910  5-4 1406       ondansetron    polyethylene glycol    Insert peripheral IV **AND** sodium chloride (PF)

## 2018-05-04 NOTE — PROGRESS NOTES
Progress Note - Critical Care   Clive Granados 80 y o  female MRN: [id number]  Unit/Bed#: ICU 11 Encounter: [id number]  Patient Active Problem List   Diagnosis    Altered mental status    Dementia    Essential hypertension    [facility]    Stage 3 chronic kidney disease    Falls    Epigastric pain    Cholecystitis with cholelithiasis    Shortness of breath    Anemia    Hypothyroid    Cholecystostomy tube dysfunction    Cholecystitis       Assessment:   Cholecystis   Severe Sepsis  DM  CKD  Anemia  Hypothyroidism      Plan:       Neuro   1  Acute encephalopathy 2/2 Severe sepsis, improving  -Q4hr neuro exams, continue ABX as started in ED, continue to monitor vitals and labs as stated below  -Maintaining airway, not intubated at this time     2  Analgesia  -PRN dilaudid 0 5mg Q4hrs     3  Tachycardia, 2/2 sepsis, resolved     CV   1  Elevated Troponin at 0 20 Likley type II  -trended down from 0 20-0 34-0 33-0 29(most recent)  -continue to trend     2  Hypotension, MAPs 40s-50s  -Central line placed, maintain  -Continue to wean levo to MAP >65    3  Chronic Hypertension, essential  - Monitor Bp via a line  - Hold home dose Norvasc 2 5 mg daily  - Continue to monitor hemodynamics     3  Dyslipidemia  - Continue home dose Crestor 5 mg daily     Pulm   1  Right lung base on CT suggestive of either atelectasis or pneumonia in the right lung   -Continue Abx, Monitor with morning CXR, pending  -currently 98% on 2L NC, Protecting airway  monitor and maintain O2 sat >94%   -once mental status improves encourage spirometry and pul toilet     GI   1  Cholecystitis 2/2 choledocholithiasis   - ERCP yesterday   - GI following   - Abx therapy continued    2  Metabolic Acidosis  - /29 7/14 8/-10 3 this am, pH improving from 7 280/33 5/15 4/-10 4  - Add bicard ggts  - Monitor ABGs  - monitor for intubation if Pt tires from tachypnea     2   Metabolic Acidosis  -/13 8/33 0/-13 9 this am, pH improving from 7 280/33 5/15 4/-10 4  - Add bicard ggts  - Monitor ABGs  - monitor for intubation if Pt tires from tachypnea      2  Transaminitis 2/2 to #1  -AST/ALT - 1847/56, Alk Phos 623  -Tend with CMP, pending     3  Hiatal hernia  -monitor as outpatient     4  Proctitis  -Continue abx     5  GERD/Stress ulcer ppx   Prilosec 20mg BID  On Prilosec 20mg daily at home Bowel regimen: senna, Colace, miralax PRN      FEN    ml/hr, replete lytes PRN, NPO  1  Hypoalbuminemia at 2 0  -monitor with CMP, possible repletion in am     2  Hypophosphatemia , 0 9  -replete this am    3  Hypernatremia, 147  -Change to /hr  -monitor with CMP    4  Hyperchloridemia   -see #3    5  Hyperglycemia,   -Continue to monitor        1  CALE, resolved  -Cr 0 97, GFR 53  Baseline GFR is 48- 60  -Monitor CMP and UOP     2  CKD stage 3  -monitor as above     ID   1  Sepsis 2/2 Cholangitis, LA at 5 7-->2 4  -Continue Cefepime, vanc, zosyn  -monitor CBC and temp  -Blood cx + 2/2 GNR     2  Leukocytosis, 33 94 up from 31 49  -Cont to monitor CBC and vitals       Heme   1  Anemia, 9  2 down from 10  yesterday  -Monitor with CBC  -Transfuse as needed, less than Hbg 7  DVT ppx SQH, SCDs     2  Metabolic Acidosis  -/27 4/42 0/-59 0 this am, pH improving from 7 280/33 5/15 4/-10 4  - Add bicard ggts  - Monitor ABGs  - monitor for intubation if Pt tires from tachypnea     3  Lactic Acidosis  -Trending down, 5 9-->7 8-->5 3-->2 8  -am LR bolus 1L  -Trend LA     Endo   1  Diabetes  -Insulin gtts  -Monitor POCT glucose Q6 hrs, Last BS was 262 on CMP, BS range goal 110-180     2  Hypothyroidism  -previous Dx, No Synthroid Rx since 2017   Will discuss with family         MSK/Skin  Pressure ulcer ppx, frequent offloading                 Disposition: Continue ICU care    Chief Complaint: AMS with septic cholangitis      HPI  Cammie Parmar is a 80 y o  female who presented to the ED on 5/3/18 from a nursing facility with fever and abdominal pain and altered mental status  In January she underwent percutaneous cholecystostomy placement  She was seen as outpatient in March, where she underwent cholangiogram that showed a patent cystic duct  She presented to the emergency department with a fever of 103 5  Her LFTs showed significant elevation as well as elevated bilirubin  She underwent CT scan of the abdomen pelvis that demonstrated cholecystitis as well as choledocholithiasis  She the underwent emergent ERCP, which was suggestive of an impacted stone  Pt in ICU for close obs and onpressors     24hr events: Pt became hypotensive with MAPs in the 40s-50s, Central line placed in Lt IJ, Levo at 9, MAPs above 65, A-line placed, 2L bolus overnight BD -10 3, LA 2 4, UOP 75/hr, One Dilaudid 0 5 mg for pain  Physical Exam:  Physical Exam   Constitutional: No distress  HENT:   Head: Normocephalic and atraumatic  Eyes: Conjunctivae and EOM are normal  Pupils are equal, round, and reactive to light  Neck: Neck supple  Cardiovascular: Normal rate and regular rhythm  No murmur heard  Pulmonary/Chest: Effort normal and breath sounds normal  No respiratory distress  She has no wheezes  She has no rales  She exhibits no tenderness  Abdominal: Soft  There is tenderness  Musculoskeletal: She exhibits no edema or deformity  Neurological: She is alert  Remains alert, but tired appearing  Skin: Skin is warm  Capillary refill takes 2 to 3 seconds  Psychiatric: Her behavior is normal    Nursing note and vitals reviewed          Vitals:    18 0200 18 0300 18 0400 18 0500   BP:       BP Location:       Pulse: 82 72 74 74   Resp: 21 18 19 20   Temp: 97 5 °F (36 4 °C) 97 5 °F (36 4 °C) 97 7 °F (36 5 °C)    TempSrc:   Probe    SpO2: 96% 98% 98% 98%   Weight:       Height:         Arterial Line BP: 120/52  Arterial Line MAP (mmHg): 80 mmHg    Temperature:   Temp (24hrs), Av 5 °F (37 5 °C), Min:97 5 °F (36 4 °C), Max:103 9 °F (39 9 °C)    Current: Temperature: 97 7 °F (36 5 °C)    Weights:   IBW: 36 3 kg    Body mass index is 27 73 kg/m²  Weight (last 2 days)     Date/Time   Weight    05/03/18 1500  56 1 (123 68)    05/03/18 0956  59 (130 07)              Hemodynamic Monitoring:  N/A, PAP:       Non-Invasive/Invasive Ventilation Settings:  Respiratory    Lab Data (Last 4 hours)      05/04 0429            pH, Arterial       (!)7 314           pCO2, Arterial       (!)29 7           pO2, Arterial       88 5           HCO3, Arterial       (!)14 8           Base Excess, Arterial       -10 3                O2/Vent Data     None              Lab Results   Component Value Date    PHART 7 314 (L) 05/04/2018    BAA5DIL 29 7 (LL) 05/04/2018    PO2ART 88 5 05/04/2018    RZO4AVG 14 8 (L) 05/04/2018    BEART -10 3 05/04/2018    SOURCE Line, Arterial 05/04/2018     SpO2: SpO2: 99 %    Intake and Outputs:  I/O       05/02 0701 - 05/03 0700 05/03 0701 - 05/04 0700    I V  (mL/kg)  3827 8 (68 2)    IV Piggyback  5043  3    Total Intake(mL/kg)  8871 2 (158 1)    Urine (mL/kg/hr)  1945    Stool  0    Total Output   1945    Net   +6926 2          Unmeasured Stool Occurrence  1 x        UOP: 81/hour   Nutrition:        Diet Orders            Start     Ordered    05/03/18 1710  Diet NPO; Sips with meds  Diet effective now     Question Answer Comment   Diet Type NPO    NPO Except: Sips with meds    RD to adjust diet per protocol? No        05/03/18 1714        NPO    Labs:     Results from last 7 days  Lab Units 05/04/18  0429 05/04/18  0006 05/03/18  1744  05/03/18  1011   WBC Thousand/uL 33 94* 31 49* 18 26*  < > 7 64   HEMOGLOBIN g/dL 9 1* 9 0* 9 9*  < > 12 6   HEMATOCRIT % 28 2* 27 7* 30 7*  < > 37 7   PLATELETS Thousands/uL 196 200 200  < > 280   NEUTROS PCT %  --   --   --   --  79*   MONOS PCT %  --   --   --   --  1*   MONO PCT MAN %  --  2* 5  --   --    < > = values in this interval not displayed     Results from last 7 days  Lab Units 05/04/18  0429 05/04/18  0006 05/03/18  1744 05/03/18  1451 05/03/18  1240   SODIUM mmol/L 147* 143 142 143 140   POTASSIUM mmol/L 5 0 4 1 3 3* 3 3* 4 8   CHLORIDE mmol/L 122* 117* 113* 114* 110*   CO2 mmol/L 17* 18* 16* 18* 20*   BUN mg/dL 18 18 20 21 23   CREATININE mg/dL 0 97 1 03 1 29 1 25 1 37*   CALCIUM mg/dL 7 6* 7 6* 8 2* 8 2* 9 1   TOTAL PROTEIN g/dL  --   --  6 5 6 6 8 0   BILIRUBIN TOTAL mg/dL  --   --  2 47* 2 55* 3 19*   ALK PHOS U/L  --   --  408* 455* 623*   ALT U/L  --   --  427* 473* 599*   AST U/L  --   --  987* 1,273* 1,847*   GLUCOSE RANDOM mg/dL 150* 265* 264* 289* 262*       Results from last 7 days  Lab Units 05/04/18  0429 05/04/18  0006 05/03/18  1744   MAGNESIUM mg/dL 2 4 2 5 1 5*       Results from last 7 days  Lab Units 05/04/18  0429   PHOSPHORUS mg/dL 0 9*        Results from last 7 days  Lab Units 05/03/18  1451 05/03/18  1045   INR  1 30* 1 14   PTT seconds 30 26       Results from last 7 days  Lab Units 05/04/18  0501   LACTIC ACID mmol/L 2 4*       0  Lab Value Date/Time   TROPONINI 0 29 (H) 05/03/2018 2019   TROPONINI 0 33 (H) 05/03/2018 1744   TROPONINI 0 34 (H) 05/03/2018 1452   TROPONINI 0 20 (H) 05/03/2018 1240   TROPONINI 0 04 01/24/2018 2256   TROPONINI 0 03 01/24/2018 2027   TROPONINI <0 02 01/19/2018 2138   TROPONINI <0 02 11/22/2017 1214       Imaging:  I have personally reviewed pertinent reports  Micro:  Lab Results   Component Value Date    BLOODCX No Growth After 5 Days  01/24/2018    BLOODCX No Growth After 5 Days  01/24/2018    URINECX 4983-6087 cfu/ml - One colony Escherichia coli (A) 01/21/2018    URINECX 60,000-69,000 cfu/ml Lactobacillus species (A) 01/21/2018    URINECX (A) 11/21/2017     >100,000 cfu/ml Alpha Hemolytic Streptococcus NOT Enterococcus    URINECX 8072-7567 cfu/ml Gram Negative Parvez (A) 11/21/2017       Allergies:    Allergies   Allergen Reactions    Penicillins     Valsartan        Medications:   Scheduled Meds:  Current Facility-Administered Medications:  amLODIPine 2 5 mg Oral Daily Barry Burden, DO    cefepime 1,000 mg Intravenous Q24H Randi Candy    docusate sodium 100 mg Oral BID Barry Burden, DO    heparin (porcine) 5,000 Units Subcutaneous [facility] Arash Lerma MD    HYDROmorphone 0 5 mg Intravenous Q4H PRN Barry Burden, DO    insulin regular (HumuLIN R,NovoLIN R) infusion 0 3-21 Units/hr Intravenous Titrated Tung Aguayo MD Last Rate: 1 5 Units/hr (05/04/18 0604)   metroNIDAZOLE 500 mg Intravenous Q8H Arash Lerma MD Last Rate: Stopped (05/04/18 0359)   norepinephrine 1-30 mcg/min Intravenous Titrated Ludmila Pederson DO Last Rate: 4 mcg/min (05/04/18 0603)   omeprazole (PRILOSEC) suspension 2 mg/mL 20 mg Oral BID With Meals Barry Burden, DO    ondansetron 4 mg Intravenous Q6H PRN Arash Lerma MD    polyethylene glycol 17 g Oral Daily PRN Barry Burden, DO    sertraline 25 mg Oral Daily Barry Burden, DO    sodium chloride (PF) 3 mL Intravenous PRN Gael Sandhoff, MD    sodium chloride 125 mL/hr Intravenous Continuous Arash Lerma MD Last Rate: 125 mL/hr (05/03/18 1652)     Continuous Infusions:  insulin regular (HumuLIN R,NovoLIN R) infusion 0 3-21 Units/hr Last Rate: 1 5 Units/hr (05/04/18 0604)   norepinephrine 1-30 mcg/min Last Rate: 4 mcg/min (05/04/18 0603)   sodium chloride 125 mL/hr Last Rate: 125 mL/hr (05/03/18 1652)     PRN Meds:    HYDROmorphone 0 5 mg Q4H PRN   ondansetron 4 mg Q6H PRN   polyethylene glycol 17 g Daily PRN   sodium chloride (PF) 3 mL PRN       VTE Pharmacologic Prophylaxis: Heparin  VTE Mechanical Prophylaxis: sequential compression device    Invasive lines and devices:   Invasive Devices     Central Venous Catheter Line            CVC Central Lines 05/03/18 Triple 20cm less than 1 day          Peripheral Intravenous Line            Peripheral IV 05/03/18 Left Arm less than 1 day    Peripheral IV 05/03/18 Left Forearm less than 1 day    Peripheral IV 05/03/18 Right Forearm less than 1 day          Arterial Line            Arterial Line 05/03/18 Left Radial less than 1 day          Drain            Nephrostomy Right -- days    Urethral Catheter Temperature probe 1 day                       Code Status: Level 2 - DNAR: but accepts endotracheal intubation     Portions of the record may have been created with voice recognition software  Occasional wrong word or "sound a like" substitutions may have occurred due to the inherent limitations of voice recognition software  Read the chart carefully and recognize, using context, where substitutions have occurred       [facility], DO

## 2018-05-04 NOTE — PROGRESS NOTES
Progress Note - Sera Fuentes 80 y o  female MRN: [id number]    Unit/Bed#: ICU 6 Encounter: [id number]         Assessment/ Plan:  Acute cholangitis    S/p ERCP yesterday showing a large amount of pus, debris & stones, stent was placed & sphincterotomy made  She is more awake today but still confused  No further temp  LFT's decreasing  Abdomen benign    -Continue Abx  -Follow LFT's  -Repeat ERCP in 4-6 wks for stent removal  -Treatment of cholecystitis per surgery      Subjective:   Pt seen & examined  Demented & yelling obscenities  Objective:     Vitals: Blood pressure 122/59, pulse 82, temperature 97 9 °F (36 6 °C), temperature source Probe, resp  rate 22, height 4' 8" (1 422 m), weight 56 1 kg (123 lb 10 9 oz), SpO2 98 %  ,Body mass index is 27 73 kg/m²  Physical Exam: General appearance: combative and demented  Lungs: clear to auscultation bilaterally  Heart: regular rate and rhythm  Abdomen: soft, non-tender; bowel sounds normal; no masses,  no organomegaly  Skin: Skin color, texture, turgor normal  No rashes or lesions     Invasive Devices     Central Venous Catheter Line            CVC Central Lines 05/03/18 Triple 20cm less than 1 day          Peripheral Intravenous Line            Peripheral IV 05/03/18 Left Arm less than 1 day    Peripheral IV 05/03/18 Left Forearm less than 1 day    Peripheral IV 05/03/18 Right Forearm less than 1 day          Arterial Line            Arterial Line 05/03/18 Left Radial less than 1 day          Drain            Nephrostomy Right -- days    Urethral Catheter Temperature probe 1 day                Lab, Imaging and other studies: I have personally reviewed pertinent reports       CBC:   Lab Results   Component Value Date    WBC 33 94 (HH) 05/04/2018    HGB 9 1 (L) 05/04/2018    HCT 28 2 (L) 05/04/2018     (H) 05/04/2018     05/04/2018    MCH 32 3 05/04/2018    MCHC 32 3 05/04/2018    RDW 15 6 (H) 05/04/2018    MPV 11 1 05/04/2018    NRBC 0 05/04/2018 ,   CMP:   Lab Results   Component Value Date     (H) 05/04/2018    K 5 0 05/04/2018     (H) 05/04/2018    CO2 17 (L) 05/04/2018    ANIONGAP 8 05/04/2018    BUN 18 05/04/2018    CREATININE 0 97 05/04/2018    GLUCOSE 150 (H) 05/04/2018    CALCIUM 7 6 (L) 05/04/2018     (H) 05/04/2018     (H) 05/04/2018    ALKPHOS 363 (H) 05/04/2018    PROT 6 4 05/04/2018    BILITOT 1 93 (H) 05/04/2018    EGFR 53 05/04/2018   ,   Lipase:   Lab Results   Component Value Date    LIPASE 79 05/03/2018   ,  PT/INR:   Lab Results   Component Value Date    INR 1 30 (H) 05/03/2018   ,   Troponin:   Lab Results   Component Value Date    TROPONINI 0 19 (H) 05/04/2018   ,   Magnesium: No results found for: MAG,   Phosphorous:   Lab Results   Component Value Date    PHOS 0 9 (L) 05/04/2018

## 2018-05-04 NOTE — PHYSICAL THERAPY NOTE
Physical Therapy Screen    Patient Name: Josue Sheppard    DPRPX'R Date: 5/4/2018     Problem List  Patient Active Problem List   Diagnosis    Altered mental status    Dementia    Essential hypertension    [facility] mellitus ([facility] [address] [facility]    Stage 3 chronic kidney disease    Falls    Epigastric pain    Cholecystitis with cholelithiasis    Shortness of breath    Anemia    Hypothyroid    Cholecystostomy tube dysfunction    Cholecystitis        Past Medical History  Past Medical History:   Diagnosis Date    Cholelithiasis     Dementia     Diabetes mellitus ([facility] [address] [facility]     Hyperlipidemia     Hypothyroidism     Macular degeneration     Osteoarthritis         Past Surgical History  Past Surgical History:   Procedure Laterality Date    ERCP N/A 5/3/2018    Procedure: ENDOSCOPIC RETROGRADE CHOLANGIOPANCREATOGRAPHY (ERCP); Surgeon: Marielos Carter MD;  Location: [facility];  Service: Gastroenterology        SUBJECT:     OBJECTIVE:  Pt with dementia and resident of snf long term  Pt is w/c bound  Pt admitted for cholecystitis  Pt will be returning to snf  ASSESSMENT:  Pt can mobilize OOB with nursing assist, mechanical means if needed  No current skilled PT needs  Spoke with case management    PLAN:    d/c PT  RECOMMENDATION:  OOB with nursing   Return to snf    Alexander Marti PT

## 2018-05-04 NOTE — SPEECH THERAPY NOTE
Speech Language/Pathology  Speech/Language Pathology  Assessment    Patient Name: Inga Perez  [id number] Date: 5/4/2018     Problem List  Patient Active Problem List   Diagnosis    Altered mental status    Dementia    Essential hypertension    Diabetes mellitus ([facility] [address]    Stage 3 chronic kidney disease    Falls    Epigastric pain    Cholecystitis with cholelithiasis    Shortness of breath    Anemia    Hypothyroid    Cholecystostomy tube dysfunction    Cholecystitis     Past Medical History  Past Medical History:   Diagnosis Date    Cholelithiasis     Dementia     Diabetes mellitus ([facility] [address] [facility]     Hyperlipidemia     Hypothyroidism     Macular degeneration     Osteoarthritis      Past Surgical History  Past Surgical History:   Procedure Laterality Date    ERCP N/A 5/3/2018    Procedure: ENDOSCOPIC RETROGRADE CHOLANGIOPANCREATOGRAPHY (ERCP); Surgeon: Wayne Ferraro MD;  Location: [facility];  Service: Gastroenterology   Summary:  Pt presents w/ fair swallow function w/ puree, thin  No overt coughing or wet voicing  Pt did not want solids at this time  She is on regular at baseline, slp will trial w/ upgraded material while here    Recommendations:  Diet: puree  Liquid: thin  Meds: whole in puree or crushed as able  Supervision:  Positioning:Upright  Strategies: Pt to take PO/Meds only when fully alert and upright  Oral care: frequently  Aspiration precautions    Therapy Prognosis: guarded  Prognosis considerations: h/o dementia  Frequency:3-5       80 y o  female who presents with AMS and fever  Patient with known history of cholecystitis and was hospitalized in January where she underwent percutaneous cholecystostomy placement  She was seen as outpatient in March, where she underwent cholangiogram that showed a patent cystic duct  This was then removed  Per report, the patient was lethargic complaining of abdominal pain earlier today    She presented to the emergency department with a fever of 103 5  Her LFTs showed significant elevation as well as elevated bilirubin  She underwent CT scan of the abdomen pelvis that demonstrated cholecystitis as well as choledocholithiasis  Pt underwent percchole drain today  Now on unit & alert & oriented    Reason for consult:  R/o aspiration  Determine safest and least restrictive diet    Current diet:  NPO  Premorbid diet[de-identified]  Last seen on level 2 w/ thin here last stay in January  Currently on regular w/ thin  Previous VBS:  -  O2 requirement:  RA  Voice/Speech:  Mildly hoarse to begin, then clear  Social:  Iline Loosen [facility]  Follows commands: For oral [facility]    Cognitive Status:  Decreased at baseline  Oral [facility] exam:  Partial natural dentition  Full symmetry    Items administered:  Puree, jello, thin liquids by tsp/cup/straw     Oral stage:  Lip closure: mildly reduced, dried mucosa  Mastication: pt refused solids at this time, wanted liquids/applesauce  Bolus formation: fair  Bolus control: wfl  Transfer: mildly slow  Oral residue: none noted    Pharyngeal stage:  Swallow promptness: fairly prompt  Laryngeal rise: fair  Wet voice: -  Throat clear: not noted  Cough: no coughing    Esophageal stage:  No s/s reported    Aspiration precautions posted    Results d/w:  Pt, nursing  Goal(s):  Pt will tolerate least restrictive diet w/out s/s aspiration or oral/pharyngeal difficulties

## 2018-05-04 NOTE — PLAN OF CARE
Problem: DISCHARGE PLANNING - CARE MANAGEMENT  Goal: Discharge to post-acute care or home with appropriate resources  INTERVENTIONS:  - Conduct assessment to determine patient/family and health care team treatment goals, and need for post-acute services based on payer coverage, community resources, and patient preferences, and barriers to discharge  - Address psychosocial, clinical, and financial barriers to discharge as identified in assessment in conjunction with the patient/family and health care team  - Arrange appropriate level of post-acute services according to patient's   needs and preference and payer coverage in collaboration with the physician and health care team  - Communicate with and update the patient/family, physician, and health care team regarding progress on the discharge plan  - Arrange appropriate transportation to post-acute venues  - Pt to return to [facility] when medically stable    Outcome: Progressing

## 2018-05-04 NOTE — MALNUTRITION/BMI
This medical record reflects one or more clinical indicators suggestive of malnutrition  Malnutrition Findings:   Malnutrition type: Chronic illness (12% weight loss over past 3 months;  1/25/18 at which time she weighed 141 lbs or 64 1 kg; some clavicle protrusion to suggest muscle mass loss treated with plan for PO diet  )  Degree of Malnutrition: Malnutrition of mild degree  Malnutrition Characteristics: Muscle loss    BMI Findings: Body mass index is 27 73 kg/m²  See Nutrition note dated 5/4/2018 for additional details  Completed nutrition assessment is viewable in the nutrition documentation

## 2018-05-04 NOTE — PROGRESS NOTES
Critical Care Interval Progress Note:   Asia Gilman 80 y o  female MRN: [id number]    Unit/Bed#: ICU 11 Encounter: [id number]    Impression:  Patient Active Problem List   Diagnosis    Altered mental status    Dementia    Essential hypertension    Diabetes mellitus ([address]    Stage 3 chronic kidney disease    Falls    Epigastric pain    Cholecystitis with cholelithiasis    Shortness of breath    Anemia    Hypothyroid    Cholecystostomy tube dysfunction    Cholecystitis     1  Sepsis secondary to cholangitis  2  Septic shock  3  Metabolic acidosis  4  Hypoxic respiratory insufficiency    Plan: 1  Repeat endpoints of resuscitation pending - continue fluid resuscitation with bolus crystalloids  CVP < 10 suggests continued hypovolemic state  2 Wean levophed infusion to maintain mAP > 65  3  See #2  4 Continuous pulse oximetry  Supplemental O2 with nasal canula at this time  ______________________________________________________________________    Chief Complaint:     Recent Events / Nursing Concern:     Vitals:   Vitals:    18 1901 18 1910 18 1920 18 1930   BP: 118/67 100/55 91/53 102/53   BP Location:       Pulse: 90 84 82 86   Resp: (!) 26 (!) 48 (!) 37 (!) 26   Temp: 99 3 °F (37 4 °C) 99 °F (37 2 °C) 99 7 °F (37 6 °C) 99 7 °F (37 6 °C)   TempSrc:       SpO2: 97% 99% 98% 97%   Weight:       Height:                 Temperature: Temp (24hrs), Av 7 °F (38 2 °C), Min:99 °F (37 2 °C), Max:103 9 °F (39 9 °C)  Current: Temperature: 99 7 °F (37 6 °C)    Hemodynamic Monitoring:  CVP:         Respiratory:  SpO2: SpO2: 97 %  O2 Flow Rate (L/min): 2 L/min    Physical Exam:  Physical Exam      Allergies:    Allergies   Allergen Reactions    Penicillins     Valsartan        Medications:   Scheduled Meds:  Current Facility-Administered Medications:  amLODIPine 2 5 mg Oral Daily Barry Burden DO    [START ON 2018] cefepime 1,000 mg Intravenous Q24H Cosimo Moment    docusate sodium 100 mg Oral BID Barry Burden, DO    heparin (porcine) 5,000 Units Subcutaneous [facility] Torrey Green MD    HYDROmorphone 0 5 mg Intravenous Q4H PRN Barry Burden, DO    insulin lispro 1-5 Units Subcutaneous Q6H [address] Barry Burden, DO    magnesium sulfate 2 g Intravenous Once Leland Arreola MD    metroNIDAZOLE 500 mg Intravenous Q8H Torrey Green MD Last Rate: 500 mg (05/03/18 1949)   norepinephrine 1-30 mcg/min Intravenous Titrated Tinnie Dinning, DO Last Rate: 16 mcg/min (05/03/18 1950)   omeprazole (PRILOSEC) suspension 2 mg/mL 20 mg Oral BID With Meals Barry Burden, DO    ondansetron        ondansetron 4 mg Intravenous Q6H PRN Torrey Green MD    polyethylene glycol 17 g Oral Daily PRN Barry Burden, DO    potassium chloride 20 mEq Intravenous Once Leland Arreola MD Last Rate: 20 mEq (05/03/18 1942)   potassium chloride 20 mEq Intravenous Once Leland Arreola MD Last Rate: 20 mEq (05/03/18 2107)   [START ON 5/4/2018] potassium chloride 40 mEq Intravenous Once Leland Arreola MD    sertraline 25 mg Oral Daily Barry Burden DO    sodium chloride (PF) 3 mL Intravenous PRN Socorro Odell MD    sodium chloride 125 mL/hr Intravenous Continuous Torrey Green MD Last Rate: 125 mL/hr (05/03/18 1652)     Continuous Infusions:  norepinephrine 1-30 mcg/min Last Rate: 16 mcg/min (05/03/18 1950)   sodium chloride 125 mL/hr Last Rate: 125 mL/hr (05/03/18 1652)     PRN Meds:    HYDROmorphone 0 5 mg Q4H PRN   ondansetron 4 mg Q6H PRN   polyethylene glycol 17 g Daily PRN   sodium chloride (PF) 3 mL PRN       Labs:     Results from last 7 days  Lab Units 05/03/18  1744 05/03/18  1452 05/03/18  1011   WBC Thousand/uL 18 26* 15 96* 7 64   HEMOGLOBIN g/dL 9 9* 10 2* 12 6   HEMATOCRIT % 30 7* 32 0* 37 7   PLATELETS Thousands/uL 200 181 280   NEUTROS PCT %  --   --  79*   MONOS PCT %  --   --  1*   MONO PCT MAN % 5  --   --        Results from last 7 days  Lab Units 05/03/18  1744 05/03/18  1451 05/03/18  1240 SODIUM mmol/L 142 143 140   POTASSIUM mmol/L 3 3* 3 3* 4 8   CHLORIDE mmol/L 113* 114* 110*   CO2 mmol/L 16* 18* 20*   BUN mg/dL 20 21 23   CREATININE mg/dL 1 29 1 25 1 37*   CALCIUM mg/dL 8 2* 8 2* 9 1   TOTAL PROTEIN g/dL 6 5 6 6 8 0   BILIRUBIN TOTAL mg/dL 2 47* 2 55* 3 19*   ALK PHOS U/L 408* 455* 623*   ALT U/L 427* 473* 599*   AST U/L 987* 1,273* 1,847*   GLUCOSE RANDOM mg/dL 264* 289* 262*       Results from last 7 days  Lab Units 05/03/18  1744 05/03/18  1451   MAGNESIUM mg/dL 1 5* 1 5*            Results from last 7 days  Lab Units 05/03/18  1451 05/03/18  1045   INR  1 30* 1 14   PTT seconds 30 26       Results from last 7 days  Lab Units 05/03/18  1744 05/03/18  1452 05/03/18  1240 05/03/18  1045   LACTIC ACID mmol/L 7 8* 5 6* 5 7* 5 0*       0  Lab Value Date/Time   TROPONINI 0 29 (H) 05/03/2018 2019   TROPONINI 0 33 (H) 05/03/2018 1744   TROPONINI 0 34 (H) 05/03/2018 1452   TROPONINI 0 20 (H) 05/03/2018 1240   TROPONINI 0 04 01/24/2018 2256   TROPONINI 0 03 01/24/2018 2027   TROPONINI <0 02 01/19/2018 2138   TROPONINI <0 02 11/22/2017 1214           Diagnostic Imaging / Data: I have personally reviewed pertinent reports  EKG:   Code Status: Level 2 - DNAR: but accepts endotracheal intubation    Counseling / Coordination of Care: Total Critical Care time spent 30 minutes excluding procedures, teaching and family updates  Portions of the record may have been created with voice recognition software  Occasional wrong word or "sound a like" substitutions may have occurred due to the inherent limitations of voice recognition software  Read the chart carefully and recognize, using context, where substitutions have occurred      SIGNATURE: Dudley Frias MD  DATE: May 3, 2018  TIME: 9:22 PM

## 2018-05-04 NOTE — OCCUPATIONAL THERAPY NOTE
Occupational Therapy Screen    Orders received  Chart reviewed  Pt admit from [address] [place] [address]  At baseline, pt requires assistance for ADLs and is WC bound  OT anticipates return to facility with continued assistance for all ADLS/mobility pending further medical stability  Recommend supportive nursing care during hospitalization for repositioning/ADLs  No acute care OT needs identified at this time  D/C OT  Spoke to [employer] re: D/C daphnie Romero, MS, OTR/L

## 2018-05-04 NOTE — PROGRESS NOTES
Progress Note - General Surgery   Renetta Cummins 80 y o  female MRN: [id number]  Unit/Bed#: ICU 6 Encounter: [id number]    Assessment:  81 yo F with sepsis s/p ERCP with sphincertotomy and pus extraction and stent placement     Patient Active Problem List   Diagnosis    Altered mental status    Dementia    Essential hypertension    [facility] [address]    Stage 3 chronic kidney disease    Falls    Epigastric pain    Cholecystitis with cholelithiasis    Shortness of breath    Anemia    Hypothyroid    Cholecystostomy tube dysfunction    Cholecystitis     Overnight given multiple IVF bolus (total Intake 8 5L0  Levo being weaned down  Highest 16 --> currently to 6   No complaints of abdominal pain this morning   Blood cx + 2/2 GNR     Plan:  Follow up am labs   Continue broad spectrum antibiotics   Resuscitate to CVP of 12, wean pressor requirements   Patient with continued metabolic acidosis, pH improving, patient compensating with tachypnea  Will continue to observe, beware that patient may tire and need intubation   Replete phosphorous   Continue don/kidd/L TLC      Subjective/Objective   Chief Complaint:     Subjective: overnight events as above    Objective:     Blood pressure 122/59, pulse 74, temperature 97 7 °F (36 5 °C), temperature source Probe, resp  rate 20, height 4' 8" (1 422 m), weight 56 1 kg (123 lb 10 9 oz), SpO2 98 %  ,Body mass index is 27 73 kg/m²        Intake/Output Summary (Last 24 hours) at 05/04/18 0542  Last data filed at 05/04/18 0417   Gross per 24 hour   Intake          8562 57 ml   Output             1745 ml   Net          6817 57 ml       Invasive Devices     Central Venous Catheter Line            CVC Central Lines 05/03/18 Triple 20cm less than 1 day          Peripheral Intravenous Line            Peripheral IV 05/03/18 Left Arm less than 1 day    Peripheral IV 05/03/18 Left Forearm less than 1 day    Peripheral IV 05/03/18 Right Forearm less than 1 day Arterial Line            Arterial Line 05/03/18 Left Radial less than 1 day          Drain            Nephrostomy Right -- days    Urethral Catheter Temperature probe 1 day                Physical Exam: General: AAOx3  Respiratory: BS b/l  Abdomen: Soft, NT, no rebound/guarding  Heart: RRR, S1s2  Ext: Warm no cyanosis       Lab, Imaging and other studies:  I have personally reviewed pertinent lab results    , CBC:   Lab Results   Component Value Date    WBC 33 94 (HH) 05/04/2018    HGB 9 1 (L) 05/04/2018    HCT 28 2 (L) 05/04/2018     (H) 05/04/2018     05/04/2018    MCH 32 3 05/04/2018    MCHC 32 3 05/04/2018    RDW 15 6 (H) 05/04/2018    MPV 11 1 05/04/2018    NRBC 0 05/04/2018   , CMP:   Lab Results   Component Value Date     (H) 05/04/2018    K 5 0 05/04/2018     (H) 05/04/2018    CO2 17 (L) 05/04/2018    ANIONGAP 8 05/04/2018    BUN 18 05/04/2018    CREATININE 0 97 05/04/2018    GLUCOSE 150 (H) 05/04/2018    CALCIUM 7 6 (L) 05/04/2018     (H) 05/03/2018     (H) 05/03/2018    ALKPHOS 408 (H) 05/03/2018    PROT 6 5 05/03/2018    BILITOT 2 47 (H) 05/03/2018    EGFR 53 05/04/2018     VTE Pharmacologic Prophylaxis: Sequential compression device (Venodyne)   VTE Mechanical Prophylaxis: sequential compression device

## 2018-05-04 NOTE — SOCIAL WORK
Pt admitted from [facility] station 3  CM spoke with [facility] nurse Onelia Bueno  As per too pt is alert and oriented at baseline with some confusion  Pt is an assist of 1 for ADLs  Pt uses a RW and is an assist of 1 for ambulation  Pt able to feed herself  As per Onelia Bueno staff administers pt medication  [facility] for pt barbara Oakes requesting call back regarding dcp  Update - call received from pt barbara [place]  He is requesting pt return to [facility] when medically stable  Referral in [facility] [address]  CM to follow

## 2018-05-04 NOTE — PROGRESS NOTES
81 yo F with sepsis s/p ERCP with sphincertotomy and stent placement has returned from the OR after a percutaneous cholecystostomy placement  Pt was mildly hypotensive in the the OR and was not placed back on pressors  Pt was stable and tolerated the procedure well  Pt Bp stable with a map of 82 off pressors  Pt is sedated and moaning  Percchole drain in place  Physical Exam   Constitutional: No distress  HENT:   Head: Normocephalic and atraumatic  Eyes: Conjunctivae and EOM are normal  Pupils are equal, round, and reactive to light  Cardiovascular: Normal rate and regular rhythm  Pulmonary/Chest: Effort normal and breath sounds normal    Abdominal: Soft  She exhibits no distension  There is tenderness  Tenderness over surgical site     Neurological:   PT mildly sedated and complains of being tired  Skin: Skin is warm  Nursing note and vitals reviewed

## 2018-05-05 ENCOUNTER — APPOINTMENT (INPATIENT)
Dept: RADIOLOGY | Facility: HOSPITAL | Age: 83
DRG: 871 | End: 2018-05-05
Payer: MEDICARE

## 2018-05-05 PROBLEM — D72.829 LEUKOCYTOSIS: Status: ACTIVE | Noted: 2018-05-05

## 2018-05-05 PROBLEM — K80.50 CHOLEDOCHOLITHIASIS: Status: ACTIVE | Noted: 2018-05-05

## 2018-05-05 PROBLEM — K83.09 CHOLANGITIS: Status: ACTIVE | Noted: 2018-05-05

## 2018-05-05 PROBLEM — E44.1 MILD PROTEIN-CALORIE MALNUTRITION (HCC): Status: ACTIVE | Noted: 2018-05-05

## 2018-05-05 LAB
ALBUMIN SERPL BCP-MCNC: 1.7 G/DL (ref 3.5–5)
ALP SERPL-CCNC: 277 U/L (ref 46–116)
ALT SERPL W P-5'-P-CCNC: 210 U/L (ref 12–78)
ANION GAP SERPL CALCULATED.3IONS-SCNC: 7 MMOL/L (ref 4–13)
ANISOCYTOSIS BLD QL SMEAR: PRESENT
AST SERPL W P-5'-P-CCNC: 181 U/L (ref 5–45)
ATRIAL RATE: 78 BPM
BACTERIA BLD CULT: ABNORMAL
BACTERIA BLD CULT: ABNORMAL
BACTERIA UR CULT: ABNORMAL
BASE EXCESS BLDA CALC-SCNC: -5.9 MMOL/L
BASOPHILS # BLD MANUAL: 0 THOUSAND/UL (ref 0–0.1)
BASOPHILS NFR MAR MANUAL: 0 % (ref 0–1)
BILIRUB SERPL-MCNC: 0.85 MG/DL (ref 0.2–1)
BODY TEMPERATURE: 98.4 DEGREES FEHRENHEIT
BUN SERPL-MCNC: 22 MG/DL (ref 5–25)
CALCIUM SERPL-MCNC: 8 MG/DL (ref 8.3–10.1)
CHLORIDE SERPL-SCNC: 117 MMOL/L (ref 100–108)
CO2 SERPL-SCNC: 18 MMOL/L (ref 21–32)
CREAT SERPL-MCNC: 0.94 MG/DL (ref 0.6–1.3)
EOSINOPHIL # BLD MANUAL: 0 THOUSAND/UL (ref 0–0.4)
EOSINOPHIL NFR BLD MANUAL: 0 % (ref 0–6)
ERYTHROCYTE [DISTWIDTH] IN BLOOD BY AUTOMATED COUNT: 16.2 % (ref 11.6–15.1)
GFR SERPL CREATININE-BSD FRML MDRD: 55 ML/MIN/1.73SQ M
GLUCOSE SERPL-MCNC: 150 MG/DL (ref 65–140)
GLUCOSE SERPL-MCNC: 152 MG/DL (ref 65–140)
GLUCOSE SERPL-MCNC: 159 MG/DL (ref 65–140)
GLUCOSE SERPL-MCNC: 161 MG/DL (ref 65–140)
GLUCOSE SERPL-MCNC: 180 MG/DL (ref 65–140)
GLUCOSE SERPL-MCNC: 194 MG/DL (ref 65–140)
GLUCOSE SERPL-MCNC: 327 MG/DL (ref 65–140)
GRAM STN SPEC: ABNORMAL
GRAM STN SPEC: ABNORMAL
HCO3 BLDA-SCNC: 17.5 MMOL/L (ref 22–28)
HCT VFR BLD AUTO: 25.8 % (ref 34.8–46.1)
HGB BLD-MCNC: 8.5 G/DL (ref 11.5–15.4)
LYMPHOCYTES # BLD AUTO: 1.13 THOUSAND/UL (ref 0.6–4.47)
LYMPHOCYTES # BLD AUTO: 4 % (ref 14–44)
MACROCYTES BLD QL AUTO: PRESENT
MAGNESIUM SERPL-MCNC: 2.1 MG/DL (ref 1.6–2.6)
MCH RBC QN AUTO: 32.8 PG (ref 26.8–34.3)
MCHC RBC AUTO-ENTMCNC: 32.9 G/DL (ref 31.4–37.4)
MCV RBC AUTO: 100 FL (ref 82–98)
METAMYELOCYTES NFR BLD MANUAL: 1 % (ref 0–1)
MONOCYTES # BLD AUTO: 0.85 THOUSAND/UL (ref 0–1.22)
MONOCYTES NFR BLD: 3 % (ref 4–12)
MRSA NOSE QL CULT: NORMAL
NEUTROPHILS # BLD MANUAL: 25.94 THOUSAND/UL (ref 1.85–7.62)
NEUTS BAND NFR BLD MANUAL: 4 % (ref 0–8)
NEUTS SEG NFR BLD AUTO: 88 % (ref 43–75)
NON VENT ROOM AIR: 21 %
NRBC BLD AUTO-RTO: 0 /100 WBCS
NRBC BLD AUTO-RTO: 1 /100 WBC (ref 0–2)
O2 CT BLDA-SCNC: 12.3 ML/DL (ref 16–23)
OXYHGB MFR BLDA: 95.1 % (ref 94–97)
P AXIS: 51 DEGREES
PCO2 BLDA: 27.2 MM HG (ref 36–44)
PH BLDA: 7.43 [PH] (ref 7.35–7.45)
PHOSPHATE SERPL-MCNC: 2 MG/DL (ref 2.3–4.1)
PLATELET # BLD AUTO: 151 THOUSANDS/UL (ref 149–390)
PLATELET BLD QL SMEAR: ADEQUATE
PMV BLD AUTO: 11.4 FL (ref 8.9–12.7)
PO2 BLDA: 78.5 MM HG (ref 75–129)
POTASSIUM SERPL-SCNC: 4.9 MMOL/L (ref 3.5–5.3)
PR INTERVAL: 188 MS
PROT SERPL-MCNC: 6 G/DL (ref 6.4–8.2)
QRS AXIS: 48 DEGREES
QRSD INTERVAL: 71 MS
QT INTERVAL: 433 MS
QTC INTERVAL: 494 MS
RBC # BLD AUTO: 2.59 MILLION/UL (ref 3.81–5.12)
RBC MORPH BLD: PRESENT
SODIUM SERPL-SCNC: 142 MMOL/L (ref 136–145)
SPECIMEN SOURCE: ABNORMAL
T WAVE AXIS: 62 DEGREES
VENTRICULAR RATE: 78 BPM
WBC # BLD AUTO: 28.2 THOUSAND/UL (ref 4.31–10.16)

## 2018-05-05 PROCEDURE — 82805 BLOOD GASES W/O2 SATURATION: CPT | Performed by: EMERGENCY MEDICINE

## 2018-05-05 PROCEDURE — 82948 REAGENT STRIP/BLOOD GLUCOSE: CPT

## 2018-05-05 PROCEDURE — 84100 ASSAY OF PHOSPHORUS: CPT | Performed by: PHYSICIAN ASSISTANT

## 2018-05-05 PROCEDURE — 87040 BLOOD CULTURE FOR BACTERIA: CPT | Performed by: EMERGENCY MEDICINE

## 2018-05-05 PROCEDURE — 85027 COMPLETE CBC AUTOMATED: CPT | Performed by: PHYSICIAN ASSISTANT

## 2018-05-05 PROCEDURE — 71045 X-RAY EXAM CHEST 1 VIEW: CPT

## 2018-05-05 PROCEDURE — 85007 BL SMEAR W/DIFF WBC COUNT: CPT | Performed by: PHYSICIAN ASSISTANT

## 2018-05-05 PROCEDURE — 99291 CRITICAL CARE FIRST HOUR: CPT | Performed by: EMERGENCY MEDICINE

## 2018-05-05 PROCEDURE — 93010 ELECTROCARDIOGRAM REPORT: CPT | Performed by: INTERNAL MEDICINE

## 2018-05-05 PROCEDURE — 83735 ASSAY OF MAGNESIUM: CPT | Performed by: PHYSICIAN ASSISTANT

## 2018-05-05 PROCEDURE — 87040 BLOOD CULTURE FOR BACTERIA: CPT | Performed by: PHYSICIAN ASSISTANT

## 2018-05-05 PROCEDURE — 80053 COMPREHEN METABOLIC PANEL: CPT | Performed by: EMERGENCY MEDICINE

## 2018-05-05 RX ORDER — ASPIRIN 81 MG/1
81 TABLET, CHEWABLE ORAL DAILY
Status: DISCONTINUED | OUTPATIENT
Start: 2018-05-06 | End: 2018-05-07 | Stop reason: HOSPADM

## 2018-05-05 RX ORDER — DEXTROSE, SODIUM CHLORIDE, AND POTASSIUM CHLORIDE 5; .45; .15 G/100ML; G/100ML; G/100ML
75 INJECTION INTRAVENOUS CONTINUOUS
Status: DISCONTINUED | OUTPATIENT
Start: 2018-05-05 | End: 2018-05-05

## 2018-05-05 RX ORDER — LEVOTHYROXINE SODIUM 0.07 MG/1
75 TABLET ORAL
Status: DISCONTINUED | OUTPATIENT
Start: 2018-05-06 | End: 2018-05-07 | Stop reason: HOSPADM

## 2018-05-05 RX ORDER — HALOPERIDOL 5 MG/ML
5 INJECTION INTRAMUSCULAR ONCE
Status: COMPLETED | OUTPATIENT
Start: 2018-05-05 | End: 2018-05-05

## 2018-05-05 RX ADMIN — SERTRALINE HYDROCHLORIDE 25 MG: 20 SOLUTION, CONCENTRATE ORAL at 09:33

## 2018-05-05 RX ADMIN — INSULIN LISPRO 1 UNITS: 100 INJECTION, SOLUTION INTRAVENOUS; SUBCUTANEOUS at 06:36

## 2018-05-05 RX ADMIN — HYDROMORPHONE HYDROCHLORIDE 0.2 MG: 1 INJECTION, SOLUTION INTRAMUSCULAR; INTRAVENOUS; SUBCUTANEOUS at 09:37

## 2018-05-05 RX ADMIN — HALOPERIDOL LACTATE 5 MG: 5 INJECTION, SOLUTION INTRAMUSCULAR at 09:51

## 2018-05-05 RX ADMIN — HYDROMORPHONE HYDROCHLORIDE 0.2 MG: 1 INJECTION, SOLUTION INTRAMUSCULAR; INTRAVENOUS; SUBCUTANEOUS at 02:01

## 2018-05-05 RX ADMIN — CEFEPIME HYDROCHLORIDE 1000 MG: 1 INJECTION, SOLUTION INTRAVENOUS at 09:33

## 2018-05-05 RX ADMIN — SODIUM CHLORIDE, SODIUM LACTATE, POTASSIUM CHLORIDE, AND CALCIUM CHLORIDE 125 ML/HR: .6; .31; .03; .02 INJECTION, SOLUTION INTRAVENOUS at 01:51

## 2018-05-05 RX ADMIN — HEPARIN SODIUM 5000 UNITS: 5000 INJECTION, SOLUTION INTRAVENOUS; SUBCUTANEOUS at 14:53

## 2018-05-05 RX ADMIN — INSULIN LISPRO 5 UNITS: 100 INJECTION, SOLUTION INTRAVENOUS; SUBCUTANEOUS at 00:37

## 2018-05-05 RX ADMIN — HEPARIN SODIUM 5000 UNITS: 5000 INJECTION, SOLUTION INTRAVENOUS; SUBCUTANEOUS at 06:17

## 2018-05-05 NOTE — PROGRESS NOTES
Progress Note - General Surgery   Renetta Coon 80 y o  female MRN: [id number]  Unit/Bed#: ICU 6 Encounter: [id number]    Assessment:  81 yo F with choledocolithiasis and cholangitis s/p ERCP with removal of stones and sludge and pus and then followed be stent placement  s/p per arnol yesterday       Feeling improved   WBC 28 today  Regular diet, tolerating 100%  E coli bacteremia       Plan:  Continue diet   Continue drain   continue antibiotics   IVF       Subjective/Objective   Chief Complaint:     Subjective: no overnigth events  Tolerating diet  Feeling better after ERCP and perc choel    Objective:     Blood pressure 105/63, pulse 76, temperature 98 4 °F (36 9 °C), resp  rate 18, height 4' 8" (1 422 m), weight 56 1 kg (123 lb 10 9 oz), SpO2 96 %  ,Body mass index is 27 73 kg/m²  Intake/Output Summary (Last 24 hours) at 05/05/18 0643  Last data filed at 05/05/18 0601   Gross per 24 hour   Intake          5106 29 ml   Output              960 ml   Net          4146 29 ml       Invasive Devices     Central Venous Catheter Line            CVC Central Lines 05/03/18 Triple 20cm 1 day          Peripheral Intravenous Line            Peripheral IV 05/03/18 Left Arm 1 day    Peripheral IV 05/03/18 Left Forearm 1 day    Peripheral IV 05/03/18 Right Forearm 1 day          Arterial Line            Arterial Line 05/03/18 Left Radial 1 day          Drain            Nephrostomy Right -- days    Urethral Catheter Temperature probe 2 days    Closed/Suction Drain Right  8 Fr  less than 1 day            Physical Exam: General: AAOx3  Respiratory: BS b/l  Abdomen: Soft, NT, no rebound/guarding  Perc arnol with bilious drainage   Heart: RRR, S1s2  Ext: Warm no cyanosis       Lab, Imaging and other studies:  I have personally reviewed pertinent lab results    , CBC:   Lab Results   Component Value Date    WBC 28 20 (H) 05/05/2018    HGB 8 5 (L) 05/05/2018    HCT 25 8 (L) 05/05/2018     (H) 05/05/2018     05/05/2018    MCH 32 8 05/05/2018    MCHC 32 9 05/05/2018    RDW 16 2 (H) 05/05/2018    MPV 11 4 05/05/2018    NRBC 0 05/05/2018    NRBC 1 05/05/2018   , CMP:   Lab Results   Component Value Date     05/05/2018    K 4 9 05/05/2018     (H) 05/05/2018    CO2 18 (L) 05/05/2018    ANIONGAP 7 05/05/2018    BUN 22 05/05/2018    CREATININE 0 94 05/05/2018    GLUCOSE 180 (H) 05/05/2018    CALCIUM 8 0 (L) 05/05/2018     (H) 05/05/2018     (H) 05/05/2018    ALKPHOS 277 (H) 05/05/2018    PROT 6 0 (L) 05/05/2018    BILITOT 0 85 05/05/2018    EGFR 55 05/05/2018     VTE Pharmacologic Prophylaxis: Sequential compression device (Venodyne)  and Enoxaparin (Lovenox)  VTE Mechanical Prophylaxis: sequential compression device

## 2018-05-05 NOTE — PROGRESS NOTES
Progress Note - Critical Care   Renetta Roberts 80 y o  female MRN: [id number]  Unit/Bed#: ICU 6 Encounter: [id number]    Attending Physician: Nathaly Singh MD      ______________________________________________________________________  Assessment and Plan:   Principal Problem:    Cholecystitis  Resolved Problems:    * No resolved hospital problems  *        Neuro: Acute encephalopathy  Metabolic secondary to sepsis  Improving with continual delerium at night  Continue supportive care, redirecting as able  Continue PRN dilaudid 0 5 mg Q4H PRN     CV: Septic shock now resolved  Hx of HTN, will continue with home norvasc  Currently hemodynamically monitored via A line, may discontinue today  Pulm: Possible aspiration pneumonia though clinically improved, not requiring oxygen at this point  Continue to monitor respiratory status and to encourage IS/Pulm toilet  GI: Cholecystitis and ascending cholangitis 2/2 choledocholithiasis s/p ERCP with bile duct stent placing and percutaneous cholecystostomy yesterday  Clinically improved, continue antibiotic therapy and supportive care  GI following  LFTs yesterday trending down  Continue home Prilosec  Continue bowel regimen of senna colace and PRN miralax    : CKD II with Cr  Now at baseline with adequate UOP  May consider discontinuing kidd today  Continue to monitor UOP and Cr routinely  F/E/N: LR at 125 ml/Hr  Continue to trend and replete lytes as needed  ID: Severe sepsis 2/2 cholangitis, now greatly improved  Continue antibiotic therapy with cefepime  Blood Cx positive for E  Coli  Lactic acidosis resolved  Leukocytosis improving  Afebrile overnight  Continue to trend fever and wbc  Heme: Hb 8 5 from 9 yesterday, Leukocytosis improving  Continue to trend per cheri;y CBC and transfuse as needed  Endo: DM type two on ISS algorithm II   Pt has been hyperglycemic and so may need to increase insulin coverage      Msk/Skin: PT/OT, continue routine ICU Skin care    Disposition: Pt has been hemodynamically stable with no oxygen requirements  May be appropriate for transfer to stepdown level of care today  Code Status: Level 2 - DNAR: but accepts endotracheal intubation       ______________________________________________________________________    Chief Complaint: "the little man won't give me water"    24 Hour Events:  Pt slept overnight but continues to be delirious before and after bed time  Complains that she is not getting water but will not drink when offered  Pt had perc arnol yesterday, has been HDS off pressors, and afebrile overnight  Review of Systems   Unable to perform ROS: Mental status change     ______________________________________________________________________    Physical Exam:   Physical Exam   Constitutional: She appears well-developed and well-nourished  No distress  HENT:   Head: Normocephalic and atraumatic  Mouth/Throat: Oropharynx is clear and moist    Eyes: EOM are normal  Pupils are equal, round, and reactive to light  Neck: No JVD present  No tracheal deviation present  Cardiovascular: Normal rate, regular rhythm, normal heart sounds and intact distal pulses  Exam reveals no gallop and no friction rub  No murmur heard  Pulmonary/Chest: Effort normal and breath sounds normal  No respiratory distress  She has no wheezes  She has no rales  Abdominal: Soft  Bowel sounds are normal  She exhibits no distension  There is generalized tenderness  There is no rebound and no guarding  Neurological: She is alert  She is not disoriented  No cranial nerve deficit  She exhibits normal muscle tone  GCS eye subscore is 4  GCS verbal subscore is 5  GCS motor subscore is 6  Skin: Skin is warm and dry  She is not diaphoretic  No pallor  Psychiatric: She has a normal mood and affect   Her behavior is normal    Nursing note and vitals reviewed  ______________________________________________________________________  Vitals:    18 0200 18 0300 18 0400 18 0500   BP: 127/69 (!) 95/47 (!) 87/53 98/61   BP Location:       Pulse: 84 74 70 72   Resp:       Temp:    98 4 °F (36 9 °C)   TempSrc:       SpO2: 96% 94% 93% 95%   Weight:       Height:           Temperature:   Temp (24hrs), Av 2 °F (36 8 °C), Min:97 9 °F (36 6 °C), Max:98 8 °F (37 1 °C)    Current Temperature: 98 4 °F (36 9 °C)  Weights:   IBW: 36 3 kg    Body mass index is 27 73 kg/m²  Weight (last 2 days)     Date/Time   Weight    18 1500  56 1 (123 68)    18 0956  59 (130 07)            Hemodynamic Monitoring:  PAP:       Non-Invasive/Invasive Ventilation Settings:  Respiratory    Lab Data (Last 4 hours)       0523            pH, Arterial       7 426           pCO2, Arterial       (!)27 2           pO2, Arterial       78 5           HCO3, Arterial       (!)17 5           Base Excess, Arterial       -5 9                O2/Vent Data     None              Lab Results   Component Value Date    PHART 7 426 2018    DLP7ZPC 27 2 (LL) 2018    PO2ART 78 5 2018    BSS4RQZ 17 5 (L) 2018    BEART -5 9 2018    SOURCE Line, Venous 2018     SpO2: SpO2: 96 %  Intake and Outputs:  I/O       701 -  07 -  07    P  O   118    I V  (mL/kg) 3827 8 (68 2) 2276 2 (40 6)    NG/GT  10    IV Piggyback 5043  3 1950    Total Intake(mL/kg) 8871 2 (158 1) 4354 2 (77 6)    Urine (mL/kg/hr) 2145 775 (0 6)    Stool 0     Total Output 2145 775    Net +6726 2 +3579 2          Unmeasured Stool Occurrence 1 x         UOP: 0 67/hour   Nutrition:        Diet Orders            Start     Ordered    18  Diet Dysphagia/Modified Consistency; Dysphagia 1-Pureed;  Thin Liquid; Consistent Carbohydrate Diet Level 1 (4 carb servings/60 grams CHO/meal)  Diet effective now     Question Answer Comment   Diet Type Dysphagia/Modified Consistency    Dysphagia/Modified Consistency Dysphagia 1-Pureed    Liquid Modifier Thin Liquid    Other Restriction(s): Consistent Carbohydrate Diet Level 1 (4 carb servings/60 grams CHO/meal)    RD to adjust diet per protocol? No        05/04/18 1917          Labs:     Results from last 7 days  Lab Units 05/05/18  0523 05/04/18  0429 05/04/18  0006 05/03/18  1744  05/03/18  1011   WBC Thousand/uL 28 20* 33 94* 31 49* 18 26*  < > 7 64   HEMOGLOBIN g/dL 8 5* 9 1* 9 0* 9 9*  < > 12 6   HEMATOCRIT % 25 8* 28 2* 27 7* 30 7*  < > 37 7   PLATELETS Thousands/uL 151 196 200 200  < > 280   NEUTROS PCT %  --   --   --   --   --  79*   MONOS PCT %  --   --   --   --   --  1*   MONO PCT MAN %  --  3* 2* 5  --   --    < > = values in this interval not displayed      Results from last 7 days  Lab Units 05/04/18 0429 05/04/18  0006 05/03/18  1744 05/03/18  1451   SODIUM mmol/L 147* 143 142 143   POTASSIUM mmol/L 5 0 4 1 3 3* 3 3*   CHLORIDE mmol/L 122* 117* 113* 114*   CO2 mmol/L 17* 18* 16* 18*   BUN mg/dL 18 18 20 21   CREATININE mg/dL 0 97 1 03 1 29 1 25   CALCIUM mg/dL 7 6* 7 6* 8 2* 8 2*   TOTAL PROTEIN g/dL 6 4  --  6 5 6 6   BILIRUBIN TOTAL mg/dL 1 93*  --  2 47* 2 55*   ALK PHOS U/L 363*  --  408* 455*   ALT U/L 341*  --  427* 473*   AST U/L 577*  --  987* 1,273*   GLUCOSE RANDOM mg/dL 150* 265* 264* 289*       Results from last 7 days  Lab Units 05/04/18 0429 05/04/18  0006 05/03/18  1744   MAGNESIUM mg/dL 2 4 2 5 1 5*     Lab Results   Component Value Date    PHOS 0 9 (L) 05/04/2018    PHOS 2 2 (L) 01/25/2018    PHOS 0 9 (L) 01/24/2018        Results from last 7 days  Lab Units 05/03/18  1451 05/03/18  1045   INR  1 30* 1 14   PTT seconds 30 26       0  Lab Value Date/Time   TROPONINI 0 19 (H) 05/04/2018 0704   TROPONINI 0 29 (H) 05/03/2018 2019   TROPONINI 0 33 (H) 05/03/2018 1744   TROPONINI 0 34 (H) 05/03/2018 1452   TROPONINI 0 20 (H) 05/03/2018 1240   TROPONINI 0 04 01/24/2018 2256   TROPONINI 0 03 01/24/2018 2027   TROPONINI <0 02 01/19/2018 2138   TROPONINI <0 02 11/22/2017 1214       Results from last 7 days  Lab Units 05/04/18  1023 05/04/18  0501 05/04/18  0114   LACTIC ACID mmol/L 1 6 2 4* 2 8*     ABG:  Lab Results   Component Value Date    PHART 7 426 05/05/2018    JYP2KZM 27 2 (LL) 05/05/2018    PO2ART 78 5 05/05/2018    [id number] 17 5 (L) 05/05/2018    BEART -5 9 05/05/2018    SOURCE Line, Venous 05/05/2018     Imaging:   I have personally reviewed pertinent reports  and I have personally reviewed pertinent films in PACS  EKG:    Micro:  Lab Results   Component Value Date    BLOODCX Escherichia coli (A) 05/03/2018    BLOODCX Escherichia coli (A) 05/03/2018    BLOODCX No Growth After 5 Days  01/24/2018    URINECX >100,000 cfu/ml Escherichia coli (A) 05/03/2018    URINECX 5736-6368 cfu/ml - One colony Escherichia coli (A) 01/21/2018    URINECX 60,000-69,000 cfu/ml Lactobacillus species (A) 01/21/2018     Allergies:    Allergies   Allergen Reactions    Penicillins     Valsartan      Medications:   Scheduled Meds:  Current Facility-Administered Medications:  amLODIPine 2 5 mg Oral Daily Barry L Espland, DO    cefepime 1,000 mg Intravenous Q12H Barry L Espland, DO Last Rate: Stopped (05/04/18 2320)   docusate sodium 100 mg Oral BID Barry L Espland, DO    heparin (porcine) 5,000 Units Subcutaneous [facility] Robbin Nichols MD    HYDROmorphone 0 2 mg Intravenous Q4H PRN Elle Malik PA-C    HYDROmorphone 0 5 mg Intravenous Q4H PRN Barry L Espland, DO    insulin lispro 1-6 Units Subcutaneous Q6H [address] Barry L Espland, DO    lactated ringers 125 mL/hr Intravenous Continuous Barry L Espland, DO Last Rate: 125 mL/hr (05/05/18 0151)   norepinephrine 1-30 mcg/min Intravenous Titrated Margarito Rolon DO Last Rate: Stopped (05/04/18 5530)   omeprazole (PRILOSEC) suspension 2 mg/mL 20 mg Oral BID With Meals Barry Burden DO    ondansetron 4 mg Intravenous Q6H PRN Robbin Nichols MD    polyethylene glycol 17 g Oral Daily PRN Barry L Espland, DO    sertraline 25 mg Oral Daily Barry L Espland, DO    sodium chloride (PF) 3 mL Intravenous PRN Dalton Johnson MD      Continuous Infusions:  lactated ringers 125 mL/hr Last Rate: 125 mL/hr (05/05/18 0151)   norepinephrine 1-30 mcg/min Last Rate: Stopped (05/04/18 0730)     PRN Meds:    HYDROmorphone 0 2 mg Q4H PRN   HYDROmorphone 0 5 mg Q4H PRN   ondansetron 4 mg Q6H PRN   polyethylene glycol 17 g Daily PRN   sodium chloride (PF) 3 mL PRN     VTE Pharmacologic Prophylaxis: Heparin  VTE Mechanical Prophylaxis: sequential compression device  Invasive lines and devices: Invasive Devices     Central Venous Catheter Line            CVC Central Lines 05/03/18 Triple 20cm 1 day          Peripheral Intravenous Line            Peripheral IV 05/03/18 Left Arm 1 day    Peripheral IV 05/03/18 Left Forearm 1 day    Peripheral IV 05/03/18 Right Forearm 1 day          Arterial Line            Arterial Line 05/03/18 Left Radial 1 day          Drain            Nephrostomy Right -- days    Urethral Catheter Temperature probe 2 days    Closed/Suction Drain Right  8 Fr  less than 1 day                     Portions of the record may have been created with voice recognition software  Occasional wrong word or "sound a like" substitutions may have occurred due to the inherent limitations of voice recognition software  Read the chart carefully and recognize, using context, where substitutions have occurred      Vickey Eubanks MD

## 2018-05-06 LAB
ALBUMIN SERPL BCP-MCNC: 1.9 G/DL (ref 3.5–5)
ALP SERPL-CCNC: 251 U/L (ref 46–116)
ALT SERPL W P-5'-P-CCNC: 143 U/L (ref 12–78)
ANION GAP SERPL CALCULATED.3IONS-SCNC: 8 MMOL/L (ref 4–13)
AST SERPL W P-5'-P-CCNC: 63 U/L (ref 5–45)
BASOPHILS # BLD MANUAL: 0 THOUSAND/UL (ref 0–0.1)
BASOPHILS NFR MAR MANUAL: 0 % (ref 0–1)
BILIRUB SERPL-MCNC: 1.07 MG/DL (ref 0.2–1)
BUN SERPL-MCNC: 17 MG/DL (ref 5–25)
CALCIUM SERPL-MCNC: 8.8 MG/DL (ref 8.3–10.1)
CHLORIDE SERPL-SCNC: 115 MMOL/L (ref 100–108)
CO2 SERPL-SCNC: 20 MMOL/L (ref 21–32)
CREAT SERPL-MCNC: 0.89 MG/DL (ref 0.6–1.3)
DOHLE BOD BLD QL SMEAR: PRESENT
EOSINOPHIL # BLD MANUAL: 0 THOUSAND/UL (ref 0–0.4)
EOSINOPHIL NFR BLD MANUAL: 0 % (ref 0–6)
ERYTHROCYTE [DISTWIDTH] IN BLOOD BY AUTOMATED COUNT: 16.1 % (ref 11.6–15.1)
GFR SERPL CREATININE-BSD FRML MDRD: 59 ML/MIN/1.73SQ M
GLUCOSE SERPL-MCNC: 136 MG/DL (ref 65–140)
GLUCOSE SERPL-MCNC: 178 MG/DL (ref 65–140)
GLUCOSE SERPL-MCNC: 180 MG/DL (ref 65–140)
GLUCOSE SERPL-MCNC: 195 MG/DL (ref 65–140)
GLUCOSE SERPL-MCNC: 200 MG/DL (ref 65–140)
HCT VFR BLD AUTO: 26.7 % (ref 34.8–46.1)
HGB BLD-MCNC: 8.8 G/DL (ref 11.5–15.4)
LYMPHOCYTES # BLD AUTO: 0.69 THOUSAND/UL (ref 0.6–4.47)
LYMPHOCYTES # BLD AUTO: 3 % (ref 14–44)
MAGNESIUM SERPL-MCNC: 2 MG/DL (ref 1.6–2.6)
MCH RBC QN AUTO: 32.2 PG (ref 26.8–34.3)
MCHC RBC AUTO-ENTMCNC: 33 G/DL (ref 31.4–37.4)
MCV RBC AUTO: 98 FL (ref 82–98)
MONOCYTES # BLD AUTO: 0.92 THOUSAND/UL (ref 0–1.22)
MONOCYTES NFR BLD: 4 % (ref 4–12)
NEUTROPHILS # BLD MANUAL: 21.46 THOUSAND/UL (ref 1.85–7.62)
NEUTS SEG NFR BLD AUTO: 93 % (ref 43–75)
NRBC BLD AUTO-RTO: 0 /100 WBCS
PHOSPHATE SERPL-MCNC: 1.6 MG/DL (ref 2.3–4.1)
PLATELET # BLD AUTO: 171 THOUSANDS/UL (ref 149–390)
PLATELET BLD QL SMEAR: ADEQUATE
PMV BLD AUTO: 12.1 FL (ref 8.9–12.7)
POTASSIUM SERPL-SCNC: 4.1 MMOL/L (ref 3.5–5.3)
PROT SERPL-MCNC: 6.4 G/DL (ref 6.4–8.2)
RBC # BLD AUTO: 2.73 MILLION/UL (ref 3.81–5.12)
RBC MORPH BLD: PRESENT
SODIUM SERPL-SCNC: 143 MMOL/L (ref 136–145)
WBC # BLD AUTO: 23.08 THOUSAND/UL (ref 4.31–10.16)

## 2018-05-06 PROCEDURE — 94640 AIRWAY INHALATION TREATMENT: CPT

## 2018-05-06 PROCEDURE — 85027 COMPLETE CBC AUTOMATED: CPT | Performed by: SURGERY

## 2018-05-06 PROCEDURE — 94760 N-INVAS EAR/PLS OXIMETRY 1: CPT

## 2018-05-06 PROCEDURE — 85007 BL SMEAR W/DIFF WBC COUNT: CPT | Performed by: SURGERY

## 2018-05-06 PROCEDURE — 82948 REAGENT STRIP/BLOOD GLUCOSE: CPT

## 2018-05-06 PROCEDURE — 80053 COMPREHEN METABOLIC PANEL: CPT | Performed by: SURGERY

## 2018-05-06 PROCEDURE — 84100 ASSAY OF PHOSPHORUS: CPT | Performed by: SURGERY

## 2018-05-06 PROCEDURE — 99232 SBSQ HOSP IP/OBS MODERATE 35: CPT | Performed by: SURGERY

## 2018-05-06 PROCEDURE — 83735 ASSAY OF MAGNESIUM: CPT | Performed by: SURGERY

## 2018-05-06 RX ORDER — ALBUTEROL SULFATE 2.5 MG/3ML
2.5 SOLUTION RESPIRATORY (INHALATION) EVERY 4 HOURS PRN
Status: DISCONTINUED | OUTPATIENT
Start: 2018-05-06 | End: 2018-05-07 | Stop reason: HOSPADM

## 2018-05-06 RX ORDER — ALBUTEROL SULFATE 2.5 MG/3ML
SOLUTION RESPIRATORY (INHALATION)
Status: COMPLETED
Start: 2018-05-06 | End: 2018-05-06

## 2018-05-06 RX ADMIN — ASPIRIN 81 MG 81 MG: 81 TABLET ORAL at 08:04

## 2018-05-06 RX ADMIN — HEPARIN SODIUM 5000 UNITS: 5000 INJECTION, SOLUTION INTRAVENOUS; SUBCUTANEOUS at 14:31

## 2018-05-06 RX ADMIN — DOCUSATE SODIUM 100 MG: 100 CAPSULE, LIQUID FILLED ORAL at 08:02

## 2018-05-06 RX ADMIN — HEPARIN SODIUM 5000 UNITS: 5000 INJECTION, SOLUTION INTRAVENOUS; SUBCUTANEOUS at 05:23

## 2018-05-06 RX ADMIN — HEPARIN SODIUM 5000 UNITS: 5000 INJECTION, SOLUTION INTRAVENOUS; SUBCUTANEOUS at 20:36

## 2018-05-06 RX ADMIN — DOCUSATE SODIUM 100 MG: 100 CAPSULE, LIQUID FILLED ORAL at 16:38

## 2018-05-06 RX ADMIN — POTASSIUM PHOSPHATE, MONOBASIC AND POTASSIUM PHOSPHATE, DIBASIC 12 MMOL: 224; 236 INJECTION, SOLUTION INTRAVENOUS at 14:35

## 2018-05-06 RX ADMIN — CEFEPIME HYDROCHLORIDE 1000 MG: 1 INJECTION, SOLUTION INTRAVENOUS at 12:22

## 2018-05-06 RX ADMIN — ALBUTEROL SULFATE 2.5 MG: 2.5 SOLUTION RESPIRATORY (INHALATION) at 02:54

## 2018-05-06 RX ADMIN — AMLODIPINE BESYLATE 2.5 MG: 2.5 TABLET ORAL at 08:02

## 2018-05-06 RX ADMIN — LEVOTHYROXINE SODIUM 75 MCG: 75 TABLET ORAL at 05:23

## 2018-05-06 RX ADMIN — SERTRALINE HYDROCHLORIDE 25 MG: 20 SOLUTION, CONCENTRATE ORAL at 08:02

## 2018-05-06 RX ADMIN — CEFEPIME HYDROCHLORIDE 1000 MG: 1 INJECTION, SOLUTION INTRAVENOUS at 01:15

## 2018-05-06 RX ADMIN — Medication 20 MG: at 08:03

## 2018-05-06 RX ADMIN — HEPARIN SODIUM 5000 UNITS: 5000 INJECTION, SOLUTION INTRAVENOUS; SUBCUTANEOUS at 00:04

## 2018-05-06 NOTE — RESPIRATORY THERAPY NOTE
RT Protocol Note  Josue Sheppard 80 y o  female MRN: [id number]  Unit/Bed#: [facility] 907-01 Encounter: [id number]    Assessment    Principal Problem:    Cholecystitis  Active Problems:    Mild protein-calorie malnutrition ([facility] )    Cholangitis    Choledocholithiasis    Leukocytosis      Home Pulmonary Medications:         Past Medical History:   Diagnosis Date    Cholelithiasis     Dementia     Diabetes mellitus ([facility] )     Hyperlipidemia     Hypothyroidism     Macular degeneration     Osteoarthritis      Social History     Social History    Marital status:      Spouse name: N/A    Number of children: N/A    Years of education: N/A     Social History Main Topics    Smoking status: Never Smoker    Smokeless tobacco: Never Used    Alcohol use No    Drug use: No    Sexual activity: Not Asked     Other Topics Concern    None     Social History Narrative    None       Subjective         Objective    Physical Exam:   Assessment Type: Pre-treatment  General Appearance: Awake  Respiratory Pattern: Dyspnea at rest  Chest Assessment: Chest expansion symmetrical  Bilateral Breath Sounds: Expiratory wheezes  Cough: Non-productive, Moist    Vitals:  Blood pressure 145/65, pulse 84, temperature 98 3 °F (36 8 °C), temperature source Axillary, resp  rate (!) 28, height 4' 8" (1 422 m), weight 56 1 kg (123 lb 10 9 oz), SpO2 97 %  Results from last 7 days  Lab Units 05/05/18  0523   PH ART  7 426   PCO2 ART mm Hg 27 2*   PO2 ART mm Hg 78 5   HCO3 ART mmol/L 17 5*   BASE EXC ART mmol/L -5 9   O2 CONTENT ART mL/dL 12 3*   O2 HGB, ARTERIAL % 95 1   ABG SOURCE  Line, Venous   NON VENT ROOM AIR % 21       Imaging and other studies: I have personally reviewed pertinent reports  Plan    Respiratory Plan: Mild Distress pathway        Resp Comments: Pt w/o pulm/tob hx  Uses no inhaled pulm meds at home  Admit dx choleysistis  s/p ERCP   CXR='b/l pleural effusions, R>L, RLL atelectasis    Pt noted to be wheezing  Pt has scattered wheezes  HR/RR/SpO2=84/28/97%  Albuterol UDN given  Pt should be assessed further by physician  WIll communicate to RN

## 2018-05-06 NOTE — PLAN OF CARE
Discharge to home or other facility with appropriate resources Progressing      Discharge to post-acute care or home with appropriate resources Progressing      Minimal or absence of nausea and/or vomiting Progressing      Maintains or returns to baseline bowel function Progressing      Maintains adequate nutritional intake Progressing      Absence or prevention of progression during hospitalization Progressing      Absence of fever/infection during neutropenic period Progressing      Patient/family/caregiver demonstrates understanding of disease process, treatment plan, medications, and discharge instructions Progressing      Electrolytes maintained within normal limits Progressing      Fluid balance maintained Progressing      Glucose maintained within target range Progressing      Nutrient/Hydration intake appropriate for improving, restoring or maintaining nutritional needs Progressing      Verbalizes/displays adequate comfort level or baseline comfort level Progressing      Patient will remain free of falls Progressing      Skin integrity is maintained or improved Progressing      Maintain or return to baseline ADL function Progressing      Maintain or return mobility status to optimal level Progressing      Patient will remain free of falls Progressing      Skin integrity remains intact Progressing      Incision(s), wounds(s) or drain site(s) healing without S/S of infection Progressing      Oral mucous membranes remain intact Progressing

## 2018-05-06 NOTE — PROGRESS NOTES
Progress Note - General Surgery   Renetta Alan 80 y o  female MRN: [id number]  Unit/Bed#: [facility] 907-01 Encounter: [id number]    Assessment:  80 F with choledocholithiasis and cholangitis, s/p ERCP, percutaneous cholecystostomy drain    Plan:  Dysphagia diet as tolerated  Continue Perc arnol drain  Continue broad spectrum abx, will narrow when sensitivities result  f/u cultures  OOB as able  SQH    Subjective/Objective     Subjective: No acute events  Feels well  Tolerating diet and having bowel movements  Pain is improved  Objective:    Blood pressure 163/77, pulse 83, temperature 97 6 °F (36 4 °C), temperature source Oral, resp  rate (!) 24, height 4' 8" (1 422 m), weight 56 1 kg (123 lb 10 9 oz), SpO2 96 %  ,Body mass index is 27 73 kg/m²        Intake/Output Summary (Last 24 hours) at 05/06/18 0911  Last data filed at 05/06/18 0744   Gross per 24 hour   Intake              730 ml   Output             2292 ml   Net            -1562 ml       Invasive Devices     Peripheral Intravenous Line            Peripheral IV 05/03/18 Left Arm 2 days          Drain            Nephrostomy Right -- days    Closed/Suction Drain Right  8 Fr  1 day                Physical Exam:   General: NAD, AAOx3  CV: RRR +S1/S2  Chest: breath sounds bilaterally  Abdomen: soft, NT ND, perc arnol with bilious output  Extremities: atraumatic, no edema        Results from last 7 days  Lab Units 05/06/18 0442 05/05/18  0523 05/04/18  0429   WBC Thousand/uL 23 08* 28 20* 33 94*   HEMOGLOBIN g/dL 8 8* 8 5* 9 1*   HEMATOCRIT % 26 7* 25 8* 28 2*   PLATELETS Thousands/uL 171 151 196       Results from last 7 days  Lab Units 05/06/18  0442 05/05/18  0523 05/04/18  0429   SODIUM mmol/L 143 142 147*   POTASSIUM mmol/L 4 1 4 9 5 0   CHLORIDE mmol/L 115* 117* 122*   CO2 mmol/L 20* 18* 17*   BUN mg/dL 17 22 18   CREATININE mg/dL 0 89 0 94 0 97   GLUCOSE RANDOM mg/dL 136 180* 150*   CALCIUM mg/dL 8 8 8 0* 7 6*       Results from last 7 days  Lab Units 05/03/18  1451 05/03/18  1045   INR  1 30* 1 14   PTT seconds 30 26

## 2018-05-06 NOTE — PLAN OF CARE
Problem: Potential for Falls  Goal: Patient will remain free of falls  INTERVENTIONS:  - Assess patient frequently for physical needs  -  Identify cognitive and physical deficits and behaviors that affect risk of falls    -  [place] fall precautions as indicated by assessment   - Educate patient/family on patient safety including physical limitations  - Instruct patient to call for assistance with activity based on assessment  - Modify environment to reduce risk of injury  - Consider OT/PT consult to assist with strengthening/mobility    Outcome: Progressing      Problem: Prexisting or High Potential for Compromised Skin Integrity  Goal: Skin integrity is maintained or improved  INTERVENTIONS:  - Identify patients at risk for skin breakdown  - Assess and monitor skin integrity  - Assess and monitor nutrition and hydration status  - Monitor labs (i e  albumin)  - Assess for incontinence   - Turn and reposition patient  - Assist with mobility/ambulation  - Relieve pressure over bony prominences  - Avoid friction and shearing  - Provide appropriate hygiene as needed including keeping skin clean and dry  - Evaluate need for skin moisturizer/barrier cream  - Collaborate with interdisciplinary team (i e  Nutrition, Rehabilitation, etc )   - Patient/family teaching   Outcome: Progressing      Problem: PAIN - ADULT  Goal: Verbalizes/displays adequate comfort level or baseline comfort level  Interventions:  - Encourage patient to monitor pain and request assistance  - Assess pain using appropriate pain scale  - Administer analgesics based on type and severity of pain and evaluate response  - Implement non-pharmacological measures as appropriate and evaluate response  - Consider cultural and social influences on pain and pain management  - Notify physician/advanced practitioner if interventions unsuccessful or patient reports new pain   Outcome: Progressing      Problem: INFECTION - ADULT  Goal: Absence or prevention of progression during hospitalization  INTERVENTIONS:  - Assess and monitor for signs and symptoms of infection  - Monitor lab/diagnostic results  - Monitor all insertion sites, i e  indwelling lines, tubes, and drains  - Monitor endotracheal (as able) and nasal secretions for changes in amount and color  - [place] appropriate cooling/warming therapies per order  - Administer medications as ordered  - Instruct and encourage patient and family to use good hand hygiene technique  - Identify and instruct in appropriate isolation precautions for identified infection/condition   Outcome: Progressing    Goal: Absence of fever/infection during neutropenic period  INTERVENTIONS:  - Monitor WBC  - Implement neutropenic guidelines   Outcome: Progressing      Problem: SAFETY ADULT  Goal: Maintain or return to baseline ADL function  INTERVENTIONS:  -  Assess patient's ability to carry out ADLs; assess patient's baseline for ADL function and identify physical deficits which impact ability to perform ADLs (bathing, care of mouth/teeth, toileting, grooming, dressing, etc )  - Assess/evaluate cause of self-care deficits   - Assess range of motion  - Assess patient's mobility; develop plan if impaired  - Assess patient's need for assistive devices and provide as appropriate  - Encourage maximum independence but intervene and supervise when necessary  ¯ Involve family in performance of ADLs  ¯ Assess for home care needs following discharge   ¯ Request OT consult to assist with ADL evaluation and planning for discharge  ¯ Provide patient education as appropriate   Outcome: Progressing    Goal: Maintain or return mobility status to optimal level  INTERVENTIONS:  - Assess patient's baseline mobility status (ambulation, transfers, stairs, etc )    - Identify cognitive and physical deficits and behaviors that affect mobility  - Identify mobility aids required to assist with transfers and/or ambulation (gait belt, sit-to-stand, lift, walker, cane, etc )  - [place] fall precautions as indicated by assessment  - Record patient progress and toleration of activity level on Mobility SBAR; progress patient to next Phase/Stage  - Instruct patient to call for assistance with activity based on assessment  - Request Rehabilitation consult to assist with strengthening/weightbearing, etc    Outcome: Progressing    Goal: Patient will remain free of falls  INTERVENTIONS:  - Assess patient frequently for physical needs  -  Identify cognitive and physical deficits and behaviors that affect risk of falls  -  [place] fall precautions as indicated by assessment   - Educate patient/family on patient safety including physical limitations  - Instruct patient to call for assistance with activity based on assessment  - Modify environment to reduce risk of injury  - Consider OT/PT consult to assist with strengthening/mobility    Outcome: Progressing      Problem: DISCHARGE PLANNING  Goal: Discharge to home or other facility with appropriate resources  INTERVENTIONS:  - Identify barriers to discharge w/patient and caregiver  - Arrange for needed discharge resources and transportation as appropriate  - Identify discharge learning needs (meds, wound care, etc )  - Arrange for interpretive services to assist at discharge as needed  - Refer to Case Management Department for coordinating discharge planning if the patient needs post-hospital services based on physician/advanced practitioner order or complex needs related to functional status, cognitive ability, or social support system   Outcome: Progressing      Problem: Knowledge Deficit  Goal: Patient/family/caregiver demonstrates understanding of disease process, treatment plan, medications, and discharge instructions  Complete learning assessment and assess knowledge base    Interventions:  - Provide teaching at level of understanding  - Provide teaching via preferred learning methods   Outcome: Progressing      Problem: Nutrition/Hydration-ADULT  Goal: Nutrient/Hydration intake appropriate for improving, restoring or maintaining nutritional needs  Monitor and assess patient's nutrition/hydration status for malnutrition (ex- brittle hair, bruises, dry skin, pale skin and conjunctiva, muscle wasting, smooth red tongue, and disorientation)  Collaborate with interdisciplinary team and initiate plan and interventions as ordered  Monitor patient's weight and dietary intake as ordered or per policy  Utilize nutrition screening tool and intervene per policy  Determine patient's food preferences and provide high-protein, high-caloric foods as appropriate       INTERVENTIONS:  - Monitor oral intake, urinary output, labs, and treatment plans  - Assess nutrition and hydration status and recommend course of action  - Evaluate amount of meals eaten  - Assist patient with eating if necessary   - Allow adequate time for meals  - Recommend/ encourage appropriate diets, oral nutritional supplements, and vitamin/mineral supplements  - Order, calculate, and assess calorie counts as needed  - Recommend, monitor, and adjust tube feedings and TPN/PPN based on assessed needs  - Assess need for intravenous fluids  - Provide specific nutrition/hydration education as appropriate  - Include patient/family/caregiver in decisions related to nutrition   Outcome: Progressing      Problem: DISCHARGE PLANNING - CARE MANAGEMENT  Goal: Discharge to post-acute care or home with appropriate resources  INTERVENTIONS:  - Conduct assessment to determine patient/family and health care team treatment goals, and need for post-acute services based on payer coverage, community resources, and patient preferences, and barriers to discharge  - Address psychosocial, clinical, and financial barriers to discharge as identified in assessment in conjunction with the patient/family and health care team  - Arrange appropriate level of post-acute services according to patient's   needs and preference and payer coverage in collaboration with the physician and health care team  - Communicate with and update the patient/family, physician, and health care team regarding progress on the discharge plan  - Arrange appropriate transportation to post-acute venues  - [facility] medically stable     Outcome: Progressing      Problem: GASTROINTESTINAL - ADULT  Goal: Minimal or absence of nausea and/or vomiting  INTERVENTIONS:  - Administer IV fluids as ordered to ensure adequate hydration  - Maintain NPO status until nausea and vomiting are resolved  - Nasogastric tube as ordered  - Administer ordered antiemetic medications as needed  - Provide nonpharmacologic comfort measures as appropriate  - Advance diet as tolerated, if ordered  - Nutrition services referral to assist patient with adequate nutrition and appropriate food choices   Outcome: Progressing    Goal: Maintains or returns to baseline bowel function  INTERVENTIONS:  - Assess bowel function  - Encourage oral fluids to ensure adequate hydration  - Administer IV fluids as ordered to ensure adequate hydration  - Administer ordered medications as needed  - Encourage mobilization and activity  - Nutrition services referral to assist patient with appropriate food choices   Outcome: Progressing    Goal: Maintains adequate nutritional intake  INTERVENTIONS:  - Monitor percentage of each meal consumed  - Identify factors contributing to decreased intake, treat as appropriate  - Assist with meals as needed  - Monitor I&O, WT and lab values  - Obtain nutrition services referral as needed   Outcome: Progressing      Problem: METABOLIC, FLUID AND ELECTROLYTES - ADULT  Goal: Electrolytes maintained within normal limits  INTERVENTIONS:  - Monitor labs and assess patient for signs and symptoms of electrolyte imbalances  - Administer electrolyte replacement as ordered  - Monitor response to electrolyte replacements, including repeat lab results as appropriate  - Instruct patient on fluid and nutrition as appropriate   Outcome: Progressing    Goal: Fluid balance maintained  INTERVENTIONS:  - Monitor labs and assess for signs and symptoms of volume excess or deficit  - Monitor I/O and WT  - Instruct patient on fluid and nutrition as appropriate   Outcome: Progressing    Goal: Glucose maintained within target range  INTERVENTIONS:  - Monitor Blood Glucose as ordered  - Assess for signs and symptoms of hyperglycemia and hypoglycemia  - Administer ordered medications to maintain glucose within target range  - Assess nutritional intake and initiate nutrition service referral as needed   Outcome: Progressing      Problem: SKIN/TISSUE INTEGRITY - ADULT  Goal: Skin integrity remains intact  INTERVENTIONS  - Identify patients at risk for skin breakdown  - Assess and monitor skin integrity  - Assess and monitor nutrition and hydration status  - Monitor labs (i e  albumin)  - Assess for incontinence   - Turn and reposition patient  - Assist with mobility/ambulation  - Relieve pressure over bony prominences  - Avoid friction and shearing  - Provide appropriate hygiene as needed including keeping skin clean and dry  - Evaluate need for skin moisturizer/barrier cream  - Collaborate with interdisciplinary team (i e  Nutrition, Rehabilitation, etc )   - Patient/family teaching   Outcome: Progressing    Goal: Incision(s), wounds(s) or drain site(s) healing without S/S of infection  INTERVENTIONS  - Assess and document risk factors for skin impairment   - Assess and document dressing, incision, wound bed, drain sites and surrounding tissue  - Initiate Nutrition services consult and/or wound management as needed   Outcome: Progressing    Goal: Oral mucous membranes remain intact  INTERVENTIONS  - Assess oral mucosa and hygiene practices  - Implement preventative oral hygiene regimen  - Implement oral medicated treatments as ordered  - Initiate Nutrition services referral as needed   Outcome: Progressing

## 2018-05-07 VITALS
WEIGHT: 123.68 LBS | HEART RATE: 82 BPM | SYSTOLIC BLOOD PRESSURE: 152 MMHG | BODY MASS INDEX: 27.82 KG/M2 | HEIGHT: 56 IN | OXYGEN SATURATION: 96 % | DIASTOLIC BLOOD PRESSURE: 67 MMHG | RESPIRATION RATE: 18 BRPM | TEMPERATURE: 97.8 F

## 2018-05-07 LAB
ALBUMIN SERPL BCP-MCNC: 2 G/DL (ref 3.5–5)
ALP SERPL-CCNC: 237 U/L (ref 46–116)
ALT SERPL W P-5'-P-CCNC: 103 U/L (ref 12–78)
ANION GAP SERPL CALCULATED.3IONS-SCNC: 8 MMOL/L (ref 4–13)
AST SERPL W P-5'-P-CCNC: 28 U/L (ref 5–45)
BASOPHILS # BLD MANUAL: 0.12 THOUSAND/UL (ref 0–0.1)
BASOPHILS NFR MAR MANUAL: 1 % (ref 0–1)
BILIRUB DIRECT SERPL-MCNC: 0.53 MG/DL (ref 0–0.2)
BILIRUB SERPL-MCNC: 0.98 MG/DL (ref 0.2–1)
BUN SERPL-MCNC: 12 MG/DL (ref 5–25)
CALCIUM SERPL-MCNC: 9.1 MG/DL (ref 8.3–10.1)
CHLORIDE SERPL-SCNC: 109 MMOL/L (ref 100–108)
CO2 SERPL-SCNC: 23 MMOL/L (ref 21–32)
CREAT SERPL-MCNC: 0.91 MG/DL (ref 0.6–1.3)
EOSINOPHIL # BLD MANUAL: 0.24 THOUSAND/UL (ref 0–0.4)
EOSINOPHIL NFR BLD MANUAL: 2 % (ref 0–6)
ERYTHROCYTE [DISTWIDTH] IN BLOOD BY AUTOMATED COUNT: 15.8 % (ref 11.6–15.1)
GFR SERPL CREATININE-BSD FRML MDRD: 57 ML/MIN/1.73SQ M
GLUCOSE SERPL-MCNC: 153 MG/DL (ref 65–140)
GLUCOSE SERPL-MCNC: 198 MG/DL (ref 65–140)
GLUCOSE SERPL-MCNC: 278 MG/DL (ref 65–140)
HCT VFR BLD AUTO: 28.6 % (ref 34.8–46.1)
HGB BLD-MCNC: 9.6 G/DL (ref 11.5–15.4)
LYMPHOCYTES # BLD AUTO: 0.83 THOUSAND/UL (ref 0.6–4.47)
LYMPHOCYTES # BLD AUTO: 7 % (ref 14–44)
MCH RBC QN AUTO: 32.4 PG (ref 26.8–34.3)
MCHC RBC AUTO-ENTMCNC: 33.6 G/DL (ref 31.4–37.4)
MCV RBC AUTO: 97 FL (ref 82–98)
MONOCYTES # BLD AUTO: 0.24 THOUSAND/UL (ref 0–1.22)
MONOCYTES NFR BLD: 2 % (ref 4–12)
NEUTROPHILS # BLD MANUAL: 10.27 THOUSAND/UL (ref 1.85–7.62)
NEUTS BAND NFR BLD MANUAL: 1 % (ref 0–8)
NEUTS SEG NFR BLD AUTO: 86 % (ref 43–75)
NRBC BLD AUTO-RTO: 1 /100 WBCS
PLATELET # BLD AUTO: 191 THOUSANDS/UL (ref 149–390)
PLATELET BLD QL SMEAR: ADEQUATE
PMV BLD AUTO: 12.1 FL (ref 8.9–12.7)
POTASSIUM SERPL-SCNC: 4.1 MMOL/L (ref 3.5–5.3)
PREALB SERPL-MCNC: 12.4 MG/DL (ref 18–40)
PROT SERPL-MCNC: 6.6 G/DL (ref 6.4–8.2)
RBC # BLD AUTO: 2.96 MILLION/UL (ref 3.81–5.12)
SODIUM SERPL-SCNC: 140 MMOL/L (ref 136–145)
VARIANT LYMPHS # BLD AUTO: 1 %
WBC # BLD AUTO: 11.8 THOUSAND/UL (ref 4.31–10.16)

## 2018-05-07 PROCEDURE — 80048 BASIC METABOLIC PNL TOTAL CA: CPT | Performed by: SURGERY

## 2018-05-07 PROCEDURE — 84134 ASSAY OF PREALBUMIN: CPT | Performed by: SURGERY

## 2018-05-07 PROCEDURE — 94762 N-INVAS EAR/PLS OXIMTRY CONT: CPT

## 2018-05-07 PROCEDURE — 99238 HOSP IP/OBS DSCHRG MGMT 30/<: CPT | Performed by: PHYSICIAN ASSISTANT

## 2018-05-07 PROCEDURE — 85007 BL SMEAR W/DIFF WBC COUNT: CPT | Performed by: SURGERY

## 2018-05-07 PROCEDURE — 80076 HEPATIC FUNCTION PANEL: CPT | Performed by: SURGERY

## 2018-05-07 PROCEDURE — 85027 COMPLETE CBC AUTOMATED: CPT | Performed by: SURGERY

## 2018-05-07 PROCEDURE — 82948 REAGENT STRIP/BLOOD GLUCOSE: CPT

## 2018-05-07 RX ORDER — ALBUTEROL SULFATE 2.5 MG/3ML
2.5 SOLUTION RESPIRATORY (INHALATION) EVERY 4 HOURS PRN
Qty: 75 ML | Refills: 0
Start: 2018-05-07 | End: 2020-11-16 | Stop reason: ALTCHOICE

## 2018-05-07 RX ORDER — OXYCODONE HYDROCHLORIDE 5 MG/1
5 TABLET ORAL EVERY 4 HOURS PRN
Status: DISCONTINUED | OUTPATIENT
Start: 2018-05-07 | End: 2018-05-07 | Stop reason: HOSPADM

## 2018-05-07 RX ORDER — POLYETHYLENE GLYCOL 3350 17 G/17G
17 POWDER, FOR SOLUTION ORAL DAILY PRN
Qty: 14 EACH | Refills: 0 | Status: SHIPPED | OUTPATIENT
Start: 2018-05-07 | End: 2020-11-16 | Stop reason: ALTCHOICE

## 2018-05-07 RX ORDER — OXYCODONE HYDROCHLORIDE 5 MG/1
2.5 TABLET ORAL EVERY 4 HOURS PRN
Status: DISCONTINUED | OUTPATIENT
Start: 2018-05-07 | End: 2018-05-07 | Stop reason: HOSPADM

## 2018-05-07 RX ORDER — OXYCODONE HYDROCHLORIDE 5 MG/1
2.5-5 TABLET ORAL EVERY 4 HOURS PRN
Qty: 18 TABLET | Refills: 0 | Status: SHIPPED | OUTPATIENT
Start: 2018-05-07 | End: 2018-05-17

## 2018-05-07 RX ORDER — CEFDINIR 300 MG/1
300 CAPSULE ORAL EVERY 12 HOURS SCHEDULED
Refills: 0
Start: 2018-05-07 | End: 2018-05-10

## 2018-05-07 RX ORDER — DOCUSATE SODIUM 100 MG/1
100 CAPSULE, LIQUID FILLED ORAL 2 TIMES DAILY
Qty: 10 CAPSULE | Refills: 0 | Status: SHIPPED | OUTPATIENT
Start: 2018-05-07 | End: 2020-11-16 | Stop reason: ALTCHOICE

## 2018-05-07 RX ORDER — ACETAMINOPHEN 325 MG/1
650 TABLET ORAL EVERY 4 HOURS PRN
Status: DISCONTINUED | OUTPATIENT
Start: 2018-05-07 | End: 2018-05-07 | Stop reason: HOSPADM

## 2018-05-07 RX ADMIN — CEFEPIME HYDROCHLORIDE 1000 MG: 1 INJECTION, SOLUTION INTRAVENOUS at 12:35

## 2018-05-07 RX ADMIN — HEPARIN SODIUM 5000 UNITS: 5000 INJECTION, SOLUTION INTRAVENOUS; SUBCUTANEOUS at 05:23

## 2018-05-07 RX ADMIN — SERTRALINE HYDROCHLORIDE 25 MG: 20 SOLUTION, CONCENTRATE ORAL at 08:46

## 2018-05-07 RX ADMIN — AMLODIPINE BESYLATE 2.5 MG: 2.5 TABLET ORAL at 08:42

## 2018-05-07 RX ADMIN — ASPIRIN 81 MG 81 MG: 81 TABLET ORAL at 08:43

## 2018-05-07 RX ADMIN — Medication 20 MG: at 08:45

## 2018-05-07 RX ADMIN — LEVOTHYROXINE SODIUM 75 MCG: 75 TABLET ORAL at 05:23

## 2018-05-07 RX ADMIN — DOCUSATE SODIUM 100 MG: 100 CAPSULE, LIQUID FILLED ORAL at 08:43

## 2018-05-07 RX ADMIN — HYDROMORPHONE HYDROCHLORIDE 0.5 MG: 1 INJECTION, SOLUTION INTRAMUSCULAR; INTRAVENOUS; SUBCUTANEOUS at 05:27

## 2018-05-07 RX ADMIN — CEFEPIME HYDROCHLORIDE 1000 MG: 1 INJECTION, SOLUTION INTRAVENOUS at 00:05

## 2018-05-07 NOTE — PROGRESS NOTES
Progress Note - General Surgery   Renetta Almeida 80 y o  female MRN: [id number]  Unit/Bed#: [facility] 907-01 Encounter: [id number]    Assessment and Plan:  Patient Active Problem List   Diagnosis    Altered mental status    Dementia    Essential hypertension    Diabetes mellitus ([address] )    Stage 3 chronic kidney disease    Falls    Epigastric pain    Cholecystitis with cholelithiasis    Shortness of breath    Anemia    Hypothyroid    Cholecystostomy tube dysfunction    Cholecystitis    Mild protein-calorie malnutrition (HCC)    Cholangitis    Choledocholithiasis    Leukocytosis       Assessment:  80 F with choledocholithiasis and cholangitis, s/p ERCP 5/3, percutaneous cholecystostomy drain on 5/4     Plan:  Dysphagia diet as tolerated  Continue Perc arnol drain  narrow abx to Ancef  Bowel regimen - senna, colace, MgCitrate  f/u cultures  OOB as able  SQH    Subjective: Pt states her pain is much improved but is  to palpation  Objective:       Vitals   Vitals:    05/06/18 1544 05/06/18 1900 05/06/18 2344 05/07/18 0341   BP: 129/71 136/74 163/84 131/60   BP Location: Right arm Right arm Right arm Right arm   Pulse: 88 92 75 70   Resp: 20 18 18 18   Temp: 97 6 °F (36 4 °C) 97 9 °F (36 6 °C) 97 8 °F (36 6 °C) 97 9 °F (36 6 °C)   TempSrc: Oral Oral Oral Oral   SpO2: 97% 96% 96% 97%   Weight:       Height:         Temp  Min: 97 4 °F (36 3 °C)  Max: 103 9 °F (39 9 °C)  IBW: 36 3 kg   Body mass index is 27 73 kg/m²  I/O   I/O       05/05 0701 - 05/06 0700 05/06 0701 - 05/07 0700    P  O  630 925    I V  (mL/kg) 40 (0 7)     NG/GT 10 10    IV Piggyback 50 113 3    Total Intake(mL/kg) 730 (13) 1048 3 (18 7)    Urine (mL/kg/hr) 2181 (1 6) 2551 (1 9)    Drains 45 (0) 40 (0)    Total Output 2226 2591    Net -1496 -1542 7          Unmeasured Urine Occurrence 1 x 1 x          Intake/Output Summary (Last 24 hours) at 05/07/18 7974  Last data filed at 05/06/18 2311   Gross per 24 hour   Intake 1048 33 ml   Output             2591 ml   Net         -1542 67 ml       Invasive  Devices  Invasive Devices     Peripheral Intravenous Line            Peripheral IV 05/06/18 Left Wrist less than 1 day          Drain            Nephrostomy Right -- days    Closed/Suction Drain Right  8 Fr  2 days                Active medications  Scheduled Meds:  Current Facility-Administered Medications:  albuterol 2 5 mg Nebulization Q4H PRN Tanika Kiran MD    amLODIPine 2 5 mg Oral Daily Barry L Fidencioland, DO    aspirin 81 mg Oral Daily Ana Montiel MD    cefepime 1,000 mg Intravenous Q12H Barry L Espland, DO Last Rate: 1,000 mg (05/07/18 0005)   docusate sodium 100 mg Oral BID Barry L Espland, DO    heparin (porcine) 5,000 Units Subcutaneous [facility] Kaylee Arias MD    HYDROmorphone 0 2 mg Intravenous Q4H PRN Thierry Jara PA-C    HYDROmorphone 0 5 mg Intravenous Q4H PRN [facility], DO    insulin lispro 1-6 Units Subcutaneous TID With Meals Ana Montiel MD    levothyroxine 75 mcg Oral Early Morning Ana Montiel MD    omeprazole (PRILOSEC) suspension 2 mg/mL 20 mg Oral BID With Meals [facility], DO    ondansetron 4 mg Intravenous Q6H PRN Kaylee Arias MD    polyethylene glycol 17 g Oral Daily PRN [facility], DO    sertraline 25 mg Oral Daily [facility], DO    sodium chloride (PF) 3 mL Intravenous PRN Bob Mei MD      Continuous Infusions:     PRN Meds:     albuterol 2 5 mg Q4H PRN   HYDROmorphone 0 2 mg Q4H PRN   HYDROmorphone 0 5 mg Q4H PRN   ondansetron 4 mg Q6H PRN   polyethylene glycol 17 g Daily PRN   sodium chloride (PF) 3 mL PRN       Physical Exam: /60 (BP Location: Right arm)   Pulse 70   Temp 97 9 °F (36 6 °C) (Oral)   Resp 18   Ht 4' 8" (1 422 m)   Wt 56 1 kg (123 lb 10 9 oz)   SpO2 97%   BMI 27 73 kg/m²     Physical Exam:  General Appearance: Alert, cooperative, no distress, appears stated age  Lungs: Clear to auscultation bilaterally, respirations unlablored   Heart: Regular rate and rhythm, S1 and S2 normal, no murmur, rub or gallop  Abdomen: Soft, moderately tender in RUQ   Perc arnol drain in place with bilious output   non distended, bowel sounds active in all four quadrants,   No masses or organomegaly    Laboratory and Diagnostics:    Results from last 7 days  Lab Units 05/07/18  0453 05/06/18  0442 05/05/18  0523 05/04/18  0429  05/03/18  1011   WBC Thousand/uL 11 80* 23 08* 28 20* 33 94*  < > 7 64   HEMOGLOBIN g/dL 9 6* 8 8* 8 5* 9 1*  < > 12 6   HEMATOCRIT % 28 6* 26 7* 25 8* 28 2*  < > 37 7   PLATELETS Thousands/uL 191 171 151 196  < > 280   NEUTROS PCT %  --   --   --   --   --  79*   MONOS PCT %  --   --   --   --   --  1*   MONO PCT MAN %  --  4 3* 3*  < >  --    < > = values in this interval not displayed      Results from last 7 days  Lab Units 05/06/18  0442 05/05/18  0523 05/04/18  0429   SODIUM mmol/L 143 142 147*   POTASSIUM mmol/L 4 1 4 9 5 0   CHLORIDE mmol/L 115* 117* 122*   CO2 mmol/L 20* 18* 17*   BUN mg/dL 17 22 18   CREATININE mg/dL 0 89 0 94 0 97   CALCIUM mg/dL 8 8 8 0* 7 6*   TOTAL PROTEIN g/dL 6 4 6 0* 6 4   BILIRUBIN TOTAL mg/dL 1 07* 0 85 1 93*   ALK PHOS U/L 251* 277* 363*   ALT U/L 143* 210* 341*   AST U/L 63* 181* 577*   GLUCOSE RANDOM mg/dL 136 180* 150*       Results from last 7 days  Lab Units 05/06/18  0442 05/05/18  0523 05/04/18  0429   MAGNESIUM mg/dL 2 0 2 1 2 4     Lab Results   Component Value Date    PHOS 1 6 (L) 05/06/2018    PHOS 2 0 (L) 05/05/2018    PHOS 0 9 (L) 05/04/2018        Results from last 7 days  Lab Units 05/03/18  1451 05/03/18  1045   INR  1 30* 1 14   PTT seconds 30 26     No results found for: TROPONINT  ABG:  Lab Results   Component Value Date    PHART 7 426 05/05/2018    BGP0LNN 27 2 (LL) 05/05/2018    PO2ART 78 5 05/05/2018    XNH6TOZ 17 5 (L) 05/05/2018    BEART -5 9 05/05/2018    SOURCE Line, Venous 05/05/2018       Blood Culture:   Lab Results   Component Value Date    BLOODCX No Growth at 24 hrs  05/05/2018    BLOODCX No Growth at 24 hrs  05/05/2018    BLOODCX Escherichia coli (A) 05/03/2018    BLOODCX Escherichia coli (A) 05/03/2018    BLOODCX No Growth After 5 Days  01/24/2018    BLOODCX No Growth After 5 Days  01/24/2018     Urine Culture:   Lab Results   Component Value Date    URINECX >100,000 cfu/ml Escherichia coli (A) 05/03/2018    URINECX 2994-2553 cfu/ml - One colony Escherichia coli (A) 01/21/2018    URINECX 60,000-69,000 cfu/ml Lactobacillus species (A) 01/21/2018    URINECX (A) 11/21/2017     >100,000 cfu/ml Alpha Hemolytic Streptococcus NOT Enterococcus    URINECX 1867-0999 cfu/ml Gram Negative Parvez (A) 11/21/2017    URINECX >100,000 cfu/ml Escherichia coli (A) 11/15/2017    URINECX >100,000 cfu/ml Aerococcus urinae (A) 11/15/2017     Sputum Culture: No components found for: SPUTUMCX    Imaging: I have personally reviewed pertinent reports        VTE Pharmacologic Prophylaxis: Enoxaparin (Lovenox)  VTE Mechanical Prophylaxis: sequential compression device

## 2018-05-07 NOTE — PLAN OF CARE
Problem: Potential for Falls  Goal: Patient will remain free of falls  INTERVENTIONS:  - Assess patient frequently for physical needs  -  Identify cognitive and physical deficits and behaviors that affect risk of falls    -  [place] fall precautions as indicated by assessment   - Educate patient/family on patient safety including physical limitations  - Instruct patient to call for assistance with activity based on assessment  - Modify environment to reduce risk of injury  - Consider OT/PT consult to assist with strengthening/mobility    Outcome: Progressing      Problem: Prexisting or High Potential for Compromised Skin Integrity  Goal: Skin integrity is maintained or improved  INTERVENTIONS:  - Identify patients at risk for skin breakdown  - Assess and monitor skin integrity  - Assess and monitor nutrition and hydration status  - Monitor labs (i e  albumin)  - Assess for incontinence   - Turn and reposition patient  - Assist with mobility/ambulation  - Relieve pressure over bony prominences  - Avoid friction and shearing  - Provide appropriate hygiene as needed including keeping skin clean and dry  - Evaluate need for skin moisturizer/barrier cream  - Collaborate with interdisciplinary team (i e  Nutrition, Rehabilitation, etc )   - Patient/family teaching   Outcome: Progressing      Problem: PAIN - ADULT  Goal: Verbalizes/displays adequate comfort level or baseline comfort level  Interventions:  - Encourage patient to monitor pain and request assistance  - Assess pain using appropriate pain scale  - Administer analgesics based on type and severity of pain and evaluate response  - Implement non-pharmacological measures as appropriate and evaluate response  - Consider cultural and social influences on pain and pain management  - Notify physician/advanced practitioner if interventions unsuccessful or patient reports new pain   Outcome: Progressing      Problem: INFECTION - ADULT  Goal: Absence or prevention of progression during hospitalization  INTERVENTIONS:  - Assess and monitor for signs and symptoms of infection  - Monitor lab/diagnostic results  - Monitor all insertion sites, i e  indwelling lines, tubes, and drains  - Monitor endotracheal (as able) and nasal secretions for changes in amount and color  - [place] appropriate cooling/warming therapies per order  - Administer medications as ordered  - Instruct and encourage patient and family to use good hand hygiene technique  - Identify and instruct in appropriate isolation precautions for identified infection/condition   Outcome: Progressing    Goal: Absence of fever/infection during neutropenic period  INTERVENTIONS:  - Monitor WBC  - Implement neutropenic guidelines   Outcome: Progressing      Problem: SAFETY ADULT  Goal: Maintain or return to baseline ADL function  INTERVENTIONS:  -  Assess patient's ability to carry out ADLs; assess patient's baseline for ADL function and identify physical deficits which impact ability to perform ADLs (bathing, care of mouth/teeth, toileting, grooming, dressing, etc )  - Assess/evaluate cause of self-care deficits   - Assess range of motion  - Assess patient's mobility; develop plan if impaired  - Assess patient's need for assistive devices and provide as appropriate  - Encourage maximum independence but intervene and supervise when necessary  ¯ Involve family in performance of ADLs  ¯ Assess for home care needs following discharge   ¯ Request OT consult to assist with ADL evaluation and planning for discharge  ¯ Provide patient education as appropriate   Outcome: Progressing    Goal: Maintain or return mobility status to optimal level  INTERVENTIONS:  - Assess patient's baseline mobility status (ambulation, transfers, stairs, etc )    - Identify cognitive and physical deficits and behaviors that affect mobility  - Identify mobility aids required to assist with transfers and/or ambulation (gait belt, sit-to-stand, lift, walker, cane, etc )  - [place] fall precautions as indicated by assessment  - Record patient progress and toleration of activity level on Mobility SBAR; progress patient to next Phase/Stage  - Instruct patient to call for assistance with activity based on assessment  - Request Rehabilitation consult to assist with strengthening/weightbearing, etc    Outcome: Progressing    Goal: Patient will remain free of falls  INTERVENTIONS:  - Assess patient frequently for physical needs  -  Identify cognitive and physical deficits and behaviors that affect risk of falls  -  [place] fall precautions as indicated by assessment   - Educate patient/family on patient safety including physical limitations  - Instruct patient to call for assistance with activity based on assessment  - Modify environment to reduce risk of injury  - Consider OT/PT consult to assist with strengthening/mobility    Outcome: Progressing      Problem: DISCHARGE PLANNING  Goal: Discharge to home or other facility with appropriate resources  INTERVENTIONS:  - Identify barriers to discharge w/patient and caregiver  - Arrange for needed discharge resources and transportation as appropriate  - Identify discharge learning needs (meds, wound care, etc )  - Arrange for interpretive services to assist at discharge as needed  - Refer to Case Management Department for coordinating discharge planning if the patient needs post-hospital services based on physician/advanced practitioner order or complex needs related to functional status, cognitive ability, or social support system   Outcome: Progressing      Problem: Knowledge Deficit  Goal: Patient/family/caregiver demonstrates understanding of disease process, treatment plan, medications, and discharge instructions  Complete learning assessment and assess knowledge base    Interventions:  - Provide teaching at level of understanding  - Provide teaching via preferred learning methods   Outcome: Progressing      Problem: Nutrition/Hydration-ADULT  Goal: Nutrient/Hydration intake appropriate for improving, restoring or maintaining nutritional needs  Monitor and assess patient's nutrition/hydration status for malnutrition (ex- brittle hair, bruises, dry skin, pale skin and conjunctiva, muscle wasting, smooth red tongue, and disorientation)  Collaborate with interdisciplinary team and initiate plan and interventions as ordered  Monitor patient's weight and dietary intake as ordered or per policy  Utilize nutrition screening tool and intervene per policy  Determine patient's food preferences and provide high-protein, high-caloric foods as appropriate       INTERVENTIONS:  - Monitor oral intake, urinary output, labs, and treatment plans  - Assess nutrition and hydration status and recommend course of action  - Evaluate amount of meals eaten  - Assist patient with eating if necessary   - Allow adequate time for meals  - Recommend/ encourage appropriate diets, oral nutritional supplements, and vitamin/mineral supplements  - Order, calculate, and assess calorie counts as needed  - Recommend, monitor, and adjust tube feedings and TPN/PPN based on assessed needs  - Assess need for intravenous fluids  - Provide specific nutrition/hydration education as appropriate  - Include patient/family/caregiver in decisions related to nutrition   Outcome: Progressing      Problem: DISCHARGE PLANNING - CARE MANAGEMENT  Goal: Discharge to post-acute care or home with appropriate resources  INTERVENTIONS:  - Conduct assessment to determine patient/family and health care team treatment goals, and need for post-acute services based on payer coverage, community resources, and patient preferences, and barriers to discharge  - Address psychosocial, clinical, and financial barriers to discharge as identified in assessment in conjunction with the patient/family and health care team  - Arrange appropriate level of post-acute services according to patient's   needs and preference and payer coverage in collaboration with the physician and health care team  - Communicate with and update the patient/family, physician, and health care team regarding progress on the discharge plan  - Arrange appropriate transportation to post-acute venues  - Pt to return to [facility] when medically stable     Outcome: Progressing      Problem: GASTROINTESTINAL - ADULT  Goal: Minimal or absence of nausea and/or vomiting  INTERVENTIONS:  - Administer IV fluids as ordered to ensure adequate hydration  - Maintain NPO status until nausea and vomiting are resolved  - Nasogastric tube as ordered  - Administer ordered antiemetic medications as needed  - Provide nonpharmacologic comfort measures as appropriate  - Advance diet as tolerated, if ordered  - Nutrition services referral to assist patient with adequate nutrition and appropriate food choices   Outcome: Progressing    Goal: Maintains or returns to baseline bowel function  INTERVENTIONS:  - Assess bowel function  - Encourage oral fluids to ensure adequate hydration  - Administer IV fluids as ordered to ensure adequate hydration  - Administer ordered medications as needed  - Encourage mobilization and activity  - Nutrition services referral to assist patient with appropriate food choices   Outcome: Progressing    Goal: Maintains adequate nutritional intake  INTERVENTIONS:  - Monitor percentage of each meal consumed  - Identify factors contributing to decreased intake, treat as appropriate  - Assist with meals as needed  - Monitor I&O, WT and lab values  - Obtain nutrition services referral as needed   Outcome: Progressing      Problem: METABOLIC, FLUID AND ELECTROLYTES - ADULT  Goal: Electrolytes maintained within normal limits  INTERVENTIONS:  - Monitor labs and assess patient for signs and symptoms of electrolyte imbalances  - Administer electrolyte replacement as ordered  - Monitor response to electrolyte replacements, including repeat lab results as appropriate  - Instruct patient on fluid and nutrition as appropriate   Outcome: Progressing    Goal: Fluid balance maintained  INTERVENTIONS:  - Monitor labs and assess for signs and symptoms of volume excess or deficit  - Monitor I/O and WT  - Instruct patient on fluid and nutrition as appropriate   Outcome: Progressing    Goal: Glucose maintained within target range  INTERVENTIONS:  - Monitor Blood Glucose as ordered  - Assess for signs and symptoms of hyperglycemia and hypoglycemia  - Administer ordered medications to maintain glucose within target range  - Assess nutritional intake and initiate nutrition service referral as needed   Outcome: Progressing      Problem: SKIN/TISSUE INTEGRITY - ADULT  Goal: Skin integrity remains intact  INTERVENTIONS  - Identify patients at risk for skin breakdown  - Assess and monitor skin integrity  - Assess and monitor nutrition and hydration status  - Monitor labs (i e  albumin)  - Assess for incontinence   - Turn and reposition patient  - Assist with mobility/ambulation  - Relieve pressure over bony prominences  - Avoid friction and shearing  - Provide appropriate hygiene as needed including keeping skin clean and dry  - Evaluate need for skin moisturizer/barrier cream  - Collaborate with interdisciplinary team (i e  Nutrition, Rehabilitation, etc )   - Patient/family teaching   Outcome: Progressing    Goal: Incision(s), wounds(s) or drain site(s) healing without S/S of infection  INTERVENTIONS  - Assess and document risk factors for skin impairment   - Assess and document dressing, incision, wound bed, drain sites and surrounding tissue  - Initiate Nutrition services consult and/or wound management as needed   Outcome: Progressing    Goal: Oral mucous membranes remain intact  INTERVENTIONS  - Assess oral mucosa and hygiene practices  - Implement preventative oral hygiene regimen  - Implement oral medicated treatments as ordered  - Initiate Nutrition services referral as needed   Outcome: Progressing

## 2018-05-07 NOTE — SOCIAL WORK
CM notified that patient is medically clear to return to [facility]  Transport arranged with [facility] for 4 pm   CM notified [facility] in [place]  Pt's son Apolinar Klein notified and agreeable  Facility contacts completed in [facility]  Medical necessity completed

## 2018-05-07 NOTE — DISCHARGE SUMMARY
Discharge Summary - Ricardo Smith 80 y o  female MRN: [id number]    [facility] Encounter: [id number]    Admission Date:   Admission Orders     Ordered        05/03/18 1403  Inpatient Admission  Once               Admitting Diagnosis: Cholangitis [K83 0]  Altered mental status [R41 82]  Calculus of gallbladder with acute cholecystitis without obstruction [K80 00]    HPI: per resident: Ricardo Smith is a 80 y o  female who presents with AMS and fever  Patient with known history of cholecystitis and was hospitalized in January where she underwent percutaneous cholecystostomy placement  She was seen as outpatient in March, where she underwent cholangiogram that showed a patent cystic duct  This was then removed  Per report, the patient was lethargic complaining of abdominal pain earlier today  She presented to the emergency department with a fever of 103 5  Her LFTs showed significant elevation as well as elevated bilirubin  She underwent CT scan of the abdomen pelvis that demonstrated cholecystitis as well as choledocholithiasis      Procedures Performed:   Orders Placed This Encounter   Procedures   [address]       Summary of Hospital Course:  She was admitted to the acute care surgery service to the intensive care unit with sepsis secondary to choledocholithiasis and cholangitis as well as a likely UTI with secondary diagnoses of an acute kidney injury  The patient also was noted to have troponin elevation, which was attributed to sepsis as opposed to acute coronary syndrome  She was initially kept NPO, started on broad-spectrum IV antibiotics and aggressive fluid resuscitation, and a GI consult was placed  She went on to have an ERCP with common bile duct stent placement on 05/03/2018  Interventional Radiology was also consulted and reinserted a percutaneous cholecystostomy drain on 05/04/2018  The patient tolerated both procedures, and there were no procedural complications  Following her interventions and resuscitation with broad-spectrum IV antibiotics, she slowly improved and was able to be transferred out of the intensive care unit  Once her cultures returned, her antibiotic regimen was narrowed to reflect the culture sensitivities  Her diet was slowly advanced as tolerated  PT and OT evaluated the patient recommended return to skilled nursing facility  By 05/07/2018, she was deemed stable for discharge  Significant Findings, Care, Treatment and Services Provided:   Ct Abdomen Pelvis Wo Contrast    Result Date: 5/3/2018  Impression: Noncontrast CT findings most consistent with cholelithiasis and recurrent acute cholecystitis  Choledocholithiasis with mild enlargement of common bile duct measuring up to 10 mm but no significant intrahepatic biliary dilatation noted  No pericholecystic abscess  No abdominal or pelvic abscess  No free intraperitoneal air  Findings at the right lung base on CT and on earlier performed chest x-ray suggestive of either atelectasis or pneumonia in the right lung  Small to moderate hiatal hernia  Large bolus of stool in the rectum with mild rectal wall thickening and perirectal edema suggesting mild stercoral proctitis  Workstation performed: [id number]     Xr Chest Portable    Result Date: 5/3/2018  Impression: 1  IJ line entering on the left with its tip overlying the right atrium  There is no pneumothorax  2   Pneumonia in the left lung  3   Healing left rib fractures  Workstation performed: [id number]     Xr Chest Portable - 1 View    Result Date: 5/3/2018  Impression: Diminished inspiration  No acute cardiopulmonary disease  Workstation performed: [id number]     Ir Tube Placement Tanvi    Result Date: 5/4/2018  Impression: Impression: Technically successful placement of 8 [place] APD cholecystostomy tube  Workstation performed: [id number]     Fl Ercp Biliary Only    Result Date: 5/4/2018  Impression: Choledocholithiasis    Status post stent placement  Please refer to the separate procedure notes for additional details  Workstation performed: [id number]     Xr Chest Portable Icu    Result Date: 5/5/2018  Impression: Developing right-sided pleural effusion with right basilar infiltrate representing atelectasis or pneumonia  Workstation performed: [id number]     Xr Chest Portable Icu    Result Date: 5/3/2018  Impression: 1  The left IJ line has been retracted  The tip is obliquely oriented and pointing down and to the right in the region of the SVC  2   Pneumonia in the left lung  Workstation performed: [id number]       Complications: None    Discharge Diagnoses:     1  Sepsis  2   Acute cholangitis  3   Acute cholecystitis  4   UTI  5   Acute kidney injury  Resolved Problems  Date Reviewed: 5/7/2018    None          Condition at Discharge: stable         Discharge instructions/Information to patient and family:   See after visit summary for information provided to patient and family  Provisions for Follow-Up Care:  See after visit summary for information related to follow-up care and any pertinent home health orders  PCP: Sumeet Loera MD    Disposition: Skilled nursing facility at [facility] Readmission: No    Discharge Statement   I spent 25 minutes discharging the patient  This time was spent on the day of discharge  I had direct contact with the patient on the day of discharge  Additional documentation is required if more than 30 minutes were spent on discharge  Discharge Medications:  See after visit summary for reconciled discharge medications provided to patient and family

## 2018-05-07 NOTE — DISCHARGE INSTRUCTIONS
Acute Care Surgery Discharge Instructions    Please follow-up as instructed  Activity:  - PT and OT evaluation and treatment as indicated  - You may resume activity as tolerated  Diet:    - You may continue a level 1 dysphagia (puree) diet with thin liquids and diabetic restrictions  Medications:  - Please complete the entire course of antibiotics through the last dose on 5/10/2018  - You may resume all of your regular medications, including blood thinners and aspirin, after going home unless otherwise instructed  Please refer to your discharge medication list for further details  - Please take the pain medications as directed  - You are encouraged to use non-narcotic pain medications first and whenever possible  Reserve the use of narcotic pain medication for moderate to severe pain not controlled by non-narcotic medications  - You may become constipated, especially if taking pain medications  You may take any over the counter stool softeners or laxatives as needed  Examples: Milk of Magnesia, Colace, Senna  Additional Instructions:  - Please maintain IR drain (percutaneous cholecystostomy drain as below indefinitely  Drain is NOT to be removed unless cleared with a surgeon!   - May shower daily   - If you have any questions or concerns after discharge please call the office   - Call office or return to ER if fever greater than 101, chills, persistent nausea/vomiting, and/or worsening/uncontrollable pain  TUBE CARE INSTRUCTIONS    Care after your procedure:    Resume your normal diet  Small sips of flat soda will help with nausea  1  The properly functioning catheter should be forward flushed once (1x) daily with 10ml of normal saline using clean technique  You will be given a prescription for flushes  To flush the tube, clean both connections with alcohol swab  Twist off the drainage bag/ bulb  tubing and twist the saline syringe into the drainage tube and flush   Remove the syringe and twist the drainage bag / bulb tubing tubing back on     2  The drainage bag/bulb may be emptied as necessary  Keep a record of the amount of fluid you drain from your tube  This should be done with clean technique as well  3  A fresh dressing should be applied daily over the tube insertion site  4  As the tube is secured to the skin with only a suture,try not to pull on your tube  Tub baths are not permitted  Showers are permitted if the patient's skin entry site is prevented from getting wet  Similarly, washcloth "baths" are acceptable  Contact Interventional Radiology at [phone] Callie PATIENTS: Contact Interventional Radiology at [phone]) Michela Hamm PATIENTS: Contact Interventional Radiology at [phone]) if:    1  Leakage or large amounts of liquid around the catheter  2  Fever of 101 degrees lasting several hours without other obvious cause (such as sore throat, flu, etc)  3  Persistent nausea or vomiting  4  Diminished drainage, which may be associated with pressure or pain  Or when the     drainage from your tube is less than 10mls for 48 hours  5  Catheter pulled back or falls out  The following pharmacies carry the flush syringes  [facility]                     [facility]  [address] [facility]                         [id number] [place] [address]  Phone [phone]            Phone [phone]             [address] [facility]                                [phone]  [facility]                      [address] [facility]  Phone [phone]            Phone [phone]                      [facility] [address] [facility]    [address]  Phone [phone]

## 2018-05-08 LAB
BACTERIA SPEC BFLD CULT: ABNORMAL
GRAM STN SPEC: ABNORMAL

## 2018-05-10 LAB
BACTERIA BLD CULT: NORMAL
BACTERIA BLD CULT: NORMAL

## 2018-05-23 ENCOUNTER — HOSPITAL ENCOUNTER (OUTPATIENT)
Dept: RADIOLOGY | Facility: HOSPITAL | Age: 83
Discharge: HOME | End: 2018-05-23
Attending: SURGERY
Payer: MEDICARE

## 2018-05-23 DIAGNOSIS — K81.9 CHOLECYSTITIS: ICD-10-CM

## 2018-05-23 PROCEDURE — 47531 INJECTION FOR CHOLANGIOGRAM: CPT

## 2018-05-23 PROCEDURE — C1769 GUIDE WIRE: HCPCS

## 2018-05-23 PROCEDURE — 47536 EXCHANGE BILIARY DRG CATH: CPT | Performed by: RADIOLOGY

## 2018-05-23 PROCEDURE — C1729 CATH, DRAINAGE: HCPCS

## 2018-05-23 RX ADMIN — IOHEXOL 15 ML: 300 INJECTION, SOLUTION INTRAVENOUS at 10:05

## 2018-05-23 NOTE — DISCHARGE INSTRUCTIONS
TUBE CARE INSTRUCTIONS    Care after your procedure:    Resume your normal diet  Small sips of flat soda will help with nausea  1  The properly functioning catheter should be forward flushed once (1x) daily with 10ml of normal saline using clean technique  You will be given a prescription for flushes  To flush the tube, clean both connections with alcohol swab  Twist off the drainage bag/ bulb  tubing and twist the saline syringe into the drainage tube and flush  Remove the syringe and twist the drainage bag / bulb tubing tubing back on     2  The drainage bag/bulb may be emptied as necessary  Keep a record of the amount of fluid you drain from your tube  This should be done with clean technique as well  3  A fresh dressing should be applied daily over the tube insertion site  4  As the tube is secured to the skin with only a suture,try not to pull on your tube  Tub baths are not permitted  Showers are permitted if the patient's skin entry site is prevented from getting wet  Similarly, washcloth "baths" are acceptable  Contact Interventional Radiology at [phone] Callie PATIENTS: Contact Interventional Radiology at [phone]) Patrice Gonzáles PATIENTS: Contact Interventional Radiology at [phone]) if:    1  Leakage or large amounts of liquid around the catheter  2  Fever of 101 degrees lasting several hours without other obvious cause (such as sore throat, flu, etc)  3  Persistent nausea or vomiting  4  Diminished drainage, which may be associated with pressure or pain  Or when the     drainage from your tube is less than 10mls for 48 hours  5  Catheter pulled back or falls out  The following pharmacies carry the flush syringes         [facility]                     [facility]  [address] [facility]                         [address] [facility]  Phone [phone]            Phone [phone]   [place] [address]                                [phone]  [address] [facility] [place] TRISTAN                      [address] [facility]  Phone [phone]            Phone [phone]                      [place] [place]                                                                                                          [phone]  [facility]  [facility] [address]    [address]  Phone [phone]

## 2018-05-24 ENCOUNTER — OFFICE VISIT (OUTPATIENT)
Dept: SURGERY | Facility: CLINIC | Age: 83
End: 2018-05-24
Payer: MEDICARE

## 2018-05-24 VITALS — DIASTOLIC BLOOD PRESSURE: 74 MMHG | TEMPERATURE: 97.5 F | SYSTOLIC BLOOD PRESSURE: 134 MMHG

## 2018-05-24 DIAGNOSIS — K83.09 CHOLANGITIS: Primary | ICD-10-CM

## 2018-05-24 PROCEDURE — 99213 OFFICE O/P EST LOW 20 MIN: CPT | Performed by: SURGERY

## 2018-05-24 NOTE — PROGRESS NOTES
Office Visit - General Surgery  Ricardo Smith MRN: [id number]  Encounter: [id number]    Assessment and Plan    Problem List Items Addressed This Visit        Digestive    Cholangitis - Primary     Percutaneous cholecystomy replaced by IR 5/23  Follow-up in 4 weeks  Chief Complaint:  Ricardo Smtih is a 80 y o  female who presents for Post-op (IR tube changed yesterday)    Subjective    Claims to be unable to eat, doesn't know if she's having pain, doesn't know if she has nausea  Perc cholecystomomy tube replaced 5/23  Past Medical History  Past Medical History:   Diagnosis Date    Cholelithiasis     Dementia     Diabetes mellitus ([address] )     Hyperlipidemia     Hypothyroidism     Macular degeneration     Osteoarthritis        Past Surgical History  Past Surgical History:   Procedure Laterality Date    ERCP N/A 5/3/2018    Procedure: ENDOSCOPIC RETROGRADE CHOLANGIOPANCREATOGRAPHY (ERCP);   Surgeon: Sterling Steiner MD;  [facility];  Service: Gastroenterology       Family History  Family History   Problem Relation Age of Onset    Family history unknown: Yes       Medications  Current Outpatient Prescriptions on File Prior to Visit   Medication Sig Dispense Refill    acetaminophen (TYLENOL) 325 mg tablet Take 3 tablets by mouth every 8 (eight) hours 30 tablet 0    albuterol (2 5 mg/3 mL) 0 083 % nebulizer solution Take 3 mL (2 5 mg total) by nebulization every 4 (four) hours as needed for wheezing 75 mL 0    amLODIPine (NORVASC) 2 5 mg tablet Take 1 tablet by mouth daily  0    aspirin 81 mg chewable tablet Chew 1 tablet daily  0    cholecalciferol (VITAMIN D3) 1,000 units tablet Take 1,000 Units by mouth daily      cyanocobalamin (VITAMIN B-12) 1,000 mcg tablet Take 1,000 mcg by mouth daily      docusate sodium (COLACE) 100 mg capsule Take 1 capsule (100 mg total) by mouth 2 (two) times a day 10 capsule 0    insulin glargine (LANTUS) 100 units/mL subcutaneous injection Inject 7 Units under the skin daily at bedtime (Patient taking differently: Inject 6 Units under the skin daily at bedtime  ) 10 mL 0    insulin lispro (HumaLOG) 100 units/mL injection Inject 3 Units under the skin 3 (three) times a day with meals  0    levothyroxine 50 mcg tablet Take 75 mcg by mouth daily      lidocaine (LIDODERM) 5 % Place 1 patch on the skin daily Remove & Discard patch within 12 hours or as directed by MD 30 patch 0    omeprazole (PriLOSEC) 20 mg delayed release capsule Take 20 mg by mouth daily      polyethylene glycol (MIRALAX) 17 g packet Take 17 g by mouth daily as needed (Constipation) 14 each 0    rosuvastatin (CRESTOR) 5 mg tablet Take 5 mg by mouth daily      sertraline (ZOLOFT) 25 mg tablet Take 25 mg by mouth daily        ERROR: CANNOT USE RATIO BASED PRESCRIPTION MIXTURE NAMING FOR A NON-MIXTURE Take 10 mL (20 mg total) by mouth 2 (two) times a day with meals 50 mL 0     No current facility-administered medications on file prior to visit  Allergies  Allergies   Allergen Reactions    Penicillins     Valsartan        Review of Systems   Constitutional: Negative  Inability to Sleep   HENT: Negative  Eyes: Negative  Respiratory: Negative  Cough: [id number]  Cardiovascular: Negative  Gastrointestinal: Negative  Endocrine: Negative  Genitourinary: Negative  Musculoskeletal: Negative  Skin: Negative  Allergic/Immunologic: Negative  Neurological: Negative  Hematological: Negative  Psychiatric/Behavioral: Negative  Objective  Vitals:    05/24/18 1055   BP: 134/74   Temp: 97 5 °F (36 4 °C)       Physical Exam   Abdominal: Soft  She exhibits no distension and no mass  There is no tenderness  There is no rebound and no guarding  Percutaneous cholecystomy tube intact and draining  Study performed 5/23 demonstrates CBD stent intact with flow of contrast freely into small intestine

## 2018-05-25 ENCOUNTER — TELEPHONE (OUTPATIENT)
Dept: GASTROENTEROLOGY | Facility: CLINIC | Age: 83
End: 2018-05-25

## 2018-05-25 ENCOUNTER — TELEPHONE (OUTPATIENT)
Dept: GASTROENTEROLOGY | Facility: AMBULARY SURGERY CENTER | Age: 83
End: 2018-05-25

## 2018-05-25 NOTE — TELEPHONE ENCOUNTER
DR Skeet Curling from [facility] called requesting a 1-2 weeks fu with Dr Ashley Deluna in the [place] location for [place] tube fu   Please assist pt  [phone] ext [phone]

## 2018-06-27 LAB — HBA1C MFR BLD HPLC: 7.9 %

## 2018-06-29 ENCOUNTER — PREP FOR PROCEDURE (OUTPATIENT)
Dept: GASTROENTEROLOGY | Facility: MEDICAL CENTER | Age: 83
End: 2018-06-29

## 2018-06-29 ENCOUNTER — TELEPHONE (OUTPATIENT)
Dept: GASTROENTEROLOGY | Facility: MEDICAL CENTER | Age: 83
End: 2018-06-29

## 2018-06-29 ENCOUNTER — OFFICE VISIT (OUTPATIENT)
Dept: SURGERY | Facility: CLINIC | Age: 83
End: 2018-06-29
Payer: MEDICARE

## 2018-06-29 VITALS — SYSTOLIC BLOOD PRESSURE: 128 MMHG | TEMPERATURE: 97.3 F | DIASTOLIC BLOOD PRESSURE: 74 MMHG

## 2018-06-29 DIAGNOSIS — K83.09 CHOLANGITIS: Primary | ICD-10-CM

## 2018-06-29 PROCEDURE — 99214 OFFICE O/P EST MOD 30 MIN: CPT | Performed by: SURGERY

## 2018-06-29 NOTE — TELEPHONE ENCOUNTER
Hi Dr Jane Barnes,    Dr JORDAN [facility] office called in asking to schedule this patient for an ERCP  Should they come in for an office visit or just go ahead and schedule  ?      [address]  [address]

## 2018-06-29 NOTE — PROGRESS NOTES
Office Visit - General Surgery  Jurgen Joe MRN: [id number]  Encounter: [id number]    Assessment and Plan    Problem List Items Addressed This Visit        Digestive    Cholangitis - Primary     Schedule follow-up with GI Medicine Kenna Dutton)  If cleared by Dr Steffi Dale, anticipate removal of percutaneous cholecystomy tube in IR 8/24  Chief Complaint:  Jurgen Joe is a 80 y o  female who presents for Cholelithiasis (f/u IR tube check)    Subjective  S/P percutaneous cholecystostomy, ERCP/shincterotomy/stent for cholangitis  No complaints, tolerating diet w/o nausea/vomiting  Per [facility] staff, perc arnol draining minimal  Scheduled for follow-up with IR on 8/24 for tube check  No scheduled follow-up at this time with GI Medicine  Past Medical History  Past Medical History:   Diagnosis Date    Cholelithiasis     Dementia     Diabetes mellitus ([address] )     Hyperlipidemia     Hypothyroidism     Macular degeneration     Osteoarthritis        Past Surgical History  Past Surgical History:   Procedure Laterality Date    ERCP N/A 5/3/2018    Procedure: ENDOSCOPIC RETROGRADE CHOLANGIOPANCREATOGRAPHY (ERCP);   Surgeon: Abner Paniagua MD;  [facility];  Service: Gastroenterology       Family History  Family History   Problem Relation Age of Onset    Family history unknown: Yes       Medications  Current Outpatient Prescriptions on File Prior to Visit   Medication Sig Dispense Refill    acetaminophen (TYLENOL) 325 mg tablet Take 3 tablets by mouth every 8 (eight) hours 30 tablet 0    albuterol (2 5 mg/3 mL) 0 083 % nebulizer solution Take 3 mL (2 5 mg total) by nebulization every 4 (four) hours as needed for wheezing 75 mL 0    amLODIPine (NORVASC) 2 5 mg tablet Take 1 tablet by mouth daily  0    aspirin 81 mg chewable tablet Chew 1 tablet daily  0    cholecalciferol (VITAMIN D3) 1,000 units tablet Take 1,000 Units by mouth daily      cyanocobalamin (VITAMIN B-12) 1,000 mcg tablet Take 1,000 mcg by mouth daily      docusate sodium (COLACE) 100 mg capsule Take 1 capsule (100 mg total) by mouth 2 (two) times a day 10 capsule 0    ERROR: CANNOT USE RATIO BASED PRESCRIPTION MIXTURE NAMING FOR A NON-MIXTURE Take 10 mL (20 mg total) by mouth 2 (two) times a day with meals 50 mL 0    insulin glargine (LANTUS) 100 units/mL subcutaneous injection Inject 7 Units under the skin daily at bedtime (Patient taking differently: Inject 6 Units under the skin daily at bedtime  ) 10 mL 0    insulin lispro (HumaLOG) 100 units/mL injection Inject 3 Units under the skin 3 (three) times a day with meals  0    levothyroxine 50 mcg tablet Take 75 mcg by mouth daily      lidocaine (LIDODERM) 5 % Place 1 patch on the skin daily Remove & Discard patch within 12 hours or as directed by MD 30 patch 0    omeprazole (PriLOSEC) 20 mg delayed release capsule Take 20 mg by mouth daily      polyethylene glycol (MIRALAX) 17 g packet Take 17 g by mouth daily as needed (Constipation) 14 each 0    rosuvastatin (CRESTOR) 5 mg tablet Take 5 mg by mouth daily      sertraline (ZOLOFT) 25 mg tablet Take 25 mg by mouth daily         No current facility-administered medications on file prior to visit  Allergies  Allergies   Allergen Reactions    Penicillins     Valsartan        Review of Systems   Constitutional: Negative  Inability to Sleep   HENT: Negative  Eyes: Negative  Respiratory: Negative  Cough: [id number]  Cardiovascular: Negative  Gastrointestinal: Negative  Endocrine: Negative  Genitourinary: Negative  Musculoskeletal: Negative  Skin: Negative  Allergic/Immunologic: Negative  Neurological: Negative  Hematological: Negative  Psychiatric/Behavioral: Negative  Objective  Vitals:    06/29/18 1036   BP: 128/74   Temp: (!) 97 3 °F (36 3 °C)       Physical Exam   Abdominal: Soft  Bowel sounds are normal  She exhibits no distension and no mass   There is no tenderness  Percutaneous cholecystostmy tube intact, draining small volume clear fluid

## 2018-06-29 NOTE — ASSESSMENT & PLAN NOTE
Schedule follow-up with GI Medicine Latoya Pablo)  If cleared by Dr Chetan Whipple, anticipate removal of percutaneous cholecystomy tube in IR 8/24

## 2018-07-02 ENCOUNTER — TELEPHONE (OUTPATIENT)
Dept: SURGERY | Facility: CLINIC | Age: 83
End: 2018-07-02

## 2018-07-02 NOTE — TELEPHONE ENCOUNTER
Spoke with Dr Nicholas Adams office regarding patient having an ERCP done  Per Diaz's office she does not need appt  But can set up procedure to be done   [address] spoke with Lucia Mcintosh & informed her to call Dr Nicholas Adams office to set up procedure  (transportation required)

## 2018-07-02 NOTE — TELEPHONE ENCOUNTER
[place] [facility] [place]  I gave Latasha Arenas Unit clerk verbal instructions/faxed to [phone]  Pt is Diabetic

## 2018-07-05 ENCOUNTER — TELEPHONE (OUTPATIENT)
Dept: GASTROENTEROLOGY | Facility: CLINIC | Age: 83
End: 2018-07-05

## 2018-07-16 NOTE — TELEPHONE ENCOUNTER
Spoke to nurse on call the patient does not need to hold aspirin 
adenike patient      [address] called to ask if she should hold her aspirin 81mg prior to the ercp--call back # [phone] ask for rafi or station 3
Danay Santoro  (RN)  2017 06:50:22

## 2018-08-12 ENCOUNTER — APPOINTMENT (EMERGENCY)
Dept: RADIOLOGY | Facility: HOSPITAL | Age: 83
End: 2018-08-12
Attending: EMERGENCY MEDICINE
Payer: MEDICARE

## 2018-08-12 ENCOUNTER — HOSPITAL ENCOUNTER (EMERGENCY)
Facility: HOSPITAL | Age: 83
Discharge: HOME | End: 2018-08-12
Attending: EMERGENCY MEDICINE
Payer: MEDICARE

## 2018-08-12 VITALS
BODY MASS INDEX: 27.55 KG/M2 | TEMPERATURE: 97.7 F | SYSTOLIC BLOOD PRESSURE: 136 MMHG | WEIGHT: 122.9 LBS | HEART RATE: 73 BPM | DIASTOLIC BLOOD PRESSURE: 63 MMHG | RESPIRATION RATE: 18 BRPM | OXYGEN SATURATION: 100 %

## 2018-08-12 DIAGNOSIS — T85.518A CHOLECYSTOSTOMY TUBE DYSFUNCTION, INITIAL ENCOUNTER: Primary | ICD-10-CM

## 2018-08-12 PROCEDURE — C1729 CATH, DRAINAGE: HCPCS

## 2018-08-12 PROCEDURE — 99284 EMERGENCY DEPT VISIT MOD MDM: CPT

## 2018-08-12 PROCEDURE — 47536 EXCHANGE BILIARY DRG CATH: CPT | Performed by: RADIOLOGY

## 2018-08-12 PROCEDURE — 75984 XRAY CONTROL CATHETER CHANGE: CPT

## 2018-08-12 PROCEDURE — C1769 GUIDE WIRE: HCPCS

## 2018-08-12 PROCEDURE — 49423 EXCHANGE DRAINAGE CATHETER: CPT

## 2018-08-12 RX ADMIN — IOHEXOL 10 ML: 300 INJECTION, SOLUTION INTRAVENOUS at 12:20

## 2018-08-12 NOTE — BRIEF OP NOTE (RAD/CATH)
IR REPLACEMENT OF CHOLECYSTOSTOMY TUBE:  Procedure Note    PATIENT NAME: Luberta Nageotte  : 1931  MRN: [id number]     Pre-op Diagnosis: No diagnosis found  Post-op Diagnosis: No diagnosis found  Surgeon:   Corina Rushing MD  Assistants:     Estimated Blood Loss:  None  Findings:  Patient with chronic cholecystostomy tube  The tube fell out yesterday  Successful recanalization of tract with placement of a new 8 Burkinan cholecystostomy tube  Specimens:  None  Complications:  None      Anesthesia: Loulou Faulkner MD     Date: 2018  Time: 12:29 PM

## 2018-08-12 NOTE — ED ATTENDING ATTESTATION
Leilani Duque MD, saw and evaluated the patient  I have discussed the patient with the resident/non-physician practitioner and agree with the resident's/non-physician practitioner's findings, Plan of Care, and MDM as documented in the resident's/non-physician practitioner's note, except where noted  All available labs and Radiology studies were reviewed  At this point I agree with the current assessment done in the Emergency Department    I have conducted an independent evaluation of this patient a history and physical is as follows:  Pt had Perc arnol tube pulled out IR was notified and pt was told to come to ED PT has no co PE: alert r sided hole noted no redness MDM: will notify IR    Critical Care Time  CritCare Time    Procedures

## 2018-08-12 NOTE — DISCHARGE INSTRUCTIONS
TUBE CARE INSTRUCTIONS    Care after your procedure:    Resume your normal diet  Small sips of flat soda will help with nausea  1  The properly functioning catheter should be forward flushed once (1x) daily with 10ml of normal saline using clean technique  You will be given a prescription for flushes  To flush the tube, clean both connections with alcohol swab  Twist off the drainage bag/ bulb  tubing and twist the saline syringe into the drainage tube and flush  Remove the syringe and twist the drainage bag / bulb tubing tubing back on     2  The drainage bag/bulb may be emptied as necessary  Keep a record of the amount of fluid you drain from your tube  This should be done with clean technique as well  3  A fresh dressing should be applied daily over the tube insertion site  4  As the tube is secured to the skin with only a suture,try not to pull on your tube  Tub baths are not permitted  Showers are permitted if the patient's skin entry site is prevented from getting wet  Similarly, washcloth "baths" are acceptable  Contact Interventional Radiology at [phone] Callie PATIENTS: Contact Interventional Radiology at [phone]) Gin Crane PATIENTS: Contact Interventional Radiology at [phone]) if:    1  Leakage or large amounts of liquid around the catheter  2  Fever of 101 degrees lasting several hours without other obvious cause (such as sore throat, flu, etc)  3  Persistent nausea or vomiting  4  Diminished drainage, which may be associated with pressure or pain  Or when the     drainage from your tube is less than 10mls for 48 hours  5  Catheter pulled back or falls out  The following pharmacies carry the flush syringes         [facility]                     [facility]  [address] [facility]                         [address] [facility]  Phone [phone]            Phone [phone]   [place] [address]                                [phone]  [address] [facility] [address]STAN                      [address] [facility]  Phone [phone]            Phone [phone]                      Gerline Halo                                                                                                          [phone]  [facility]  [facility] [address]    [address]  Phone [phone]

## 2018-08-12 NOTE — ED PROVIDER NOTES
History  Chief Complaint   Patient presents with    Medical Problem     Pt presents from [facility] for replacement of cholecytostomy tube IR aware     This is an 49-year-old female with history of cholecystitis status post percutaneous cholecystostomy tube placement in May 2018 who presents with accidental removal of her cholecystostomy tube  According to notes, the patient was being rolled during morning care  Nursing staff accidentally pulled her cholecystostomy tube  Interventional Radiology was call that time and she was told to come to the emergency department for re-insertion  Currently, denies any complaints  Denies fever/chills, nausea/vomiting, URI symptoms, neck pain, chest pain, palpitations, shortness of breath, cough, neck pain, back pain, flank pain, abdominal pain, diarrhea, hematochezia, dysuria, hematuria, abnormal vaginal discharge/bleeding  Prior to Admission Medications   Prescriptions Last Dose Informant Patient Reported? Taking?    ERROR: CANNOT USE RATIO BASED PRESCRIPTION MIXTURE NAMING FOR A NON-MIXTURE   No No   Sig: Take 10 mL (20 mg total) by mouth 2 (two) times a day with meals   acetaminophen (TYLENOL) 325 mg tablet   No No   Sig: Take 3 tablets by mouth every 8 (eight) hours   albuterol (2 5 mg/3 mL) 0 083 % nebulizer solution   No No   Sig: Take 3 mL (2 5 mg total) by nebulization every 4 (four) hours as needed for wheezing   amLODIPine (NORVASC) 2 5 mg tablet   No No   Sig: Take 1 tablet by mouth daily   aspirin 81 mg chewable tablet   No No   Sig: Chew 1 tablet daily   cholecalciferol (VITAMIN D3) 1,000 units tablet   Yes No   Sig: Take 1,000 Units by mouth daily   cyanocobalamin (VITAMIN B-12) 1,000 mcg tablet   Yes No   Sig: Take 1,000 mcg by mouth daily   docusate sodium (COLACE) 100 mg capsule   No No   Sig: Take 1 capsule (100 mg total) by mouth 2 (two) times a day   insulin glargine (LANTUS) 100 units/mL subcutaneous injection   No No   Sig: Inject 7 Units under the skin daily at bedtime   Patient taking differently: Inject 6 Units under the skin daily at bedtime     insulin lispro (HumaLOG) 100 units/mL injection   No No   Sig: Inject 3 Units under the skin 3 (three) times a day with meals   levothyroxine 50 mcg tablet   Yes No   Sig: Take 75 mcg by mouth daily   lidocaine (LIDODERM) 5 %   No No   Sig: Place 1 patch on the skin daily Remove & Discard patch within 12 hours or as directed by MD   omeprazole (PriLOSEC) 20 mg delayed release capsule   Yes No   Sig: Take 20 mg by mouth daily   polyethylene glycol (MIRALAX) 17 g packet   No No   Sig: Take 17 g by mouth daily as needed (Constipation)   rosuvastatin (CRESTOR) 5 mg tablet   Yes No   Sig: Take 5 mg by mouth daily   sertraline (ZOLOFT) 25 mg tablet   Yes No   Sig: Take 25 mg by mouth daily        Facility-Administered Medications: None       Past Medical History:   Diagnosis Date    Cholelithiasis     Coronary artery disease     Dementia     Diabetes mellitus (HCC)     Type 2    GERD (gastroesophageal reflux disease)     Hyperlipidemia     Hypertension     Hypothyroidism     Hypothyroid    Macular degeneration     Osteoarthritis     UTI (urinary tract infection)     Vitamin D deficiency        Past Surgical History:   Procedure Laterality Date    ERCP N/A 5/3/2018    Procedure: ENDOSCOPIC RETROGRADE CHOLANGIOPANCREATOGRAPHY (ERCP); Surgeon: Humberto Pabon MD;  [facility];  Service: Gastroenterology       Family History   Problem Relation Age of Onset    Family history unknown: Yes     I have reviewed and agree with the history as documented  Social History   Substance Use Topics    Smoking status: Never Smoker    Smokeless tobacco: Never Used    Alcohol use No        Review of Systems   Constitutional: Negative for chills and fever  HENT: Negative for rhinorrhea, sore throat and trouble swallowing  Eyes: Negative for photophobia and visual disturbance  Respiratory: Negative for cough, chest tightness and shortness of breath  Cardiovascular: Negative for chest pain, palpitations and leg swelling  Gastrointestinal: Negative for abdominal pain, blood in stool, diarrhea, nausea and vomiting  Endocrine: Negative for polyuria  Genitourinary: Negative for dysuria, flank pain, hematuria, vaginal bleeding and vaginal discharge  Musculoskeletal: Negative for back pain and neck pain  Skin: Negative for color change and rash  Allergic/Immunologic: Negative for immunocompromised state  Neurological: Negative for dizziness, weakness, light-headedness, numbness and headaches  All other systems reviewed and are negative  Physical Exam  ED Triage Vitals [08/12/18 1022]   Temperature Pulse Respirations Blood Pressure SpO2   97 7 °F (36 5 °C) 73 18 136/63 100 %      Temp Source Heart Rate Source Patient Position - Orthostatic VS BP Location FiO2 (%)   Oral Monitor Lying Right arm --      Pain Score       No Pain           Orthostatic Vital Signs  Vitals:    08/12/18 1022   BP: 136/63   Pulse: 73   Patient Position - Orthostatic VS: Lying       Physical Exam   Constitutional: Vital signs are normal  She appears well-developed  She is cooperative  No distress  HENT:   Mouth/Throat: Uvula is midline, oropharynx is clear and moist and mucous membranes are normal    Eyes: Conjunctivae and EOM are normal  Pupils are equal, round, and reactive to light  Neck: Trachea normal  No thyroid mass and no thyromegaly present  Cardiovascular: Normal rate, regular rhythm, normal heart sounds, intact distal pulses and normal pulses  No murmur heard  Pulmonary/Chest: Effort normal and breath sounds normal    Abdominal: Soft  Normal appearance and bowel sounds are normal  There is no tenderness  There is no rebound, no guarding and no CVA tenderness  Percutaneous cholecystostomy tube surgical site is clean, dry, intact  Serous drainage noted     Neurological: She is alert    Skin: Skin is warm, dry and intact  Psychiatric: She has a normal mood and affect  Her speech is normal and behavior is normal  Thought content normal        ED Medications  Medications   iohexol (OMNIPAQUE) 300 mg/mL injection 50 mL (10 mL Intravenous Given 8/12/18 1220)       Diagnostic Studies  Results Reviewed     None                 IR tube change   Final Result by Wilmer Rogers MD (08/12 1236)   Impression:  Uncomplicated recanalization of percutaneous cholecystostomy tube tract with replacement of a new 8-Frisian cholecystostomy tube  Workstation performed: [id number]               Procedures  Procedures      Phone Consults  ED Phone Contact    ED Course  ED Course as of Aug 12 1241   Jorge Luis Hastings Aug 12, 2018   1237 Patient returned from interventional Radiology after insertion of percutaneous cholecystostomy tube  The procedure was uncomplicated  She can be discharged from an interventional radiology and an emergency department standpoint  Identification of Seniors at Risk      Most Recent Value   (ISAR) Identification of Seniors at Risk   Before the illness or injury that brought you to the Emergency, did you need someone to help you on a regular basis? 0 Filed at: 08/12/2018 1031   In the last 24 hours, have you needed more help than usual?  0 Filed at: 08/12/2018 1031   Have you been hospitalized for one or more nights during the past 6 months? 1 Filed at: 08/12/2018 1031   In general, do you see well?  0 Filed at: 08/12/2018 1031   In general, do you have serious problems with your memory? 1 Filed at: 08/12/2018 1031   Do you take more than three different medications every day? 1 Filed at: 08/12/2018 1031   ISAR Score  3 Filed at: 08/12/2018 1031                          MDM  Number of Diagnoses or Management Options  Diagnosis management comments: Interventional radiology contacted    Will await their arrival     CritCare Time    Disposition  Final diagnoses:   Cholecystostomy tube dysfunction, initial encounter     Time reflects when diagnosis was documented in both MDM as applicable and the Disposition within this note     Time User Action Codes Description Comment    8/12/2018 12:39 PM Nathaly Ortega Add [C98 736] Hemorrhage following percutaneous trans-hepatic cholangiogram     8/12/2018 12:39 PM Nathaly Ortega Remove [G33 521] Hemorrhage following percutaneous trans-hepatic cholangiogram     8/12/2018 12:40 PM Nathaly Ortega Add [L39 426H] Cholecystostomy tube dysfunction, initial encounter       ED Disposition     ED Disposition Condition Comment    Discharge  [address] [place] [address] discharge to home/self care  Condition at discharge: Stable        Follow-up Information     Follow up With Specialties Details Why Contact Info Additional Information    Anup Reed MD Internal Medicine Call in 1 day As needed [address] [facility] [phone]       [address] [place] [address] Emergency Department Emergency Medicine Go to If symptoms worsen [address] [facility] ED, [address] [place] [address], [address]          Patient's Medications   Discharge Prescriptions    No medications on file     No discharge procedures on file  ED Provider  Attending physically available and evaluated [facility]  I managed the patient along with the ED Attending      Electronically Signed by         Tyree Caldwell MD  08/12/18 8807

## 2018-08-12 NOTE — ED NOTES
Spoke with Dayana Gilliland from IR will be in for tube placement      Kimberly Cruz RN  08/12/18 3770

## 2018-08-16 ENCOUNTER — APPOINTMENT (OUTPATIENT)
Dept: RADIOLOGY | Facility: HOSPITAL | Age: 83
End: 2018-08-16
Attending: INTERNAL MEDICINE
Payer: MEDICARE

## 2018-08-16 ENCOUNTER — HOSPITAL ENCOUNTER (OUTPATIENT)
Facility: HOSPITAL | Age: 83
Setting detail: OUTPATIENT SURGERY
Discharge: HOME | End: 2018-08-16
Attending: INTERNAL MEDICINE | Admitting: INTERNAL MEDICINE
Payer: MEDICARE

## 2018-08-16 ENCOUNTER — ANESTHESIA (OUTPATIENT)
Dept: GASTROENTEROLOGY | Facility: HOSPITAL | Age: 83
End: 2018-08-16
Payer: MEDICARE

## 2018-08-16 ENCOUNTER — ANESTHESIA EVENT (OUTPATIENT)
Dept: GASTROENTEROLOGY | Facility: HOSPITAL | Age: 83
End: 2018-08-16
Payer: MEDICARE

## 2018-08-16 VITALS
TEMPERATURE: 96.4 F | OXYGEN SATURATION: 97 % | HEIGHT: 56 IN | WEIGHT: 123 LBS | BODY MASS INDEX: 27.67 KG/M2 | HEART RATE: 68 BPM | SYSTOLIC BLOOD PRESSURE: 106 MMHG | DIASTOLIC BLOOD PRESSURE: 65 MMHG | RESPIRATION RATE: 16 BRPM

## 2018-08-16 DIAGNOSIS — K83.09 CHOLANGITIS: ICD-10-CM

## 2018-08-16 LAB — GLUCOSE SERPL-MCNC: 257 MG/DL (ref 65–140)

## 2018-08-16 PROCEDURE — 74328 X-RAY BILE DUCT ENDOSCOPY: CPT

## 2018-08-16 PROCEDURE — 43239 EGD BIOPSY SINGLE/MULTIPLE: CPT | Performed by: INTERNAL MEDICINE

## 2018-08-16 PROCEDURE — 43273 ENDOSCOPIC PANCREATOSCOPY: CPT | Performed by: INTERNAL MEDICINE

## 2018-08-16 PROCEDURE — 88305 TISSUE EXAM BY PATHOLOGIST: CPT | Performed by: PATHOLOGY

## 2018-08-16 PROCEDURE — C1769 GUIDE WIRE: HCPCS | Performed by: INTERNAL MEDICINE

## 2018-08-16 PROCEDURE — 82948 REAGENT STRIP/BLOOD GLUCOSE: CPT

## 2018-08-16 PROCEDURE — 43275 ERCP REMOVE FORGN BODY DUCT: CPT | Performed by: INTERNAL MEDICINE

## 2018-08-16 RX ORDER — MIRTAZAPINE 15 MG/1
15 TABLET, FILM COATED ORAL
COMMUNITY
End: 2020-11-16 | Stop reason: ALTCHOICE

## 2018-08-16 RX ORDER — SUCCINYLCHOLINE/SOD CL,ISO/PF 100 MG/5ML
SYRINGE (ML) INTRAVENOUS AS NEEDED
Status: DISCONTINUED | OUTPATIENT
Start: 2018-08-16 | End: 2018-08-16 | Stop reason: SURG

## 2018-08-16 RX ORDER — SODIUM CHLORIDE 9 MG/ML
50 INJECTION, SOLUTION INTRAVENOUS CONTINUOUS
Status: DISCONTINUED | OUTPATIENT
Start: 2018-08-16 | End: 2018-08-16 | Stop reason: HOSPADM

## 2018-08-16 RX ORDER — ONDANSETRON 2 MG/ML
INJECTION INTRAMUSCULAR; INTRAVENOUS AS NEEDED
Status: DISCONTINUED | OUTPATIENT
Start: 2018-08-16 | End: 2018-08-16 | Stop reason: SURG

## 2018-08-16 RX ORDER — EPHEDRINE SULFATE 50 MG/ML
INJECTION, SOLUTION INTRAVENOUS AS NEEDED
Status: DISCONTINUED | OUTPATIENT
Start: 2018-08-16 | End: 2018-08-16 | Stop reason: SURG

## 2018-08-16 RX ORDER — LIDOCAINE HYDROCHLORIDE 10 MG/ML
INJECTION, SOLUTION INFILTRATION; PERINEURAL AS NEEDED
Status: DISCONTINUED | OUTPATIENT
Start: 2018-08-16 | End: 2018-08-16 | Stop reason: SURG

## 2018-08-16 RX ORDER — PROPOFOL 10 MG/ML
INJECTION, EMULSION INTRAVENOUS AS NEEDED
Status: DISCONTINUED | OUTPATIENT
Start: 2018-08-16 | End: 2018-08-16 | Stop reason: SURG

## 2018-08-16 RX ADMIN — EPHEDRINE SULFATE 5 MG: 50 INJECTION, SOLUTION INTRAMUSCULAR; INTRAVENOUS; SUBCUTANEOUS at 09:40

## 2018-08-16 RX ADMIN — PROPOFOL 90 MG: 10 INJECTION, EMULSION INTRAVENOUS at 09:21

## 2018-08-16 RX ADMIN — IOHEXOL 4 ML: 240 INJECTION, SOLUTION INTRATHECAL; INTRAVASCULAR; INTRAVENOUS; ORAL at 09:30

## 2018-08-16 RX ADMIN — SODIUM CHLORIDE 50 ML/HR: 0.9 INJECTION, SOLUTION INTRAVENOUS at 08:35

## 2018-08-16 RX ADMIN — Medication 60 MG: at 09:22

## 2018-08-16 RX ADMIN — ONDANSETRON 4 MG: 2 INJECTION INTRAMUSCULAR; INTRAVENOUS at 09:30

## 2018-08-16 RX ADMIN — LIDOCAINE HYDROCHLORIDE 50 MG: 10 INJECTION, SOLUTION INFILTRATION; PERINEURAL at 09:21

## 2018-08-16 NOTE — ANESTHESIA PREPROCEDURE EVALUATION
Review of Systems/Medical History  Patient summary reviewed  Chart reviewed      Cardiovascular  Hyperlipidemia, Hypertension , CAD ,    Pulmonary  Shortness of breath,        GI/Hepatic    GERD ,             Endo/Other  Diabetes well controlled type 1 Insulin, History of thyroid disease , hypothyroidism,      GYN       Hematology  Anemia ,     Musculoskeletal    Arthritis     Neurology   Psychology           Physical Exam    Airway    Mallampati score: II  TM Distance: <3 FB  Neck ROM: limited     Dental   No notable dental hx     Cardiovascular  Rhythm: regular, Rate: normal,     Pulmonary  Breath sounds clear to auscultation,     Other Findings        Anesthesia Plan  ASA Score- 3     Anesthesia Type- general with ASA Monitors  Additional Monitors:   Airway Plan: ETT  Plan Factors-    Induction- intravenous  Postoperative Plan- Plan for postoperative opioid use  Informed Consent- Anesthetic plan and risks discussed with patient  I personally reviewed this patient with the CRNA  Discussed and agreed on the Anesthesia Plan with the CRNA  Porfirio Waddell

## 2018-08-16 NOTE — OP NOTE
**** GI/ENDOSCOPY REPORT ****     PATIENT NAME: Akhil Zuniga - VISIT ID:  Patient ID: [id number]   YOB: 1931     INTRODUCTION: Endoscopic Retrograde Cholangiopancreatography - A 80  year-old female patient presents for an outpatient Endoscopic Retrograde   Cholangiopancreatography at [address] [place] [address]  INDICATIONS: History of cholangitis status post ERCP with stent placement  Now for stent removal and duct clearance  CONSENT:  The benefits, risks, and alternatives to the procedure were   discussed and informed consent was obtained from the patient's son  PREPARATION:  EKG, pulse, pulse oximetry and blood pressure were monitored   throughout the procedure  ASA Classification: Class 3 - Patient has severe   systemic disturbance that may or may not be related to the disorder   requiring surgery  MEDICATIONS: Anesthesia-check records     PROCEDURE:  The endoscope was passed with ease through the mouth under   direct visualization and advanced to the duodenum  The scope was withdrawn   and the mucosa was carefully examined  FINDINGS:   The ampulla was visualized  A plastic stent was seen  The   stent was removed using a snare  The bile duct was cannulated using wire   and a 9-12 extraction balloon  Balloon sweeps revealed some sludge  Otherwise the bile duct was clear  Occlusion cholangiogram did not show   any  filling defects  A right-sided percutaneous cholecystostomy drain   was seen in place  The ampulla appeared slightly prominent and to   biopsies were taken  The procedure was then terminated  COMPLICATIONS: There were no complications  IMPRESSIONS:  Successful ERCP with stent removal, sludge removal   No   filling defects were seen on occlusion cholangiogram    prominent ampulla  Two biopsies were taken  RECOMMENDATIONS:  Follow up with GI as needed     follow-up with surgery   and interventional Radiology for percutaneous cholecystostomy removal      ESTIMATED BLOOD LOSS:     PROCEDURE CODES:     ICD-9 Codes:     ICD-10 Codes:     PERFORMED BY: Dr Kelleen Paget, M D  on 08/16/2018  Version 1, electronically signed by Dr Kelleen Paget, M D  on 08/16/2018 at   09:50

## 2018-08-16 NOTE — H&P
History and Physical - [facility]  Lexx Miles 80 y o  female MRN: [id number]    HPI: Lexx Miles is a 80y o  year old female who presents with h/o cholangitis and stent placement for pus/stones  Now for stent removal        Review of Systems    Historical Information   Past Medical History:   Diagnosis Date    Anemia     Cholecystitis     Cholelithiasis     Chronic kidney disease     Coronary artery disease     Dementia     Dementia     Diabetes mellitus (HCC)     Type 2 blood sugar 257 on admission    Dysphagia     Encephalopathy     GERD (gastroesophageal reflux disease)     H/O falling     Hyperlipidemia     Hypertension     Hypothyroidism     Hypothyroid    Macular degeneration     Osteoarthritis     UTI (urinary tract infection)     UTI (urinary tract infection)     Vitamin D deficiency     Vitamin D deficiency      Past Surgical History:   Procedure Laterality Date    ERCP N/A 5/3/2018    Procedure: ENDOSCOPIC RETROGRADE CHOLANGIOPANCREATOGRAPHY (ERCP);   Surgeon: Den Henderson MD;  [facility];  Service: Gastroenterology    IR TUBE PLACEMENT NEPHROSTOMY       Social History   History   Alcohol Use No     History   Drug Use No     History   Smoking Status    Never Smoker   Smokeless Tobacco    Never Used     Family History   Problem Relation Age of Onset    Family history unknown: Yes       Meds/Allergies     Prescriptions Prior to Admission   Medication    acetaminophen (TYLENOL) 325 mg tablet    albuterol (2 5 mg/3 mL) 0 083 % nebulizer solution    amLODIPine (NORVASC) 2 5 mg tablet    aspirin 81 mg chewable tablet    cholecalciferol (VITAMIN D3) 1,000 units tablet    cyanocobalamin (VITAMIN B-12) 1,000 mcg tablet    docusate sodium (COLACE) 100 mg capsule    insulin lispro (HumaLOG) 100 units/mL injection    levothyroxine 50 mcg tablet    lidocaine (LIDODERM) 5 %    mirtazapine (REMERON) 15 mg tablet    omeprazole (PriLOSEC) 20 mg delayed release capsule    polyethylene glycol (MIRALAX) 17 g packet    rosuvastatin (CRESTOR) 5 mg tablet    sertraline (ZOLOFT) 25 mg tablet    ERROR: CANNOT USE RATIO BASED PRESCRIPTION MIXTURE NAMING FOR A NON-MIXTURE    insulin glargine (LANTUS) 100 units/mL subcutaneous injection       Allergies   Allergen Reactions    Penicillins     Valsartan        Objective     Blood pressure 121/59, pulse 76, temperature 98 4 °F (36 9 °C), temperature source Tympanic, resp  rate 16, height 4' 8" (1 422 m), weight 55 8 kg (123 lb), SpO2 97 %  PHYSICAL EXAM    Gen: NAD  CV: RRR  CHEST: Clear  ABD: soft, NT/ND  EXT: no edema  Neuro: AAO      ASSESSMENT/PLAN:  This is a 80y o  year old female here for ERCP for stent remvoal and duct clearance for her recent history of cholangitis  PLAN:   Procedure: ERCP

## 2018-08-16 NOTE — PROGRESS NOTES
Spoke to Sarah at [facility]  Confirmed npo status, medications   Son Marta Krishnan for consent

## 2018-08-16 NOTE — ANESTHESIA POSTPROCEDURE EVALUATION
Post-Op Assessment Note      CV Status:  Stable    Mental Status:  Alert and awake    Hydration Status:  Euvolemic    PONV Controlled:  Controlled    Airway Patency:  Patent    Post Op Vitals Reviewed: Yes          Staff: CRNA           BP   114/55   Temp      Pulse  78   Resp   18   SpO2   98%

## 2018-09-23 ENCOUNTER — HOSPITAL ENCOUNTER (EMERGENCY)
Facility: HOSPITAL | Age: 83
Discharge: HOME | End: 2018-09-24
Attending: EMERGENCY MEDICINE
Payer: MEDICARE

## 2018-09-23 DIAGNOSIS — R10.9 ABDOMINAL PAIN: Primary | ICD-10-CM

## 2018-09-23 PROCEDURE — 99284 EMERGENCY DEPT VISIT MOD MDM: CPT

## 2018-09-24 ENCOUNTER — APPOINTMENT (EMERGENCY)
Dept: RADIOLOGY | Facility: HOSPITAL | Age: 83
End: 2018-09-24
Payer: MEDICARE

## 2018-09-24 VITALS
HEIGHT: 56 IN | DIASTOLIC BLOOD PRESSURE: 63 MMHG | WEIGHT: 123.02 LBS | SYSTOLIC BLOOD PRESSURE: 131 MMHG | RESPIRATION RATE: 18 BRPM | OXYGEN SATURATION: 98 % | BODY MASS INDEX: 27.67 KG/M2 | HEART RATE: 73 BPM | TEMPERATURE: 97.5 F

## 2018-09-24 LAB
ALBUMIN SERPL BCP-MCNC: 2.7 G/DL (ref 3.5–5)
ALP SERPL-CCNC: 92 U/L (ref 46–116)
ALT SERPL W P-5'-P-CCNC: 19 U/L (ref 12–78)
ANION GAP SERPL CALCULATED.3IONS-SCNC: 3 MMOL/L (ref 4–13)
AST SERPL W P-5'-P-CCNC: 52 U/L (ref 5–45)
BASOPHILS # BLD AUTO: 0.06 THOUSANDS/ΜL (ref 0–0.1)
BASOPHILS NFR BLD AUTO: 1 % (ref 0–1)
BILIRUB SERPL-MCNC: 0.39 MG/DL (ref 0.2–1)
BUN SERPL-MCNC: 30 MG/DL (ref 5–25)
CALCIUM SERPL-MCNC: 9.3 MG/DL (ref 8.3–10.1)
CHLORIDE SERPL-SCNC: 106 MMOL/L (ref 100–108)
CO2 SERPL-SCNC: 28 MMOL/L (ref 21–32)
CREAT SERPL-MCNC: 1.22 MG/DL (ref 0.6–1.3)
EOSINOPHIL # BLD AUTO: 0.52 THOUSAND/ΜL (ref 0–0.61)
EOSINOPHIL NFR BLD AUTO: 8 % (ref 0–6)
ERYTHROCYTE [DISTWIDTH] IN BLOOD BY AUTOMATED COUNT: 13.4 % (ref 11.6–15.1)
GFR SERPL CREATININE-BSD FRML MDRD: 40 ML/MIN/1.73SQ M
GLUCOSE SERPL-MCNC: 233 MG/DL (ref 65–140)
HCT VFR BLD AUTO: 39 % (ref 34.8–46.1)
HGB BLD-MCNC: 12.2 G/DL (ref 11.5–15.4)
IMM GRANULOCYTES # BLD AUTO: 0.01 THOUSAND/UL (ref 0–0.2)
IMM GRANULOCYTES NFR BLD AUTO: 0 % (ref 0–2)
LYMPHOCYTES # BLD AUTO: 2.61 THOUSANDS/ΜL (ref 0.6–4.47)
LYMPHOCYTES NFR BLD AUTO: 40 % (ref 14–44)
MCH RBC QN AUTO: 30.3 PG (ref 26.8–34.3)
MCHC RBC AUTO-ENTMCNC: 31.3 G/DL (ref 31.4–37.4)
MCV RBC AUTO: 97 FL (ref 82–98)
MONOCYTES # BLD AUTO: 0.46 THOUSAND/ΜL (ref 0.17–1.22)
MONOCYTES NFR BLD AUTO: 7 % (ref 4–12)
NEUTROPHILS # BLD AUTO: 2.82 THOUSANDS/ΜL (ref 1.85–7.62)
NEUTS SEG NFR BLD AUTO: 44 % (ref 43–75)
NRBC BLD AUTO-RTO: 0 /100 WBCS
PLATELET # BLD AUTO: 261 THOUSANDS/UL (ref 149–390)
PMV BLD AUTO: 12.3 FL (ref 8.9–12.7)
POTASSIUM SERPL-SCNC: 5.4 MMOL/L (ref 3.5–5.3)
PROT SERPL-MCNC: 8.2 G/DL (ref 6.4–8.2)
RBC # BLD AUTO: 4.02 MILLION/UL (ref 3.81–5.12)
SODIUM SERPL-SCNC: 137 MMOL/L (ref 136–145)
WBC # BLD AUTO: 6.48 THOUSAND/UL (ref 4.31–10.16)

## 2018-09-24 PROCEDURE — 85025 COMPLETE CBC W/AUTO DIFF WBC: CPT | Performed by: EMERGENCY MEDICINE

## 2018-09-24 PROCEDURE — 80053 COMPREHEN METABOLIC PANEL: CPT | Performed by: EMERGENCY MEDICINE

## 2018-09-24 PROCEDURE — 74176 CT ABD & PELVIS W/O CONTRAST: CPT

## 2018-09-24 PROCEDURE — 36415 COLL VENOUS BLD VENIPUNCTURE: CPT | Performed by: EMERGENCY MEDICINE

## 2018-09-24 NOTE — ED PROVIDER NOTES
History  Chief Complaint   Patient presents with    Abdominal Problem     Abdominal tube in the URQ not patent according to [facility]     Patient is an 40-year-old female with a history of dementia, cholangitis status post percutaneous cholecystostomy tube placement on 08/15 who presents from her nursing home for tube drainage  According to staff, patient had drainage from her cholecystostomy to so she was sent in for further evaluation  When I spoke with the nursing home, the nurse who took report was unable to tell me where the drainage was coming from or the quality of the drainage  Upon speaking with patient, patient has no complaints except for the fact that she is hungry  She denies any fevers, chills, abdominal pain, nausea, vomiting or diarrhea  On exam, her vitals are within normal limits and she is in no acute distress  Her abdomen is soft and nontender  The tube is site is clean with no redness or drainage  Prior to Admission Medications   Prescriptions Last Dose Informant Patient Reported? Taking?    ERROR: CANNOT USE RATIO BASED PRESCRIPTION MIXTURE NAMING FOR A NON-MIXTURE   No Yes   Sig: Take 10 mL (20 mg total) by mouth 2 (two) times a day with meals   acetaminophen (TYLENOL) 325 mg tablet   No Yes   Sig: Take 3 tablets by mouth every 8 (eight) hours   albuterol (2 5 mg/3 mL) 0 083 % nebulizer solution   No Yes   Sig: Take 3 mL (2 5 mg total) by nebulization every 4 (four) hours as needed for wheezing   amLODIPine (NORVASC) 2 5 mg tablet   No Yes   Sig: Take 1 tablet by mouth daily   aspirin 81 mg chewable tablet   No Yes   Sig: Chew 1 tablet daily   cholecalciferol (VITAMIN D3) 1,000 units tablet   Yes Yes   Sig: Take 1,000 Units by mouth daily   cyanocobalamin (VITAMIN B-12) 1,000 mcg tablet   Yes Yes   Sig: Take 1,000 mcg by mouth daily   docusate sodium (COLACE) 100 mg capsule   No Yes   Sig: Take 1 capsule (100 mg total) by mouth 2 (two) times a day   insulin glargine (LANTUS) 100 units/mL subcutaneous injection   No Yes   Sig: Inject 7 Units under the skin daily at bedtime   Patient taking differently: Inject 6 Units under the skin daily at bedtime     insulin lispro (HumaLOG) 100 units/mL injection   No Yes   Sig: Inject 3 Units under the skin 3 (three) times a day with meals   levothyroxine 50 mcg tablet   Yes Yes   Sig: Take 75 mcg by mouth daily   lidocaine (LIDODERM) 5 %   No Yes   Sig: Place 1 patch on the skin daily Remove & Discard patch within 12 hours or as directed by MD   mirtazapine (REMERON) 15 mg tablet   Yes Yes   Sig: Take 15 mg by mouth daily at bedtime   omeprazole (PriLOSEC) 20 mg delayed release capsule   Yes Yes   Sig: Take 20 mg by mouth daily   polyethylene glycol (MIRALAX) 17 g packet   No Yes   Sig: Take 17 g by mouth daily as needed (Constipation)   rosuvastatin (CRESTOR) 5 mg tablet   Yes Yes   Sig: Take 5 mg by mouth daily   sertraline (ZOLOFT) 25 mg tablet   Yes Yes   Sig: Take 25 mg by mouth daily        Facility-Administered Medications: None       Past Medical History:   Diagnosis Date    Anemia     Cholecystitis     Cholelithiasis     Chronic kidney disease     Coronary artery disease     Dementia     Dementia     Diabetes mellitus (HCC)     Type 2 blood sugar 257 on admission    Dysphagia     Encephalopathy     GERD (gastroesophageal reflux disease)     H/O falling     Hyperlipidemia     Hypertension     Hypothyroidism     Hypothyroid    Macular degeneration     Osteoarthritis     UTI (urinary tract infection)     UTI (urinary tract infection)     Vitamin D deficiency     Vitamin D deficiency        Past Surgical History:   Procedure Laterality Date    ERCP N/A 5/3/2018    Procedure: ENDOSCOPIC RETROGRADE CHOLANGIOPANCREATOGRAPHY (ERCP);   Surgeon: Safia Fuchs MD;  Location:  MAIN OR;  Service: Gastroenterology    IR TUBE PLACEMENT NEPHROSTOMY      NE ERCP DX COLLECTION SPECIMEN BRUSHING/WASHING N/A 8/16/2018 Procedure: ENDOSCOPIC RETROGRADE CHOLANGIOPANCREATOGRAPHY (ERCP); Surgeon: Marilee Khanna MD;  Location[facility]; Service: Gastroenterology       Family History   Problem Relation Age of Onset    Family history unknown: Yes     I have reviewed and agree with the history as documented  Social History   Substance Use Topics    Smoking status: Never Smoker    Smokeless tobacco: Never Used    Alcohol use No        Review of Systems   Constitutional: Negative for chills, diaphoresis and fever  HENT: Negative for congestion, sinus pressure, sore throat and trouble swallowing  Eyes: Negative for pain, discharge and itching  Respiratory: Negative for cough, chest tightness, shortness of breath and wheezing  Cardiovascular: Negative for chest pain, palpitations and leg swelling  Gastrointestinal: Negative for abdominal distention, abdominal pain, blood in stool, diarrhea, nausea and vomiting  Endocrine: Negative for polyphagia and polyuria  Genitourinary: Negative for difficulty urinating, dysuria, flank pain, hematuria, pelvic pain and vaginal bleeding  Musculoskeletal: Negative for arthralgias, back pain, neck pain and neck stiffness  Skin: Negative for color change and rash  Neurological: Negative for dizziness, syncope, weakness, light-headedness and headaches  Physical Exam  ED Triage Vitals [09/23/18 2223]   Temperature Pulse Respirations Blood Pressure SpO2   97 5 °F (36 4 °C) 69 18 123/60 99 %      Temp Source Heart Rate Source Patient Position - Orthostatic VS BP Location FiO2 (%)   Oral Monitor Lying Right arm --      Pain Score       No Pain           Orthostatic Vital Signs  Vitals:    09/23/18 2223 09/24/18 0025 09/24/18 0132   BP: 123/60 140/60 131/63   Pulse: 69 75 73   Patient Position - Orthostatic VS: Lying Lying Lying       Physical Exam   Constitutional: She is oriented to person, place, and time  She appears well-developed and well-nourished  No distress     HENT: Head: Normocephalic and atraumatic  Right Ear: External ear normal    Left Ear: External ear normal    Eyes: Conjunctivae are normal  Pupils are equal, round, and reactive to light  Neck: Normal range of motion  Neck supple  Cardiovascular: Normal rate, regular rhythm, normal heart sounds and intact distal pulses  Exam reveals no gallop and no friction rub  No murmur heard  Pulmonary/Chest: Effort normal and breath sounds normal  No respiratory distress  She has no wheezes  She has no rales  Abdominal: Soft  Bowel sounds are normal  She exhibits no distension and no mass  There is tenderness (diffuse, mild)  There is no guarding  Musculoskeletal: Normal range of motion  She exhibits no edema, tenderness or deformity  Lymphadenopathy:     She has no cervical adenopathy  Neurological: She is alert and oriented to person, place, and time  No cranial nerve deficit or sensory deficit  She exhibits normal muscle tone  Skin: Skin is warm and dry  No rash noted  Tube site is clean dry and intact  There is no redness, swelling or drainage from the site  Psychiatric: She has a normal mood and affect  Nursing note and vitals reviewed        ED Medications  Medications - No data to display    Diagnostic Studies  Results Reviewed     Procedure Component Value Units Date/Time    Comprehensive metabolic panel [[id number]]  (Abnormal) Collected:  09/24/18 0016    Lab Status:  Final result Specimen:  Blood from Arm, Right Updated:  09/24/18 0102     Sodium 137 mmol/L      Potassium 5 4 (H) mmol/L      Chloride 106 mmol/L      CO2 28 mmol/L      ANION GAP 3 (L) mmol/L      BUN 30 (H) mg/dL      Creatinine 1 22 mg/dL      Glucose 233 (H) mg/dL      Calcium 9 3 mg/dL      AST 52 (H) U/L      ALT 19 U/L      Alkaline Phosphatase 92 U/L      Total Protein 8 2 g/dL      Albumin 2 7 (L) g/dL      Total Bilirubin 0 39 mg/dL      eGFR 40 ml/min/1 73sq m     Narrative:         National Kidney Disease Education Program recommendations are as follows:  GFR calculation is accurate only with a steady state creatinine  Chronic Kidney disease less than 60 ml/min/1 73 sq  meters  Kidney failure less than 15 ml/min/1 73 sq  meters  CBC and differential [[id number]]  (Abnormal) Collected:  09/24/18 0016    Lab Status:  Final result Specimen:  Blood from Arm, Right Updated:  09/24/18 0046     WBC 6 48 Thousand/uL      RBC 4 02 Million/uL      Hemoglobin 12 2 g/dL      Hematocrit 39 0 %      MCV 97 fL      MCH 30 3 pg      MCHC 31 3 (L) g/dL      RDW 13 4 %      MPV 12 3 fL      Platelets 557 Thousands/uL      nRBC 0 /100 WBCs      Neutrophils Relative 44 %      Immat GRANS % 0 %      Lymphocytes Relative 40 %      Monocytes Relative 7 %      Eosinophils Relative 8 (H) %      Basophils Relative 1 %      Neutrophils Absolute 2 82 Thousands/µL      Immature Grans Absolute 0 01 Thousand/uL      Lymphocytes Absolute 2 61 Thousands/µL      Monocytes Absolute 0 46 Thousand/µL      Eosinophils Absolute 0 52 Thousand/µL      Basophils Absolute 0 06 Thousands/µL                  CT abdomen pelvis wo contrast   Final Result by Ana Lilia Durand DO (09/24 0157)      Cholecystostomy tube in the region of the gallbladder  No significant amount of surrounding inflammation or free fluid  Tiny droplet of air in the soft tissues overlying the liver which is likely due to recent cholecystostomy tube placement               Workstation performed: [id number]               Procedures  Procedures      Phone Consults  ED Phone Contact    ED Course           Identification of Seniors at Risk      Most Recent Value   (ISAR) Identification of Seniors at Risk   Before the illness or injury that brought you to the Emergency, did you need someone to help you on a regular basis?   1 Filed at: 09/23/2018 2225   In the last 24 hours, have you needed more help than usual?  1 Filed at: 09/23/2018 2225   Have you been hospitalized for one or more nights during the past 6 months? 0 Filed at: 09/23/2018 2225   In general, do you see well?  0 Filed at: 09/23/2018 2225   In general, do you have serious problems with your memory? 1 Filed at: 09/23/2018 2225   Do you take more than three different medications every day? 1 Filed at: 09/23/2018 2225   ISAR Score  4 Filed at: 09/23/2018 2225                          [facility]  Number of Diagnoses or Management Options  Abdominal pain:   Diagnosis management comments: 28-year-old female with history of cholangitis status post percutaneous cholecystostomy tube placement presents from her nursing home for tube drainage  Staff members at nursing home were unable to quantify amount of drainage or site of drainage  On exam in ED, tube is clean dry and intact with no active drainage  There is no redness or swelling of the area  Patient's vitals within normal limits  Abdomen soft and nontender  Plan: CBC, CMP, CT abdomen given tenderness on exam   CT negative,  Labs wnl,  Put dressing over tube, sent back to nursing home    CritCare Time    Disposition  Final diagnoses:   Abdominal pain     Time reflects when diagnosis was documented in both MDM as applicable and the Disposition within this note     Time User Action Codes Description Comment    9/24/2018  2:01 AM Kendall Pedroza Add [R10 9] Abdominal pain       ED Disposition     ED Disposition Condition Comment    Discharge  [address] discharge to home/self care      Condition at discharge: Stable        Follow-up Information     Follow up With Specialties Details Why Contact Info Additional Information    Yanely Muniz MD Internal Medicine   [facility] [phone]       [address] [place] [address] Emergency Department Emergency Medicine  If symptoms worsen [address]  [phone]  ED, [address] [place] [address], [id number]          Discharge Medication List as of 9/24/2018  2:01 AM      CONTINUE these medications which have NOT CHANGED    Details   acetaminophen (TYLENOL) 325 mg tablet Take 3 tablets by mouth every 8 (eight) hours, Starting Mon 11/27/2017, No Print      albuterol (2 5 mg/3 mL) 0 083 % nebulizer solution Take 3 mL (2 5 mg total) by nebulization every 4 (four) hours as needed for wheezing, Starting Mon 5/7/2018, No Print      amLODIPine (NORVASC) 2 5 mg tablet Take 1 tablet by mouth daily, Starting Tue 11/28/2017, No Print      aspirin 81 mg chewable tablet Chew 1 tablet daily, Starting Tue 11/28/2017, No Print      cholecalciferol (VITAMIN D3) 1,000 units tablet Take 1,000 Units by mouth daily, Historical Med      cyanocobalamin (VITAMIN B-12) 1,000 mcg tablet Take 1,000 mcg by mouth daily, Historical Med      docusate sodium (COLACE) 100 mg capsule Take 1 capsule (100 mg total) by mouth 2 (two) times a day, Starting Mon 5/7/2018, Print      ERROR: CANNOT USE RATIO BASED PRESCRIPTION MIXTURE NAMING FOR A NON-MIXTURE Take 10 mL (20 mg total) by mouth 2 (two) times a day with meals, Starting Mon 5/7/2018, No Print      insulin glargine (LANTUS) 100 units/mL subcutaneous injection Inject 7 Units under the skin daily at bedtime, Starting Mon 11/27/2017, No Print      insulin lispro (HumaLOG) 100 units/mL injection Inject 3 Units under the skin 3 (three) times a day with meals, Starting Mon 11/27/2017, No Print      levothyroxine 50 mcg tablet Take 75 mcg by mouth daily, Historical Med      lidocaine (LIDODERM) 5 % Place 1 patch on the skin daily Remove & Discard patch within 12 hours or as directed by MD, Starting Tue 11/28/2017, No Print      mirtazapine (REMERON) 15 mg tablet Take 15 mg by mouth daily at bedtime, Historical Med      omeprazole (PriLOSEC) 20 mg delayed release capsule Take 20 mg by mouth daily, Historical Med      polyethylene glycol (MIRALAX) 17 g packet Take 17 g by mouth daily as needed (Constipation), Starting Mon 5/7/2018, Print      rosuvastatin (CRESTOR) 5 mg tablet Take 5 mg by mouth daily, Historical Med      sertraline (ZOLOFT) 25 mg tablet Take 25 mg by mouth daily  , Historical Med           No discharge procedures on file  ED Provider  Attending physically available and evaluated Jermaine Acevedo I managed the patient along with the ED Attending      Electronically Signed by         Shannon Arzate DO  09/24/18 1139

## 2018-09-24 NOTE — ED ATTENDING ATTESTATION
Jovanni ABBOTT, DO, saw and evaluated the patient  I have discussed the patient with the resident/non-physician practitioner and agree with the resident's/non-physician practitioner's findings, Plan of Care, and MDM as documented in the resident's/non-physician practitioner's note, except where noted  All available labs and Radiology studies were reviewed  At this point I agree with the current assessment done in the Emergency Department  I have conducted an independent evaluation of this patient a history and physical is as follows:    69-year-old female presents from nursing for leakage around her perc arnol tube  Patient unable to give any reliable history secondary to dementia  Currently denies complaints except for hunger  On exam-no acute distress, heart regular, no respiratory distress, abdomen soft with tenderness in the right upper quadrant, cholecystostomy tube in place right upper quadrant without noticeable drainage, site is not erythematous, there is bile in bag    Plan-will CT abdomen secondary to pain and check labs    Critical Care Time  CritCare Time    Procedures

## 2018-09-24 NOTE — DISCHARGE INSTRUCTIONS

## 2018-09-27 ENCOUNTER — OFFICE VISIT (OUTPATIENT)
Dept: SURGERY | Facility: CLINIC | Age: 83
End: 2018-09-27
Payer: MEDICARE

## 2018-09-27 ENCOUNTER — HOSPITAL ENCOUNTER (OUTPATIENT)
Dept: RADIOLOGY | Facility: HOSPITAL | Age: 83
Discharge: HOME | End: 2018-09-27
Attending: SURGERY | Admitting: RADIOLOGY
Payer: MEDICARE

## 2018-09-27 VITALS — DIASTOLIC BLOOD PRESSURE: 78 MMHG | SYSTOLIC BLOOD PRESSURE: 122 MMHG | TEMPERATURE: 97.6 F

## 2018-09-27 DIAGNOSIS — K80.01 CALCULUS OF GALLBLADDER WITH ACUTE CHOLECYSTITIS AND OBSTRUCTION: ICD-10-CM

## 2018-09-27 DIAGNOSIS — K82.9 GALL BLADDER DISEASE: ICD-10-CM

## 2018-09-27 DIAGNOSIS — K80.43 CHOLEDOCHOLITHIASIS WITH ACUTE CHOLECYSTITIS WITH OBSTRUCTION: Primary | ICD-10-CM

## 2018-09-27 PROCEDURE — 76080 X-RAY EXAM OF FISTULA: CPT

## 2018-09-27 PROCEDURE — 47537 REMOVAL BILIARY DRG CATH: CPT | Performed by: RADIOLOGY

## 2018-09-27 PROCEDURE — 49424 ASSESS CYST CONTRAST INJECT: CPT

## 2018-09-27 PROCEDURE — 99213 OFFICE O/P EST LOW 20 MIN: CPT | Performed by: SURGERY

## 2018-09-27 RX ADMIN — IOHEXOL 10 ML: 300 INJECTION, SOLUTION INTRAVENOUS at 10:48

## 2018-09-27 NOTE — DISCHARGE INSTRUCTIONS
Drainage Tube Removal    Your drainage tube was removed today  What you need know at home:   Keep a clean dry dressing at the tube site until the small opening closes  It will take twenty four to forty eight hours  Keep the site dry until it heals  A small amount of drainage on your dressing is normal  Resume your normal diet  Small sips of flat soda will help with any nausea  Contact Interventional Radiology for any of the following: You have pain, fever greater than 101, shaking chills  If you have increased redness or swelling at the site  I the drainage from your site does not stop  If the site drains pus or has a bad odor       Contact Interventional Radiology at [phone]   Callie PATIENTS: Contact Interventional Radiology at [phone]) Su Alexander PATIENTS: Contact Interventional Radiology at [phone]) if:

## 2018-09-27 NOTE — PROGRESS NOTES
Office Visit - General Surgery  Lexx Miles MRN: [id number]  Encounter: [id number]    Assessment and Plan    Impression  Choledocholithiasis  Status post ERCP  Stent has been removed  Cholecystostomy tube    Recommendation  Will have interventional radiology evaluate cholecystostomy tube  If draining appropriately, hopeful removal today  Problem List Items Addressed This Visit     None          Chief Complaint:  Lexx Miles is a 80 y o  female who presents for Cholelithiasis (Tanvi tube check)    Subjective      Past Medical History  Past Medical History:   Diagnosis Date    Anemia     Cholecystitis     Cholelithiasis     Chronic kidney disease     Coronary artery disease     Dementia     Dementia     Diabetes mellitus ([facility] [address] )     Type 2 blood sugar 257 on admission    Dysphagia     Encephalopathy     GERD (gastroesophageal reflux disease)     H/O falling     Hyperlipidemia     Hypertension     Hypothyroidism     Hypothyroid    Macular degeneration     Osteoarthritis     UTI (urinary tract infection)     UTI (urinary tract infection)     Vitamin D deficiency     Vitamin D deficiency        Past Surgical History  Past Surgical History:   Procedure Laterality Date    ERCP N/A 5/3/2018    Procedure: ENDOSCOPIC RETROGRADE CHOLANGIOPANCREATOGRAPHY (ERCP); Surgeon: Den Henderson MD;  Location: [facility];  Service: Gastroenterology    IR TUBE PLACEMENT NEPHROSTOMY      WI ERCP DX COLLECTION SPECIMEN BRUSHING/WASHING N/A 8/16/2018    Procedure: ENDOSCOPIC RETROGRADE CHOLANGIOPANCREATOGRAPHY (ERCP); Surgeon: Den Henderson MD;  Location: [facility];   Service: Gastroenterology       Family History  Family History   Problem Relation Age of Onset    Family history unknown: Yes       Medications  Current Outpatient Prescriptions on File Prior to Visit   Medication Sig Dispense Refill    acetaminophen (TYLENOL) 325 mg tablet Take 3 tablets by mouth every 8 (eight) hours 30 tablet 0    albuterol (2 5 mg/3 mL) 0 083 % nebulizer solution Take 3 mL (2 5 mg total) by nebulization every 4 (four) hours as needed for wheezing 75 mL 0    amLODIPine (NORVASC) 2 5 mg tablet Take 1 tablet by mouth daily  0    aspirin 81 mg chewable tablet Chew 1 tablet daily  0    cholecalciferol (VITAMIN D3) 1,000 units tablet Take 1,000 Units by mouth daily      cyanocobalamin (VITAMIN B-12) 1,000 mcg tablet Take 1,000 mcg by mouth daily      docusate sodium (COLACE) 100 mg capsule Take 1 capsule (100 mg total) by mouth 2 (two) times a day 10 capsule 0    ERROR: CANNOT USE RATIO BASED PRESCRIPTION MIXTURE NAMING FOR A NON-MIXTURE Take 10 mL (20 mg total) by mouth 2 (two) times a day with meals 50 mL 0    insulin glargine (LANTUS) 100 units/mL subcutaneous injection Inject 7 Units under the skin daily at bedtime (Patient taking differently: Inject 6 Units under the skin daily at bedtime  ) 10 mL 0    insulin lispro (HumaLOG) 100 units/mL injection Inject 3 Units under the skin 3 (three) times a day with meals  0    mirtazapine (REMERON) 15 mg tablet Take 15 mg by mouth daily at bedtime      omeprazole (PriLOSEC) 20 mg delayed release capsule Take 20 mg by mouth daily      rosuvastatin (CRESTOR) 5 mg tablet Take 5 mg by mouth daily      sertraline (ZOLOFT) 25 mg tablet Take 25 mg by mouth daily        levothyroxine 50 mcg tablet Take 75 mcg by mouth daily      lidocaine (LIDODERM) 5 % Place 1 patch on the skin daily Remove & Discard patch within 12 hours or as directed by MD 30 patch 0    polyethylene glycol (MIRALAX) 17 g packet Take 17 g by mouth daily as needed (Constipation) 14 each 0     No current facility-administered medications on file prior to visit  Allergies  Allergies   Allergen Reactions    Penicillins     Valsartan        Review of Systems    Objective  Vitals:    09/27/18 0916   BP: 122/78   Temp: 97 6 °F (36 4 °C)       Physical Exam     Abdomen soft nontender nondistended    Drainage from cholecystostomy tube is minimal

## 2019-04-05 DIAGNOSIS — R13.10 DYSPHAGIA, UNSPECIFIED TYPE: Primary | ICD-10-CM

## 2019-04-10 ENCOUNTER — TRANSCRIBE ORDERS (OUTPATIENT)
Dept: RADIOLOGY | Facility: HOSPITAL | Age: 84
End: 2019-04-10

## 2019-04-10 ENCOUNTER — HOSPITAL ENCOUNTER (OUTPATIENT)
Dept: RADIOLOGY | Facility: HOSPITAL | Age: 84
Discharge: HOME | End: 2019-04-10
Payer: MEDICARE

## 2019-04-10 DIAGNOSIS — R13.10 DYSPHAGIA, UNSPECIFIED TYPE: ICD-10-CM

## 2019-04-10 PROCEDURE — 92611 MOTION FLUOROSCOPY/SWALLOW: CPT

## 2019-04-10 PROCEDURE — G8997 SWALLOW GOAL STATUS: HCPCS

## 2019-04-10 PROCEDURE — G8998 SWALLOW D/C STATUS: HCPCS

## 2019-04-10 PROCEDURE — 74230 X-RAY XM SWLNG FUNCJ C+: CPT

## 2019-04-10 PROCEDURE — G8996 SWALLOW CURRENT STATUS: HCPCS

## 2019-04-22 PROBLEM — H02.826 EYELID CYST, LEFT: Status: ACTIVE | Noted: 2019-04-22

## 2019-05-08 PROBLEM — D64.9 ANEMIA: Status: RESOLVED | Noted: 2018-01-24 | Resolved: 2019-05-08

## 2019-05-08 PROBLEM — E44.1 MILD PROTEIN-CALORIE MALNUTRITION (HCC): Status: RESOLVED | Noted: 2018-05-05 | Resolved: 2019-05-08

## 2019-05-08 PROBLEM — E55.9 VITAMIN D DEFICIENCY: Status: ACTIVE | Noted: 2019-05-08

## 2019-05-08 PROBLEM — E78.5 HYPERLIPIDEMIA: Status: ACTIVE | Noted: 2019-05-08

## 2019-05-08 PROBLEM — F06.30 MOOD DISORDER DUE TO A GENERAL MEDICAL CONDITION: Status: ACTIVE | Noted: 2019-05-08
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
## 2019-05-08 PROBLEM — R53.81 DEBILITY: Status: ACTIVE | Noted: 2019-05-08

## 2019-05-21 ENCOUNTER — NURSING HOME VISIT (OUTPATIENT)
Dept: GERIATRICS | Facility: OTHER | Age: 84
End: 2019-05-21
Payer: MEDICARE

## 2019-05-21 DIAGNOSIS — R52 PAIN: Primary | ICD-10-CM

## 2019-05-21 PROBLEM — M54.50 PAIN IN LOWER BACK: Status: ACTIVE | Noted: 2019-05-21

## 2019-05-21 PROCEDURE — 99308 SBSQ NF CARE LOW MDM 20: CPT | Performed by: NURSE PRACTITIONER

## 2019-05-24 ENCOUNTER — NURSING HOME VISIT (OUTPATIENT)
Dept: GERIATRICS | Facility: OTHER | Age: 84
End: 2019-05-24
Payer: MEDICARE

## 2019-05-24 DIAGNOSIS — M41.80 DEXTROSCOLIOSIS: Primary | ICD-10-CM

## 2019-05-24 PROCEDURE — 99307 SBSQ NF CARE SF MDM 10: CPT | Performed by: INTERNAL MEDICINE

## 2019-05-29 ENCOUNTER — NURSING HOME VISIT (OUTPATIENT)
Dept: OTHER | Facility: HOSPITAL | Age: 84
End: 2019-05-29
Payer: MEDICARE

## 2019-05-29 DIAGNOSIS — K59.03 DRUG-INDUCED CONSTIPATION: ICD-10-CM

## 2019-05-29 DIAGNOSIS — F06.30 MOOD DISORDER DUE TO A GENERAL MEDICAL CONDITION: ICD-10-CM

## 2019-05-29 DIAGNOSIS — M54.50 PAIN IN LOWER BACK: ICD-10-CM

## 2019-05-29 DIAGNOSIS — R33.9 URINARY RETENTION: Primary | ICD-10-CM

## 2019-05-29 PROBLEM — K59.00 CONSTIPATION: Status: ACTIVE | Noted: 2019-05-29

## 2019-05-29 PROCEDURE — 99307 SBSQ NF CARE SF MDM 10: CPT | Performed by: NURSE PRACTITIONER

## 2019-05-30 ENCOUNTER — TELEPHONE (OUTPATIENT)
Dept: OTHER | Facility: OTHER | Age: 84
End: 2019-05-30

## 2019-05-31 ENCOUNTER — NURSING HOME VISIT (OUTPATIENT)
Dept: GERIATRICS | Facility: OTHER | Age: 84
End: 2019-05-31
Payer: MEDICARE

## 2019-05-31 DIAGNOSIS — M16.0 OSTEOARTHRITIS OF BOTH HIPS, UNSPECIFIED OSTEOARTHRITIS TYPE: ICD-10-CM

## 2019-05-31 DIAGNOSIS — B95.2 UTI (URINARY TRACT INFECTION) DUE TO ENTEROCOCCUS: Primary | ICD-10-CM

## 2019-05-31 DIAGNOSIS — N39.0 UTI (URINARY TRACT INFECTION) DUE TO ENTEROCOCCUS: Primary | ICD-10-CM

## 2019-05-31 PROCEDURE — 99308 SBSQ NF CARE LOW MDM 20: CPT | Performed by: NURSE PRACTITIONER

## 2019-06-14 ENCOUNTER — NURSING HOME VISIT (OUTPATIENT)
Dept: GERIATRICS | Facility: OTHER | Age: 84
End: 2019-06-14
Payer: MEDICARE

## 2019-06-14 DIAGNOSIS — M41.80 DEXTROSCOLIOSIS: Primary | ICD-10-CM

## 2019-06-14 DIAGNOSIS — M54.5 ACUTE BILATERAL LOW BACK PAIN, WITH SCIATICA PRESENCE UNSPECIFIED: ICD-10-CM

## 2019-06-14 PROCEDURE — 99308 SBSQ NF CARE LOW MDM 20: CPT | Performed by: INTERNAL MEDICINE

## 2019-06-26 ENCOUNTER — NURSING HOME VISIT (OUTPATIENT)
Dept: GERIATRICS | Facility: OTHER | Age: 84
End: 2019-06-26
Payer: MEDICARE

## 2019-06-26 DIAGNOSIS — R53.81 DEBILITY: Primary | ICD-10-CM

## 2019-06-26 DIAGNOSIS — E11.8 TYPE 2 DIABETES MELLITUS WITH COMPLICATION, WITH LONG-TERM CURRENT USE OF INSULIN (HCC): ICD-10-CM

## 2019-06-26 DIAGNOSIS — N18.30 CHRONIC KIDNEY DISEASE, STAGE 3 (HCC): ICD-10-CM

## 2019-06-26 DIAGNOSIS — E78.5 HYPERLIPIDEMIA, UNSPECIFIED HYPERLIPIDEMIA TYPE: ICD-10-CM

## 2019-06-26 DIAGNOSIS — M54.5 ACUTE BILATERAL LOW BACK PAIN, WITH SCIATICA PRESENCE UNSPECIFIED: ICD-10-CM

## 2019-06-26 DIAGNOSIS — E03.9 HYPOTHYROIDISM, UNSPECIFIED TYPE: Chronic | ICD-10-CM

## 2019-06-26 DIAGNOSIS — F06.30 MOOD DISORDER DUE TO A GENERAL MEDICAL CONDITION: ICD-10-CM

## 2019-06-26 DIAGNOSIS — I10 ESSENTIAL HYPERTENSION: ICD-10-CM

## 2019-06-26 DIAGNOSIS — E55.9 VITAMIN D DEFICIENCY: ICD-10-CM

## 2019-06-26 DIAGNOSIS — F03.90 DEMENTIA WITHOUT BEHAVIORAL DISTURBANCE, UNSPECIFIED DEMENTIA TYPE (HCC): ICD-10-CM

## 2019-06-26 DIAGNOSIS — Z79.4 TYPE 2 DIABETES MELLITUS WITH COMPLICATION, WITH LONG-TERM CURRENT USE OF INSULIN (HCC): ICD-10-CM

## 2019-06-26 PROCEDURE — 99309 SBSQ NF CARE MODERATE MDM 30: CPT | Performed by: NURSE PRACTITIONER

## 2019-08-16 ENCOUNTER — NURSING HOME VISIT (OUTPATIENT)
Dept: GERIATRICS | Facility: OTHER | Age: 84
End: 2019-08-16
Payer: MEDICARE

## 2019-08-16 DIAGNOSIS — E11.8 TYPE 2 DIABETES MELLITUS WITH COMPLICATION, WITH LONG-TERM CURRENT USE OF INSULIN (HCC): ICD-10-CM

## 2019-08-16 DIAGNOSIS — I10 ESSENTIAL HYPERTENSION: ICD-10-CM

## 2019-08-16 DIAGNOSIS — Z79.4 TYPE 2 DIABETES MELLITUS WITH COMPLICATION, WITH LONG-TERM CURRENT USE OF INSULIN (HCC): ICD-10-CM

## 2019-08-16 DIAGNOSIS — R53.81 DEBILITY: Primary | ICD-10-CM

## 2019-08-16 DIAGNOSIS — N18.30 CHRONIC KIDNEY DISEASE, STAGE 3 (HCC): ICD-10-CM

## 2019-08-16 DIAGNOSIS — F06.30 MOOD DISORDER DUE TO A GENERAL MEDICAL CONDITION: ICD-10-CM

## 2019-08-16 DIAGNOSIS — E03.9 HYPOTHYROIDISM, UNSPECIFIED TYPE: Chronic | ICD-10-CM

## 2019-08-16 PROBLEM — K83.09 CHOLANGITIS: Status: RESOLVED | Noted: 2018-05-05 | Resolved: 2019-08-16

## 2019-08-16 PROBLEM — K81.9 CHOLECYSTITIS: Status: RESOLVED | Noted: 2018-05-03 | Resolved: 2019-08-16

## 2019-08-16 PROBLEM — T85.518A CHOLECYSTOSTOMY TUBE DYSFUNCTION: Status: RESOLVED | Noted: 2018-02-16 | Resolved: 2019-08-16

## 2019-08-16 PROBLEM — K80.50 CHOLEDOCHOLITHIASIS: Status: RESOLVED | Noted: 2018-05-05 | Resolved: 2019-08-16

## 2019-08-16 PROCEDURE — 99309 SBSQ NF CARE MODERATE MDM 30: CPT | Performed by: INTERNAL MEDICINE

## 2019-08-16 NOTE — PROGRESS NOTES
Julio [address]  [address]  Facility: [facility]  [place] [address]        NAME: Latonya Moscoso  AGE: 80 y o  SEX: female    DATE OF ENCOUNTER: 8/16/2019    Code status:  AND/DNR    Assessment and Plan     Problem List Items Addressed This Visit        Endocrine    Diabetes mellitus ([facility] [address] )    Hypothyroid (Chronic)       Cardiovascular and Mediastinum    Essential hypertension       Nervous and Auditory    Mood disorder due to a general medical condition       Genitourinary    Chronic kidney disease, stage 3 ([facility] [address] )       Other    Debility - Primary      1  Debility secondary to her chronic medical conditions-still requiring 24/7 care/support of her ADLs  Continue with care/support of her ADLs at long-term care facility level of care  Continue with monitoring  2  Hypertension-review of her BP log shows that she is doing well with amlodipine  Continue with monitoring  3  Insulin-requiring type 2 diabetes mellitus-her fingerstick blood sugars have improved with CCD, Levemir, and NovoLog  Continue with clinical and periodic laboratory monitoring  Will hold off on hemoglobin A1c until sugar values are consistent  4  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-having breakthrough symptoms with duloxetine 30 mg daily  Will increase her duloxetine to 40 mg daily and continue with clinical monitoring  5  Stage 3 chronic kidney disease-asymptomatic  Continue clinical and periodic laboratory monitoring  6  Low back pain/dextroscoliosis-currently without symptoms  Will discontinue her routine Tylenol and topical therapy  Will continue with as needed oxycodone and monitoring  7  Hypothyroidism-she is doing well with levothyroxine  Continue with clinical and periodic laboratory monitoring  8  Vitamin-D deficiency-doing well with vitamin D3 supplement  Continue with periodic laboratory monitoring      9  Vitamin B12 deficiency-vitamin B12 level trending downward without supplement  Will restart supplement and continue with periodic laboratory monitoring  CBC with diff, CMP, and TFTs ordered for monitoring  All medications and routine orders were reviewed and updated as needed  Plan discussed with: Nurse    Chief Complaint     She is seen with female nursing staff for a follow-up visit to update the care and treatment of her debility, hypertension, insulin-requiring type 2 diabetes mellitus, lower back pain/dextroscoliosis, and 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  History of Present Illness     She is a pleasant 80-year-old female who is seen with female nursing staff and bilingual staff for a follow-up visit to update the care and treatment of her debility secondary to her chronic medical conditions-still requiring 24/7 care/support of her ADLs, hypertension-doing well with amlodipine, insulin-requiring type 2 diabetes mellitus-doing well with CCD/Levemir/NovoLog, lower back pain/dextroscoliosis-currently without discomfort, and 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-she reports still feeling sad with current dose of duloxetine  She states that she constantly worries about her family  The following portions of the patient's history were reviewed and updated as appropriate: current medications, past family history, past medical history, past social history, past surgical history and problem list     Allergies: Allergies   Allergen Reactions    Penicillins     Valsartan        Review of Systems     Review of Systems   Respiratory: Negative for shortness of breath  Cardiovascular: Negative for chest pain and leg swelling  Gastrointestinal: Negative for abdominal pain  Musculoskeletal:        See HPI  Psychiatric/Behavioral:        See HPI  Medications and orders     All medications reviewed and updated in Nursing Home EMR        Objective     Vitals: Weight 129 7 lb (stable for the last 3 months), pulse 80, respiratory rate 12, blood pressure 112/64, fasting fingerstick blood sugar 158  Physical Exam   Constitutional: Vital signs are normal  She appears well-developed and well-nourished  She is cooperative  Non-toxic appearance  No distress  She appears comfortable sitting in her wheelchair, stated age, and healthy  Eyes: No scleral icterus  Cardiovascular: Normal rate, regular rhythm and normal heart sounds  Exam reveals no gallop and no friction rub  No murmur heard  Pulmonary/Chest: Effort normal and breath sounds normal  No stridor  No respiratory distress  She has no wheezes  She has no rales  Abdominal: Soft  She exhibits no distension and no mass  There is no tenderness  There is no guarding  Musculoskeletal: She exhibits no edema  Neurological: She is alert  Pertinent Laboratory/Diagnostic Studies: The following labs were reviewed please see chart or hospital paperwork for details  March 28, 2019:  Hemoglobin A1c 8 0 8  Vitamin-D 25 hydroxy level 18 August 1, 2019:  Vitamin B12 level -343      - Treatment plan reviewed with nursing      MINA Lou   8/16/2019 7:28 PM

## 2019-08-29 ENCOUNTER — NURSING HOME VISIT (OUTPATIENT)
Dept: GERIATRICS | Facility: OTHER | Age: 84
End: 2019-08-29
Payer: MEDICARE

## 2019-08-29 DIAGNOSIS — R53.81 DEBILITY: ICD-10-CM

## 2019-08-29 DIAGNOSIS — M65.342 TRIGGER RING FINGER OF LEFT HAND: Primary | ICD-10-CM

## 2019-08-29 PROCEDURE — 99307 SBSQ NF CARE SF MDM 10: CPT | Performed by: STUDENT IN AN ORGANIZED HEALTH CARE EDUCATION/TRAINING PROGRAM

## 2019-08-29 NOTE — ASSESSMENT & PLAN NOTE
Debility secondary to chronic comorbidities  Patient continues to require 24/7 assistance with ADLs to function at baseline    Manage chronic conditions  Maintain [place] precautions  Continue nutritional support, supportive care

## 2019-08-29 NOTE — PROGRESS NOTES
[address]  [phone]  [facility] Progress Note  Billing code:        NAME: Asia Gilman  AGE: 80 y o  SEX: female    DATE OF ENCOUNTER: 8/29/2019    Assessment and Plan     Problem List Items Addressed This Visit        Musculoskeletal and Integument    Trigger ring finger of left hand - Primary     Clinical exam revealed a left ring finger trigger finger  Consult placed for occupational therapy for placement of MCP splint for involved finger to hold finger 10-15 degrees of flexion leaving PIP and DIP joints free  Continue Tylenol as needed  Continue oxycodone p r n  For severe pain only  Will monitor over the next week and if symptoms persist or worsen, will place consult orthopedics for possible steroid injection to affected finger              Other    Debility     Debility secondary to chronic comorbidities  Patient continues to require 24/7 assistance with ADLs to function at baseline  Manage chronic conditions  Maintain [place] precautions  Continue nutritional support, supportive care                 Chief Complaint     "my finger hurts"    History of Present Illness     HPI  Requested by staff to review patient who was noted to have a painful left ring finger  Finger noted to be in persisted flexed position andis unable to extend it fully  She denies any recent trauma to the finger  Patient explains pain is 7/10 intensity, constant, worse with movement, relieved by analgesics as patient was previously on oxycodone  No numbness, tingling fever, chills, swelling, warmth, open wounds of left ring finger  Patient continues to have normal movement at wrist, elbow and shoulder as per her baseline    No other concerns expressed by patient or nursing staff    The following portions of the patient's history were reviewed and updated as appropriate: allergies, current medications, past family history, past medical history, past social history, past surgical history and problem list     Review of Systems     Review of Systems   Constitutional: Negative for chills and fever  Respiratory: Negative for cough and shortness of breath  Musculoskeletal: Positive for gait problem (Uses wheelchair)  Boom painful left ring finger   Psychiatric/Behavioral: The patient is not nervous/anxious  Active Problem List     Patient Active Problem List   Diagnosis    Dementia    Essential hypertension    Diabetes mellitus ([facility] [address] )    Chronic kidney disease, stage 3 (HCC)    Cholecystitis with cholelithiasis    Hypothyroid    Dysphagia    Eyelid cyst, left    Debility    Hyperlipidemia    Mood disorder due to a general medical condition    Vitamin D deficiency    Pain in lower back    Dextroscoliosis    Urinary retention    Constipation    Trigger ring finger of left hand       Objective     /67, temp 97 1°, HR 88, RR 18, O2 sat-94% in room air, BS 143mg/dl    Physical Exam   Constitutional:   Patient lying in bed in no obvious painful distress   Musculoskeletal: She exhibits deformity  Left ring finger noted to be in locked position, stiff, mild tenderness over palmar aspect of MCP joint and along the entire digit on palpation  No nodules in line of flexor digitorum superficialis  No erythema, swelling or warmth synonymous with a gout presentation     Decreased ROM of left ring finger secondary to stiffness, restriction and pain  Pulses palpable, sensation intact, no neurovascular compromise       Pertinent Laboratory/Diagnostic Studies:  Reviewed prior blood work  No diagnostic studies ordered    Current Medications   Medications were reviewed and updated in facility paper chart      Rocco Mcfarland MD  Geriatric Medicine  8/29/2019 10:46 AM

## 2019-08-29 NOTE — ASSESSMENT & PLAN NOTE
Clinical exam revealed a left ring finger trigger finger  Consult placed for occupational therapy for placement of MCP splint for involved finger to hold finger 10-15 degrees of flexion leaving PIP and DIP joints free  Continue Tylenol as needed  Continue oxycodone p r n   For severe pain only  Will monitor over the next week and if symptoms persist or worsen, will place consult orthopedics for possible steroid injection to affected finger

## 2019-09-05 ENCOUNTER — NURSING HOME VISIT (OUTPATIENT)
Dept: GERIATRICS | Facility: OTHER | Age: 84
End: 2019-09-05
Payer: MEDICARE

## 2019-09-05 DIAGNOSIS — E11.8 TYPE 2 DIABETES MELLITUS WITH COMPLICATION, WITH LONG-TERM CURRENT USE OF INSULIN (HCC): Primary | ICD-10-CM

## 2019-09-05 DIAGNOSIS — M65.342 TRIGGER RING FINGER OF LEFT HAND: ICD-10-CM

## 2019-09-05 DIAGNOSIS — R53.81 DEBILITY: ICD-10-CM

## 2019-09-05 DIAGNOSIS — Z79.4 TYPE 2 DIABETES MELLITUS WITH COMPLICATION, WITH LONG-TERM CURRENT USE OF INSULIN (HCC): Primary | ICD-10-CM

## 2019-09-05 DIAGNOSIS — I10 ESSENTIAL HYPERTENSION: ICD-10-CM

## 2019-09-05 PROCEDURE — 99308 SBSQ NF CARE LOW MDM 20: CPT | Performed by: STUDENT IN AN ORGANIZED HEALTH CARE EDUCATION/TRAINING PROGRAM

## 2019-09-05 NOTE — PROGRESS NOTES
[address]  [phone]  Jules Sams Progress Note  Billing code:        NAME: Latonya Moscoso  AGE: 80 y o  SEX: female    DATE OF ENCOUNTER: 9/5/2019    Assessment and Plan     Problem List Items Addressed This Visit        Endocrine    Diabetes mellitus ([address] ) - Primary     Lab Results   Component Value Date    HGBA1C 7 9 06/27/2018     Given recent multiple episodes of Accu-Chek level being less than 80 and patient explaining that she has been feeling unwell with occasional symptoms of hypoglycemia, discontinued NovoLog yesterday  Will also decrease Levemir to 3 units subcu HS  Recommend a diabetic, heart healthy diet  Monitor for signs of hypoglycemia  Given patient's age of 80 and prior HbA1c of 7 9, per a  guidelines, will avoid tight control of blood sugar due to risk of hypoglycemia and increasing consequence of mortality risk as result of this  Continue Accu-Cheks a c  HS  Will review blood sugars in 1 week              Cardiovascular and Mediastinum    Essential hypertension     Blood pressure remained stable on amlodipine 2 5 mg daily  Systolic blood pressure between low 105-136 mm Hg  Isolated reading of systolic blood pressure [id number]  Will continue to monitor  No focal neurological deficits            Musculoskeletal and Integument    Trigger ring finger of left hand     Patient explains that pain in left ring finger has been improving  Continue splint to left hand per occupational therapy  Will continue to monitor            Other    Alvira Hue secondary to multiple chronic medical conditions    Patient continues to require 24/7 assistance with ADLs in order to function at her usual baseline, at nursing facility  Manage chronic conditions  Maintain [place] precautions  Continue supportive care, nutritional support                   Chief Complaint     Asked by nursing staff to review blood sugars which have been noted to be low over the previous days    History of Present Illness     HPI  Patient seen and examined at bedside for follow-up of diabetic control  On review of Accu-Chek logs over the past 1 week, blood sugars have ranged between 57-233mg/dl  Patient apparently did have some symptoms of hypoglycemia and required correction with increased oral intake  Patient was previously on Levemir 6 units subcutaneously daily and NovoLog before meals  Patient does admit that she feels unwell and has been for the past few days  She explains that she feels tired, weak and did have hypoglycemic symptoms  The following portions of the patient's history were reviewed and updated as appropriate: allergies, current medications, past family history, past medical history, past social history, past surgical history and problem list     Review of Systems     Review of Systems   Constitutional: Positive for activity change (Secondary to feeling unwell), appetite change (Secondary to feeling a) and fatigue  Negative for chills and fever  HENT: Negative for congestion, ear pain, rhinorrhea and sore throat  Eyes: Negative for discharge  Respiratory: Negative for cough, chest tightness, shortness of breath, wheezing and stridor  Cardiovascular: Negative for chest pain, palpitations and leg swelling  Gastrointestinal: Negative for abdominal pain, constipation, diarrhea, nausea and vomiting  Genitourinary: Negative for dysuria, frequency and urgency  Skin: Negative for color change, pallor, rash and wound  Neurological: Positive for weakness  Negative for dizziness, syncope, light-headedness and headaches  Psychiatric/Behavioral: Positive for confusion (Occasionally at baseline)  Negative for agitation and behavioral problems         Active Problem List     Patient Active Problem List   Diagnosis    Dementia    Essential hypertension    Diabetes mellitus ([facility] )    Chronic kidney disease, stage 3 ([facility] )    Cholecystitis with cholelithiasis    Hypothyroid    Dysphagia    Eyelid cyst, left    Debility    Hyperlipidemia    Mood disorder due to a general medical condition    Vitamin D deficiency    Pain in lower back    Dextroscoliosis    Urinary retention    Constipation    Trigger ring finger of left hand       Objective     /84, temp 98 6°, RR 20, HR 76, O2 sat-97%    Physical Exam   Constitutional: She appears well-developed and well-nourished  No distress  HENT:   Head: Normocephalic and atraumatic  Right Ear: External ear normal    Left Ear: External ear normal    Nose: Nose normal    Mouth/Throat: Oropharynx is clear and moist    Eyes: Conjunctivae are normal  Right eye exhibits no discharge  Left eye exhibits no discharge  No scleral icterus  Neck: Normal range of motion  Neck supple  Cardiovascular: Normal rate, regular rhythm and intact distal pulses  Exam reveals no gallop and no friction rub  Murmur (ESM 2/6) heard  Pulmonary/Chest: Effort normal and breath sounds normal  No stridor  No respiratory distress  She has no wheezes  She has no rales  Abdominal: Soft  Bowel sounds are normal  She exhibits no distension  There is no tenderness  There is no guarding  Musculoskeletal: She exhibits no edema, tenderness or deformity  Left 4th digit trigger finger-improving    Neurological: She is alert  She has normal reflexes  No cranial nerve deficit  Oriented times self and place  Follows commands readily  Cooperative, calm  Moving all limbs   Skin: Skin is warm  No rash noted  She is not diaphoretic  No erythema  No pallor  Psychiatric: She has a normal mood and affect  Nursing note and vitals reviewed  Pertinent Laboratory/Diagnostic Studies:  Reviewed recent blood work  No new diagnostic studies ordered    Current Medications   Medications were reviewed and updated in facility paper chart      Alonso Barnard MD  Geriatric Medicine  9/5/2019 10:32 PM

## 2019-09-06 NOTE — ASSESSMENT & PLAN NOTE
Debility secondary to multiple chronic medical conditions    Patient continues to require 24/7 assistance with ADLs in order to function at her usual baseline, at nursing facility  Manage chronic conditions  Maintain [place] precautions  Continue supportive care, nutritional support

## 2019-09-06 NOTE — ASSESSMENT & PLAN NOTE
Lab Results   Component Value Date    HGBA1C 7 9 06/27/2018     Given recent multiple episodes of Accu-Chek level being less than 80 and patient explaining that she has been feeling unwell with occasional symptoms of hypoglycemia, discontinued NovoLog yesterday  Will also decrease Levemir to 3 units subcu HS  Recommend a diabetic, heart healthy diet  Monitor for signs of hypoglycemia  Given patient's age of 80 and prior HbA1c of 7 9, per a  guidelines, will avoid tight control of blood sugar due to risk of hypoglycemia and increasing consequence of mortality risk as result of this    Continue Accu-Cheks a c  HS  Will review blood sugars in 1 week

## 2019-09-06 NOTE — ASSESSMENT & PLAN NOTE
Blood pressure remained stable on amlodipine 2 5 mg daily  Systolic blood pressure between low 105-136 mm Hg    Isolated reading of systolic blood pressure [id number]  Will continue to monitor  No focal neurological deficits

## 2019-09-06 NOTE — ASSESSMENT & PLAN NOTE
Patient explains that pain in left ring finger has been improving  Continue splint to left hand per occupational therapy  Will continue to monitor

## 2019-10-12 ENCOUNTER — NURSING HOME VISIT (OUTPATIENT)
Dept: GERIATRICS | Facility: OTHER | Age: 84
End: 2019-10-12
Payer: MEDICARE

## 2019-10-12 DIAGNOSIS — I10 ESSENTIAL HYPERTENSION: ICD-10-CM

## 2019-10-12 DIAGNOSIS — E78.5 HYPERLIPIDEMIA, UNSPECIFIED HYPERLIPIDEMIA TYPE: ICD-10-CM

## 2019-10-12 DIAGNOSIS — E11.9 TYPE 2 DIABETES MELLITUS WITHOUT COMPLICATION, WITH LONG-TERM CURRENT USE OF INSULIN (HCC): Primary | ICD-10-CM

## 2019-10-12 DIAGNOSIS — F06.30 MOOD DISORDER DUE TO A GENERAL MEDICAL CONDITION: ICD-10-CM

## 2019-10-12 DIAGNOSIS — R53.81 DEBILITY: ICD-10-CM

## 2019-10-12 DIAGNOSIS — N18.30 CHRONIC KIDNEY DISEASE, STAGE 3 (HCC): ICD-10-CM

## 2019-10-12 DIAGNOSIS — Z79.4 TYPE 2 DIABETES MELLITUS WITHOUT COMPLICATION, WITH LONG-TERM CURRENT USE OF INSULIN (HCC): Primary | ICD-10-CM

## 2019-10-12 DIAGNOSIS — E03.9 HYPOTHYROIDISM, UNSPECIFIED TYPE: Chronic | ICD-10-CM

## 2019-10-12 PROBLEM — H02.826 EYELID CYST, LEFT: Status: RESOLVED | Noted: 2019-04-22 | Resolved: 2019-10-12

## 2019-10-12 PROCEDURE — 99309 SBSQ NF CARE MODERATE MDM 30: CPT | Performed by: INTERNAL MEDICINE

## 2019-10-13 NOTE — PROGRESS NOTES
[address]  [address], [address] [place] [address]  Facility: [facility] [place] [facility]  [place] [address]        NAME: Olman Sanders  AGE: 80 y o  SEX: female    DATE OF ENCOUNTER: 10/12/2019    Code status:  AND/DNR    Assessment and Plan     Problem List Items Addressed This Visit        Endocrine    Diabetes mellitus ([facility] [address] ) - Primary    Hypothyroid (Chronic)       Cardiovascular and Mediastinum    Essential hypertension       Nervous and Auditory    Mood disorder due to a general medical condition       Genitourinary    Chronic kidney disease, stage 3 ([address] )       Other    Debility    Hyperlipidemia      1  Insulin-requiring type 2 diabetes mellitus-hemoglobin A1c above target despite good fasting fingerstick blood sugar values  I reviewed her fingerstick blood sugar log  Will discontinue preprandial NovoLog, increase Levemir to 40 units nightly, and add metformin 500 mg daily at 5:00 p m  with supper  Will continue with fingerstick and laboratory monitoring  Will continue to monitor kidney function and check vitamin B12 level  Continue with monitoring  2  Hypothyroidism-she is doing well with levothyroxine  Continue with clinical and periodic laboratory monitoring  3  Hypertension-review of her BP log shows that she is doing well with amlodipine  Continue with monitoring  4  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-she is doing well with duloxetine  Continue with monitoring  5  Stage 3 chronic kidney disease-asymptomatic  Continue with periodic laboratory monitoring  6  Low back pain/dextroscoliosis-nursing reports that her symptoms are doing well with routine oxycodone/duloxetine/as needed Tylenol  Continue with monitoring  7  Vitamin-D deficiency-continue with supplement and periodic laboratory monitoring during the winter months  8  Vitamin B12 deficiency-continue with monitoring      9  Hyperlipidemia-doing well with rosuvastatin  Continue with periodic laboratory monitoring  8  Debility secondary to her chronic medical conditions-still requiring 24/7 care/support of her ADLs  Continue with care/support of her ADLs at long-term care facility level of care  Continue with monitoring  All medications and routine orders were reviewed and updated as needed  Plan discussed with: Nurse    Chief Complaint     She is seen with bilingual female nursing staff for a follow-up visit to update the care and treatment of her hypertension, insulin-requiring type 2 diabetes mellitus, hypothyroidism, and debility secondary to her chronic medical conditions  History of Present Illness     She is a an 75-year-old woman who is seen with bilingual female nursing staff for a follow-up visit to update the care and treatment of her hypertension-doing well with amlodipine, insulin-requiring type 2 diabetes mellitus-doing well with Levemir/NovoLog, hypothyroidism-doing well with levothyroxine, and debility secondary to her chronic medical conditions-still requiring 24/7 care/support of her ADLs  As discussed with nursing, she has occasional flare-ups of her back pain  She is doing well with her current regimen and as needed Tylenol by report  The following portions of the patient's history were reviewed and updated as appropriate: current medications, past medical history, past social history, past surgical history and problem list     Allergies: Allergies   Allergen Reactions    Penicillins     Valsartan        Review of Systems     Review of Systems   Constitutional: Positive for fatigue  Respiratory: Negative for shortness of breath  Cardiovascular: Negative for chest pain  Gastrointestinal: Negative for constipation  Medications and orders     All medications reviewed and updated in Nursing Home EMR        Objective     Vitals:  Weight 129 6 lb (stable for 4 months), pulse 68, respiratory rate 24, blood pressure 122/74  Physical Exam   Constitutional: Vital signs are normal  She appears well-developed and well-nourished  She is cooperative  Non-toxic appearance  She does not have a sickly appearance  She does not appear ill  No distress  She is awake and appears comfortable sitting in her wheelchair, stated age, and healthy  Eyes: No scleral icterus  Cardiovascular: Normal rate, regular rhythm and normal heart sounds  Exam reveals no gallop and no friction rub  No murmur heard  Pulmonary/Chest: Effort normal and breath sounds normal  No stridor  No respiratory distress  She has no wheezes  She has no rales  Abdominal: Soft  She exhibits no distension and no mass  There is no tenderness  There is no guarding  Musculoskeletal: She exhibits no edema  Neurological: She is alert  Vitals reviewed  Pertinent Laboratory/Diagnostic Studies: The following labs were reviewed please see chart or hospital paperwork for details  March 28, 2019:  Hemoglobin A1c 8 8%  Vitamin-D 25 hydroxy level 18  August 22, 2019:    CMP-sodium 140, potassium 3 6, BUN 15, creatinine 0 94, fasting blood sugar 145, EGFR 54, LFTs within normal limits except albumin 3 1  TSH 1 14  CBC with diff-WBC count 7, hemoglobin 12 7, hematocrit 37 6, platelet count [id number]  - Treatment plan reviewed with nursing staff      MINA Clayton   10/12/2019 11:24 PM

## 2019-12-08 ENCOUNTER — NURSING HOME VISIT (OUTPATIENT)
Dept: OTHER | Facility: HOSPITAL | Age: 84
End: 2019-12-08
Payer: MEDICARE

## 2019-12-08 DIAGNOSIS — E11.9 TYPE 2 DIABETES MELLITUS WITHOUT COMPLICATION, WITH LONG-TERM CURRENT USE OF INSULIN (HCC): Primary | ICD-10-CM

## 2019-12-08 DIAGNOSIS — M41.80 DEXTROSCOLIOSIS: ICD-10-CM

## 2019-12-08 DIAGNOSIS — Z79.4 TYPE 2 DIABETES MELLITUS WITHOUT COMPLICATION, WITH LONG-TERM CURRENT USE OF INSULIN (HCC): Primary | ICD-10-CM

## 2019-12-08 DIAGNOSIS — I10 ESSENTIAL HYPERTENSION: ICD-10-CM

## 2019-12-08 DIAGNOSIS — R53.81 DEBILITY: ICD-10-CM

## 2019-12-08 DIAGNOSIS — M54.50 CHRONIC BILATERAL LOW BACK PAIN, UNSPECIFIED WHETHER SCIATICA PRESENT: ICD-10-CM

## 2019-12-08 DIAGNOSIS — F43.21 ADJUSTMENT DISORDER WITH DEPRESSED MOOD: ICD-10-CM

## 2019-12-08 DIAGNOSIS — E03.9 HYPOTHYROIDISM, UNSPECIFIED TYPE: Chronic | ICD-10-CM

## 2019-12-08 DIAGNOSIS — N18.30 CHRONIC KIDNEY DISEASE, STAGE 3 (HCC): ICD-10-CM

## 2019-12-08 DIAGNOSIS — G89.29 CHRONIC BILATERAL LOW BACK PAIN, UNSPECIFIED WHETHER SCIATICA PRESENT: ICD-10-CM

## 2019-12-08 PROBLEM — F06.30 MOOD DISORDER DUE TO A GENERAL MEDICAL CONDITION: Status: RESOLVED | Noted: 2019-05-08 | Resolved: 2019-12-08

## 2019-12-08 PROBLEM — K80.10 CHOLECYSTITIS WITH CHOLELITHIASIS: Status: RESOLVED | Noted: 2018-01-21 | Resolved: 2019-12-08

## 2019-12-08 PROCEDURE — 99309 SBSQ NF CARE MODERATE MDM 30: CPT | Performed by: INTERNAL MEDICINE

## 2019-12-08 NOTE — PROGRESS NOTES
[address]  [address]  Facility: [facility]  [place] [id number]      NAME: Jennifer Drew  AGE: 80 y o  SEX: female    DATE OF ENCOUNTER: 12/8/2019    Code status:  AND/DNR    Assessment and Plan     Problem List Items Addressed This Visit        Endocrine    Diabetes mellitus ([address] ) - Primary (Chronic)    Hypothyroid (Chronic)       Cardiovascular and Mediastinum    Essential hypertension (Chronic)       Musculoskeletal and Integument    Dextroscoliosis       Genitourinary    Chronic kidney disease, stage 3 ([facility]) (Chronic)       Other    Adjustment disorder with depressed mood (Chronic)    Debility    Pain in lower back      1  Insulin-requiring type 2 diabetes mellitus-review of her fingerstick blood sugar log shows that she is doing well with Levemir and metformin  I recommend to continue with clinical and periodic laboratory monitoring  2  Debility secondary to her chronic medical conditions-she still requires 24/7 care/support of her ADLs  Continue with care/support of her ADLs at long-term care facility level of care  Continue with monitoring  3  Adjustment disorder with depressive symptoms-she is doing well with duloxetine  I do not recommend GDR of duloxetine secondary to concern of return of distressing symptoms  Continue with monitoring  4  Stage 3 chronic kidney disease-asymptomatic  Continue with periodic laboratory monitoring  5  Hypothyroidism-doing well with levothyroxine  I recommend to continue with clinical and periodic laboratory monitoring  6  Low back pain with dextroscoliosis-she is doing well with around the clock Tylenol, duloxetine, and as needed oxycodone  She has required only 4 doses of oxycodone over the last 7 days  Continue with monitoring  7  Vitamin-D deficiency-continue with supplement and periodic laboratory monitoring during the winter months      8  Vitamin B12 deficiency  9  Hyperlipidemia-doing well with rosuvastatin  Continue with clinical and periodic laboratory monitoring  10  Hypertension-doing well with amlodipine  Continue with monitoring  All medications and routine orders were reviewed and updated as needed  Plan discussed with: Nurse    Chief Complaint     She is seen with a bilingual nursing aide for a follow-up visit to update the care and treatment of her hypertension, adjustment disorder with depression, low back pain/dextroscoliosis, insulin-requiring type 2 diabetes mellitus, hypothyroidism, and debility secondary to her chronic medical conditions  History of Present Illness     She is an 80-year-old woman who is seen with a bilingual nursing aide for a follow-up visit to update the care and treatment of her hypertension-doing well with amlodipine, adjustment disorder with depressive symptoms-doing well with duloxetine, low back pain/dextroscoliosis-doing well with duloxetine/routine Tylenol/as needed oxycodone, insulin-requiring type 2 diabetes mellitus-doing well with Levemir/metformin, hypothyroidism-doing well with levothyroxine, and debility secondary to her chronic medical conditions-she still requires 24/7 care/support of her ADLs  She reports having some abdominal discomfort yesterday, but it was self limited she feels well today  She denies back pain  The following portions of the patient's history were reviewed and updated as appropriate: current medications, past family history, past medical history, past social history, past surgical history and problem list     Allergies: Allergies   Allergen Reactions    Penicillins     Valsartan        Review of Systems     Review of Systems   Unable to perform ROS: Dementia       Medications and orders     All medications reviewed and updated in Nursing Home EMR  Objective     Vitals:  Weight 127 3 lb (stable)    Monthly vitals-pulse 80, blood pressure 130/72, fasting fingerstick blood sugar 159  Physical Exam   Constitutional: Vital signs are normal  She appears well-developed and well-nourished  She is cooperative  Non-toxic appearance  She does not have a sickly appearance  She does not appear ill  No distress  She is awake and appears comfortable in her wheelchair, stated age, and healthy  Eyes: No scleral icterus  Cardiovascular: Normal rate, regular rhythm and normal heart sounds  Exam reveals no gallop and no friction rub  No murmur heard  Pulmonary/Chest: Effort normal and breath sounds normal  No stridor  No respiratory distress  She has no wheezes  She has no rales  Abdominal: Soft  She exhibits no distension and no mass  There is no tenderness  There is no guarding  Musculoskeletal: She exhibits no edema  Neurological: She is alert  Vitals reviewed  Pertinent Laboratory/Diagnostic Studies: The following labs were reviewed please see chart or hospital paperwork for details  March 28, 2019:    Vitamin-D 25 hydroxy level-18    Hemoglobin A1c- 8 8 percent    - Treatment plan reviewed with nursing staff      MINA Clark   12/8/2019 12:42 PM

## 2020-01-31 ENCOUNTER — NURSING HOME VISIT (OUTPATIENT)
Dept: GERIATRICS | Facility: OTHER | Age: 85
End: 2020-01-31
Payer: MEDICARE

## 2020-01-31 DIAGNOSIS — I10 ESSENTIAL HYPERTENSION: Chronic | ICD-10-CM

## 2020-01-31 DIAGNOSIS — E78.5 HYPERLIPIDEMIA, UNSPECIFIED HYPERLIPIDEMIA TYPE: ICD-10-CM

## 2020-01-31 DIAGNOSIS — Z79.4 TYPE 2 DIABETES MELLITUS WITHOUT COMPLICATION, WITH LONG-TERM CURRENT USE OF INSULIN (HCC): Primary | Chronic | ICD-10-CM

## 2020-01-31 DIAGNOSIS — R13.10 DYSPHAGIA, UNSPECIFIED TYPE: ICD-10-CM

## 2020-01-31 DIAGNOSIS — E11.9 TYPE 2 DIABETES MELLITUS WITHOUT COMPLICATION, WITH LONG-TERM CURRENT USE OF INSULIN (HCC): Primary | Chronic | ICD-10-CM

## 2020-01-31 DIAGNOSIS — E03.9 HYPOTHYROIDISM, UNSPECIFIED TYPE: Chronic | ICD-10-CM

## 2020-01-31 DIAGNOSIS — G89.29 CHRONIC BILATERAL LOW BACK PAIN, UNSPECIFIED WHETHER SCIATICA PRESENT: ICD-10-CM

## 2020-01-31 DIAGNOSIS — F43.21 ADJUSTMENT DISORDER WITH DEPRESSED MOOD: Chronic | ICD-10-CM

## 2020-01-31 DIAGNOSIS — K59.03 DRUG-INDUCED CONSTIPATION: ICD-10-CM

## 2020-01-31 DIAGNOSIS — N18.30 CHRONIC KIDNEY DISEASE, STAGE 3 (HCC): Chronic | ICD-10-CM

## 2020-01-31 DIAGNOSIS — M54.50 CHRONIC BILATERAL LOW BACK PAIN, UNSPECIFIED WHETHER SCIATICA PRESENT: ICD-10-CM

## 2020-01-31 PROCEDURE — 99309 SBSQ NF CARE MODERATE MDM 30: CPT | Performed by: NURSE PRACTITIONER

## 2020-02-01 NOTE — ASSESSMENT & PLAN NOTE
-last creatinine 1 10 and GFR 45 (10/2019)  -continue to renal dose medications and avoid nephrotoxic drugs  -continue periodic lab surveillance

## 2020-02-01 NOTE — ASSESSMENT & PLAN NOTE
-no recent episodes of exacerbation  -stable and doing well with low-dose oxycodone 2 5 mg every 4 hours as needed  -continue to encourage activity as tolerated  -continue monitoring

## 2020-02-01 NOTE — ASSESSMENT & PLAN NOTE
-stable and doing well with with rosuvastatin 5 mg daily  -no recent lipid panel to review since 01/2019; will order lipid panel with next lab draw in March 2020  -continue lifestyle modifications

## 2020-02-01 NOTE — ASSESSMENT & PLAN NOTE
-last hemoglobin A1c 8 8% in March 2019; ordered to repeat lab work in March 2020 for continued periodic surveillance  -reviewed blood glucose logs; fasting readings for the past month range from 162-262 (more readings in the 200s)-->will increase Levemir from 4 units to 6 units at HS  -continue metformin 500 mg every evening  -continue carb controlled diet  -continue to monitor for any signs or symptoms of hyper/hypoglycemia  -continue to monitor

## 2020-02-01 NOTE — ASSESSMENT & PLAN NOTE
-occasional episodes of sadness in relation to her grandson not visiting  -continue active listening and support when needed  -continue to encourage activity and socialization with other residents  -continue conservative measures and medication management with duloxetine 40 mg  -continue to monitor for any change in mood or behavior

## 2020-02-01 NOTE — ASSESSMENT & PLAN NOTE
-continue mechanical soft diet and aspiration precautions  -consult speech language pathology as needed  -continue monitoring

## 2020-02-01 NOTE — ASSESSMENT & PLAN NOTE
-stable and doing well with levothyroxine 70 mcg daily  -last TSH 1 14 in August 2019  -continue periodic lab surveillance

## 2020-02-19 ENCOUNTER — NURSING HOME VISIT (OUTPATIENT)
Dept: GERIATRICS | Facility: OTHER | Age: 85
End: 2020-02-19
Payer: MEDICARE

## 2020-02-19 DIAGNOSIS — J06.9 URTI (ACUTE UPPER RESPIRATORY INFECTION): Primary | ICD-10-CM

## 2020-02-19 DIAGNOSIS — R53.81 DEBILITY: ICD-10-CM

## 2020-02-19 PROCEDURE — 99307 SBSQ NF CARE SF MDM 10: CPT | Performed by: STUDENT IN AN ORGANIZED HEALTH CARE EDUCATION/TRAINING PROGRAM

## 2020-02-23 PROBLEM — J06.9 URTI (ACUTE UPPER RESPIRATORY INFECTION): Status: ACTIVE | Noted: 2020-02-23

## 2020-02-23 NOTE — ASSESSMENT & PLAN NOTE
Patient currently in no obvious cardiorespiratory distress  Maintain an oxygen saturations well in room air  Clinical exam of lungs reveal wheezing with minimal decrease in air entry  Will start albuterol nebs Q 8 H  Will start Mucinex 600 mg p o  B i d   Will start vitals Q shift x3 days  Will order a chest x-ray, two views, to rule out pneumonia  If patient becomes febrile, will order CBC, CMP  Will continue to monitor

## 2020-02-23 NOTE — ASSESSMENT & PLAN NOTE
Debility secondary to chronic comorbidities  Patient continues to require 24/7 assistance with ADLs during her long-term care  Manage chronic conditions  Maintain Falls precautions  Encourage patient remain active mentally, physically and socially  Will continue to monitor

## 2020-02-23 NOTE — PROGRESS NOTES
[address]  [phone]  Andres Been Progress Note  Billing code: 28        NAME: Kimberly Bui  AGE: 80 y o  SEX: female    DATE OF ENCOUNTER:  02/19/2020    Assessment and Plan     Problem List Items Addressed This Visit        Respiratory    URTI (acute upper respiratory infection) - Primary     Patient currently in no obvious cardiorespiratory distress  Maintain an oxygen saturations well in room air  Clinical exam of lungs reveal wheezing with minimal decrease in air entry  Will start albuterol nebs Q 8 H  Will start Mucinex 600 mg p o  B i d   Will start vitals Q shift x3 days  Will order a chest x-ray, two views, to rule out pneumonia  If patient becomes febrile, will order CBC, CMP  Will continue to monitor            Other    Debility     Debility secondary to chronic comorbidities  Patient continues to require 24/7 assistance with ADLs during her long-term care  Manage chronic conditions  Maintain [place] precautions  Encourage patient remain active mentally, physically and socially  Will continue to monitor                   - Counseling Documentation: patient was counseled regarding: instructions for management, risk factor reductions, prognosis, patient and family education, impressions, risks and benefits of treatment options and importance of compliance with treatment  Discussed with patient and nursing staff    Chief Complaint     Requested by nursing staff to review patient with cough and congestion    History of Present Illness     HPI    Requested by nursing staff to review patient who has been noted to have a cough, rhinorrhea and congestion for the past 2 days  Patient describes cough as productive of clear mucus and associated with some wheezing  She denies any fever, chills, nausea, vomiting, diarrhea, sore throat, body aches, rash or cardiorespiratory distress  She does admit to having a decreased appetite and weakness  Patient did receive her influenza vaccine per nursing home protocol last year  She continues to maintain her oxygen saturations well on room air without any work of breathing, lethargy or other distress  The following portions of the patient's history were reviewed and updated as appropriate: allergies, current medications, past family history, past medical history, past social history, past surgical history and problem list     Review of Systems     Review of Systems   Constitutional: Positive for activity change and fatigue  Negative for chills and fever  HENT: Positive for congestion and rhinorrhea  Negative for ear pain, sore throat and trouble swallowing  Eyes: Negative for discharge  Respiratory: Positive for cough and wheezing  Negative for chest tightness, shortness of breath and stridor  Cardiovascular: Negative for chest pain, palpitations and leg swelling  Gastrointestinal: Positive for constipation (Chronic)  Negative for abdominal pain, diarrhea, nausea and vomiting  Genitourinary: Negative for decreased urine volume, dysuria and hematuria  Musculoskeletal: Positive for arthralgias (Chronic), back pain (Chronic) and gait problem (Uses wheelchair)  Skin: Negative for color change, pallor, rash and wound  Neurological: Positive for weakness  Negative for dizziness, speech difficulty and light-headedness  Hematological: Does not bruise/bleed easily  Psychiatric/Behavioral: Negative for agitation, behavioral problems, confusion, hallucinations and suicidal ideas         Active Problem List     Patient Active Problem List   Diagnosis    Dementia ([facility] [address] )    Essential hypertension    Diabetes mellitus ([facility] [address] )    Chronic kidney disease, stage 3 ([facility] [address] [facility] )    Hypothyroid    Dysphagia    Debility    Hyperlipidemia    Vitamin D deficiency    Pain in lower back    Dextroscoliosis    Urinary retention    Constipation    Trigger ring finger of left hand    Adjustment disorder with depressed mood    URTI (acute upper respiratory infection)       Objective     Weight 124 6 lbs, /80, temp 98 2, RR 18, HR 76, O2 sat 94% in room air    Physical Exam   Constitutional: No distress  Elderly female sitting quietly in wheelchair in no obvious cardiorespiratory or painful distress   HENT:   Head: Normocephalic and atraumatic  Right Ear: External ear normal    Left Ear: External ear normal    Nose: Nose normal    Mouth/Throat: Oropharynx is clear and moist  No oropharyngeal exudate  Eyes: Conjunctivae are normal  Right eye exhibits no discharge  Left eye exhibits no discharge  No scleral icterus  Neck: Normal range of motion  Neck supple  No JVD present  Cardiovascular: Normal rate, regular rhythm and intact distal pulses  Exam reveals no gallop and no friction rub  Murmur (ESM 2/6) heard  Pulmonary/Chest: Effort normal  No stridor  No respiratory distress  She has wheezes  She has no rales  Air entry decreased bilaterally, harsh breath sounds throughout, expiratory wheezes   Abdominal: Soft  Bowel sounds are normal  She exhibits no distension and no mass  There is no tenderness  There is no rebound and no guarding  Musculoskeletal: She exhibits no edema, tenderness or deformity  Neurological: She is alert  She has normal reflexes  Oriented x2  Moving all limbs  Follows commands readily   Skin: Skin is warm  Capillary refill takes less than 2 seconds  No rash noted  She is not diaphoretic  No erythema  No pallor  Psychiatric: She has a normal mood and affect  Nursing note and vitals reviewed  Pertinent Laboratory/Diagnostic Studies:  Reviewed prior blood work  No new lab orders placed today  Will order chest x-ray to rule out pneumonia    Current Medications   Medications were reviewed and updated in facility paper chart      Bud Umanzor MD  Geriatric Medicine  2/23/2020 1:28 PM

## 2020-03-27 ENCOUNTER — NURSING HOME VISIT (OUTPATIENT)
Dept: GERIATRICS | Facility: OTHER | Age: 85
End: 2020-03-27
Payer: MEDICARE

## 2020-03-27 DIAGNOSIS — Z79.4 TYPE 2 DIABETES MELLITUS WITHOUT COMPLICATION, WITH LONG-TERM CURRENT USE OF INSULIN (HCC): Primary | Chronic | ICD-10-CM

## 2020-03-27 DIAGNOSIS — F03.90 DEMENTIA WITHOUT BEHAVIORAL DISTURBANCE, UNSPECIFIED DEMENTIA TYPE (HCC): ICD-10-CM

## 2020-03-27 DIAGNOSIS — E03.9 HYPOTHYROIDISM, UNSPECIFIED TYPE: Chronic | ICD-10-CM

## 2020-03-27 DIAGNOSIS — F43.21 ADJUSTMENT DISORDER WITH DEPRESSED MOOD: Chronic | ICD-10-CM

## 2020-03-27 DIAGNOSIS — G89.29 CHRONIC BILATERAL LOW BACK PAIN, UNSPECIFIED WHETHER SCIATICA PRESENT: ICD-10-CM

## 2020-03-27 DIAGNOSIS — E55.9 VITAMIN D DEFICIENCY: ICD-10-CM

## 2020-03-27 DIAGNOSIS — I10 ESSENTIAL HYPERTENSION: Chronic | ICD-10-CM

## 2020-03-27 DIAGNOSIS — M54.50 CHRONIC BILATERAL LOW BACK PAIN, UNSPECIFIED WHETHER SCIATICA PRESENT: ICD-10-CM

## 2020-03-27 DIAGNOSIS — N18.30 CHRONIC KIDNEY DISEASE, STAGE 3 (HCC): Chronic | ICD-10-CM

## 2020-03-27 DIAGNOSIS — E11.9 TYPE 2 DIABETES MELLITUS WITHOUT COMPLICATION, WITH LONG-TERM CURRENT USE OF INSULIN (HCC): Primary | Chronic | ICD-10-CM

## 2020-03-27 DIAGNOSIS — R13.10 DYSPHAGIA, UNSPECIFIED TYPE: ICD-10-CM

## 2020-03-27 DIAGNOSIS — E78.5 HYPERLIPIDEMIA, UNSPECIFIED HYPERLIPIDEMIA TYPE: ICD-10-CM

## 2020-03-27 DIAGNOSIS — K59.09 OTHER CONSTIPATION: ICD-10-CM

## 2020-03-27 PROCEDURE — 99309 SBSQ NF CARE MODERATE MDM 30: CPT | Performed by: STUDENT IN AN ORGANIZED HEALTH CARE EDUCATION/TRAINING PROGRAM

## 2020-03-28 PROBLEM — K59.09 OTHER CONSTIPATION: Status: ACTIVE | Noted: 2019-05-29

## 2020-03-28 NOTE — ASSESSMENT & PLAN NOTE
Less TSH done in 8/19 was 1 14  Continue levothyroxine 75 mcg daily  Will repeat TSH  Will continue to follow

## 2020-03-28 NOTE — ASSESSMENT & PLAN NOTE
Current lipid panel within normal limits  Patient currently on rosuvastatin 5 mg daily  Will repeat lipid panel  If levels within normal limits will discontinue statin therapy due to risks versus benefits and lag time versus benefits in this frail elderly patient  Will continue to follow

## 2020-03-28 NOTE — PROGRESS NOTES
[address]  [phone]  Fausto Findamy Progress Note  Billing code: 28        NAME: Mahamed Day  AGE: 80 y o  SEX: female    DATE OF ENCOUNTER: 3/27/2020    Assessment and Plan     Problem List Items Addressed This Visit        Digestive    Dysphagia     Continue mechanical soft diet  Continue speech therapy as needed  Will continue to follow            Endocrine    Diabetes mellitus ([facility] [address] ) - Primary (Chronic)       HbA1c 10 4 (increased from 8 8) on 03/18/2020  Patient recently had an increase in metformin to 850 mg p o   Daily  Continue Levemir 6 units subcu HS  Will plan to repeat HbA1c in June of 2020  Monitor for symptoms of hypoglycemia  Continue Accu-Chek monitoring  Recommend adherence to a diabetic, heart healthy diet  Will continue to follow         Hypothyroid (Chronic)     Less TSH done in 8/19 was 1 14  Continue levothyroxine 75 mcg daily  Will repeat TSH  Will continue to follow            Cardiovascular and Mediastinum    Essential hypertension (Chronic)     Blood pressure logs have remained stable in general  Continue amlodipine 2 5 mg daily  Continue aspirin 81 mg daily  Recommend adherence to a diabetic, heart healthy diet  Will continue to follow            Nervous and Auditory    D[facility] ([facility] )     Recommend reorientation and redirection as needed  Manage chronic conditions  Maintain [place] precautions  Encourage patient remain active mentally, physically and socially  Patient should participate in cognitively challenging exercises  Will continue to follow            Genitourinary    Chronic kidney disease, stage 3 (HCC) (Chronic)     Stable  Medications to be renally dosed  Avoid nephrotoxic agents  Will continue to monitor periodically only            Other    Adjustment disorder with depressed mood (Chronic)     Mood has remained stable  Encourage patient to remain active mentally, physically and socially  Continue duloxetine 40 mg p o  Daily  Patient denies any homicidal/suicidal ideation  Will continue to follow         Hyperlipidemia     Current lipid panel within normal limits  Patient currently on rosuvastatin 5 mg daily  Will repeat lipid panel  If levels within normal limits will discontinue statin therapy due to risks versus benefits and lag time versus benefits in this frail elderly patient  Will continue to follow         Vitamin D deficiency     Stable  Continue vitamin-D supplementation with 1000 International Units daily  Maintain [place] precautions  Will continue to follow         Pain in lower back     Stable  Continue Tylenol b i d /p r n  Oxycodone 2 5 mg p o  Q 4h p r n  For severe pain  Physical therapy as needed  Will continue to follow         Other constipation     Stable  Continue senna S1 tablet p o  HS  Continue nursing home bowel regimen as needed  Encourage increased fiber intake  Physical activity as able  Will continue to follow             - Counseling Documentation: patient was counseled regarding: diagnostic results, instructions for management, risk factor reductions, prognosis, patient and family education, impressions, risks and benefits of treatment options and importance of compliance with treatment    Chief Complaint     Patient seen for routine monthly long-term care nursing home rounds for follow-up of acute and chronic conditions  History of Present Illness     HPI  Patient seen and examined for routine monthly long-term care nursing home rounds for follow-up of acute and chronic conditions  The patient remains pleasant and cooperative during the entire interview and denies any acute concerns  Given patient's history of dementia, review of systems are unreliable  Per nursing staff, patient has had no recent complains  The patient continues to tolerate oral intake, has been sleeping well and having regular bowel movements    The patient continues to do well on levothyroxine for a history of hypothyroidism  She also continues on rosuvastatin for a history of hyperlipidemia  The patient has been compliant with amlodipine for a history of hypertension with blood pressures remaining controlled  The following portions of the patient's history were reviewed and updated as appropriate: allergies, current medications, past family history, past medical history, past social history, past surgical history and problem list     Review of Systems     Review of Systems   Unable to perform ROS: Dementia       Active Problem List     Patient Active Problem List   Diagnosis    Dementia ([facility] )    Essential hypertension    Diabetes mellitus ([facility] )    Chronic kidney disease, stage 3 ([facility] )    Hypothyroid    Dysphagia    Debility    Hyperlipidemia    Vitamin D deficiency    Pain in lower back    Dextroscoliosis    Urinary retention    Other constipation    Trigger ring finger of left hand    Adjustment disorder with depressed mood    URTI (acute upper respiratory infection)       Objective     /78, RR 18, HR 73, O2 sat 96, weight 121 8 lb, temp 98 1, blood sugar 132mg/dl    Physical Exam   Constitutional: No distress  Elderly frail female sitting comfortably in chair in no obvious cardiorespiratory or painful distress   HENT:   Head: Normocephalic and atraumatic  Right Ear: External ear normal    Left Ear: External ear normal    Nose: Nose normal    Mouth/Throat: Oropharynx is clear and moist    Eyes: Conjunctivae are normal  Right eye exhibits no discharge  Left eye exhibits no discharge  No scleral icterus  Neck: Normal range of motion  Neck supple  No JVD present  Cardiovascular: Normal rate, regular rhythm and intact distal pulses  Exam reveals no gallop and no friction rub  Murmur (ESM 2/6) heard  Pulmonary/Chest: Effort normal  No stridor  No respiratory distress  She has no wheezes  She has no rales  Air entry good bilaterally, harsh breath sounds auscultated   Abdominal: Soft  Bowel sounds are normal  She exhibits no distension  There is no tenderness  There is no guarding  Musculoskeletal: She exhibits no edema, tenderness or deformity  Neurological: She is alert  She has normal reflexes  Oriented to self only  Moving all limbs  Follows commands readily   Skin: Skin is warm  No rash noted  She is not diaphoretic  No erythema  No pallor  Psychiatric: She has a normal mood and affect  Nursing note and vitals reviewed  Pertinent Laboratory/Diagnostic Studies:  HbA1c (03/18/2020):  10 4  Lipid panel (10/22/2019): Total cholesterol 118, , HDL 41, LDL 45  BMP (10/22/2019):  Glucose 161, BUN 21, creatinine 1 8, Na 141, K 4, chloride 108, calcium 8 9, GFR 45    Current Medications   Medications were reviewed and updated in facility paper chart      Jeff Gaming MD  Geriatric Medicine  3/28/2020 7:03 PM

## 2020-03-28 NOTE — ASSESSMENT & PLAN NOTE
Stable  Medications to be renally dosed  Avoid nephrotoxic agents  Will continue to monitor periodically only

## 2020-03-28 NOTE — ASSESSMENT & PLAN NOTE
Mood has remained stable  Encourage patient to remain active mentally, physically and socially  Continue duloxetine 40 mg p o   Daily  Patient denies any homicidal/suicidal ideation  Will continue to follow

## 2020-03-28 NOTE — ASSESSMENT & PLAN NOTE
Stable  Continue vitamin-D supplementation with 1000 International Units daily  Maintain [place] precautions  Will continue to follow

## 2020-03-28 NOTE — ASSESSMENT & PLAN NOTE
Stable  Continue Tylenol b i d /p r n  Oxycodone 2 5 mg p o  Q 4h p r n   For severe pain  Physical therapy as needed  Will continue to follow

## 2020-03-28 NOTE — ASSESSMENT & PLAN NOTE
Blood pressure logs have remained stable in general  Continue amlodipine 2 5 mg daily  Continue aspirin 81 mg daily  Recommend adherence to a diabetic, heart healthy diet  Will continue to follow

## 2020-03-28 NOTE — ASSESSMENT & PLAN NOTE
Stable  Continue senna S1 tablet p o  HS  Continue nursing home bowel regimen as needed  Encourage increased fiber intake  Physical activity as able  Will continue to follow

## 2020-03-28 NOTE — ASSESSMENT & PLAN NOTE
HbA1c 10 4 (increased from 8 8) on 03/18/2020  Patient recently had an increase in metformin to 850 mg p o   Daily  Continue Levemir 6 units subcu HS  Will plan to repeat HbA1c in June of 2020  Monitor for symptoms of hypoglycemia  Continue Accu-Chek monitoring  Recommend adherence to a diabetic, heart healthy diet  Will continue to follow

## 2020-05-27 ENCOUNTER — NURSING HOME VISIT (OUTPATIENT)
Dept: GERIATRICS | Facility: OTHER | Age: 85
End: 2020-05-27
Payer: MEDICARE

## 2020-05-27 DIAGNOSIS — R53.81 DEBILITY: ICD-10-CM

## 2020-05-27 DIAGNOSIS — E11.9 TYPE 2 DIABETES MELLITUS WITHOUT COMPLICATION, WITH LONG-TERM CURRENT USE OF INSULIN (HCC): Chronic | ICD-10-CM

## 2020-05-27 DIAGNOSIS — N18.30 CHRONIC KIDNEY DISEASE, STAGE 3 (HCC): Chronic | ICD-10-CM

## 2020-05-27 DIAGNOSIS — G89.29 CHRONIC BILATERAL LOW BACK PAIN, UNSPECIFIED WHETHER SCIATICA PRESENT: ICD-10-CM

## 2020-05-27 DIAGNOSIS — M54.50 CHRONIC BILATERAL LOW BACK PAIN, UNSPECIFIED WHETHER SCIATICA PRESENT: ICD-10-CM

## 2020-05-27 DIAGNOSIS — F03.90 DEMENTIA WITHOUT BEHAVIORAL DISTURBANCE, UNSPECIFIED DEMENTIA TYPE (HCC): Primary | ICD-10-CM

## 2020-05-27 DIAGNOSIS — F43.21 ADJUSTMENT DISORDER WITH DEPRESSED MOOD: Chronic | ICD-10-CM

## 2020-05-27 DIAGNOSIS — I10 ESSENTIAL HYPERTENSION: Chronic | ICD-10-CM

## 2020-05-27 DIAGNOSIS — E03.9 HYPOTHYROIDISM, UNSPECIFIED TYPE: Chronic | ICD-10-CM

## 2020-05-27 DIAGNOSIS — E78.5 HYPERLIPIDEMIA, UNSPECIFIED HYPERLIPIDEMIA TYPE: ICD-10-CM

## 2020-05-27 DIAGNOSIS — K59.09 OTHER CONSTIPATION: ICD-10-CM

## 2020-05-27 DIAGNOSIS — Z79.4 TYPE 2 DIABETES MELLITUS WITHOUT COMPLICATION, WITH LONG-TERM CURRENT USE OF INSULIN (HCC): Chronic | ICD-10-CM

## 2020-05-27 PROCEDURE — 99309 SBSQ NF CARE MODERATE MDM 30: CPT | Performed by: NURSE PRACTITIONER

## 2020-07-14 ENCOUNTER — NURSING HOME VISIT (OUTPATIENT)
Dept: GERIATRICS | Facility: OTHER | Age: 85
End: 2020-07-14
Payer: MEDICARE

## 2020-07-14 DIAGNOSIS — S60.221A TRAUMATIC ECCHYMOSIS OF RIGHT HAND, INITIAL ENCOUNTER: Primary | ICD-10-CM

## 2020-07-14 PROCEDURE — 99309 SBSQ NF CARE MODERATE MDM 30: CPT | Performed by: NURSE PRACTITIONER

## 2020-07-14 NOTE — PROGRESS NOTES
[facility]  [address]  [phone]) [address]  Code 32      NAME: Brandon Garber  AGE: 80 y o  SEX: female    : 1931    Code Status: AND/DNR    DATE OF ENCOUNTER: 2020    Assessment and Plan     Problem List Items Addressed This Visit        Other    Traumatic ecchymosis of right hand - Primary     -unable to identify how injury occurred   -of note, resident combative at times during care   -ROM WNL   -no neurovascular compromise noted   -mildly edematous accompanied by erythema and ecchymosis of dorsal hand  -pain upon palpation   -ordered right hand xray for completeness--> results depicting no fracture of right hand; mild degenerative changes noted  -continue Tylenol/ice/elevation as needed  -continue to provide supportive environment  -continue monitoring  -discussed with nursing                No orders of the defined types were placed in this encounter  Chief Complaint     Bruised right hand     History of Present Illness     80-year-old female, resident of [facility], seen in collaboration with nursing to evaluate change in condition  Notified by nursing this AM of new ecchymosis noted to the backside of her right hand  Per nursing, resident is occasionally combative with staff during care; otherwise unknown as to how resident acquired her injury  No further concerns from nursing  Upon exam, resident resting comfortably in bed with no acute symptoms of distress or discomfort  Resident unable to provide history of what had happened  She does depict nonverbal s/sx of pain upon palpation, but is able to tolerate passive ROM without difficulty  Thorough ROS limited secondary to Dementia           The following portions of the patient's history were reviewed and updated as appropriate: allergies, current medications, past family history (no pertinent FH), past medical history and problem list     Review of Systems     ROS limited secondary to Dementia  Review of Systems   Respiratory: Negative for shortness of breath  Cardiovascular: Negative for chest pain  Musculoskeletal: Positive for arthralgias (right hand )  Skin: Positive for color change  Active Problem List     Patient Active Problem List   Diagnosis    [facility]    Essential hypertension    [facility]    Chronic [facility] disease, stage 3 [facility]    Hypothyroid    Dysphagia    Debility    Hyperlipidemia    Vitamin D deficiency    Pain in lower back    Dextroscoliosis    Urinary retention    Other constipation    Trigger ring finger of left hand    Adjustment disorder with depressed mood    URTI (acute upper respiratory infection)    Traumatic ecchymosis of right hand       Objective     VS: /68, HR 71, T 98 2, RR 18,     Physical Exam   Constitutional: No distress  Appearing frail and chronically ill    HENT:   Head: Normocephalic and atraumatic  Right Ear: External ear normal    Left Ear: External ear normal    Nose: Nose normal    Eyes: Conjunctivae are normal  Right eye exhibits no discharge  Left eye exhibits no discharge  Neck: Normal range of motion  Cardiovascular: Normal rate  Pulses:       Radial pulses are 2+ on the right side  Femoral pulses are 2+ on the right side  Pulmonary/Chest: Effort normal  No accessory muscle usage  No tachypnea  No respiratory distress  No cough or audible congestion noted    Abdominal: She exhibits no distension  There is no guarding  Musculoskeletal: Normal range of motion  She exhibits edema (mild; dorsal side of right hand) and tenderness (right hand )  She exhibits no deformity  Neurological: She is alert  Oriented to name only      Skin: Skin is warm and dry  Capillary refill takes less than 2 seconds  Ecchymosis (dorsal side of right hand; 11 5 cm x 8 5 cm) noted  No rash noted  She is not diaphoretic  There is erythema   No pallor  Psychiatric: She is not agitated, not aggressive, not hyperactive and not combative  Calm and cooperative   Nursing note and vitals reviewed  Pertinent Laboratory/Diagnostic Studies:  Reviewed results of imaging     Current Medications       Medications reviewed and updated, see facility [facility] for details         Kenya Alfred, [address]   [facility]  7/19/2020 7:54 PM

## 2020-07-19 PROBLEM — S60.221A TRAUMATIC ECCHYMOSIS OF RIGHT HAND: Status: ACTIVE | Noted: 2020-07-19

## 2020-07-19 NOTE — ASSESSMENT & PLAN NOTE
-unable to identify how injury occurred   -of note, resident combative at times during care   -ROM WNL   -no neurovascular compromise noted   -mildly edematous accompanied by erythema and ecchymosis of dorsal hand  -pain upon palpation   -ordered right hand xray for completeness--> results depicting no fracture of right hand; mild degenerative changes noted  -continue Tylenol/ice/elevation as needed  -continue to provide supportive environment  -continue monitoring  -discussed with nursing

## 2020-07-22 ENCOUNTER — NURSING HOME VISIT (OUTPATIENT)
Dept: GERIATRICS | Facility: OTHER | Age: 85
End: 2020-07-22
Payer: MEDICARE

## 2020-07-22 DIAGNOSIS — Z79.4 TYPE 2 DIABETES MELLITUS WITHOUT COMPLICATION, WITH LONG-TERM CURRENT USE OF INSULIN (HCC): Chronic | ICD-10-CM

## 2020-07-22 DIAGNOSIS — F43.21 ADJUSTMENT DISORDER WITH DEPRESSED MOOD: Chronic | ICD-10-CM

## 2020-07-22 DIAGNOSIS — E78.5 HYPERLIPIDEMIA, UNSPECIFIED HYPERLIPIDEMIA TYPE: ICD-10-CM

## 2020-07-22 DIAGNOSIS — I10 ESSENTIAL HYPERTENSION: Chronic | ICD-10-CM

## 2020-07-22 DIAGNOSIS — F03.90 DEMENTIA WITHOUT BEHAVIORAL DISTURBANCE, UNSPECIFIED DEMENTIA TYPE (HCC): Primary | ICD-10-CM

## 2020-07-22 DIAGNOSIS — M54.50 CHRONIC BILATERAL LOW BACK PAIN, UNSPECIFIED WHETHER SCIATICA PRESENT: ICD-10-CM

## 2020-07-22 DIAGNOSIS — K59.09 OTHER CONSTIPATION: ICD-10-CM

## 2020-07-22 DIAGNOSIS — G89.29 CHRONIC BILATERAL LOW BACK PAIN, UNSPECIFIED WHETHER SCIATICA PRESENT: ICD-10-CM

## 2020-07-22 DIAGNOSIS — E03.9 HYPOTHYROIDISM, UNSPECIFIED TYPE: Chronic | ICD-10-CM

## 2020-07-22 DIAGNOSIS — N18.30 CHRONIC KIDNEY DISEASE, STAGE 3 (HCC): Chronic | ICD-10-CM

## 2020-07-22 DIAGNOSIS — E11.9 TYPE 2 DIABETES MELLITUS WITHOUT COMPLICATION, WITH LONG-TERM CURRENT USE OF INSULIN (HCC): Chronic | ICD-10-CM

## 2020-07-22 DIAGNOSIS — R53.81 DEBILITY: ICD-10-CM

## 2020-07-22 PROCEDURE — 99309 SBSQ NF CARE MODERATE MDM 30: CPT | Performed by: STUDENT IN AN ORGANIZED HEALTH CARE EDUCATION/TRAINING PROGRAM

## 2020-07-23 NOTE — PROGRESS NOTES
[address]  [phone]  Tristan Jesus Progress Note  Billing code: 28        NAME: Mariposa Brown  AGE: 80 y o  SEX: female    DATE OF ENCOUNTER:  07/22/2020    Assessment and Plan     Problem List Items Addressed This Visit        Endocrine    Diabetes mellitus ([facility] [address] ) (Chronic)       HbA1c 10 4 done March 2020  Per nursing staff, patient has had decreased oral intake  Accu-Cheks notably low with episodes of hypoglycemia  Will decrease Levemir to 3 units subcu HS  Continue metformin 850 mg p o   Daily  Would avoid very tight control of diabetes in this frail elderly patient due to her increased risk of hypoglycemia  Will review patient follow-up with blood sugars  Recommend adherence to a diabetic, heart healthy diet  Will continue to follow         Hypothyroid (Chronic)     Stable  Continue levothyroxine 50 mcg daily  Will continue to monitor periodically  Will continue to follow            Cardiovascular and Mediastinum    Essential hypertension (Chronic)     Blood pressure logs remained stable  Continue aspirin 81 mg daily  Continue amlodipine 2 5 mg daily  Recommend adherence to a diabetic, heart healthy diet  Will continue to follow            Nervous and Auditory    Dementia ([facility] ) - Primary     Stable  Recommend reorientation and redirection as needed  Manage chronic conditions  Maintain [place] precautions  Encourage patient to remain active mentally, physically and socially  Recommend participating in cognitive activities if able  Maintain [place] precautions  Continue duloxetine 40 mg daily  Will continue to follow            Genitourinary    Chronic kidney disease, stage 3 (HCC) (Chronic)     Stable  Medications to be renally dosed  Avoid nephrotoxic agents  Will continue to follow            Other    Adjustment disorder with depressed mood (Chronic)     No acute changes in personality or behavior  Encourage patient remain active mentally, physically and socially  Continue duloxetine 40 mg daily  Continue nutritional support, supportive care  Will continue to follow         Omar Graham secondary to chronic comorbidities  Patient continues to require assistance and supervision with her ADLs during her long-term care  Manage chronic conditions  Maintain [place] precautions  Continue nutritional support, supportive care  Will continue to follow         Hyperlipidemia     Patient currently on rosuvastatin 5 mg daily  Recommend weaning the patient off statin therapy if lipid panel acceptable at neck is blood drawn  Recommend adherence to a diabetic, heart healthy diet  Will continue to follow         Pain in lower back     Controlled  Continue Tylenol as needed  Continue oxycodone 2 5 mg q 4h p r n  For moderate to severe pain  Maintain [place] precautions  PT as needed  Will continue to follow         Other constipation     Stable  Continue senna S, 1 tablet p o  HS  Encourage increased fiber intake  Physical activity as able  Continue nursing home bowel regimen as needed  Will continue to follow                 - Counseling Documentation: patient was counseled regarding: diagnostic results, instructions for management, risk factor reductions, prognosis, patient and family education, impressions, risks and benefits of treatment options and importance of compliance with treatment    Chief Complaint     Patient seen for routine monthly long-term care nursing home rounds for follow-up of acute and chronic conditions    History of Present Illness     HPI  Patient seen and examined this morning for routine monthly long-term care nursing home rounds for follow-up of acute and chronic conditions  Given patient's severity of dementia, review of systems are unreliable  Per nursing staff, patient remains stable and has had no cardiorespiratory distress, fever, chills, nausea, vomiting, lethargy or URI type symptoms    She continues to tolerate small amounts orally and has been sleeping well  During the interview, patient did become stat briefly as she explained her family has not been calling her regularly  Per nursing staff, family members do call her  The patient continues on levothyroxine for a history of hypothyroidism which remains stable  She has been compliant with senna S for a history of constipation with regular bowel movements on this regimen  She also continues on amlodipine for a history of hypertension with blood sugars remaining stable  No acute concerns expressed by patient or nursing staff today  The following portions of the patient's history were reviewed and updated as appropriate: allergies, current medications, past family history, past medical history, past social history, past surgical history and problem list     Review of Systems     Review of Systems   Unable to perform ROS: Dementia       Active Problem List     Patient Active Problem List   Diagnosis    Dementia ([facility] )    Essential hypertension    Diabetes mellitus ([facility] )    Chronic kidney disease, stage 3 ([facility] )    Hypothyroid    Dysphagia    Debility    Hyperlipidemia    Vitamin D deficiency    Pain in lower back    Dextroscoliosis    Urinary retention    Other constipation    Trigger ring finger of left hand    Adjustment disorder with depressed mood    URTI (acute upper respiratory infection)    Traumatic ecchymosis of right hand       Objective     /67, temp 98 3°, HR 68, RR 20, blood sugar 86mg/dl, weight 116 5 lbs    Physical Exam   Constitutional: No distress  Elderly frail female sitting comfortably in wheelchair in no obvious cardiorespiratory or painful distress   HENT:   Head: Normocephalic and atraumatic  Right Ear: External ear normal    Left Ear: External ear normal    Nose: Nose normal    Mouth/Throat: Oropharynx is clear and moist    Eyes: Conjunctivae are normal  Right eye exhibits no discharge  Left eye exhibits no discharge   No scleral icterus  Neck: Normal range of motion  Neck supple  No JVD present  Cardiovascular: Normal rate, regular rhythm and intact distal pulses  Exam reveals no gallop and no friction rub  Murmur (ESM 2/6) heard  Pulmonary/Chest: Effort normal and breath sounds normal  No stridor  No respiratory distress  She has no wheezes  She has no rales  Abdominal: Soft  Bowel sounds are normal  She exhibits no distension  There is no tenderness  Musculoskeletal: Normal range of motion  She exhibits no edema, tenderness or deformity  Neurological: She is alert  She has normal reflexes  Oriented to self only  Moving all limbs  Follows commands readily   Skin: Skin is warm  No rash noted  She is not diaphoretic  No erythema  No pallor  Psychiatric: She has a normal mood and affect  Pleasantly confused at baseline   Nursing note and vitals reviewed  Pertinent Laboratory/Diagnostic Studies:  Reviewed prior blood work  No new lab orders placed today    Current Medications   Medications were reviewed and updated in facility paper chart      Jeffrey Acharya MD  Geriatric Medicine  7/25/2020 7:43 AM

## 2020-07-25 NOTE — ASSESSMENT & PLAN NOTE
Stable  Continue levothyroxine 50 mcg daily  Will continue to monitor periodically  Will continue to follow

## 2020-07-25 NOTE — ASSESSMENT & PLAN NOTE
Stable  Continue senna S, 1 tablet p o  HS  Encourage increased fiber intake  Physical activity as able  Continue nursing home bowel regimen as needed  Will continue to follow

## 2020-07-25 NOTE — ASSESSMENT & PLAN NOTE
Patient currently on rosuvastatin 5 mg daily  Recommend weaning the patient off statin therapy if lipid panel acceptable at neck is blood drawn  Recommend adherence to a diabetic, heart healthy diet  Will continue to follow

## 2020-07-25 NOTE — ASSESSMENT & PLAN NOTE
Debility secondary to chronic comorbidities  Patient continues to require assistance and supervision with her ADLs during her long-term care  Manage chronic conditions  Maintain [place] precautions  Continue nutritional support, supportive care  Will continue to follow

## 2020-07-25 NOTE — ASSESSMENT & PLAN NOTE
Blood pressure logs remained stable  Continue aspirin 81 mg daily  Continue amlodipine 2 5 mg daily  Recommend adherence to a diabetic, heart healthy diet  Will continue to follow

## 2020-07-25 NOTE — ASSESSMENT & PLAN NOTE
No acute changes in personality or behavior  Encourage patient remain active mentally, physically and socially  Continue duloxetine 40 mg daily  Continue nutritional support, supportive care  Will continue to follow

## 2020-07-25 NOTE — ASSESSMENT & PLAN NOTE
Controlled  Continue Tylenol as needed  Continue oxycodone 2 5 mg q 4h p r n   For moderate to severe pain  Maintain [place] precautions  PT as needed  Will continue to follow

## 2020-07-25 NOTE — ASSESSMENT & PLAN NOTE
Stable  Recommend reorientation and redirection as needed  Manage chronic conditions  Maintain [place] precautions  Encourage patient to remain active mentally, physically and socially  Recommend participating in cognitive activities if able  Maintain [place] precautions  Continue duloxetine 40 mg daily  Will continue to follow

## 2020-07-25 NOTE — ASSESSMENT & PLAN NOTE
HbA1c 10 4 done March 2020  Per nursing staff, patient has had decreased oral intake  Accu-Cheks notably low with episodes of hypoglycemia  Will decrease Levemir to 3 units subcu HS  Continue metformin 850 mg p o   Daily  Would avoid very tight control of diabetes in this frail elderly patient due to her increased risk of hypoglycemia  Will review patient follow-up with blood sugars  Recommend adherence to a diabetic, heart healthy diet  Will continue to follow

## 2020-09-15 ENCOUNTER — NURSING HOME VISIT (OUTPATIENT)
Dept: GERIATRICS | Facility: OTHER | Age: 85
End: 2020-09-15
Payer: MEDICARE

## 2020-09-15 DIAGNOSIS — K59.09 OTHER CONSTIPATION: ICD-10-CM

## 2020-09-15 DIAGNOSIS — M54.50 CHRONIC BILATERAL LOW BACK PAIN, UNSPECIFIED WHETHER SCIATICA PRESENT: ICD-10-CM

## 2020-09-15 DIAGNOSIS — E03.9 HYPOTHYROIDISM, UNSPECIFIED TYPE: Chronic | ICD-10-CM

## 2020-09-15 DIAGNOSIS — Z79.4 TYPE 2 DIABETES MELLITUS WITHOUT COMPLICATION, WITH LONG-TERM CURRENT USE OF INSULIN (HCC): Chronic | ICD-10-CM

## 2020-09-15 DIAGNOSIS — F03.90 DEMENTIA WITHOUT BEHAVIORAL DISTURBANCE, UNSPECIFIED DEMENTIA TYPE (HCC): Primary | ICD-10-CM

## 2020-09-15 DIAGNOSIS — E78.5 HYPERLIPIDEMIA, UNSPECIFIED HYPERLIPIDEMIA TYPE: ICD-10-CM

## 2020-09-15 DIAGNOSIS — G89.29 CHRONIC BILATERAL LOW BACK PAIN, UNSPECIFIED WHETHER SCIATICA PRESENT: ICD-10-CM

## 2020-09-15 DIAGNOSIS — R53.81 DEBILITY: ICD-10-CM

## 2020-09-15 DIAGNOSIS — F43.21 ADJUSTMENT DISORDER WITH DEPRESSED MOOD: Chronic | ICD-10-CM

## 2020-09-15 DIAGNOSIS — N18.30 CHRONIC KIDNEY DISEASE, STAGE 3 (HCC): Chronic | ICD-10-CM

## 2020-09-15 DIAGNOSIS — E11.9 TYPE 2 DIABETES MELLITUS WITHOUT COMPLICATION, WITH LONG-TERM CURRENT USE OF INSULIN (HCC): Chronic | ICD-10-CM

## 2020-09-15 DIAGNOSIS — I10 ESSENTIAL HYPERTENSION: Chronic | ICD-10-CM

## 2020-09-15 PROCEDURE — 99309 SBSQ NF CARE MODERATE MDM 30: CPT | Performed by: NURSE PRACTITIONER

## 2020-09-15 NOTE — ASSESSMENT & PLAN NOTE
-stable with no acute change in mood or behavior   -nursing reports consistent decreased appetite, but tolerating Glucerna shakes BID and maintaining a consistent weight   -continue mechanical soft diet and aspiration precautions; c/s SLP as needed  -continue to encourage activity as tolerated while maintaining fall precautions; c/s PT/OT as needed   -continue redirection and reorientation as needed   -continue to encourage cognitive challenging exercises as tolerated   -continue to provide safe and supportive environment   -continue to monitor

## 2020-09-15 NOTE — ASSESSMENT & PLAN NOTE
-stable   -continue conservative measures and medication management including duloxetine   -continue to monitor for any change in mood or behavior

## 2020-09-15 NOTE — ASSESSMENT & PLAN NOTE
-BP logs reviewed and stable  -continue lifestyle and diet modifications   -continue medication management including amlodipine and aspirin   -continue to monitor

## 2020-09-15 NOTE — ASSESSMENT & PLAN NOTE
-stable   -last TSH 2 98 (9/10/20)  -continue levothyroxine 75 mcg daily except for Sundays  -continue periodic lab surveillance  -continue to monitor

## 2020-09-15 NOTE — ASSESSMENT & PLAN NOTE
-stable and doing well with rosuvastatin   -continue diet and lifestyle modifications   -last lipid panel 3/2020; continue periodic lab surveillance

## 2020-09-15 NOTE — ASSESSMENT & PLAN NOTE
-BG logs reviewed; fasting BG 180s-260s  -no recent s/sx of hypo/hyperglycemia per nursing   -will increase levemir from 3 to 5 units at HS and continue metformin 850 mg daily   -continue carb controlled diet and lifestyle modifications   -continue periodic lab surveillance; last hgb A1c 8 1% (6/2020)  -continue to monitor

## 2020-09-15 NOTE — PROGRESS NOTES
"Pharmacy Consult - Lovenox    Marielairma Ruiz has been consulted for pharmacy to dose enoxaparin for VTE prophylaxis per Dr. Shoemaker's request.       Relevant clinical data and objective history reviewed:  72 y.o. female 175.3 cm (69\") 87.5 kg (193 lb)    Home Anticoagulation: none    Past Medical History:   Diagnosis Date   • Chronic venous insufficiency    • Dupuytren's contracture    • History of staph infection     S/P KNEE DEC 2016   • History of transfusion    • Hyperlipidemia    • Nontoxic multinodular goiter    • Osteoporosis     Dr. Cabrera   • Postsurgical hypothyroidism    • Restless leg syndrome    • Seizure disorder (CMS/HCC)     Dr. Whitman, HX 1980 LAST SEIZURE   • Sleep apnea     DOES NOT HAVE MACHINE   • Vitamin D insufficiency      is allergic to clindamycin/lincomycin; sulfa antibiotics; duricef [cefadroxil]; and keflex [cephalexin].    Lab Results   Component Value Date    WBC 7.00 08/16/2019    HGB 12.9 08/16/2019    HCT 40.6 08/16/2019    MCV 92.1 08/16/2019     08/16/2019     Lab Results   Component Value Date    GLUCOSE 104 (H) 08/16/2019    CALCIUM 9.2 08/16/2019     08/16/2019    K 4.2 08/16/2019    CO2 24.4 08/16/2019     08/16/2019    BUN 14 08/16/2019    CREATININE 1.01 (H) 08/16/2019    EGFRIFAFRI 82 04/24/2019    EGFRIFNONA 54 (L) 08/16/2019    BCR 13.9 08/16/2019    ANIONGAP 13.6 08/16/2019       Estimated Creatinine Clearance: 59.4 mL/min (A) (by C-G formula based on SCr of 1.01 mg/dL (H)).      Assessment/Plan    Will start patient on 40 mg subcutaneous every 24 hours, adjusted for renal function.  Pharmacy will continue to follow.     Otf Keith PharmD, BCIDP, BCPS    " [address] [facility]  [address]  [phone] [address]  Code 32      NAME: Teressa Guzman  AGE: 80 y o   SEX: female    : 1931    Code Status: AND/DNR    DATE OF ENCOUNTER: 9/15/2020    Assessment and Plan     Problem List Items Addressed This Visit        Endocrine    Diabetes mellitus ([facility] ) (Chronic)     -BG logs reviewed; fasting BG 180s-260s  -no recent s/sx of hypo/hyperglycemia per nursing   -will increase levemir from 3 to 5 units at HS and continue metformin 850 mg daily   -continue carb controlled diet and lifestyle modifications   -continue periodic lab surveillance; last hgb A1c 8 1% (2020)  -continue to monitor           Hypothyroid (Chronic)     -stable   -last TSH 2 98 (9/10/20)  -continue levothyroxine 75 mcg daily except for Sundays  -continue periodic lab surveillance  -continue to monitor              Cardiovascular and Mediastinum    Essential hypertension (Chronic)     -BP logs reviewed and stable  -continue lifestyle and diet modifications   -continue medication management including amlodipine and aspirin   -continue to monitor             Nervous and Auditory    Dementia ([place] [address] ) - Primary     -stable with no acute change in mood or behavior   -nursing reports consistent decreased appetite, but tolerating Glucerna shakes BID and maintaining a consistent weight   -continue mechanical soft diet and aspiration precautions; c/s SLP as needed  -continue to encourage activity as tolerated while maintaining fall precautions; c/s PT/OT as needed   -continue redirection and reorientation as needed   -continue to encourage cognitive challenging exercises as tolerated   -continue to provide safe and supportive environment   -continue to monitor             Genitourinary    Chronic kidney disease, stage 3 (HCC) (Chronic)     -last creatinine 0 92/GFR 56 (2020)  -continue to renal dose medications and avoid nephrotoxic drugs   -continue periodic lab surveillance             Other    Adjustment disorder with depressed mood (Chronic)     -stable   -continue conservative measures and medication management including duloxetine   -continue to monitor for any change in mood or behavior          Debility     -secondary to her chronic medical conditions   -continue 24/7 support at [facility]TC[facility]           Hyperlipidemia     -stable and doing well with rosuvastatin   -continue diet and lifestyle modifications   -last lipid panel 3/2020; continue periodic lab surveillance          Pain in lower back     -controlled s/sx  -no nonverbal indicators of pain on exam   -continue to encourage activity as tolerated   -continue medication management including Tylenol and low dose oxycodone   -continue to monitor          Other constipation     -stable  -continue senna-s  -continue to encourage activity and adequate fluid and fiber intake                No orders of the defined types were placed in this encounter  Chief Complaint     Follow-up of chronic medical conditions  History of Present Illness     79 yo female, resident of [facility], seen in collaboration with nursing to evaluate her chronic medical conditions including but not limited to Dementia, HTN, DM II, CKD, adjustment disorder, chronic low back pain, hypothyroidism and constipation  No new concerns from nursing  Upon exam, resident sitting comfortably in her wheelchair with no s/sx of distress or discomfort  She appears clinically stable and at baseline  She denies acute pain, shortness of breath or abdominal pain  ROS limited secondary to Dementia  Resident calm and cooperative during visit         The following portions of the patient's history were reviewed and updated as appropriate: allergies, current medications, past family history (no pertinent FH), past medical history, past social history, past surgical history and problem list     Review of Systems     Review of Systems Unable to perform ROS: Dementia       Active Problem List     Patient Active Problem List   Diagnosis    Dementia ([facility] )    Essential hypertension    Diabetes mellitus ([facility] )    Chronic kidney disease, stage 3 ([facility] )    Hypothyroid    Dysphagia    Debility    Hyperlipidemia    Vitamin D deficiency    Pain in lower back    Dextroscoliosis    Urinary retention    Other constipation    Trigger ring finger of left hand    Adjustment disorder with depressed mood    URTI (acute upper respiratory infection)    Traumatic ecchymosis of right hand       Objective     VS: /66, HR 78, RR 18, T 98 2, , weight: 117 2 lbs (stable)    Physical Exam  Vitals signs and nursing note reviewed  Constitutional:       General: She is not in acute distress  Appearance: She is not ill-appearing, toxic-appearing or diaphoretic  Comments: Appearing frail and chronically ill    HENT:      Head: Normocephalic and atraumatic  Eyes:      General:         Right eye: No discharge  Left eye: No discharge  Conjunctiva/sclera: Conjunctivae normal    Cardiovascular:      Rate and Rhythm: Normal rate and regular rhythm  Pulses: Normal pulses  Heart sounds: Murmur present  Pulmonary:      Effort: Pulmonary effort is normal  No respiratory distress  Breath sounds: Normal breath sounds  No wheezing or rales  Abdominal:      General: Bowel sounds are normal  There is no distension  Palpations: Abdomen is soft  Tenderness: There is no abdominal tenderness  There is no guarding  Musculoskeletal:         General: No swelling or tenderness  Right lower leg: No edema  Left lower leg: No edema  Comments: Wheelchair bound    Skin:     General: Skin is warm and dry  Capillary Refill: Capillary refill takes less than 2 seconds  Coloration: Skin is not pale  Findings: No erythema or rash  Neurological:      Mental Status: She is alert   Mental status is at baseline  Motor: Weakness (2/2 to chronic medical conditions ) present  Gait: Gait abnormal (baseline )  Comments: Oriented to self only   Psychiatric:         Attention and Perception: Attention normal          Mood and Affect: Mood is not anxious or depressed  Behavior: Behavior is not agitated or aggressive  Behavior is cooperative  Pertinent Laboratory/Diagnostic Studies:  Reviewed   TSH 9/2020  hgb A1c, CBC, BMP 6/2020  Lipids 3/2020    Current Medications       Medications reviewed and updated, see facility [facility] for details         GINA Salguero   [facility]  9/15/2020 3:37 PM

## 2020-09-15 NOTE — ASSESSMENT & PLAN NOTE
-last creatinine 0 92/GFR 56 (6/2020)  -continue to renal dose medications and avoid nephrotoxic drugs   -continue periodic lab surveillance

## 2020-09-15 NOTE — ASSESSMENT & PLAN NOTE
-controlled s/sx  -no nonverbal indicators of pain on exam   -continue to encourage activity as tolerated   -continue medication management including Tylenol and low dose oxycodone   -continue to monitor

## 2020-11-13 ENCOUNTER — NURSING HOME VISIT (OUTPATIENT)
Dept: GERIATRICS | Facility: OTHER | Age: 85
End: 2020-11-13
Payer: MEDICARE

## 2020-11-13 DIAGNOSIS — F03.90 DEMENTIA WITHOUT BEHAVIORAL DISTURBANCE, UNSPECIFIED DEMENTIA TYPE (HCC): ICD-10-CM

## 2020-11-13 DIAGNOSIS — K59.03 DRUG-INDUCED CONSTIPATION: ICD-10-CM

## 2020-11-13 DIAGNOSIS — Z79.4 TYPE 2 DIABETES MELLITUS WITHOUT COMPLICATION, WITH LONG-TERM CURRENT USE OF INSULIN (HCC): Chronic | ICD-10-CM

## 2020-11-13 DIAGNOSIS — E11.9 TYPE 2 DIABETES MELLITUS WITHOUT COMPLICATION, WITH LONG-TERM CURRENT USE OF INSULIN (HCC): Chronic | ICD-10-CM

## 2020-11-13 DIAGNOSIS — R53.81 DEBILITY: Primary | ICD-10-CM

## 2020-11-13 DIAGNOSIS — E03.9 HYPOTHYROIDISM, UNSPECIFIED TYPE: Chronic | ICD-10-CM

## 2020-11-13 DIAGNOSIS — I10 HYPERTENSION, UNSPECIFIED TYPE: ICD-10-CM

## 2020-11-13 DIAGNOSIS — Z71.89 GOALS OF CARE, COUNSELING/DISCUSSION: ICD-10-CM

## 2020-11-13 DIAGNOSIS — N18.31 STAGE 3A CHRONIC KIDNEY DISEASE (HCC): Chronic | ICD-10-CM

## 2020-11-13 PROCEDURE — 99309 SBSQ NF CARE MODERATE MDM 30: CPT | Performed by: INTERNAL MEDICINE

## 2020-11-16 PROBLEM — J06.9 URTI (ACUTE UPPER RESPIRATORY INFECTION): Status: RESOLVED | Noted: 2020-02-23 | Resolved: 2020-11-16

## 2020-11-16 PROBLEM — Z71.89 GOALS OF CARE, COUNSELING/DISCUSSION: Status: ACTIVE | Noted: 2020-11-16

## 2020-11-16 RX ORDER — DULOXETINE 40 MG/1
1 CAPSULE, DELAYED RELEASE ORAL DAILY
COMMUNITY

## 2020-11-16 RX ORDER — OXYCODONE HYDROCHLORIDE 5 MG/1
2.5 CAPSULE ORAL DAILY
COMMUNITY
End: 2021-03-14 | Stop reason: ALTCHOICE

## 2020-11-16 RX ORDER — LEVOTHYROXINE SODIUM 0.07 MG/1
1 TABLET ORAL DAILY
COMMUNITY
End: 2021-11-04 | Stop reason: ALTCHOICE

## 2020-11-16 RX ORDER — OXYCODONE HYDROCHLORIDE 5 MG/1
2.5 CAPSULE ORAL EVERY 4 HOURS PRN
COMMUNITY
End: 2021-03-14 | Stop reason: ALTCHOICE

## 2020-11-16 RX ORDER — ACETAMINOPHEN 325 MG/1
2 TABLET ORAL 2 TIMES DAILY
COMMUNITY

## 2021-01-12 ENCOUNTER — NURSING HOME VISIT (OUTPATIENT)
Dept: GERIATRICS | Facility: OTHER | Age: 86
End: 2021-01-12
Payer: MEDICARE

## 2021-01-12 DIAGNOSIS — E03.9 HYPOTHYROIDISM, UNSPECIFIED TYPE: Chronic | ICD-10-CM

## 2021-01-12 DIAGNOSIS — E11.9 TYPE 2 DIABETES MELLITUS WITHOUT COMPLICATION, WITH LONG-TERM CURRENT USE OF INSULIN (HCC): Chronic | ICD-10-CM

## 2021-01-12 DIAGNOSIS — E78.5 HYPERLIPIDEMIA, UNSPECIFIED HYPERLIPIDEMIA TYPE: ICD-10-CM

## 2021-01-12 DIAGNOSIS — R53.81 DEBILITY: Primary | ICD-10-CM

## 2021-01-12 DIAGNOSIS — Z71.89 GOALS OF CARE, COUNSELING/DISCUSSION: ICD-10-CM

## 2021-01-12 DIAGNOSIS — F03.90 DEMENTIA WITHOUT BEHAVIORAL DISTURBANCE, UNSPECIFIED DEMENTIA TYPE (HCC): ICD-10-CM

## 2021-01-12 DIAGNOSIS — I10 HYPERTENSION, UNSPECIFIED TYPE: ICD-10-CM

## 2021-01-12 DIAGNOSIS — Z79.4 TYPE 2 DIABETES MELLITUS WITHOUT COMPLICATION, WITH LONG-TERM CURRENT USE OF INSULIN (HCC): Chronic | ICD-10-CM

## 2021-01-12 PROBLEM — S60.221A TRAUMATIC ECCHYMOSIS OF RIGHT HAND: Status: RESOLVED | Noted: 2020-07-19 | Resolved: 2021-01-12

## 2021-01-12 PROCEDURE — 99309 SBSQ NF CARE MODERATE MDM 30: CPT | Performed by: INTERNAL MEDICINE

## 2021-01-12 RX ORDER — SENNA AND DOCUSATE SODIUM 50; 8.6 MG/1; MG/1
1 TABLET, FILM COATED ORAL DAILY
COMMUNITY

## 2021-01-13 NOTE — ASSESSMENT & PLAN NOTE
· Review of her fingerstick blood sugar log shows that she is doing well with Levemir and metformin  · Her hemoglobin A1c is below her goal of 8 5%  · I will have her continue with her current medications  · Will continue with clinical and periodic laboratory monitoring for change in condition

## 2021-01-13 NOTE — PROGRESS NOTES
[address]  [address] [place] [address]  Facility: [facility] [place]  32    NAME: Ashley Humboldt  AGE: 80 y o  SEX: female    DATE OF ENCOUNTER: 2021    Code status:  DNR    Assessment and Plan     1  Debility  Assessment & Plan:  · Secondary to her chronic medical conditions  · She continues to require 24/7 care/support of her ADLs  · I recommend continued care/support of her ADLs as a long-term resident at the nursing facility  · Continue to monitor for change in condition      2  Dementia without behavioral disturbance, unspecified dementia type [facility])  Assessment & Plan:  · She is doing well with care/support of her ADLs at the nursing facility  · Continue to monitor for change in condition      3  Type 2 diabetes mellitus without complication, with long-term current use of insulin ([facility])  Assessment & Plan:    · Review of her fingerstick blood sugar log shows that she is doing well with Levemir and metformin  · Her hemoglobin A1c is below her goal of 8 5%  · I will have her continue with her current medications  · Will continue with clinical and periodic laboratory monitoring for change in condition      4  Hypertension, unspecified type  Assessment & Plan:  · Review of her BP log shows that she is doing well with amlodipine  · Will have her continue with the same medication  · Continue to monitor for change in condition      5  Hyperlipidemia, unspecified hyperlipidemia type  Assessment & Plan:  · She is doing well with rosuvastatin  · I will have her continue with the same medication  · Will continue with clinical and periodic laboratory monitoring for change in condition      6  Hypothyroidism, unspecified type  Assessment & Plan:  · She is doing well with levothyroxine  · I will have her continue with same medication  · Will continue with clinical and periodic laboratory monitoring for change in condition      7   Goals of care, counseling/discussion  Assessment & Plan:  · Her resuscitation status is do not resuscitate           All medications and routine orders were reviewed and updated as needed  Plan discussed with:  Nursing staff    Chief Complaint     She is seen for a follow-up visit to update the care and treatment of her debility secondary to her chronic medical conditions, hypertension, insulin-requiring type 2 diabetes mellitus, and hypothyroidism  History of Present Illness     She is an 70-year-old woman who is seen with a Mongolian-speaking nursing aide and female nursing staff for a follow-up visit to update the care and treatment of her debility secondary to her chronic medical conditions-she still requires 24/7 care/support of her ADLs, hypertension-doing well with amlodipine, insulin-requiring type 2 diabetes mellitus-doing well with Levemir/metformin, hypothyroidism-doing well with levothyroxine  She endorses feeling well  Nursing endorses having no concerns for me during the visit  The following portions of the patient's history were reviewed and updated as appropriate: current medications, past medical history, past social history, past surgical history and problem list     Allergies: Allergies   Allergen Reactions    Penicillins     Valsartan        Review of Systems     Review of Systems   Unable to perform ROS: Dementia       Medications and orders     All medications reviewed and updated in Nursing Home EMR  Objective     Vitals:  Weight 113 lb (stable) temperature 98 1° F, blood pressure 140/60  Physical Exam  Vitals signs reviewed  Exam conducted with a chaperone present  Constitutional:       General: She is awake  She is not in acute distress  Appearance: She is well-developed  She is not ill-appearing, toxic-appearing or diaphoretic  Comments: She appears comfortable sitting in her wheelchair in the hallway, stated age, and frail  Eyes:      General: No scleral icterus  Conjunctiva/sclera: Conjunctivae normal    Cardiovascular:      Rate and Rhythm: Normal rate and regular rhythm  Heart sounds: Normal heart sounds  No murmur  No friction rub  No gallop  Comments: There is no pretibial edema, bilaterally  Pulmonary:      Effort: Pulmonary effort is normal  No respiratory distress  Breath sounds: Normal breath sounds  No stridor  No wheezing, rhonchi or rales  Abdominal:      General: Abdomen is flat  There is no distension  Palpations: Abdomen is soft  There is no mass  Tenderness: There is no abdominal tenderness  There is no guarding or rebound  Neurological:      Mental Status: She is alert  Pertinent Laboratory/Diagnostic Studies: The following labs were reviewed please see chart or hospital paperwork for details  November 5, 2020:    TSH:  0 87    - Her monthly order summary was reviewed and signed  Treatment plan reviewed with nursing staff      MINA Scott   1/12/2021 11:44 PM

## 2021-01-13 NOTE — ASSESSMENT & PLAN NOTE
· Secondary to her chronic medical conditions  · She continues to require 24/7 care/support of her ADLs  · I recommend continued care/support of her ADLs as a long-term resident at the nursing facility  · Continue to monitor for change in condition

## 2021-01-13 NOTE — ASSESSMENT & PLAN NOTE
· She is doing well with care/support of her ADLs at the nursing facility  · Continue to monitor for change in condition

## 2021-01-13 NOTE — ASSESSMENT & PLAN NOTE
· She is doing well with rosuvastatin  · I will have her continue with the same medication  · Will continue with clinical and periodic laboratory monitoring for change in condition

## 2021-01-13 NOTE — ASSESSMENT & PLAN NOTE
· Review of her BP log shows that she is doing well with amlodipine  · Will have her continue with the same medication  · Continue to monitor for change in condition

## 2021-01-13 NOTE — ASSESSMENT & PLAN NOTE
· She is doing well with levothyroxine  · I will have her continue with same medication  · Will continue with clinical and periodic laboratory monitoring for change in condition

## 2021-01-23 RX ORDER — MAGNESIUM HYDROXIDE 2400 MG/30ML
30 SUSPENSION ORAL DAILY PRN
COMMUNITY

## 2021-03-12 ENCOUNTER — NURSING HOME VISIT (OUTPATIENT)
Dept: GERIATRICS | Facility: OTHER | Age: 86
End: 2021-03-12
Payer: MEDICARE

## 2021-03-12 DIAGNOSIS — E11.9 TYPE 2 DIABETES MELLITUS WITHOUT COMPLICATION, WITH LONG-TERM CURRENT USE OF INSULIN (HCC): Chronic | ICD-10-CM

## 2021-03-12 DIAGNOSIS — F03.91 DEMENTIA WITH BEHAVIORAL DISTURBANCE, UNSPECIFIED DEMENTIA TYPE (HCC): ICD-10-CM

## 2021-03-12 DIAGNOSIS — E03.9 HYPOTHYROIDISM, UNSPECIFIED TYPE: Chronic | ICD-10-CM

## 2021-03-12 DIAGNOSIS — Z79.4 TYPE 2 DIABETES MELLITUS WITHOUT COMPLICATION, WITH LONG-TERM CURRENT USE OF INSULIN (HCC): Chronic | ICD-10-CM

## 2021-03-12 DIAGNOSIS — R53.81 DEBILITY: Primary | ICD-10-CM

## 2021-03-12 DIAGNOSIS — I10 HYPERTENSION, UNSPECIFIED TYPE: ICD-10-CM

## 2021-03-12 DIAGNOSIS — R63.4 WEIGHT LOSS: ICD-10-CM

## 2021-03-12 DIAGNOSIS — Z71.89 GOALS OF CARE, COUNSELING/DISCUSSION: ICD-10-CM

## 2021-03-12 PROCEDURE — 99309 SBSQ NF CARE MODERATE MDM 30: CPT | Performed by: INTERNAL MEDICINE

## 2021-03-12 NOTE — PROGRESS NOTES
[address]  [address]  Facility: [facility]  32    NAME: Kierra Meza  AGE: 80 y o  SEX: female    DATE OF ENCOUNTER: 3/12/2021    Code status:  DNR    Assessment and Plan     1  Debility  Assessment & Plan:  · Secondary to her chronic medical conditions   · She continues to require 24/7 care / support of her ADLs   · I recommend continued care / support of her ADLs as a long-term resident at the nursing facility  · Will continue to monitor for change in condition      2  Hypertension, unspecified type  Assessment & Plan:  · She is doing well with amlodipine  · I will continue her on this medication  · Will continue with monitoring for change in condition      3  Dementia with behavioral disturbance, unspecified dementia type [facility])  Assessment & Plan:  · She continues to require 24/7 care/ support of her ADLs   · Will continue care/ support of her ADLs as a long-term resident at the nursing facility   · She is doing well with duloxetine  · Will continue monitoring for change in condition      4  Type 2 diabetes mellitus without complication, with long-term current use of insulin ([facility])  Assessment & Plan:    · Review of her fingerstick blood sugar log looks well with Levemir/metformin  · I will continue her on this medication regimen   · Will continue with clinical and periodic laboratory monitoring for change in condition      5  Hypothyroidism, unspecified type  Assessment & Plan:  · She is doing well with levothyroxine   · I will continue her on this medication regimen  · Will continue with clinical and periodic laboratory monitoring for change in condition      6   Weight loss  Assessment & Plan:  · Review of her weight log shows that she has been losing weight   · Nursing endorses that she is receiving dietary supplements   · When asked, she reports not being hungry   · Staff reports that she does not care for the food that is provided to her, but there is a new menu that dietary is initiating   · She does enjoy Latvian food from home by report   · Will check laboratory testings  · Question progression of her dementia  · Nursing staff contacted dietitian at my request for evaluation of the patient's condition  · Will continue to monitor for change in condition      7  Goals of care, counseling/discussion  Assessment & Plan:  ·  Her resuscitation status is "do not resuscitate "      Hemoglobin A1c, thyroid function panel, and CMP ordered for March 17, 2021 to assist in her care  All medications and routine orders were reviewed and updated as needed  Plan discussed with:  Nursing staff  Chief Complaint     She is seen with the assistance of nursing and a Latvian-speaking nursing aide for a follow-up visit to update the care and treatment of her debility secondary to her chronic medical conditions, type 2 diabetes mellitus, dementia with behavioral disturbance, and hypothyroidism  History of Present Illness     She is an 80-year-old woman who is seen with the assistance of nursing and a Latvian-speaking nursing aide for a follow-up visit to update the care and treatment of her debility secondary to her chronic medical conditions-she continues to require 24/7 care/ support of her ADLs, type 2 diabetes mellitus-doing well with Levemir / metformin, dementia with behavior disturbance-doing well care/ support at long-term care facility, and hypothyroidism-doing well with levothyroxine  When asked, she reports not being hungry  Nursing endorses that she drinks her supplement on a regular basis  She endorses sleeping well  She denies abdominal pain  The following portions of the patient's history were reviewed and updated as appropriate: current medications, past medical history, past social history, past surgical history and problem list     Allergies:   Allergies   Allergen Reactions    Penicillins     Valsartan Review of Systems     Review of Systems   Unable to perform ROS: Dementia       Medications and orders     All medications reviewed and updated in Nursing Home EMR  Objective     Vitals:    Monthly vitals:  Weight 108 6 lb, pulse 74, blood pressure 140/70, fasting fingerstick blood sugar 125  Temperature 98 3° F     Physical Exam  Vitals signs reviewed  Exam conducted with a chaperone present  Constitutional:       General: She is awake  She is not in acute distress  Appearance: She is well-developed  She is not ill-appearing or diaphoretic  Comments: She appears comfortable lying in bed, her stated age, and frail  HENT:      Head: Normocephalic  Cardiovascular:      Rate and Rhythm: Normal rate and regular rhythm  Heart sounds: Normal heart sounds  No murmur  No friction rub  No gallop  Comments: There is no pretibial edema, bilaterally  Pulmonary:      Effort: Pulmonary effort is normal  No respiratory distress  Breath sounds: Normal breath sounds  No stridor  No wheezing, rhonchi or rales  Abdominal:      General: Abdomen is flat  There is no distension  Palpations: Abdomen is soft  There is no mass  Tenderness: There is no abdominal tenderness  There is no guarding or rebound  Neurological:      Mental Status: She is alert  Psychiatric:         Mood and Affect: Mood normal          Behavior: Behavior is cooperative  Pertinent Laboratory/Diagnostic Studies: The following labs were reviewed please see chart or hospital paperwork for details  June 17, 2020:     BMP:  Sodium 143, potassium 4 1, BUN 24, creatinine 0 92, fasting blood sugar 93, EGFR 56     CBC with diff:  WBC count 7 1, hemoglobin 12 8, hematocrit 40 1, platelet count [id number]     Hemoglobin A1c:  8 1%     November 5, 2020:     TSH:  0 87   Free T3: 2 34    - Treatment plan reviewed with nursing staff  Monthly order summary reviewed and signed      MINA Adam   3/14/2021 5:49 PM

## 2021-03-14 PROBLEM — Z79.899 MEDICATION MANAGEMENT: Status: ACTIVE | Noted: 2021-03-14

## 2021-03-14 PROBLEM — R63.4 WEIGHT LOSS: Status: ACTIVE | Noted: 2021-03-14

## 2021-03-14 RX ORDER — OXYCODONE HYDROCHLORIDE 5 MG/1
0.5 TABLET ORAL DAILY
COMMUNITY
End: 2021-04-01 | Stop reason: SDUPTHER

## 2021-03-14 RX ORDER — OXYCODONE HYDROCHLORIDE 5 MG/1
1 TABLET ORAL EVERY 4 HOURS PRN
Status: ON HOLD | COMMUNITY
End: 2021-04-10 | Stop reason: SDUPTHER

## 2021-03-14 RX ORDER — ASPIRIN 81 MG/1
1 TABLET, CHEWABLE ORAL DAILY
Status: ON HOLD | COMMUNITY
End: 2021-04-08 | Stop reason: HOSPADM

## 2021-03-14 RX ORDER — AMLODIPINE BESYLATE 2.5 MG/1
1 TABLET ORAL DAILY
COMMUNITY

## 2021-03-14 NOTE — ASSESSMENT & PLAN NOTE
· Review of her weight log shows that she has been losing weight   · Nursing endorses that she is receiving dietary supplements   · When asked, she reports not being hungry   · Staff reports that she does not care for the food that is provided to her, but there is a new menu that dietary is initiating   · She does enjoy Sinhala food from home by report   · Will check laboratory testings  · Question progression of her dementia  · Nursing staff contacted dietitian at my request for evaluation of the patient's condition  · Will continue to monitor for change in condition

## 2021-03-14 NOTE — ASSESSMENT & PLAN NOTE
· She is doing well with amlodipine  · I will continue her on this medication  · Will continue with monitoring for change in condition

## 2021-03-14 NOTE — ASSESSMENT & PLAN NOTE
· Review of her fingerstick blood sugar log looks well with Levemir/metformin  · I will continue her on this medication regimen   · Will continue with clinical and periodic laboratory monitoring for change in condition

## 2021-03-14 NOTE — ASSESSMENT & PLAN NOTE
· She is doing well with levothyroxine   · I will continue her on this medication regimen  · Will continue with clinical and periodic laboratory monitoring for change in condition

## 2021-03-14 NOTE — ASSESSMENT & PLAN NOTE
· She continues to require 24/7 care/ support of her ADLs   · Will continue care/ support of her ADLs as a long-term resident at the nursing facility   · She is doing well with duloxetine  · Will continue monitoring for change in condition

## 2021-03-14 NOTE — ASSESSMENT & PLAN NOTE
· Secondary to her chronic medical conditions   · She continues to require 24/7 care / support of her ADLs   · I recommend continued care / support of her ADLs as a long-term resident at the nursing facility  · Will continue to monitor for change in condition

## 2021-03-31 ENCOUNTER — TELEPHONE (OUTPATIENT)
Dept: OTHER | Facility: OTHER | Age: 86
End: 2021-03-31

## 2021-04-01 ENCOUNTER — APPOINTMENT (EMERGENCY)
Dept: RADIOLOGY | Facility: HOSPITAL | Age: 86
End: 2021-04-01
Payer: MEDICARE

## 2021-04-01 ENCOUNTER — TELEPHONE (OUTPATIENT)
Dept: OTHER | Facility: OTHER | Age: 86
End: 2021-04-01

## 2021-04-01 ENCOUNTER — HOSPITAL ENCOUNTER (EMERGENCY)
Facility: HOSPITAL | Age: 86
Discharge: HOME | End: 2021-04-01
Attending: EMERGENCY MEDICINE | Admitting: EMERGENCY MEDICINE
Payer: MEDICARE

## 2021-04-01 VITALS
DIASTOLIC BLOOD PRESSURE: 57 MMHG | SYSTOLIC BLOOD PRESSURE: 99 MMHG | HEART RATE: 66 BPM | BODY MASS INDEX: 26.99 KG/M2 | RESPIRATION RATE: 16 BRPM | OXYGEN SATURATION: 94 % | HEIGHT: 56 IN | WEIGHT: 120 LBS | TEMPERATURE: 97.5 F

## 2021-04-01 DIAGNOSIS — S42.302A FRACTURE OF HUMERAL SHAFT, LEFT, CLOSED: Primary | ICD-10-CM

## 2021-04-01 DIAGNOSIS — S00.11XA PERIORBITAL ECCHYMOSIS OF RIGHT EYE, INITIAL ENCOUNTER: ICD-10-CM

## 2021-04-01 DIAGNOSIS — S01.81XA FACIAL LACERATION, INITIAL ENCOUNTER: ICD-10-CM
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
## 2021-04-01 PROCEDURE — NC001 PR NO CHARGE: Performed by: ORTHOPAEDIC SURGERY

## 2021-04-01 PROCEDURE — 73090 X-RAY EXAM OF FOREARM: CPT

## 2021-04-01 PROCEDURE — 99284 EMERGENCY DEPT VISIT MOD MDM: CPT

## 2021-04-01 PROCEDURE — 70450 CT HEAD/BRAIN W/O DYE: CPT

## 2021-04-01 PROCEDURE — 73100 X-RAY EXAM OF WRIST: CPT

## 2021-04-01 PROCEDURE — 99285 EMERGENCY DEPT VISIT HI MDM: CPT | Performed by: EMERGENCY MEDICINE

## 2021-04-01 PROCEDURE — 73560 X-RAY EXAM OF KNEE 1 OR 2: CPT

## 2021-04-01 PROCEDURE — 73060 X-RAY EXAM OF HUMERUS: CPT

## 2021-04-01 PROCEDURE — 70486 CT MAXILLOFACIAL W/O DYE: CPT

## 2021-04-01 PROCEDURE — 73070 X-RAY EXAM OF ELBOW: CPT

## 2021-04-01 PROCEDURE — 72125 CT NECK SPINE W/O DYE: CPT

## 2021-04-01 RX ORDER — OXYCODONE HYDROCHLORIDE 5 MG/1
2.5 TABLET ORAL DAILY
Qty: 11 TABLET | Refills: 0 | Status: SHIPPED | OUTPATIENT
Start: 2021-04-01 | End: 2021-04-10 | Stop reason: HOSPADM

## 2021-04-01 RX ORDER — HYDROMORPHONE HCL/PF 1 MG/ML
0.5 SYRINGE (ML) INJECTION ONCE
Status: COMPLETED | OUTPATIENT
Start: 2021-04-01 | End: 2021-04-01

## 2021-04-01 RX ORDER — OXYCODONE HYDROCHLORIDE 5 MG/1
5 TABLET ORAL EVERY 4 HOURS PRN
Qty: 11 TABLET | Refills: 0 | Status: SHIPPED | OUTPATIENT
Start: 2021-04-01 | End: 2021-04-01 | Stop reason: CLARIF

## 2021-04-01 RX ADMIN — HYDROMORPHONE HYDROCHLORIDE 0.5 MG: 1 INJECTION, SOLUTION INTRAMUSCULAR; INTRAVENOUS; SUBCUTANEOUS at 04:23

## 2021-04-01 RX ADMIN — HYDROMORPHONE HYDROCHLORIDE 0.5 MG: 1 INJECTION, SOLUTION INTRAMUSCULAR; INTRAVENOUS; SUBCUTANEOUS at 04:09

## 2021-04-01 NOTE — TELEPHONE ENCOUNTER
[phone]/[id number] from [facility]/Pt is Trena gibson 31/Pt fell earlier on left arm  X-ray was taken  Pt is in lots of pain  Call back needed

## 2021-04-01 NOTE — ED NOTES
Transport team arrived for patient at this time, given belongings and paperwork with discharge instructions  Per night shift RNs, they were unable to reach the facility for report back        Dao Pedraza RN  04/01/21 2950

## 2021-04-01 NOTE — DISCHARGE INSTRUCTIONS
Discharge Instructions - Orthopedics  Armond Bee 80 y o  female MRN: [id number]  Unit/Bed#: Cat Scan    Weight Bearing Status:                                           Non weight bearing left upper extremity, please allow arm to hang to allow weight of the arm to help keep the fracture reduced  Pain:  Continue analgesics as directed    Dressing Instructions:   Please keep clean, dry and intact until follow up     Appt Instructions: If you do not have your appointment, please call the clinic at [phone]   Otherwise followup as scheduled     Contact the office sooner if you experience any increased numbness/tingling in the extremities

## 2021-04-01 NOTE — ED ATTENDING ATTESTATION
4/1/2021  I, Audrey Dandy, MD, saw and evaluated the patient  I have discussed the patient with the resident/non-physician practitioner and agree with the resident's/non-physician practitioner's findings, Plan of Care, and MDM as documented in the resident's/non-physician practitioner's note, except where noted  All available labs and Radiology studies were reviewed  I was present for key portions of any procedure(s) performed by the resident/non-physician practitioner and I was immediately available to provide assistance  At this point I agree with the current assessment done in the Emergency Department  I have conducted an independent evaluation of this patient a history and physical is as follows:    ED Course      Emergency Department Note- Oli Shukla 80 y o  female MRN: [id number]    Unit/Bed#: ED 21 Encounter: [id number]    Oli Shukla is a 80 y o  female who presents with   Chief Complaint   Patient presents with    Fall     unwitnessed fall from bed at nursing home          History of Present Illness   HPI:  Oli Shukla is a 80 y o  female who presents for evaluation of:  Fall with right facial trauma and left upper extremity pain  The patient is on aspirin but does not take any anticoagulants  The patient has dementia and is unable to provide further history  Review of Systems   Unable to perform ROS: Dementia   Constitutional: Negative for chills and fever  Gastrointestinal: Negative for nausea and vomiting         Historical Information   Past Medical History:   Diagnosis Date    Anemia     Cholangitis 5/5/2018    Cholecystitis     Cholecystitis 5/3/2018    Cholecystitis with cholelithiasis 1/21/2018    Cholecystostomy tube dysfunction 2/16/2018    Choledocholithiasis 5/5/2018    Cholelithiasis     Chronic kidney disease     Coronary artery disease     Dementia (HCC)     [facility]     Diabetes mellitus ([email or web address] )     Type 2 blood sugar 257 on admission    Dysphagia     Encephalopathy     GERD (gastroesophageal reflux disease)     H/O falling     Hyperlipidemia     Hypertension     Hypothyroidism     Hypothyroid    Macular degeneration     Mild protein-calorie malnutrition ([facility] ) 5/5/2018    Osteoarthritis     UTI (urinary tract infection)     UTI (urinary tract infection)     Vitamin D deficiency     Vitamin D deficiency      Past Surgical History:   Procedure Laterality Date    ERCP N/A 5/3/2018    Procedure: ENDOSCOPIC RETROGRADE CHOLANGIOPANCREATOGRAPHY (ERCP); Surgeon: Gayatri Jeronimo MD;  [facility];  Service: Gastroenterology    IR NEPHROSTOMY TUBE PLACEMENT      [place] ERCP DX COLLECTION SPECIMEN BRUSHING/WASHING N/A 8/16/2018    Procedure: ENDOSCOPIC RETROGRADE CHOLANGIOPANCREATOGRAPHY (ERCP); Surgeon: Gayatri Jeronimo MD;  [facility];   Service: Gastroenterology     Social History   Social History     Substance and Sexual Activity   Alcohol Use No     Social History     Substance and Sexual Activity   Drug Use No     Social History     Tobacco Use   Smoking Status Never Smoker   Smokeless Tobacco Never Used     Family History: non-contributory    Meds/Allergies   all medications and allergies reviewed  Allergies   Allergen Reactions    Penicillins     Valsartan        Objective   First Vitals:   Blood Pressure: (!) 172/109 (04/01/21 0218)  Pulse: 66 (04/01/21 0218)  Temperature: 97 5 °F (36 4 °C) (04/01/21 0218)  Temp Source: Temporal (04/01/21 0218)  Respirations: 16 (04/01/21 0218)  Height: 4' 8" (142 2 cm) (04/01/21 0218)  Weight - Scale: 54 4 kg (120 lb) (04/01/21 0218)  SpO2: 97 % (04/01/21 0218)    Current Vitals:   Blood Pressure: 99/57 (04/01/21 0600)  Pulse: 66 (04/01/21 0800)  Temperature: 97 5 °F (36 4 °C) (04/01/21 0218)  Temp Source: Temporal (04/01/21 0218)  Respirations: 16 (04/01/21 0800)  Height: 4' 8" (142 2 cm) (04/01/21 0218)  Weight - Scale: 54 4 kg (120 lb) (04/01/21 0218)  SpO2: 94 % (04/01/21 0800)    No intake or output data in the 24 hours ending 21 1904    Invasive Devices     None                 Physical Exam  Vitals signs and nursing note reviewed  Constitutional:       General: She is not in acute distress  HENT:      Head: No raccoon eyes or Shearer's sign  Nose: Nose normal       Mouth/Throat:      Mouth: Mucous membranes are moist       Pharynx: Oropharynx is clear  Eyes:      Conjunctiva/sclera: Conjunctivae normal       Pupils: Pupils are equal, round, and reactive to light  Cardiovascular:      Rate and Rhythm: Normal rate and regular rhythm  Pulses: Normal pulses  Pulmonary:      Effort: Pulmonary effort is normal  No respiratory distress  Breath sounds: Normal breath sounds  Abdominal:      General: Bowel sounds are normal       Palpations: Abdomen is soft  Musculoskeletal:         General: Tenderness (Left upper extremity) and deformity ( left upper extremity) present  Skin:     General: Skin is warm and dry  Capillary Refill: Capillary refill takes less than 2 seconds  Neurological:      General: No focal deficit present  Mental Status: She is alert  Mental status is at baseline  She is disoriented  Coordination: Coordination normal    Psychiatric:      Comments: Decision making capacity, thought content, and judgment are impaired secondary to dementia       3 cm laceration right face        Medical Decision Makin  Acute facial trauma status post fall:  Laceration repair; CT head and facial bones; CT scan C-spine  2  Left upper extremity pain:  X-ray rule out fracture     No results found for this or any previous visit (from the past 36 hour(s))  XR wrist 2 vw left   Final Result      No acute osseous abnormality  Workstation performed: [id number]         XR knee 1 or 2 vw left   Final Result      No acute osseous abnormality  Degenerative changes, most pronounced in the medial compartment        Partially visualized distal femoral ORIF   No complication of the visualized hardware  Workstation performed: [id number]         XR humerus left   Final Result      Status post casting and reduction for a previously seen left proximal humerus diaphyseal fracture no significant interval change in alignment  Workstation performed: [id number]         XR elbow 2 vw left   Final Result      No fracture or dislocation of the left elbow  Overlying cast obscures fine bony detail  Workstation performed: [id number]         XR humerus LEFT   Final Result      Impacted, angulated fracture of the proximal left humeral diaphysis  Workstation performed: [id number]         XR forearm 2 views LEFT   Final Result      No acute osseous abnormality  Workstation performed: [id number]         TRAUMA - CT head wo contrast   Final Result      No intracranial hemorrhage or calvarial fracture  Workstation performed: [id number]         TRAUMA - CT facial bones wo contrast   Final Result      No evidence of acute traumatic injury to the facial bones  Workstation performed: [id number]         CT spine cervical wo contrast   Final Result      No cervical spine fracture or traumatic malalignment  Workstation performed: [id number]               Portions of the record may have been created with voice recognition software  Occasional wrong word or "sound a like" substitutions may have occurred due to the inherent limitations of voice recognition software  Read the chart carefully and recognize, using context, where substitutions have occurred            Critical Care Time  Procedures

## 2021-04-01 NOTE — ED PROVIDER NOTES
Emergency Department Trauma Note  Maira Saini 80 y o  female MRN: [id number]  Unit/Bed#: ED 21/ED 21 Encounter: [id number]      Trauma Alert: Trauma Acuity: C  Model of Arrival: Mode of Arrival: BLS via    Trauma Team: Current Providers  Attending Provider: Adria Mcfarlane MD  Attending Provider: Melvin Scott DO  Resident: Xiomara Holland MD  Registered Nurse: Mulu Russ, RN  Registered Nurse: Kaye Tobar RN  Surgeon: Elliott Camara MD  Consultants: Orthopaedics: Dr Lorin Barbour      History of Present Illness     Chief Complaint:   Chief Complaint   Patient presents with    Fall     unwitnessed fall from bed at nursing home      HPI:  Maira Saini is a 80 y o  female who presents s/p fall from bed  Mechanism:Details of Incident: unwitnessed fall out of bed at nursing facility, found on floor by staff  Injury Date: 04/01/21 Injury Time: 200      81 yo F presents from [facility] trauma nursing home after fall out of bed with head strike, +aspirin  Patient has dementia and hx is limited secondary to this  EMS reports patient fell out of bed around 2200  Xray was done at nursing home that revealed fracture of left arm and she was brought to ED  Patient complaining of arm pain  She also has wound above right eyebrow and right periorbital ecchymosis  Per chart review patient is up to date on TDAP  Review of Systems   Unable to perform ROS: Dementia       Historical Information     Immunizations: There is no immunization history on file for this patient      Past Medical History:   Diagnosis Date    Anemia     Cholangitis 5/5/2018    Cholecystitis     Cholecystitis 5/3/2018    Cholecystitis with cholelithiasis 1/21/2018    Cholecystostomy tube dysfunction 2/16/2018    Choledocholithiasis 5/5/2018    Cholelithiasis     Chronic kidney disease     Coronary artery disease     Dementia (HCC)     Dementia [facility] )     Diabetes mellitus (HCC)     Type 2 blood sugar 257 on admission    Dysphagia     Encephalopathy     GERD (gastroesophageal reflux disease)     H/O falling     Hyperlipidemia     Hypertension     Hypothyroidism     Hypothyroid    Macular degeneration     Mild protein-calorie malnutrition ([facility] ) 5/5/2018    Osteoarthritis     UTI (urinary tract infection)     UTI (urinary tract infection)     Vitamin D deficiency     Vitamin D deficiency        Family History   Problem Relation Age of Onset    No Known Problems Mother     No Known Problems Father      Past Surgical History:   Procedure Laterality Date    ERCP N/A 5/3/2018    Procedure: ENDOSCOPIC RETROGRADE CHOLANGIOPANCREATOGRAPHY (ERCP); Surgeon: Himanshu Prajapati MD;  [facility];  Service: Gastroenterology    IR NEPHROSTOMY TUBE PLACEMENT      [place] ERCP DX COLLECTION SPECIMEN BRUSHING/WASHING N/A 8/16/2018    Procedure: ENDOSCOPIC RETROGRADE CHOLANGIOPANCREATOGRAPHY (ERCP); Surgeon: Himanshu Prajapati MD;  [facility]; Service: Gastroenterology     Social History     Tobacco Use    Smoking status: Never Smoker    Smokeless tobacco: Never Used   Substance Use Topics    Alcohol use: No    Drug use: No     E-Cigarette/Vaping     E-Cigarette/Vaping Substances       Family History: non-contributory    Meds/Allergies   Prior to Admission Medications   Prescriptions Last Dose Informant Patient Reported? Taking?    DULoxetine HCl 40 MG CPEP   Yes No   Sig: Take 1 capsule by mouth daily   SODIUM PHOSPHATES RE   Yes No   Sig: Insert into the rectum daily as needed   acetaminophen (TYLENOL) 325 mg tablet   Yes No   Sig: Take 2 tablets by mouth 2 (two) times a day   amLODIPine (NORVASC) 2 5 mg tablet   Yes No   Sig: Take 1 tablet by mouth daily   aspirin 81 mg chewable tablet   Yes No   Sig: Chew 1 tablet daily   bisacodyl (FLEET) 10 MG/30ML ENEM   Yes No   Sig: Insert 10 mg into the rectum once   cholecalciferol (VITAMIN D3) 1,000 units tablet   Yes No   Sig: Take 1 tablet by mouth daily   insulin detemir (LEVEMIR) 100 units/mL subcutaneous injection   Yes No   Sig: Inject 5 Units under the skin daily at bedtime   levothyroxine 75 mcg tablet   Yes No   Sig: Take 1 tablet by mouth daily   magnesium hydroxide (Milk of Magnesia) 400 mg/5 mL oral suspension   Yes No   Sig: Take 30 mL by mouth daily as needed for constipation   metFORMIN (GLUCOPHAGE) 850 mg tablet   Yes No   Sig: Take 1 tablet by mouth daily with dinner   oxyCODONE (ROXICODONE) 5 mg immediate release tablet   Yes No   Sig: Take 1 tablet by mouth every 4 (four) hours as needed   oxyCODONE (ROXICODONE) 5 mg immediate release tablet   Yes No   Sig: Take 0 5 tablets by mouth daily  At 5:00 p m    oxyCODONE (ROXICODONE) 5 mg immediate release tablet   No No   Sig: Take 0 5 tablets (2 5 mg total) by mouth daily for 10 days  At 5:00 p  m  Max Daily Amount: 2 5 mg   rosuvastatin (CRESTOR) 5 mg tablet   Yes No   Sig: Take 1 tablet by mouth daily   senna-docusate sodium (SENOKOT-S) 8 6-50 mg per tablet   Yes No   Sig: Take 1 tablet by mouth daily      Facility-Administered Medications: None       Allergies   Allergen Reactions    Penicillins     Valsartan        PHYSICAL EXAM    PE limited by: dementia    Objective   Vitals:   First set: Temperature: 97 5 °F (36 4 °C) (04/01/21 0218)  Pulse: 66 (04/01/21 0218)  Respirations: 16 (04/01/21 0218)  Blood Pressure: (!) 172/109 (04/01/21 0218)  SpO2: 97 % (04/01/21 0218)    Primary Survey:   (A) Airway: Intact  (B) Breathing: Symmetric breath sounds bilaterally  (C) Circulation: Pulses:   pedal  2/4 and radial  2/4  (D) Disabliity:  GCS Total:  14  (E) Expose:  Completed    Secondary Survey: (Click on Physical Exam tab above)  Physical Exam  Vitals signs and nursing note reviewed  Constitutional:       Appearance: She is well-developed  HENT:      Head: Normocephalic  Comments: 2 cm superficial laceration above right eyebrow  Right periorbital ecchymosis   No other craniofacial ecchymosis, crepitus, or deformity  No liu's sign  No raccoon eyes  No hemotympanum  Right Ear: External ear normal       Left Ear: External ear normal       Nose: Nose normal    Eyes:      Pupils: Pupils are equal, round, and reactive to light  Neck:      Trachea: No tracheal deviation  Comments: No midline cervical spine tenderness  Full active range of motion  Cardiovascular:      Rate and Rhythm: Normal rate and regular rhythm  Pulses:           Radial pulses are 2+ on the right side and 2+ on the left side  Posterior tibial pulses are 2+ on the right side and 2+ on the left side  Heart sounds: Normal heart sounds  No murmur  No friction rub  No gallop  Pulmonary:      Effort: Pulmonary effort is normal  No respiratory distress  Breath sounds: Normal breath sounds  No wheezing or rales  Chest:      Chest wall: No tenderness  Abdominal:      General: There is no distension  Palpations: Abdomen is soft  Tenderness: There is no abdominal tenderness  There is no right CVA tenderness, left CVA tenderness, guarding or rebound  Musculoskeletal: Normal range of motion  General: Tenderness (tender over left humerus) present  Comments: LUE: Difficulty cooperating with exam but able to wiggle fingers  No open wounds  Pelvis stable  RUE/RLE/LLE atraumatic and non-tender with normal ROM  Patient log-rolled  No cervico-thoraco-lumbar spinal tenderness, step-offs, deformities  Skin:     General: Skin is warm and dry  Capillary Refill: Capillary refill takes less than 2 seconds  Neurological:      General: No focal deficit present  Mental Status: She is alert  GCS: GCS eye subscore is 4  GCS verbal subscore is 4  GCS motor subscore is 6  Cervical spine cleared by clinical criteria?  Yes     Invasive Devices     None                 Lab Results:   Results Reviewed     None                 Imaging Studies:   Direct to CT: Yes  TRAUMA - CT head wo contrast Final Result by Johann Smith MD (04/01 1366)      No intracranial hemorrhage or calvarial fracture  Workstation performed: [id number]         TRAUMA - CT facial bones wo contrast   Final Result by Johann Smith MD (04/01 5978)      No evidence of acute traumatic injury to the facial bones  Workstation performed: [id number]         CT spine cervical wo contrast   Final Result by Johann Smith MD (04/01 9628)      No cervical spine fracture or traumatic malalignment  Workstation performed: [id number]         XR humerus LEFT    (Results Pending)   XR forearm 2 views LEFT    (Results Pending)   XR humerus left    (Results Pending)   XR wrist 2 vw left    (Results Pending)   XR knee 1 or 2 vw left    (Results Pending)   XR elbow 2 vw left    (Results Pending)         Procedures  Procedures         ED Course           MDM  Number of Diagnoses or Management Options  Facial laceration, initial encounter:   Fall, initial encounter:   Fracture of humeral shaft, left, closed:   Periorbital ecchymosis of right eye, initial encounter:   Diagnosis management comments: 79 yo F presents from as level C trauma nursing home after fall out of bed with head strike, +aspirin  Has signs of facial trauma and tenderness to LUE  Will obtain CT head/C-spine/facial bones and xrays of LUE to evaluate  Final assessment: CTs negative  Xrays reveal displaced humeral shaft fx  Ortho consulted, reduced fracture and placed in splint  Plan to follow-up with orthopedics point  Facial laceration repaired with steri-strips  Patient stable for discharge            Disposition    Final diagnoses:   Fracture of humeral shaft, left, closed   Fall, initial encounter   Facial laceration, initial encounter   Periorbital ecchymosis of right eye, initial encounter     Time reflects when diagnosis was documented in both MDM as applicable and the Disposition within this note     Time User Action Codes Description Comment    4/1/2021  4:32 AM MinorDianelys Add [S42 302A] Fracture of humeral shaft, left, closed     4/1/2021  4:32 AM MinorDianelys Add [W19  XXXA] Fall, initial encounter     4/1/2021  4:32 AM MinorRenzon Add [S01 81XA] Facial laceration, initial encounter     4/1/2021  4:32 AM MinorRenzon Add [S00 11XA] Periorbital ecchymosis of right eye, initial encounter       ED Disposition     ED Disposition Condition Date/Time Comment    Discharge Stable Thu Apr 1, 2021  5:11 AM Renetta Parmar discharge to home/self care  Follow-up Information     Follow up With Specialties Details Why Contact Info    Marybeth Millard MD Orthopedic Surgery Follow up in 1 week(s)  [facility] [address]          Current Discharge Medication List      CONTINUE these medications which have CHANGED    Details   !! oxyCODONE (ROXICODONE) 5 mg immediate release tablet Take 0 5 tablets (2 5 mg total) by mouth daily for 10 days  At 5:00 p  m  Max Daily Amount: 2 5 mg  Qty: 11 tablet, Refills: 0    Associated Diagnoses: Fracture of humeral shaft, left, closed       !! - Potential duplicate medications found  Please discuss with provider        CONTINUE these medications which have NOT CHANGED    Details   acetaminophen (TYLENOL) 325 mg tablet Take 2 tablets by mouth 2 (two) times a day      amLODIPine (NORVASC) 2 5 mg tablet Take 1 tablet by mouth daily      aspirin 81 mg chewable tablet Chew 1 tablet daily      bisacodyl (FLEET) 10 MG/30ML ENEM Insert 10 mg into the rectum once      cholecalciferol (VITAMIN D3) 1,000 units tablet Take 1 tablet by mouth daily      DULoxetine HCl 40 MG CPEP Take 1 capsule by mouth daily      insulin detemir (LEVEMIR) 100 units/mL subcutaneous injection Inject 5 Units under the skin daily at bedtime      levothyroxine 75 mcg tablet Take 1 tablet by mouth daily      magnesium hydroxide (Milk of Magnesia) 400 mg/5 mL oral suspension Take 30 mL by mouth daily as needed for constipation      metFORMIN (GLUCOPHAGE) 850 mg tablet Take 1 tablet by mouth daily with dinner      !! oxyCODONE (ROXICODONE) 5 mg immediate release tablet Take 1 tablet by mouth every 4 (four) hours as needed      rosuvastatin (CRESTOR) 5 mg tablet Take 1 tablet by mouth daily      senna-docusate sodium (SENOKOT-S) 8 6-50 mg per tablet Take 1 tablet by mouth daily      SODIUM PHOSPHATES RE Insert into the rectum daily as needed       !! - Potential duplicate medications found  Please discuss with provider  No discharge procedures on file      PDMP Review     None          ED Provider  Electronically Signed by         Geni Zuniga MD  04/01/21 Alvena Gowers Minor, MD  04/01/21 8783

## 2021-04-01 NOTE — CONSULTS
Orthopedics   Renetta Gold 80 y o  female MRN: [id number]  Unit/Bed#: Cat Scan      Chief Complaint:   left arm pain    HPI:   80 y o  right hand dominant female status post fall complaining of left arm pain  The patient is demented and lives in a nursing facility where she sustained a fall  She states that the pain is in her left arm and worse with movement and better with rest  She has no numbness or tingling of the left upper extremity  She has significant pmh of CKD, DM, and CAD  Review Of Systems:   · Skin: facial laceration  · Neuro: See HPI  · Musculoskeletal: See HPI  · 14 point review of systems negative except as stated above     Past Medical History:   Past Medical History:   Diagnosis Date    Anemia     Cholangitis 5/5/2018    Cholecystitis     Cholecystitis 5/3/2018    Cholecystitis with cholelithiasis 1/21/2018    Cholecystostomy tube dysfunction 2/16/2018    Choledocholithiasis 5/5/2018    Cholelithiasis     Chronic kidney disease     Coronary artery disease     [facility]     Dementia [facility]     Diabetes mellitus ([facility] )     Type 2 blood sugar 257 on admission    Dysphagia     Encephalopathy     GERD (gastroesophageal reflux disease)     H/O falling     Hyperlipidemia     Hypertension     Hypothyroidism     Hypothyroid    Macular degeneration     Mild protein-calorie malnutrition ([facility] ) 5/5/2018    Osteoarthritis     UTI (urinary tract infection)     UTI (urinary tract infection)     Vitamin D deficiency     Vitamin D deficiency        Past Surgical History:   Past Surgical History:   Procedure Laterality Date    ERCP N/A 5/3/2018    Procedure: ENDOSCOPIC RETROGRADE CHOLANGIOPANCREATOGRAPHY (ERCP); Surgeon: Jose Guadalupe Durand MD;  [facility];  Service: Gastroenterology    IR NEPHROSTOMY TUBE PLACEMENT      LA ERCP DX COLLECTION SPECIMEN BRUSHING/WASHING N/A 8/16/2018    Procedure: ENDOSCOPIC RETROGRADE CHOLANGIOPANCREATOGRAPHY (ERCP);   Surgeon: Jose Guadalupe Durand MD; Location:  GI LAB; Service: Gastroenterology       Family History:  Family history reviewed and non-contributory  Family History   Problem Relation Age of Onset    No Known Problems Mother     No Known Problems Father        Social History:  Social History     Socioeconomic History    Marital status:      Spouse name: None    Number of children: None    Years of education: None    Highest education level: None   Occupational History    None   Social Needs    Financial resource strain: None    Food insecurity     Worry: None     Inability: None    Transportation needs     Medical: None     Non-medical: None   Tobacco Use    Smoking status: Never Smoker    Smokeless tobacco: Never Used   Substance and Sexual Activity    Alcohol use: No    Drug use: No    Sexual activity: None   Lifestyle    Physical activity     Days per week: None     Minutes per session: None    Stress: None   Relationships    Social connections     Talks on phone: None     Gets together: None     Attends Restorationist service: None     Active member of club or organization: None     Attends meetings of clubs or organizations: None     Relationship status: None    Intimate partner violence     Fear of current or ex partner: None     Emotionally abused: None     Physically abused: None     Forced sexual activity: None   Other Topics Concern    None   Social History Narrative    None       Allergies:    Allergies   Allergen Reactions    Penicillins     Valsartan            Labs:  0   Lab Value Date/Time    HCT 39 0 09/24/2018 0016    HCT 28 6 (L) 05/07/2018 0453    HCT 26 7 (L) 05/06/2018 0442    HCT 35 8 07/12/2014 0939    HGB 12 2 09/24/2018 0016    HGB 9 6 (L) 05/07/2018 0453    HGB 8 8 (L) 05/06/2018 0442    HGB 11 6 07/12/2014 0939    INR 1 30 (H) 05/03/2018 1451    WBC 6 48 09/24/2018 0016    WBC 11 80 (H) 05/07/2018 0453    WBC 23 08 (H) 05/06/2018 0442    WBC 5 29 07/12/2014 0939       Meds:  No current facility-administered medications for this encounter  Current Outpatient Medications:     acetaminophen (TYLENOL) 325 mg tablet, Take 2 tablets by mouth 2 (two) times a day, Disp: , Rfl:     amLODIPine (NORVASC) 2 5 mg tablet, Take 1 tablet by mouth daily, Disp: , Rfl:     aspirin 81 mg chewable tablet, Chew 1 tablet daily, Disp: , Rfl:     bisacodyl (FLEET) 10 MG/30ML ENEM, Insert 10 mg into the rectum once, Disp: , Rfl:     cholecalciferol (VITAMIN D3) 1,000 units tablet, Take 1 tablet by mouth daily, Disp: , Rfl:     DULoxetine HCl 40 MG CPEP, Take 1 capsule by mouth daily, Disp: , Rfl:     insulin detemir (LEVEMIR) 100 units/mL subcutaneous injection, Inject 5 Units under the skin daily at bedtime, Disp: , Rfl:     levothyroxine 75 mcg tablet, Take 1 tablet by mouth daily, Disp: , Rfl:     magnesium hydroxide (Milk of Magnesia) 400 mg/5 mL oral suspension, Take 30 mL by mouth daily as needed for constipation, Disp: , Rfl:     metFORMIN (GLUCOPHAGE) 850 mg tablet, Take 1 tablet by mouth daily with dinner, Disp: , Rfl:     oxyCODONE (ROXICODONE) 5 mg immediate release tablet, Take 1 tablet by mouth every 4 (four) hours as needed, Disp: , Rfl:     oxyCODONE (ROXICODONE) 5 mg immediate release tablet, Take 0 5 tablets by mouth daily  At 5:00 p m , Disp: , Rfl:     rosuvastatin (CRESTOR) 5 mg tablet, Take 1 tablet by mouth daily, Disp: , Rfl:     senna-docusate sodium (SENOKOT-S) 8 6-50 mg per tablet, Take 1 tablet by mouth daily, Disp: , Rfl:     SODIUM PHOSPHATES RE, Insert into the rectum daily as needed, Disp: , Rfl:     Blood Culture:   Lab Results   Component Value Date    BLOODCX No Growth After 5 Days  05/05/2018       Wound Culture:   No results found for: WOUNDCULT    Ins and Outs:  No intake/output data recorded            Physical Exam:   /64   Pulse 68   Temp 97 5 °F (36 4 °C) (Temporal)   Resp 16   Ht 4' 8" (1 422 m)   Wt 54 4 kg (120 lb)   SpO2 95%   BMI 26 90 kg/m² Gen: Alert and oriented to person only  HEENT: EOMI, eyes clear, moist mucus membranes, hearing intact  Respiratory: Bilateral chest rise  No audible wheezing found  Cardiovascular: Distal pulses intact  Abdomen: soft nontender/nondistended  Musculoskeletal: left upper extremity  · Skin intact  · Tender to palpation over humerus  · Sensation intact in 1st dorsal web space  intact thumb retropulsion   · Sensation intact to radial, ulna, median, musculocutaneous, and axillary nerve distributions  · Motor intact to  radial, ulna, median, musculocutaneous, and axillary nerve distributions  · 2+ radial pulse      Radiology:   I personally reviewed the films  X-rays left humerus shows displaced spiral proximal third humeral shaft fracture  Procedure:  Reduction and Coaptation splint  Once adequate analgesia was obtained, a gentle closed reduction maneuver was performed to bring the fracture out to length and proper alignment  Patient placed in well padded 3-inch coaptation splint  Patient tolerated the procedure well and was neurovascularly intact pre and post procedure  Post reduction orthogonal x rays showed appropriate reduction      _*_*_*_*_*_*_*_*_*_*_*_*_*_*_*_*_*_*_*_*_*_*_*_*_*_*_*_*_*_*_*_*_*_*_*_*_*_*_*_*_*    Assessment:  80 y o  female S/P fall with left humeral shaft fracture  Patient may be managed nonoperatively at this time with 1 week follow up for repeat x-rays    Plan:   · NWB left extremity in coap splint, allowing for gravity to let the arm hang  · Analgesics for pain  · Body mass index is 26 9 kg/m²     · F/u with orthopaedics in 1 week for alignment check  · Dispo: Krzysztof Lux for discharge from James Madrigal MD

## 2021-04-01 NOTE — TELEPHONE ENCOUNTER
Chary Arredondo from [facility] called in regards of pt  Pt rolled out of bed and landed on her sides  She had a small cut on her right eye above eyebrow  Pt also experiencing pain on left shoulder  Chary Arredondo would like to know if pt can get an x-ray done

## 2021-04-02 ENCOUNTER — TELEPHONE (OUTPATIENT)
Dept: OBGYN CLINIC | Facility: HOSPITAL | Age: 86
End: 2021-04-02

## 2021-04-02 NOTE — TELEPHONE ENCOUNTER
Lynn from [facility] in [place] called to schedule a follow up appointment with Dr Cas Cruz, e-mail sent to practice administrator

## 2021-04-04 DIAGNOSIS — M25.512 CHRONIC LEFT SHOULDER PAIN: Primary | ICD-10-CM

## 2021-04-04 DIAGNOSIS — G89.29 CHRONIC LEFT SHOULDER PAIN: Primary | ICD-10-CM

## 2021-04-06 ENCOUNTER — TELEPHONE (OUTPATIENT)
Dept: OBGYN CLINIC | Facility: MEDICAL CENTER | Age: 86
End: 2021-04-06

## 2021-04-06 NOTE — TELEPHONE ENCOUNTER
Patient sees Dr Sheffield Leventhal Crystal at [facility] confirming appointmen    Confirmed

## 2021-04-07 ENCOUNTER — ANESTHESIA EVENT (INPATIENT)
Dept: PERIOP | Facility: HOSPITAL | Age: 86
DRG: 493 | End: 2021-04-07
Payer: MEDICARE

## 2021-04-07 ENCOUNTER — HOSPITAL ENCOUNTER (INPATIENT)
Facility: HOSPITAL | Age: 86
LOS: 3 days | Discharge: NURSING, REHAB OR LONG-TERM CARE | DRG: 493 | End: 2021-04-10
Attending: ORTHOPAEDIC SURGERY | Admitting: ORTHOPAEDIC SURGERY
Payer: MEDICARE

## 2021-04-07 ENCOUNTER — HOSPITAL ENCOUNTER (OUTPATIENT)
Dept: RADIOLOGY | Facility: HOSPITAL | Age: 86
Discharge: HOME | DRG: 493 | End: 2021-04-07
Attending: ORTHOPAEDIC SURGERY
Payer: MEDICARE

## 2021-04-07 ENCOUNTER — APPOINTMENT (INPATIENT)
Dept: RADIOLOGY | Facility: HOSPITAL | Age: 86
DRG: 493 | End: 2021-04-07
Payer: MEDICARE

## 2021-04-07 ENCOUNTER — OFFICE VISIT (OUTPATIENT)
Dept: OBGYN CLINIC | Facility: HOSPITAL | Age: 86
End: 2021-04-07
Payer: MEDICARE

## 2021-04-07 VITALS — HEIGHT: 56 IN | WEIGHT: 120 LBS | BODY MASS INDEX: 26.99 KG/M2

## 2021-04-07 DIAGNOSIS — N18.31 STAGE 3A CHRONIC KIDNEY DISEASE (HCC): Chronic | ICD-10-CM

## 2021-04-07 DIAGNOSIS — E11.9 TYPE 2 DIABETES MELLITUS WITHOUT COMPLICATION, WITH LONG-TERM CURRENT USE OF INSULIN (HCC): Chronic | ICD-10-CM

## 2021-04-07 DIAGNOSIS — M25.512 CHRONIC LEFT SHOULDER PAIN: ICD-10-CM

## 2021-04-07 DIAGNOSIS — S42.292A OTHER CLOSED DISPLACED FRACTURE OF PROXIMAL END OF LEFT HUMERUS, INITIAL ENCOUNTER: Primary | ICD-10-CM

## 2021-04-07 DIAGNOSIS — M25.512 ACUTE PAIN OF LEFT SHOULDER: ICD-10-CM

## 2021-04-07 DIAGNOSIS — I10 HYPERTENSION, UNSPECIFIED TYPE: ICD-10-CM

## 2021-04-07 DIAGNOSIS — E78.5 HYPERLIPIDEMIA, UNSPECIFIED HYPERLIPIDEMIA TYPE: ICD-10-CM

## 2021-04-07 DIAGNOSIS — G89.29 CHRONIC LEFT SHOULDER PAIN: ICD-10-CM

## 2021-04-07 DIAGNOSIS — Z79.899 MEDICATION MANAGEMENT: ICD-10-CM

## 2021-04-07 DIAGNOSIS — Z79.4 TYPE 2 DIABETES MELLITUS WITHOUT COMPLICATION, WITH LONG-TERM CURRENT USE OF INSULIN (HCC): Chronic | ICD-10-CM

## 2021-04-07 PROBLEM — S42.202A CLOSED FRACTURE OF PROXIMAL END OF LEFT HUMERUS: Status: ACTIVE | Noted: 2021-04-07

## 2021-04-07 LAB
ABO GROUP BLD: NORMAL
ANION GAP SERPL CALCULATED.3IONS-SCNC: 8 MMOL/L (ref 4–13)
APTT PPP: 25 SECONDS (ref 23–37)
ATRIAL RATE: 85 BPM
BLD GP AB SCN SERPL QL: NEGATIVE
BUN SERPL-MCNC: 40 MG/DL (ref 5–25)
CALCIUM SERPL-MCNC: 10.1 MG/DL (ref 8.3–10.1)
CHLORIDE SERPL-SCNC: 102 MMOL/L (ref 100–108)
CO2 SERPL-SCNC: 24 MMOL/L (ref 21–32)
CREAT SERPL-MCNC: 1 MG/DL (ref 0.6–1.3)
ERYTHROCYTE [DISTWIDTH] IN BLOOD BY AUTOMATED COUNT: 12.9 % (ref 11.6–15.1)
EST. AVERAGE GLUCOSE BLD GHB EST-MCNC: 183 MG/DL
FLUAV RNA RESP QL NAA+PROBE: NEGATIVE
FLUBV RNA RESP QL NAA+PROBE: NEGATIVE
GFR SERPL CREATININE-BSD FRML MDRD: 50 ML/MIN/1.73SQ M
GLUCOSE SERPL-MCNC: 221 MG/DL (ref 65–140)
GLUCOSE SERPL-MCNC: 227 MG/DL (ref 65–140)
GLUCOSE SERPL-MCNC: 228 MG/DL (ref 65–140)
GLUCOSE SERPL-MCNC: 271 MG/DL (ref 65–140)
HBA1C MFR BLD: 8 %
HCT VFR BLD AUTO: 39.3 % (ref 34.8–46.1)
HGB BLD-MCNC: 12.5 G/DL (ref 11.5–15.4)
INR PPP: 1 (ref 0.84–1.19)
MAGNESIUM SERPL-MCNC: 2.6 MG/DL (ref 1.6–2.6)
MCH RBC QN AUTO: 32.4 PG (ref 26.8–34.3)
MCHC RBC AUTO-ENTMCNC: 31.8 G/DL (ref 31.4–37.4)
MCV RBC AUTO: 102 FL (ref 82–98)
P AXIS: 38 DEGREES
PHOSPHATE SERPL-MCNC: 3.4 MG/DL (ref 2.3–4.1)
PLATELET # BLD AUTO: 267 THOUSANDS/UL (ref 149–390)
PMV BLD AUTO: 11.8 FL (ref 8.9–12.7)
POTASSIUM SERPL-SCNC: 5.2 MMOL/L (ref 3.5–5.3)
PR INTERVAL: 158 MS
PROTHROMBIN TIME: 13.2 SECONDS (ref 11.6–14.5)
QRS AXIS: 20 DEGREES
QRSD INTERVAL: 66 MS
QT INTERVAL: 360 MS
QTC INTERVAL: 428 MS
RBC # BLD AUTO: 3.86 MILLION/UL (ref 3.81–5.12)
RH BLD: POSITIVE
RSV RNA RESP QL NAA+PROBE: NEGATIVE
SARS-COV-2 RNA RESP QL NAA+PROBE: NEGATIVE
SODIUM SERPL-SCNC: 134 MMOL/L (ref 136–145)
SPECIMEN EXPIRATION DATE: NORMAL
T WAVE AXIS: 74 DEGREES
VENTRICULAR RATE: 85 BPM
WBC # BLD AUTO: 9.38 THOUSAND/UL (ref 4.31–10.16)

## 2021-04-07 PROCEDURE — 83735 ASSAY OF MAGNESIUM: CPT | Performed by: ORTHOPAEDIC SURGERY

## 2021-04-07 PROCEDURE — 85610 PROTHROMBIN TIME: CPT | Performed by: ORTHOPAEDIC SURGERY

## 2021-04-07 PROCEDURE — 36415 COLL VENOUS BLD VENIPUNCTURE: CPT | Performed by: ORTHOPAEDIC SURGERY

## 2021-04-07 PROCEDURE — 99285 EMERGENCY DEPT VISIT HI MDM: CPT

## 2021-04-07 PROCEDURE — 93010 ELECTROCARDIOGRAM REPORT: CPT | Performed by: INTERNAL MEDICINE

## 2021-04-07 PROCEDURE — 80048 BASIC METABOLIC PNL TOTAL CA: CPT | Performed by: ORTHOPAEDIC SURGERY

## 2021-04-07 PROCEDURE — 86900 BLOOD TYPING SEROLOGIC ABO: CPT | Performed by: ORTHOPAEDIC SURGERY

## 2021-04-07 PROCEDURE — 86901 BLOOD TYPING SEROLOGIC RH(D): CPT | Performed by: ORTHOPAEDIC SURGERY

## 2021-04-07 PROCEDURE — 85027 COMPLETE CBC AUTOMATED: CPT | Performed by: ORTHOPAEDIC SURGERY

## 2021-04-07 PROCEDURE — 99223 1ST HOSP IP/OBS HIGH 75: CPT | Performed by: ORTHOPAEDIC SURGERY

## 2021-04-07 PROCEDURE — 82948 REAGENT STRIP/BLOOD GLUCOSE: CPT

## 2021-04-07 PROCEDURE — 86923 COMPATIBILITY TEST ELECTRIC: CPT

## 2021-04-07 PROCEDURE — 83036 HEMOGLOBIN GLYCOSYLATED A1C: CPT

## 2021-04-07 PROCEDURE — 99204 OFFICE O/P NEW MOD 45 MIN: CPT | Performed by: ORTHOPAEDIC SURGERY

## 2021-04-07 PROCEDURE — 73030 X-RAY EXAM OF SHOULDER: CPT

## 2021-04-07 PROCEDURE — 86850 RBC ANTIBODY SCREEN: CPT | Performed by: ORTHOPAEDIC SURGERY

## 2021-04-07 PROCEDURE — 93005 ELECTROCARDIOGRAM TRACING: CPT

## 2021-04-07 PROCEDURE — 0241U HB NFCT DS VIR RESP RNA 4 TRGT: CPT

## 2021-04-07 PROCEDURE — 71045 X-RAY EXAM CHEST 1 VIEW: CPT

## 2021-04-07 PROCEDURE — 85730 THROMBOPLASTIN TIME PARTIAL: CPT | Performed by: ORTHOPAEDIC SURGERY

## 2021-04-07 PROCEDURE — 84100 ASSAY OF PHOSPHORUS: CPT | Performed by: ORTHOPAEDIC SURGERY

## 2021-04-07 RX ORDER — CHLORHEXIDINE GLUCONATE 0.12 MG/ML
15 RINSE ORAL ONCE
Status: COMPLETED | OUTPATIENT
Start: 2021-04-07 | End: 2021-04-08

## 2021-04-07 RX ORDER — HYDRALAZINE HYDROCHLORIDE 20 MG/ML
5 INJECTION INTRAMUSCULAR; INTRAVENOUS ONCE AS NEEDED
Status: DISCONTINUED | OUTPATIENT
Start: 2021-04-07 | End: 2021-04-10 | Stop reason: HOSPADM

## 2021-04-07 RX ORDER — ACETAMINOPHEN 325 MG/1
975 TABLET ORAL EVERY 8 HOURS SCHEDULED
Status: DISCONTINUED | OUTPATIENT
Start: 2021-04-07 | End: 2021-04-10 | Stop reason: HOSPADM

## 2021-04-07 RX ORDER — ONDANSETRON 2 MG/ML
4 INJECTION INTRAMUSCULAR; INTRAVENOUS EVERY 6 HOURS PRN
Status: DISCONTINUED | OUTPATIENT
Start: 2021-04-07 | End: 2021-04-10 | Stop reason: HOSPADM

## 2021-04-07 RX ORDER — DOCUSATE SODIUM 100 MG/1
100 CAPSULE, LIQUID FILLED ORAL 2 TIMES DAILY
Status: DISCONTINUED | OUTPATIENT
Start: 2021-04-07 | End: 2021-04-09

## 2021-04-07 RX ORDER — OXYCODONE HYDROCHLORIDE 5 MG/1
5 TABLET ORAL EVERY 4 HOURS PRN
Status: DISCONTINUED | OUTPATIENT
Start: 2021-04-07 | End: 2021-04-10 | Stop reason: HOSPADM

## 2021-04-07 RX ORDER — AMLODIPINE BESYLATE 2.5 MG/1
2.5 TABLET ORAL DAILY
Status: DISCONTINUED | OUTPATIENT
Start: 2021-04-08 | End: 2021-04-10 | Stop reason: HOSPADM

## 2021-04-07 RX ORDER — HEPARIN SODIUM 5000 [USP'U]/ML
5000 INJECTION, SOLUTION INTRAVENOUS; SUBCUTANEOUS EVERY 8 HOURS SCHEDULED
Status: DISPENSED | OUTPATIENT
Start: 2021-04-07 | End: 2021-04-08

## 2021-04-07 RX ORDER — LEVOTHYROXINE SODIUM 0.07 MG/1
75 TABLET ORAL
Status: DISCONTINUED | OUTPATIENT
Start: 2021-04-08 | End: 2021-04-10 | Stop reason: HOSPADM

## 2021-04-07 RX ORDER — HYDRALAZINE HYDROCHLORIDE 20 MG/ML
5 INJECTION INTRAMUSCULAR; INTRAVENOUS ONCE
Status: DISCONTINUED | OUTPATIENT
Start: 2021-04-07 | End: 2021-04-07

## 2021-04-07 RX ORDER — OXYCODONE HYDROCHLORIDE 5 MG/1
2.5 TABLET ORAL EVERY 4 HOURS PRN
Status: DISCONTINUED | OUTPATIENT
Start: 2021-04-07 | End: 2021-04-10 | Stop reason: HOSPADM

## 2021-04-07 RX ORDER — HYDROMORPHONE HCL IN WATER/PF 6 MG/30 ML
0.2 PATIENT CONTROLLED ANALGESIA SYRINGE INTRAVENOUS EVERY 4 HOURS PRN
Status: DISCONTINUED | OUTPATIENT
Start: 2021-04-07 | End: 2021-04-10 | Stop reason: HOSPADM

## 2021-04-07 RX ORDER — MELATONIN
1000 DAILY
Status: DISCONTINUED | OUTPATIENT
Start: 2021-04-08 | End: 2021-04-10 | Stop reason: HOSPADM

## 2021-04-07 RX ORDER — DULOXETIN HYDROCHLORIDE 20 MG/1
40 CAPSULE, DELAYED RELEASE ORAL DAILY
Status: DISCONTINUED | OUTPATIENT
Start: 2021-04-08 | End: 2021-04-10 | Stop reason: HOSPADM

## 2021-04-07 RX ADMIN — HEPARIN SODIUM 5000 UNITS: 5000 INJECTION INTRAVENOUS; SUBCUTANEOUS at 18:06

## 2021-04-07 RX ADMIN — ACETAMINOPHEN 975 MG: 325 TABLET, FILM COATED ORAL at 23:14

## 2021-04-07 RX ADMIN — OXYCODONE HYDROCHLORIDE 5 MG: 5 TABLET ORAL at 23:16

## 2021-04-07 NOTE — CONSULTS
INTERNAL MEDICINE RESIDENCY CONSULT      Name: Tone Edmond   Age & Sex: 80 y o  female   MRN: [id number]  Unit/Bed#: ED 09   Encounter: [id number]  Primary Care Provider: Hugh Ch MD    Code Status: Level 1 - Full Code    Reason for Admission / Principal Problem: Closed fracture of proximal end of left humerus    Reason for Consult: Preop assessment and assistance with medical mgmt     ASSESSMENT/PLAN     Principal Problem:    Closed fracture of proximal end of left humerus  Active Problems:    Preoperative examination    Dementia ([facility] )    Hypertension    Diabetes mellitus ([facility] )    Chronic kidney disease, stage 3 ([facility] )    Hypothyroid    Dysphagia    Hyperlipidemia    Vitamin D deficiency    H/O falling      Preoperative examination    Risk Factor Score (Yes=1, No=0)   Hx of TIA/CVA  0   Hx of prior ischemic heart disease (AMI, unstable angina, Q waves on EKG, CABG)  0   Hx of congestive heart failure  0   Serum Creatinine >2 mg/dl  0   Insulin dependent diabetes mellitus  1   Total Score  1      Risk of MACE (30-day)     Points Risk % (95% CI), Janina 2017 Risk % (95% CI) Bipin, 1999   0 3 9 (2 8-5 4) 0 4 (0 05-1 5)   1 6 (4 9-7 4) 0 9 (0 3-2 1)   2 10 1 (8 1-12 6) 6 6 (3 9-10 3)   3 or more 15 (11 1-20) >11 (5 8-18 4)     -RCRI : 1 point; class 2 risk 6% 30 day risk of death, mi, or cardiac arrest  -Mets <4, however precluded by h/o ambulatory dysfunction  Pt is a long term patient in NH  -EKG- normal sinus rhythm with no ST or T-wave changes noted  -patient with no current chest pain, shortness of breath, lightheadedness, dizziness, fever, or chills     -patient is low risk for low risk procedure       Closed fracture of proximal end of left humerus  S/p fall on 4/01  Planned surgical intervention by ortho at 1pm   Pain control with tylenol schedule, Oxycodone 2 5mg for moderate and 5mg for severe, 0 2 dilaudid for breakthrough    Dementia ([facility] )    Alert oriented x1  Delirium precautions    Hypertension  Continue home regimen amlodipine 2 5 daily    Diabetes mellitus ([facility] )  Lab Results   Component Value Date    HGBA1C 8 1 (H) 06/17/2020   Random glucose 271 4/07  - Will hold Home regimen of metformin and Detemir 5 units  - will start correctional scale      Chronic kidney disease, stage 3 (HCC)  GFR of 50  Creatinine clearance by CG equation 33  Avoid nephrotixic agents    Hypothyroid  Continue levothyroxine    Dysphagia  Mechanical soft with thin liquids As per Nursing staph at [facility],   Continue above       Vitamin D deficiency  Continue vitamin-D supplementation    H/O falling  Multiple  Last documented on 4/01  Fall precautions      VTE Pharmacologic Prophylaxis: Heparin  VTE Mechanical Prophylaxis: sequential compression device and foot pump applied    CHIEF COMPLAINT     Chief Complaint   Patient presents with    Shoulder Injury     Staff with patient reports that she injured her left shoulder from a fall at an unknown time; patient already in sling; neither patient or staff offer much information      HISTORY OF PRESENT ILLNESS     Patient is a 51-year-old female past medical history significant for hypertension, hyperlipidemia, diabetes, hypothyroidism, vitamin-D deficiency  Patient presented to the ED on 04/07 insert referred from [address] after being evaluated for left humeral fracture on 4/0 1 that she suffered after she fell out of bed at her nursing home  Orthopedics team has planned surgical intervention on 04/08 and has requested Internal Medicine to perform preop evaluation and assistance with medical management after surgical intervention  REVIEW OF SYSTEMS    - Limited by cognitive status   Review of Systems   Constitutional: Negative for fever  HENT: Negative for rhinorrhea, sinus pain, sneezing and sore throat  Eyes: Negative  Negative for visual disturbance     Respiratory: Negative for cough, choking, chest tightness and shortness of breath  Cardiovascular: Negative for chest pain  Gastrointestinal: Negative for abdominal pain and nausea  Endocrine: Negative  Genitourinary: Negative  Musculoskeletal: Negative  Skin: Negative  Neurological: Negative for dizziness, tremors, weakness, light-headedness, numbness and headaches  Psychiatric/Behavioral: Negative for confusion  OBJECTIVE     Vitals:    21 1113 21 1220 21 1320 21 1420   BP: 147/66 143/62 148/66 (!) 180/67   BP Location: Right arm      Pulse: 76 71 72 70   Resp: 18 18 18 18   Temp: 98 2 °F (36 8 °C)      TempSrc: Oral      SpO2: 95% 95% 95% 94%   Weight: 54 4 kg (120 lb)      Height: 4' 8" (1 422 m)         Temperature:   Temp (24hrs), Av 2 °F (36 8 °C), Min:98 2 °F (36 8 °C), Max:98 2 °F (36 8 °C)    Temperature: 98 2 °F (36 8 °C)  Intake & Output:  I/O     None        Weights:   IBW: 36 3 kg    Body mass index is 26 9 kg/m²  Weight (last 2 days)     Date/Time   Weight    21 1113   54 4 (120)            Physical Exam  Physical Exam:   GENERAL: NAD, Frail appearance  Non diaphoretic, non-toxic, not ill-appearing  NEUROLOGIC:  Alert/oriented x1  Decreased P/AROM Left UE due to fx  HEENT:  NC/AT, PERRL, EOMI, MMM, no scleral icterus  CARDIAC:  RRR, +S1/S2, no S3/S4 heard, no m/g/r  PULMONARY:  non-labored breathing, CTA B/L, no wheezing/rales/rhonci appreciated at time of encounter  ABDOMEN:  Soft, NT/ND, +BS, no rebound/guarding/rigidity  Extremities:  2+ Pulses in DP/PT  Warm skin on palpation  LUE with echymosis and edema  Tender     SKIN:  No rashes or erythema    PAST MEDICAL HISTORY     Past Medical History:   Diagnosis Date    Anemia     Cholangitis 2018    Cholecystitis     Cholecystitis 5/3/2018    Cholecystitis with cholelithiasis 2018    Cholecystostomy tube dysfunction 2018    Choledocholithiasis 2018    Cholelithiasis     Chronic kidney disease     Coronary artery disease     [facility]     Dementia ([facility] )     Diabetes mellitus ([facility] )     Type 2 blood sugar 257 on admission    Dysphagia     Encephalopathy     GERD (gastroesophageal reflux disease)     H/O falling     Hyperlipidemia     Hypertension     Hypothyroidism     Hypothyroid    Macular degeneration     [facility] protein[facility]cal[facility] ) 5/5/2018    Osteoarthritis     UTI (urinary tract infection)     UTI (urinary tract infection)     Vitamin D deficiency     Vitamin D deficiency      PAST SURGICAL HISTORY     Past Surgical History:   Procedure Laterality Date    ERCP N/A 5/3/2018    Procedure: ENDOSCOPIC RETROGRADE CHOLANGIOPANCREATOGRAPHY (ERCP); Surgeon: Dwain Abernathy MD;  Location: [facility];  Service: Gastroenterology    IR NEPHROSTOMY TUBE PLACEMENT      TX ERCP DX COLLECTION SPECIMEN BRUSHING/WASHING N/A 8/16/2018    Procedure: ENDOSCOPIC RETROGRADE CHOLANGIOPANCREATOGRAPHY (ERCP); Surgeon: Dwain Abernathy MD;  Location:  GI LAB; Service: Gastroenterology     SOCIAL & FAMILY HISTORY     Social History     Substance and Sexual Activity   Alcohol Use No     Social History     Substance and Sexual Activity   Drug Use No     Social History     Tobacco Use   Smoking Status Never Smoker   Smokeless Tobacco Never Used     Family History   Problem Relation Age of Onset    No Known Problems Mother     No Known Problems Father      LABORATORY DATA     Labs: I have personally reviewed pertinent reports  Invalid input(s):  EOSPCT       Invalid input(s): LABALBU                       Micro:  Lab Results   Component Value Date    BLOODCX No Growth After 5 Days  05/05/2018    BLOODCX No Growth After 5 Days   05/05/2018    BLOODCX Escherichia coli (A) 05/03/2018    BLOODCX Escherichia coli (A) 05/03/2018    URINECX >100,000 cfu/ml Escherichia coli (A) 05/03/2018    URINECX 6113-8213 cfu/ml - One colony Escherichia coli (A) 01/21/2018    URINECX 60,000-69,000 cfu/ml Lactobacillus species (A) 01/21/2018 IMAGING & DIAGNOSTIC TESTS     Imaging:  I have personally reviewed pertinent reports  No results found  EKG, Pathology, and Other Studies: I have personally reviewed pertinent reports  ALLERGIES     Allergies   Allergen Reactions    Penicillins     Valsartan      MEDICATIONS PRIOR TO ARRIVAL     Prior to Admission medications    Medication Sig Start Date End Date Taking? Authorizing Provider   acetaminophen (TYLENOL) 325 mg tablet Take 2 tablets by mouth 2 (two) times a day    Historical Provider, MD   amLODIPine (NORVASC) 2 5 mg tablet Take 1 tablet by mouth daily    Historical Provider, MD   aspirin 81 mg chewable tablet Chew 1 tablet daily    Historical Provider, MD   bisacodyl (FLEET) 10 MG/30ML ENEM Insert 10 mg into the rectum once    Historical Provider, MD   cholecalciferol (VITAMIN D3) 1,000 units tablet Take 1 tablet by mouth daily    Historical Provider, MD   DULoxetine HCl 40 MG CPEP Take 1 capsule by mouth daily    Historical Provider, MD   insulin detemir (LEVEMIR) 100 units/mL subcutaneous injection Inject 5 Units under the skin daily at bedtime    Historical Provider, MD   levothyroxine 75 mcg tablet Take 1 tablet by mouth daily    Historical Provider, MD   magnesium hydroxide (Milk of Magnesia) 400 mg/5 mL oral suspension Take 30 mL by mouth daily as needed for constipation    Historical Provider, MD   metFORMIN (GLUCOPHAGE) 850 mg tablet Take 1 tablet by mouth daily with dinner    Historical Provider, MD   oxyCODONE (ROXICODONE) 5 mg immediate release tablet Take 1 tablet by mouth every 4 (four) hours as needed    Historical Provider, MD   oxyCODONE (ROXICODONE) 5 mg immediate release tablet Take 0 5 tablets (2 5 mg total) by mouth daily for 10 days  At 5:00 p  m  Max Daily Amount: 2 5 mg 4/1/21 4/11/21  Dianelys Sanz MD   rosuvastatin (CRESTOR) 5 mg tablet Take 1 tablet by mouth daily    Historical Provider, MD   senna-docusate sodium (SENOKOT-S) 8 6-50 mg per tablet Take 1 tablet by mouth daily    Historical Provider, MD   SODIUM PHOSPHATES RE Insert into the rectum daily as needed    Historical Provider, MD     MEDICATIONS ADMINISTERED IN LAST 24 HOURS     CURRENT MEDICATIONS     Current Facility-Administered Medications   Medication Dose Route Frequency Provider Last Rate    docusate sodium  100 mg Oral BID Alejandra Tom MD      ondansetron  4 mg Intravenous Q6H PRN Alejandra Tom MD          ondansetron, 4 mg, Q6H PRN      Admission Time  I spent 45 Mins admitting the patient    This involved direct patient contact where I performed a full history and physical, reviewing previous records, and reviewing laboratory and other diagnostic studies     ==  Bandar Fournier MD  Internal Medicine Residency, PGY-2  [address] [facility]

## 2021-04-07 NOTE — H&P
Orthopedics   Renettapeyman Rodriguez 80 y o  female MRN: [id number]  Unit/Bed#: ED 9      Chief Complaint:   left arm pain    HPI:   80 y o   female status post fall out of bed at nursing home on 4/1/21 complaining of left arm pain  Patient has a history of dementia  Per chart review and speaking with her power of  her son Mike Argueta, patient fell out of bed at her nursing home about a week ago and had left arm pain  Was brought to [facility] ED as a trauma, underwent coaptation splinting at that time for left proximal humerus fracture  She presented to clinic with Dr Ping Neely 4/7/21 for 1 week follow up xrays and fracture alignment was found to be unacceptable for nonoperative treatment, and she was indicated for surgery  She was a direct admit from clinic  Her primary complaint is left arm pain worse with any movement of left elbow or hand, primarily localized to left upper arm       Review Of Systems:   · Skin: Normal  · Neuro: See HPI  · Musculoskeletal: See HPI  · 14 point review of systems unobtainable due to pts condition    Past Medical History:   Past Medical History:   Diagnosis Date    Anemia     Cholangitis 5/5/2018    Cholecystitis     Cholecystitis 5/3/2018    Cholecystitis with cholelithiasis 1/21/2018    Cholecystostomy tube dysfunction 2/16/2018    Choledocholithiasis 5/5/2018    Cholelithiasis     Chronic kidney disease     Coronary artery disease     [facility]     Dementia [facility]     Diabetes mellitus ([facility] )     Type 2 blood sugar 257 on admission    Dysphagia     Encephalopathy     GERD (gastroesophageal reflux disease)     H/O falling     Hyperlipidemia     Hypertension     Hypothyroidism     Hypothyroid    Macular degeneration     Mild protein-calorie malnutrition ([address] ) 5/5/2018    Osteoarthritis     UTI (urinary tract infection)     UTI (urinary tract infection)     Vitamin D deficiency     Vitamin D deficiency        Past Surgical History:   Past Surgical History: Procedure Laterality Date    ERCP N/A 5/3/2018    Procedure: ENDOSCOPIC RETROGRADE CHOLANGIOPANCREATOGRAPHY (ERCP); Surgeon: Jaquelin Montoya MD;  [facility];  Service: Gastroenterology    IR NEPHROSTOMY TUBE PLACEMENT      MS ERCP DX COLLECTION SPECIMEN BRUSHING/WASHING N/A 8/16/2018    Procedure: ENDOSCOPIC RETROGRADE CHOLANGIOPANCREATOGRAPHY (ERCP); Surgeon: Jaquelin Montoya MD;  [facility]; Service: Gastroenterology       Family History:  Family history reviewed and non-contributory  Family History   Problem Relation Age of Onset    No Known Problems Mother     No Known Problems Father        Social History:  Social History     Socioeconomic History    Marital status:      Spouse name: None    Number of children: None    Years of education: None    Highest education level: None   Occupational History    None   Social Needs    Financial resource strain: None    Food insecurity     Worry: None     Inability: None    Transportation needs     Medical: None     Non-medical: None   Tobacco Use    Smoking status: Never Smoker    Smokeless tobacco: Never Used   Substance and Sexual Activity    Alcohol use: No    Drug use: No    Sexual activity: None   Lifestyle    Physical activity     Days per week: None     Minutes per session: None    Stress: None   Relationships    Social connections     Talks on phone: None     Gets together: None     Attends Muslim service: None     Active member of club or organization: None     Attends meetings of clubs or organizations: None     Relationship status: None    Intimate partner violence     Fear of current or ex partner: None     Emotionally abused: None     Physically abused: None     Forced sexual activity: None   Other Topics Concern    None   Social History Narrative    None       Allergies:    Allergies   Allergen Reactions    Penicillins     Valsartan            Labs:  0   Lab Value Date/Time    HCT 39 0 09/24/2018 0016    HCT 28 6 (L) 05/07/2018 0453    HCT 26 7 (L) 05/06/2018 0442    HCT 35 8 07/12/2014 0939    HGB 12 2 09/24/2018 0016    HGB 9 6 (L) 05/07/2018 0453    HGB 8 8 (L) 05/06/2018 0442    HGB 11 6 07/12/2014 0939    INR 1 30 (H) 05/03/2018 1451    WBC 6 48 09/24/2018 0016    WBC 11 80 (H) 05/07/2018 0453    WBC 23 08 (H) 05/06/2018 0442    WBC 5 29 07/12/2014 0939       Meds:  No current facility-administered medications for this encounter  Current Outpatient Medications:     acetaminophen (TYLENOL) 325 mg tablet, Take 2 tablets by mouth 2 (two) times a day, Disp: , Rfl:     amLODIPine (NORVASC) 2 5 mg tablet, Take 1 tablet by mouth daily, Disp: , Rfl:     aspirin 81 mg chewable tablet, Chew 1 tablet daily, Disp: , Rfl:     bisacodyl (FLEET) 10 MG/30ML ENEM, Insert 10 mg into the rectum once, Disp: , Rfl:     cholecalciferol (VITAMIN D3) 1,000 units tablet, Take 1 tablet by mouth daily, Disp: , Rfl:     DULoxetine HCl 40 MG CPEP, Take 1 capsule by mouth daily, Disp: , Rfl:     insulin detemir (LEVEMIR) 100 units/mL subcutaneous injection, Inject 5 Units under the skin daily at bedtime, Disp: , Rfl:     levothyroxine 75 mcg tablet, Take 1 tablet by mouth daily, Disp: , Rfl:     magnesium hydroxide (Milk of Magnesia) 400 mg/5 mL oral suspension, Take 30 mL by mouth daily as needed for constipation, Disp: , Rfl:     metFORMIN (GLUCOPHAGE) 850 mg tablet, Take 1 tablet by mouth daily with dinner, Disp: , Rfl:     oxyCODONE (ROXICODONE) 5 mg immediate release tablet, Take 1 tablet by mouth every 4 (four) hours as needed, Disp: , Rfl:     oxyCODONE (ROXICODONE) 5 mg immediate release tablet, Take 0 5 tablets (2 5 mg total) by mouth daily for 10 days  At 5:00 p  m  Max Daily Amount: 2 5 mg, Disp: 11 tablet, Rfl: 0    rosuvastatin (CRESTOR) 5 mg tablet, Take 1 tablet by mouth daily, Disp: , Rfl:     senna-docusate sodium (SENOKOT-S) 8 6-50 mg per tablet, Take 1 tablet by mouth daily, Disp: , Rfl:     SODIUM PHOSPHATES RE, Insert into the rectum daily as needed, Disp: , Rfl:     Blood Culture:   Lab Results   Component Value Date    BLOODCX No Growth After 5 Days  05/05/2018       Wound Culture:   No results found for: WOUNDCULT    Ins and Outs:  No intake/output data recorded  Physical Exam:   /66 (BP Location: Right arm)   Pulse 76   Temp 98 2 °F (36 8 °C) (Oral)   Resp 18   Ht 4' 8" (1 422 m)   Wt 54 4 kg (120 lb)   SpO2 95%   BMI 26 90 kg/m²   Gen: Alert and oriented to person, place, time  HEENT: EOMI, eyes clear, moist mucus membranes, hearing intact  Respiratory: Bilateral chest rise  No audible wheezing found  Cardiovascular: Regular Rate and Rhythm  Abdomen: soft nontender/nondistended  Musculoskeletal: left upper extremity  · Skin intact, ecchymosis over left upper arm  · Tender to palpation over humerus  · Sensation intact to radial, ulna, median, musculocutaneous, and axillary nerve distributions  · Motor intact to  radial, ulna, median distributions  Elbow and shoulder motor could not be tested 2/2 pain and patients mental status  · 2+ radial pulse    Radiology:   I personally reviewed the films    X-rays left humerus shows shortened angulated oblique proximal third humeral shaft fracture      _*_*_*_*_*_*_*_*_*_*_*_*_*_*_*_*_*_*_*_*_*_*_*_*_*_*_*_*_*_*_*_*_*_*_*_*_*_*_*_*_*    Assessment:  80 y o  female with left humeral shaft fracture    Plan:   · NWB left extremity insling  · NPO at midnight  · Hold AC/AP after midnight  · To OR for operative fixation left humeral shaft fracture tomorrow  · Analgesics for pain  · Discussed with attending physician    Yvette Stein MD

## 2021-04-07 NOTE — PROGRESS NOTES
Assessment:   Diagnosis ICD-10-CM Associated Orders   1  Other closed displaced fracture of proximal end of left humerus, initial encounter  S42 292A Transfer to other facility   2  Acute pain of left shoulder  M25 [id number]        Plan:  Diagnostics reviewed and physical exam performed  Diagnosis, treatment options and associated risks were discussed with the patient including no treatment, nonsurgical treatment and potential for surgical intervention  The patient was given the opportunity to ask questions regarding each  89F with L proximal humerus fracture with no cortical contact and angulation outside of parameters for closed treatment    Admit to hospital  NPO after midnight  Plan for ORIF L proximal humeral shaft fracture tomorrow  SLIM consult for medical risk stratification and optimization/med management  NWB LUE in sling        To do next visit:  Return for post-op with x-rays left humerus  The above stated was discussed in layman's terms and the patient expressed understanding  All questions were answered to the patient's satisfaction  Scribe Attestation    I,:  Lamar Cummings am acting as a scribe while in the presence of the attending physician :       I,:  Pilar Artis MD personally performed the services described in this documentation    as scribed in my presence :             Subjective:   Elmo Barahona is a 80 y o  female who presents for evaluation of her left proximal humerus displaced fracture  She fell out of her bed where she resides at a nursing facility on 04/01/2021  She presented to the emergency department where x-rays were taken and placed in a coaptation splint  She presents today in a wheelchair and nursing assistant from the nursing facility she resides at  She does have pain throughout her left upper extremity  She has limited use and function of her elbow wrist and hand    She is minimally responsive with history given somnolent mental status      Review of systems negative unless otherwise specified in HPI  Review of Systems    Past Medical History:   Diagnosis Date    Anemia     Cholangitis 5/5/2018    Cholecystitis     Cholecystitis 5/3/2018    Cholecystitis with cholelithiasis 1/21/2018    Cholecystostomy tube dysfunction 2/16/2018    Choledocholithiasis 5/5/2018    Cholelithiasis     Chronic kidney disease     Coronary artery disease     Dementia ([facility] )     Dementia ([facility] )     Diabetes mellitus ([place] [id number] )     Type 2 blood sugar 257 on admission    Dysphagia     Encephalopathy     GERD (gastroesophageal reflux disease)     H/O falling     Hyperlipidemia     Hypertension     Hypothyroidism     Hypothyroid    Macular degeneration     [facility] 5/5/2018    Osteoarthritis     UTI (urinary tract infection)     UTI (urinary tract infection)     Vitamin D deficiency     Vitamin D deficiency        Past Surgical History:   Procedure Laterality Date    ERCP N/A 5/3/2018    Procedure: ENDOSCOPIC RETROGRADE CHOLANGIOPANCREATOGRAPHY (ERCP); Surgeon: Frantz Argueta MD;  [facility];  Service: Gastroenterology    IR NEPHROSTOMY TUBE PLACEMENT      [place] ERCP DX COLLECTION SPECIMEN BRUSHING/WASHING N/A 8/16/2018    Procedure: ENDOSCOPIC RETROGRADE CHOLANGIOPANCREATOGRAPHY (ERCP); Surgeon: Frantz Argueta MD;  [facility];   Service: Gastroenterology       Family History   Problem Relation Age of Onset    No Known Problems Mother     No Known Problems Father        Social History     Occupational History    Not on file   Tobacco Use    Smoking status: Never Smoker    Smokeless tobacco: Never Used   Substance and Sexual Activity    Alcohol use: No    Drug use: No    Sexual activity: Not on file         Current Outpatient Medications:     acetaminophen (TYLENOL) 325 mg tablet, Take 2 tablets by mouth 2 (two) times a day, Disp: , Rfl:     amLODIPine (NORVASC) 2 5 mg tablet, Take 1 tablet by mouth daily, Disp: , Rfl:    aspirin 81 mg chewable tablet, Chew 1 tablet daily, Disp: , Rfl:     bisacodyl (FLEET) 10 MG/30ML ENEM, Insert 10 mg into the rectum once, Disp: , Rfl:     cholecalciferol (VITAMIN D3) 1,000 units tablet, Take 1 tablet by mouth daily, Disp: , Rfl:     DULoxetine HCl 40 MG CPEP, Take 1 capsule by mouth daily, Disp: , Rfl:     insulin detemir (LEVEMIR) 100 units/mL subcutaneous injection, Inject 5 Units under the skin daily at bedtime, Disp: , Rfl:     levothyroxine 75 mcg tablet, Take 1 tablet by mouth daily, Disp: , Rfl:     magnesium hydroxide (Milk of Magnesia) 400 mg/5 mL oral suspension, Take 30 mL by mouth daily as needed for constipation, Disp: , Rfl:     metFORMIN (GLUCOPHAGE) 850 mg tablet, Take 1 tablet by mouth daily with dinner, Disp: , Rfl:     oxyCODONE (ROXICODONE) 5 mg immediate release tablet, Take 1 tablet by mouth every 4 (four) hours as needed, Disp: , Rfl:     oxyCODONE (ROXICODONE) 5 mg immediate release tablet, Take 0 5 tablets (2 5 mg total) by mouth daily for 10 days  At 5:00 p  m  Max Daily Amount: 2 5 mg, Disp: 11 tablet, Rfl: 0    rosuvastatin (CRESTOR) 5 mg tablet, Take 1 tablet by mouth daily, Disp: , Rfl:     senna-docusate sodium (SENOKOT-S) 8 6-50 mg per tablet, Take 1 tablet by mouth daily, Disp: , Rfl:     SODIUM PHOSPHATES RE, Insert into the rectum daily as needed, Disp: , Rfl:     Allergies   Allergen Reactions    Penicillins     Valsartan           There were no vitals filed for this visit  Objective:                    Left Shoulder Exam     Comments:    (+) swelling  (+) diffuse ecchymosis  No open lesions  Diffusely tender throughout her proximal upper extremity    Weakness with thumb retropulsion  Patient otherwise does not participate with motor/sensory exam - unable to assess function of remaining nerves  Extremity appears perfused            Diagnostics, reviewed and taken today if performed as documented:     The attending physician has personally reviewed the pertinent films in PACS and interpretation is as follows:      Left shoulder x-rays taken reviewed the office today show:   Significantly displaced proximal humerus fracture      Procedures, if performed today:    Procedures    None performed      Portions of the record may have been created with voice recognition software  Occasional wrong word or "sound a like" substitutions may have occurred due to the inherent limitations of voice recognition software  Read the chart carefully and recognize, using context, where substitutions have occurred

## 2021-04-07 NOTE — PLAN OF CARE
Problem: Potential for Falls  Goal: Patient will remain free of falls  Description: INTERVENTIONS:  - Assess patient frequently for physical needs  -  Identify cognitive and physical deficits and behaviors that affect risk of falls    -  [place] fall precautions as indicated by assessment   - Educate patient/family on patient safety including physical limitations  - Instruct patient to call for assistance with activity based on assessment  - Modify environment to reduce risk of injury  - Consider OT/PT consult to assist with strengthening/mobility  Outcome: Progressing     Problem: Prexisting or High Potential for Compromised Skin Integrity  Goal: Skin integrity is maintained or improved  Description: INTERVENTIONS:  - Identify patients at risk for skin breakdown  - Assess and monitor skin integrity  - Assess and monitor nutrition and hydration status  - Monitor labs   - Assess for incontinence   - Turn and reposition patient  - Assist with mobility/ambulation  - Relieve pressure over bony prominences  - Avoid friction and shearing  - Provide appropriate hygiene as needed including keeping skin clean and dry  - Evaluate need for skin moisturizer/barrier cream  - Collaborate with interdisciplinary team   - Patient/family teaching  - Consider wound care consult   Outcome: Progressing     Problem: PAIN - ADULT  Goal: Verbalizes/displays adequate comfort level or baseline comfort level  Description: Interventions:  - Encourage patient to monitor pain and request assistance  - Assess pain using appropriate pain scale  - Administer analgesics based on type and severity of pain and evaluate response  - Implement non-pharmacological measures as appropriate and evaluate response  - Consider cultural and social influences on pain and pain management  - Notify physician/advanced practitioner if interventions unsuccessful or patient reports new pain  Outcome: Progressing     Problem: INFECTION - ADULT  Goal: Absence or prevention of progression during hospitalization  Description: INTERVENTIONS:  - Assess and monitor for signs and symptoms of infection  - Monitor lab/diagnostic results  - Monitor all insertion sites, i e  indwelling lines, tubes, and drains  - Monitor endotracheal if appropriate and nasal secretions for changes in amount and color  - [place] appropriate cooling/warming therapies per order  - Administer medications as ordered  - Instruct and encourage patient and family to use good hand hygiene technique  - Identify and instruct in appropriate isolation precautions for identified infection/condition  Outcome: Progressing  Goal: Absence of fever/infection during neutropenic period  Description: INTERVENTIONS:  - Monitor WBC    Outcome: Progressing     Problem: SAFETY ADULT  Goal: Patient will remain free of falls  Description: INTERVENTIONS:  - Assess patient frequently for physical needs  -  Identify cognitive and physical deficits and behaviors that affect risk of falls    -  [place] fall precautions as indicated by assessment   - Educate patient/family on patient safety including physical limitations  - Instruct patient to call for assistance with activity based on assessment  - Modify environment to reduce risk of injury  - Consider OT/PT consult to assist with strengthening/mobility  Outcome: Progressing  Goal: Maintain or return to baseline ADL function  Description: INTERVENTIONS:  -  Assess patient's ability to carry out ADLs; assess patient's baseline for ADL function and identify physical deficits which impact ability to perform ADLs (bathing, care of mouth/teeth, toileting, grooming, dressing, etc )  - Assess/evaluate cause of self-care deficits   - Assess range of motion  - Assess patient's mobility; develop plan if impaired  - Assess patient's need for assistive devices and provide as appropriate  - Encourage maximum independence but intervene and supervise when necessary  - Involve family in performance of ADLs  - Assess for home care needs following discharge   - Consider OT consult to assist with ADL evaluation and planning for discharge  - Provide patient education as appropriate  Outcome: Progressing  Goal: Maintain or return mobility status to optimal level  Description: INTERVENTIONS:  - Assess patient's baseline mobility status (ambulation, transfers, stairs, etc )    - Identify cognitive and physical deficits and behaviors that affect mobility  - Identify mobility aids required to assist with transfers and/or ambulation (gait belt, sit-to-stand, lift, walker, cane, etc )  - [place] fall precautions as indicated by assessment  - Record patient progress and toleration of activity level on Mobility SBAR; progress patient to next Phase/Stage  - Instruct patient to call for assistance with activity based on assessment  - Consider rehabilitation consult to assist with strengthening/weightbearing, etc   Outcome: Progressing     Problem: DISCHARGE PLANNING  Goal: Discharge to home or other facility with appropriate resources  Description: INTERVENTIONS:  - Identify barriers to discharge w/patient and caregiver  - Arrange for needed discharge resources and transportation as appropriate  - Identify discharge learning needs (meds, wound care, etc )  - Arrange for interpretive services to assist at discharge as needed  - Refer to Case Management Department for coordinating discharge planning if the patient needs post-hospital services based on physician/advanced practitioner order or complex needs related to functional status, cognitive ability, or social support system  Outcome: Progressing     Problem: Knowledge Deficit  Goal: Patient/family/caregiver demonstrates understanding of disease process, treatment plan, medications, and discharge instructions  Description: Complete learning assessment and assess knowledge base    Interventions:  - Provide teaching at level of understanding  - Provide teaching via preferred learning methods  Outcome: Progressing

## 2021-04-08 ENCOUNTER — ANESTHESIA (INPATIENT)
Dept: PERIOP | Facility: HOSPITAL | Age: 86
DRG: 493 | End: 2021-04-08
Payer: MEDICARE

## 2021-04-08 ENCOUNTER — APPOINTMENT (INPATIENT)
Dept: RADIOLOGY | Facility: HOSPITAL | Age: 86
DRG: 493 | End: 2021-04-08
Payer: MEDICARE

## 2021-04-08 LAB
ANION GAP SERPL CALCULATED.3IONS-SCNC: 4 MMOL/L (ref 4–13)
BASOPHILS # BLD AUTO: 0.06 THOUSANDS/ΜL (ref 0–0.1)
BASOPHILS NFR BLD AUTO: 1 % (ref 0–1)
BUN SERPL-MCNC: 34 MG/DL (ref 5–25)
CALCIUM SERPL-MCNC: 9.7 MG/DL (ref 8.3–10.1)
CHLORIDE SERPL-SCNC: 105 MMOL/L (ref 100–108)
CO2 SERPL-SCNC: 28 MMOL/L (ref 21–32)
CREAT SERPL-MCNC: 0.97 MG/DL (ref 0.6–1.3)
EOSINOPHIL # BLD AUTO: 0.24 THOUSAND/ΜL (ref 0–0.61)
EOSINOPHIL NFR BLD AUTO: 3 % (ref 0–6)
ERYTHROCYTE [DISTWIDTH] IN BLOOD BY AUTOMATED COUNT: 12.9 % (ref 11.6–15.1)
FOLATE SERPL-MCNC: >20 NG/ML (ref 3.1–17.5)
GFR SERPL CREATININE-BSD FRML MDRD: 52 ML/MIN/1.73SQ M
GLUCOSE SERPL-MCNC: 146 MG/DL (ref 65–140)
GLUCOSE SERPL-MCNC: 222 MG/DL (ref 65–140)
GLUCOSE SERPL-MCNC: 227 MG/DL (ref 65–140)
GLUCOSE SERPL-MCNC: 303 MG/DL (ref 65–140)
HCT VFR BLD AUTO: 36.1 % (ref 34.8–46.1)
HGB BLD-MCNC: 11.4 G/DL (ref 11.5–15.4)
IMM GRANULOCYTES # BLD AUTO: 0.03 THOUSAND/UL (ref 0–0.2)
IMM GRANULOCYTES NFR BLD AUTO: 0 % (ref 0–2)
LYMPHOCYTES # BLD AUTO: 2.18 THOUSANDS/ΜL (ref 0.6–4.47)
LYMPHOCYTES NFR BLD AUTO: 29 % (ref 14–44)
MCH RBC QN AUTO: 31.9 PG (ref 26.8–34.3)
MCHC RBC AUTO-ENTMCNC: 31.6 G/DL (ref 31.4–37.4)
MCV RBC AUTO: 101 FL (ref 82–98)
MONOCYTES # BLD AUTO: 0.56 THOUSAND/ΜL (ref 0.17–1.22)
MONOCYTES NFR BLD AUTO: 7 % (ref 4–12)
NEUTROPHILS # BLD AUTO: 4.52 THOUSANDS/ΜL (ref 1.85–7.62)
NEUTS SEG NFR BLD AUTO: 60 % (ref 43–75)
NRBC BLD AUTO-RTO: 0 /100 WBCS
PLATELET # BLD AUTO: 248 THOUSANDS/UL (ref 149–390)
PMV BLD AUTO: 12.1 FL (ref 8.9–12.7)
POTASSIUM SERPL-SCNC: 4.2 MMOL/L (ref 3.5–5.3)
RBC # BLD AUTO: 3.57 MILLION/UL (ref 3.81–5.12)
SODIUM SERPL-SCNC: 137 MMOL/L (ref 136–145)
VIT B12 SERPL-MCNC: 139 PG/ML (ref 100–900)
WBC # BLD AUTO: 7.59 THOUSAND/UL (ref 4.31–10.16)

## 2021-04-08 PROCEDURE — C1713 ANCHOR/SCREW BN/BN,TIS/BN: HCPCS | Performed by: ORTHOPAEDIC SURGERY

## 2021-04-08 PROCEDURE — NC001 PR NO CHARGE: Performed by: ORTHOPAEDIC SURGERY

## 2021-04-08 PROCEDURE — 82746 ASSAY OF FOLIC ACID SERUM: CPT | Performed by: STUDENT IN AN ORGANIZED HEALTH CARE EDUCATION/TRAINING PROGRAM

## 2021-04-08 PROCEDURE — 24515 OPTX HUMRL SHFT FX PLATE/SCR: CPT | Performed by: ORTHOPAEDIC SURGERY

## 2021-04-08 PROCEDURE — 73060 X-RAY EXAM OF HUMERUS: CPT

## 2021-04-08 PROCEDURE — 0PSG04Z REPOSITION LEFT HUMERAL SHAFT WITH INTERNAL FIXATION DEVICE, OPEN APPROACH: ICD-10-PCS | Performed by: ORTHOPAEDIC SURGERY

## 2021-04-08 PROCEDURE — 99222 1ST HOSP IP/OBS MODERATE 55: CPT | Performed by: INTERNAL MEDICINE

## 2021-04-08 PROCEDURE — 82607 VITAMIN B-12: CPT | Performed by: STUDENT IN AN ORGANIZED HEALTH CARE EDUCATION/TRAINING PROGRAM

## 2021-04-08 PROCEDURE — 85025 COMPLETE CBC W/AUTO DIFF WBC: CPT | Performed by: ORTHOPAEDIC SURGERY

## 2021-04-08 PROCEDURE — 82948 REAGENT STRIP/BLOOD GLUCOSE: CPT

## 2021-04-08 PROCEDURE — 73060 X-RAY EXAM OF HUMERUS: CPT | Performed by: ORTHOPAEDIC SURGERY

## 2021-04-08 PROCEDURE — NC001 PR NO CHARGE: Performed by: INTERNAL MEDICINE

## 2021-04-08 PROCEDURE — 80048 BASIC METABOLIC PNL TOTAL CA: CPT | Performed by: ORTHOPAEDIC SURGERY

## 2021-04-08 DEVICE — 3.5MM LOCKING SCREW SLF-TPNG W/STARDRIVE(TM) RECESS 40MM: Type: IMPLANTABLE DEVICE | Site: ARM | Status: FUNCTIONAL

## 2021-04-08 DEVICE — 2.7MM CORTEX SCREW SELF-TAPPING 22MM: Type: IMPLANTABLE DEVICE | Site: ARM | Status: FUNCTIONAL

## 2021-04-08 DEVICE — 2.7MM CORTEX SCREW SELF-TAPPING 30MM: Type: IMPLANTABLE DEVICE | Site: ARM | Status: FUNCTIONAL

## 2021-04-08 DEVICE — 3.5MM LOCKING SCREW SLF-TPNG W/STARDRIVE(TM) RECESS 50MM: Type: IMPLANTABLE DEVICE | Site: ARM | Status: FUNCTIONAL

## 2021-04-08 DEVICE — 3.5MM LOCKING SCREW SLF-TPNG W/STARDRIVE(TM) RECESS 45MM: Type: IMPLANTABLE DEVICE | Site: ARM | Status: FUNCTIONAL

## 2021-04-08 DEVICE — 3.5MM LOCKING SCREW SLF-TPNG W/STARDRIVE(TM) RECESS 34MM: Type: IMPLANTABLE DEVICE | Site: ARM | Status: FUNCTIONAL

## 2021-04-08 DEVICE — 3.5MM LCP® PROXIMAL HUMERUS PLATE-LONG 6H SHAFT/160MM
Type: IMPLANTABLE DEVICE | Site: ARM | Status: FUNCTIONAL
Brand: LCP

## 2021-04-08 DEVICE — 3.5MM CORTEX SCREW SELF-TAPPING 22MM: Type: IMPLANTABLE DEVICE | Site: ARM | Status: FUNCTIONAL

## 2021-04-08 DEVICE — 2.7MM CORTEX SCREW SELF-TAPPING 26MM: Type: IMPLANTABLE DEVICE | Site: ARM | Status: FUNCTIONAL

## 2021-04-08 DEVICE — 3.5MM CORTEX SCREW SELF-TAPPING 20MM: Type: IMPLANTABLE DEVICE | Site: ARM | Status: FUNCTIONAL

## 2021-04-08 RX ORDER — ONDANSETRON 2 MG/ML
4 INJECTION INTRAMUSCULAR; INTRAVENOUS ONCE AS NEEDED
Status: DISCONTINUED | OUTPATIENT
Start: 2021-04-08 | End: 2021-04-08 | Stop reason: HOSPADM

## 2021-04-08 RX ORDER — SODIUM CHLORIDE, SODIUM LACTATE, POTASSIUM CHLORIDE, CALCIUM CHLORIDE 600; 310; 30; 20 MG/100ML; MG/100ML; MG/100ML; MG/100ML
50 INJECTION, SOLUTION INTRAVENOUS CONTINUOUS
Status: DISCONTINUED | OUTPATIENT
Start: 2021-04-08 | End: 2021-04-10 | Stop reason: HOSPADM

## 2021-04-08 RX ORDER — CEFAZOLIN SODIUM 2 G/50ML
2000 SOLUTION INTRAVENOUS ONCE
Status: DISCONTINUED | OUTPATIENT
Start: 2021-04-08 | End: 2021-04-08 | Stop reason: HOSPADM

## 2021-04-08 RX ORDER — HEPARIN SODIUM 5000 [USP'U]/ML
5000 INJECTION, SOLUTION INTRAVENOUS; SUBCUTANEOUS EVERY 8 HOURS SCHEDULED
Status: DISCONTINUED | OUTPATIENT
Start: 2021-04-08 | End: 2021-04-10 | Stop reason: HOSPADM

## 2021-04-08 RX ORDER — FENTANYL CITRATE 50 UG/ML
INJECTION, SOLUTION INTRAMUSCULAR; INTRAVENOUS AS NEEDED
Status: DISCONTINUED | OUTPATIENT
Start: 2021-04-08 | End: 2021-04-08

## 2021-04-08 RX ORDER — CEFAZOLIN SODIUM 1 G/50ML
1000 SOLUTION INTRAVENOUS EVERY 8 HOURS
Status: DISCONTINUED | OUTPATIENT
Start: 2021-04-08 | End: 2021-04-09

## 2021-04-08 RX ORDER — MAGNESIUM HYDROXIDE 1200 MG/15ML
LIQUID ORAL AS NEEDED
Status: DISCONTINUED | OUTPATIENT
Start: 2021-04-08 | End: 2021-04-08 | Stop reason: HOSPADM

## 2021-04-08 RX ORDER — SODIUM CHLORIDE 9 MG/ML
50 INJECTION, SOLUTION INTRAVENOUS CONTINUOUS
Status: DISCONTINUED | OUTPATIENT
Start: 2021-04-08 | End: 2021-04-10 | Stop reason: HOSPADM

## 2021-04-08 RX ORDER — SODIUM CHLORIDE, SODIUM LACTATE, POTASSIUM CHLORIDE, CALCIUM CHLORIDE 600; 310; 30; 20 MG/100ML; MG/100ML; MG/100ML; MG/100ML
50 INJECTION, SOLUTION INTRAVENOUS CONTINUOUS
Status: DISCONTINUED | OUTPATIENT
Start: 2021-04-08 | End: 2021-04-08

## 2021-04-08 RX ORDER — CEFAZOLIN SODIUM 1 G/3ML
INJECTION, POWDER, FOR SOLUTION INTRAMUSCULAR; INTRAVENOUS AS NEEDED
Status: DISCONTINUED | OUTPATIENT
Start: 2021-04-08 | End: 2021-04-08

## 2021-04-08 RX ORDER — HYDROMORPHONE HCL IN WATER/PF 6 MG/30 ML
0.2 PATIENT CONTROLLED ANALGESIA SYRINGE INTRAVENOUS
Status: DISCONTINUED | OUTPATIENT
Start: 2021-04-08 | End: 2021-04-08 | Stop reason: HOSPADM

## 2021-04-08 RX ORDER — PROPOFOL 10 MG/ML
INJECTION, EMULSION INTRAVENOUS AS NEEDED
Status: DISCONTINUED | OUTPATIENT
Start: 2021-04-08 | End: 2021-04-08

## 2021-04-08 RX ORDER — ROCURONIUM BROMIDE 10 MG/ML
INJECTION, SOLUTION INTRAVENOUS AS NEEDED
Status: DISCONTINUED | OUTPATIENT
Start: 2021-04-08 | End: 2021-04-08

## 2021-04-08 RX ORDER — SODIUM CHLORIDE, SODIUM LACTATE, POTASSIUM CHLORIDE, CALCIUM CHLORIDE 600; 310; 30; 20 MG/100ML; MG/100ML; MG/100ML; MG/100ML
INJECTION, SOLUTION INTRAVENOUS CONTINUOUS PRN
Status: DISCONTINUED | OUTPATIENT
Start: 2021-04-08 | End: 2021-04-08

## 2021-04-08 RX ORDER — ALBUMIN, HUMAN INJ 5% 5 %
SOLUTION INTRAVENOUS CONTINUOUS PRN
Status: DISCONTINUED | OUTPATIENT
Start: 2021-04-08 | End: 2021-04-08

## 2021-04-08 RX ORDER — SODIUM CHLORIDE 9 MG/ML
INJECTION, SOLUTION INTRAVENOUS CONTINUOUS PRN
Status: DISCONTINUED | OUTPATIENT
Start: 2021-04-08 | End: 2021-04-08

## 2021-04-08 RX ORDER — SENNOSIDES 8.6 MG
1 TABLET ORAL
Status: DISCONTINUED | OUTPATIENT
Start: 2021-04-08 | End: 2021-04-09

## 2021-04-08 RX ORDER — EPHEDRINE SULFATE 50 MG/ML
INJECTION INTRAVENOUS AS NEEDED
Status: DISCONTINUED | OUTPATIENT
Start: 2021-04-08 | End: 2021-04-08

## 2021-04-08 RX ORDER — ONDANSETRON 2 MG/ML
INJECTION INTRAMUSCULAR; INTRAVENOUS AS NEEDED
Status: DISCONTINUED | OUTPATIENT
Start: 2021-04-08 | End: 2021-04-08

## 2021-04-08 RX ORDER — LIDOCAINE HYDROCHLORIDE 10 MG/ML
INJECTION, SOLUTION EPIDURAL; INFILTRATION; INTRACAUDAL; PERINEURAL AS NEEDED
Status: DISCONTINUED | OUTPATIENT
Start: 2021-04-08 | End: 2021-04-08

## 2021-04-08 RX ORDER — FENTANYL CITRATE/PF 50 MCG/ML
25 SYRINGE (ML) INJECTION
Status: DISCONTINUED | OUTPATIENT
Start: 2021-04-08 | End: 2021-04-08 | Stop reason: HOSPADM

## 2021-04-08 RX ADMIN — EPHEDRINE SULFATE 5 MG: 50 INJECTION, SOLUTION INTRAVENOUS at 14:30

## 2021-04-08 RX ADMIN — FENTANYL CITRATE 12.5 MCG: 50 INJECTION INTRAMUSCULAR; INTRAVENOUS at 16:20

## 2021-04-08 RX ADMIN — INSULIN HUMAN 1 UNITS: 100 INJECTION, SOLUTION PARENTERAL at 15:37

## 2021-04-08 RX ADMIN — ALBUMIN (HUMAN): 12.5 INJECTION, SOLUTION INTRAVENOUS at 14:13

## 2021-04-08 RX ADMIN — LIDOCAINE HYDROCHLORIDE 50 MG: 10 INJECTION, SOLUTION EPIDURAL; INFILTRATION; INTRACAUDAL; PERINEURAL at 13:24

## 2021-04-08 RX ADMIN — INSULIN LISPRO 1 UNITS: 100 INJECTION, SOLUTION INTRAVENOUS; SUBCUTANEOUS at 06:24

## 2021-04-08 RX ADMIN — INSULIN DETEMIR 5 UNITS: 100 INJECTION, SOLUTION SUBCUTANEOUS at 08:18

## 2021-04-08 RX ADMIN — PHENYLEPHRINE HYDROCHLORIDE 20 MCG/MIN: 10 INJECTION INTRAVENOUS at 14:09

## 2021-04-08 RX ADMIN — FENTANYL CITRATE 50 MCG: 50 INJECTION INTRAMUSCULAR; INTRAVENOUS at 13:24

## 2021-04-08 RX ADMIN — ACETAMINOPHEN 975 MG: 325 TABLET, FILM COATED ORAL at 21:00

## 2021-04-08 RX ADMIN — EPHEDRINE SULFATE 10 MG: 50 INJECTION, SOLUTION INTRAVENOUS at 14:59

## 2021-04-08 RX ADMIN — SODIUM CHLORIDE 50 ML/HR: 0.9 INJECTION, SOLUTION INTRAVENOUS at 17:35

## 2021-04-08 RX ADMIN — PHENYLEPHRINE HYDROCHLORIDE 200 MCG: 10 INJECTION INTRAVENOUS at 13:24

## 2021-04-08 RX ADMIN — SODIUM CHLORIDE, SODIUM LACTATE, POTASSIUM CHLORIDE, AND CALCIUM CHLORIDE 50 ML/HR: .6; .31; .03; .02 INJECTION, SOLUTION INTRAVENOUS at 04:44

## 2021-04-08 RX ADMIN — EPHEDRINE SULFATE 5 MG: 50 INJECTION, SOLUTION INTRAVENOUS at 16:07

## 2021-04-08 RX ADMIN — SODIUM CHLORIDE, SODIUM LACTATE, POTASSIUM CHLORIDE, AND CALCIUM CHLORIDE: .6; .31; .03; .02 INJECTION, SOLUTION INTRAVENOUS at 13:18

## 2021-04-08 RX ADMIN — CHLORHEXIDINE GLUCONATE 0.12% ORAL RINSE 15 ML: 1.2 LIQUID ORAL at 12:31

## 2021-04-08 RX ADMIN — SUGAMMADEX 109 MG: 100 INJECTION, SOLUTION INTRAVENOUS at 16:35

## 2021-04-08 RX ADMIN — SODIUM CHLORIDE: 0.9 INJECTION, SOLUTION INTRAVENOUS at 13:35

## 2021-04-08 RX ADMIN — ROCURONIUM BROMIDE 20 MG: 50 INJECTION, SOLUTION INTRAVENOUS at 13:58

## 2021-04-08 RX ADMIN — CEFAZOLIN SODIUM 1000 MG: 1 SOLUTION INTRAVENOUS at 23:10

## 2021-04-08 RX ADMIN — PHENYLEPHRINE HYDROCHLORIDE 100 MCG: 10 INJECTION INTRAVENOUS at 14:34

## 2021-04-08 RX ADMIN — SODIUM CHLORIDE 50 ML/HR: 0.9 INJECTION, SOLUTION INTRAVENOUS at 12:28

## 2021-04-08 RX ADMIN — ROCURONIUM BROMIDE 10 MG: 50 INJECTION, SOLUTION INTRAVENOUS at 15:33

## 2021-04-08 RX ADMIN — EPHEDRINE SULFATE 5 MG: 50 INJECTION, SOLUTION INTRAVENOUS at 15:07

## 2021-04-08 RX ADMIN — ONDANSETRON 4 MG: 2 INJECTION INTRAMUSCULAR; INTRAVENOUS at 16:31

## 2021-04-08 RX ADMIN — ALBUMIN (HUMAN): 12.5 INJECTION, SOLUTION INTRAVENOUS at 15:59

## 2021-04-08 RX ADMIN — HEPARIN SODIUM 5000 UNITS: 5000 INJECTION INTRAVENOUS; SUBCUTANEOUS at 19:47

## 2021-04-08 RX ADMIN — FENTANYL CITRATE 25 MCG: 50 INJECTION INTRAMUSCULAR; INTRAVENOUS at 14:22

## 2021-04-08 RX ADMIN — PROPOFOL 100 MG: 10 INJECTION, EMULSION INTRAVENOUS at 13:24

## 2021-04-08 RX ADMIN — CEFAZOLIN 1000 MG: 1 INJECTION, POWDER, FOR SOLUTION INTRAMUSCULAR; INTRAVENOUS at 13:35

## 2021-04-08 RX ADMIN — ROCURONIUM BROMIDE 30 MG: 50 INJECTION, SOLUTION INTRAVENOUS at 13:24

## 2021-04-08 RX ADMIN — FENTANYL CITRATE 12.5 MCG: 50 INJECTION INTRAMUSCULAR; INTRAVENOUS at 16:37

## 2021-04-08 RX ADMIN — EPHEDRINE SULFATE 5 MG: 50 INJECTION, SOLUTION INTRAVENOUS at 14:09

## 2021-04-08 NOTE — ASSESSMENT & PLAN NOTE
Patient has had multiple falls in the past, mass documented on 04/01  · Fall precautions  · Consider inquiring more about nature of falls with nursing home to prevent any future episodes

## 2021-04-08 NOTE — ASSESSMENT & PLAN NOTE
Lab Results   Component Value Date    HGBA1C 8 0 (H) 04/07/2021       Recent Labs     04/10/21  0453 04/10/21  0707 04/10/21  0823 04/10/21  1018   POCGLU 150* 142* 133 268*       Blood Sugar Average: Last 72 hrs:  (P) 238 65     Blood glucose elevated overnight, requiring initiation of insulin drip  Will switch to basal insulin and SSI  On discharge, she can go on Levemir 10 units and metformin 850 mg

## 2021-04-08 NOTE — ASSESSMENT & PLAN NOTE
Lab Results   Component Value Date    EGFR 48 04/09/2021    EGFR 52 04/08/2021    EGFR 50 04/07/2021    CREATININE 1 03 04/09/2021    CREATININE 0 97 04/08/2021    CREATININE 1 00 04/07/2021     GFR approximately 50, stage IIIA; likely combination of age-related nephron loss, diabetic nephropathy, hypertensive nephrosclerosis  · Avoid hypotension  · Avoid nephrotoxic agents, NSAIDs, and contrast if able

## 2021-04-08 NOTE — ASSESSMENT & PLAN NOTE
Risk Factor Score (Yes=1, No=0)   Hx of TIA/CVA  0   Hx of prior ischemic heart disease (AMI, unstable angina, Q waves on EKG, CABG)  0   Hx of congestive heart failure  0   Serum Creatinine >2 mg/dl  0   Insulin dependent diabetes mellitus  1   Total Score  1       Risk of MACE (30-day)      Points Risk % (95% CI), Janina, 2017 Risk % (95% CI) Bipin, 1999   0 3 9 (2 8-5 4) 0 4 (0 05-1 5)   1 6 (4 9-7 4) 0 9 (0 3-2 1)   2 10 1 (8 1-12 6) 6 6 (3 9-10 3)   3 or more 15 (11 1-20) >11 (5 8-18  4)      · RCRI : 1 point; class 2 risk 6% 30 day risk of death, mi, or cardiac arrest  · Mets <4, however precluded by h/o ambulatory dysfunction  Pt is a long term patient in NH  · EKG- normal sinus rhythm with no ST or T-wave changes noted  · patient with no current chest pain, shortness of breath, lightheadedness, dizziness, fever, or chills     · patient is low risk for low risk procedure

## 2021-04-08 NOTE — ASSESSMENT & PLAN NOTE
POD #1 status post fixation  Pain management per primary team   Bowel regimen for opioid induced constipation

## 2021-04-08 NOTE — PROGRESS NOTES
Subjective: No acute events overnight  No acute distress        Objective:  As below     Lab Results   Component Value Date/Time    WBC 7 59 04/08/2021 04:33 AM    WBC 5 29 07/12/2014 09:39 AM    HGB 11 4 (L) 04/08/2021 04:33 AM    HGB 11 6 07/12/2014 09:39 AM       Vitals:    04/08/21 0014   BP: 121/69   Pulse: 68   Resp: 17   Temp: 98 2 °F (36 8 °C)   SpO2: 90%     Left upper extremity  Skin intact with ecchymosis and swelling at the proximal aspect of the arm and shoulder  Moving all digits spontaneously and appears to respond to light touch along all peripheral nerve distributions   Digits warm and well perfused with brisk capillary refill     Assessment: 80 y o  female with Left humeral shaft fracture     Plan:  NWB LUE in sling   OR today for fixation  NPO  Pain control  DVT ppx: hold  PT/OT post op  Will continue to assess for acute blood loss anemia  Dispo: pending OR and PT eval

## 2021-04-08 NOTE — PROGRESS NOTES
INTERNAL MEDICINE RESIDENCY PROGRESS NOTE     Name: Jairon Handy   Age & Sex: 80 y o  female   MRN: [id number]  Unit/Bed#: -01   Encounter: [id number]  Team: SOD Team A    PATIENT INFORMATION     Name: Jairon Handy   Age & Sex: 80 y o  female   MRN: [id number] [facility] Stay Days: 1    ASSESSMENT/PLAN     Principal Problem:    Closed fracture of proximal end of left humerus  Active Problems:    Preoperative examination    Dementia ([facility] [address] )    Hypertension    Diabetes mellitus ([facility] [address] )    Chronic kidney disease, stage 3 (HCC)    Hypothyroid    Dysphagia    Hyperlipidemia    Vitamin D deficiency    H/O falling      * Closed fracture of proximal end of left humerus  Assessment & Plan  Orthopedic surgery is patient's primary team   They are planning for ORIF of left proximal humeral shaft fracture today  · Per primary team  · Pain control with scheduled acetaminophen 975 mg mild, oxycodone 2 5 mg moderate, oxycodone 5 mg severe, hydromorphone 0 2 mg breakthrough  · Given history of constipation on multiple laxatives at home--> scheduled docusate 2 times daily and Senokot p r n  Preoperative examination  Assessment & Plan  Risk Factor Score (Yes=1, No=0)   Hx of TIA/CVA  0   Hx of prior ischemic heart disease (AMI, unstable angina, Q waves on EKG, CABG)  0   Hx of congestive heart failure  0   Serum Creatinine >2 mg/dl  0   Insulin dependent diabetes mellitus  1   Total Score  1       Risk of MACE (30-day)      Points Risk % (95% CI), Janina 2017 Risk % (95% CI) Bipin, 1999   0 3 9 (2 8-5 4) 0 4 (0 05-1 5)   1 6 (4 9-7 4) 0 9 (0 3-2 1)   2 10 1 (8 1-12 6) 6 6 (3 9-10 3)   3 or more 15 (11 1-20) >11 (5 8-18  4)      · RCRI : 1 point; class 2 risk 6% 30 day risk of death, mi, or cardiac arrest  · Mets <4, however precluded by h/o ambulatory dysfunction   Pt is a long term patient in NH  · EKG- normal sinus rhythm with no ST or T-wave changes noted  · patient with no current chest pain, shortness of breath, lightheadedness, dizziness, fever, or chills     · patient is low risk for low risk procedure       H/O falling  Assessment & Plan  Patient has had multiple falls in the past, mass documented on 04/01  · Fall precautions  · Consider inquiring more about nature of falls with nursing home to prevent any future episodes    Vitamin D deficiency  Assessment & Plan  · Continue home coli calciferol 1000 units daily    Hyperlipidemia  Assessment & Plan  · On rosuvastatin 5 mg at home--holding during admission    Dysphagia  Assessment & Plan  · NPO sips with meds for operation  · Per [facility] [address] home, diet should be thin liquids    Hypothyroid  Assessment & Plan  · Continue home levothyroxine 75 mcg a m  Chronic kidney disease, stage 3 [facility])  Assessment & Plan  Lab Results   Component Value Date    EGFR 52 04/08/2021    EGFR 50 04/07/2021    EGFR 40 09/24/2018    CREATININE 0 97 04/08/2021    CREATININE 1 00 04/07/2021    CREATININE 1 22 09/24/2018     GFR approximately 50, stage IIIA; likely combination of age-related nephron loss, diabetic nephropathy, hypertensive nephrosclerosis  · Avoid hypotension  · Avoid nephrotoxic agents, NSAIDs, and contrast if able    Diabetes mellitus [facility])  Assessment & Plan  Lab Results   Component Value Date    HGBA1C 8 0 (H) 04/07/2021       Recent Labs     04/07/21  1708 04/07/21  2125 04/08/21  0537 04/08/21  1058   POCGLU 227* 228* 222* 146*       Blood Sugar Average: Last 72 hrs:  (P) 208 8   Patient's A1c on admission 8 0  · Continue home Levemir 5 units a m ; SSI  · Monitor serum glucose and titrate as necessary    Hypertension  Assessment & Plan  Patient's blood pressures are being well controlled:  110-120s/60-70s; one reading measured 180/67  Currently stable  · Continue home amlodipine 2 5 mg  · Hydralazine 5 mg once p r n  if SBP greater than 180    Dementia (HCC)  Assessment & Plan  Seems to be consistently AOx0-1  Her son Armani Sullivan is the PoA    · Continue dementia precautions      Disposition: Patient to undergo open reduction with internal fixation of left proximal humeral shaft fracture today  We will be optimizing her medical management  Primary team will determine safe discharge  SUBJECTIVE     Patient seen and examined  No acute events overnight  She is mostly nonverbal, so ROS was difficult  She is not currently in any pain or discomfort  Based on the discussion with her nurse, she has had multiple BMs since admission and has been comfortable since sleeping last night  OBJECTIVE     Vitals:    21 1420 21 1701 21 0014 21 0723   BP: (!) 180/67 117/73 121/69 111/69   BP Location:   Right arm Right arm   Pulse: 70 75 68 71   Resp: 18 20 17 18   Temp:  97 5 °F (36 4 °C) 98 2 °F (36 8 °C) 97 5 °F (36 4 °C)   TempSrc:   Oral Oral   SpO2: 94% 92% 90% 93%   Weight:       Height:          Temperature:   Temp (24hrs), Av 9 °F (36 6 °C), Min:97 5 °F (36 4 °C), Max:98 2 °F (36 8 °C)    Temperature: 97 5 °F (36 4 °C)  Intake & Output:  I/O        07 -  0700  07 -  0700          Unmeasured Urine Occurrence  2 x    Unmeasured Stool Occurrence  1 x        Weights:   IBW: 36 3 kg    Body mass index is 26 9 kg/m²  Weight (last 2 days)     Date/Time   Weight    21 1113   54 4 (120)            Physical Exam  Constitutional:       General: She is not in acute distress  Appearance: She is well-developed  She is not diaphoretic  HENT:      Head: Normocephalic and atraumatic  Mouth/Throat:      Pharynx: No oropharyngeal exudate  Comments: Poor dentition  Eyes:      General: No scleral icterus  Conjunctiva/sclera: Conjunctivae normal    Neck:      Musculoskeletal: Neck supple  Thyroid: No thyromegaly  Trachea: No tracheal deviation  Cardiovascular:      Rate and Rhythm: Normal rate and regular rhythm  Heart sounds: Normal heart sounds  No murmur     Pulmonary:      Effort: Pulmonary effort is normal       Breath sounds: Normal breath sounds  Abdominal:      General: Bowel sounds are normal  There is no distension  Palpations: Abdomen is soft  Tenderness: There is no abdominal tenderness  Musculoskeletal:         General: Swelling and signs of injury (Left proximal arm) present  No tenderness  Right lower leg: No edema  Left lower leg: No edema  Lymphadenopathy:      Cervical: No cervical adenopathy  Skin:     General: Skin is warm  Capillary Refill: Capillary refill takes less than 2 seconds  Coloration: Skin is not pale  Findings: Bruising (diffuse throughout proximal arm into forearm on the left) present  No erythema  Neurological:      Mental Status: She is alert  Comments: Patient AOx0-1; mostly nonverbal, only speaking "yes" or "no"   Psychiatric:         Behavior: Behavior normal        LABORATORY DATA     Labs: I have personally reviewed pertinent reports  Results from last 7 days   Lab Units 04/08/21  0433 04/07/21  1502   WBC Thousand/uL 7 59 9 38   HEMOGLOBIN g/dL 11 4* 12 5   HEMATOCRIT % 36 1 39 3   PLATELETS Thousands/uL 248 267   NEUTROS PCT % 60  --    MONOS PCT % 7  --       Results from last 7 days   Lab Units 04/08/21  0433 04/07/21  1502   POTASSIUM mmol/L 4 2 5 2   CHLORIDE mmol/L 105 102   CO2 mmol/L 28 24   BUN mg/dL 34* 40*   CREATININE mg/dL 0 97 1 00   CALCIUM mg/dL 9 7 10 1     Results from last 7 days   Lab Units 04/07/21  1502   MAGNESIUM mg/dL 2 6     Results from last 7 days   Lab Units 04/07/21  1502   PHOSPHORUS mg/dL 3 4      Results from last 7 days   Lab Units 04/07/21  1607   INR  1 00   PTT seconds 25               IMAGING & DIAGNOSTIC TESTING     Radiology Results: I have personally reviewed pertinent reports  Xr Chest Portable    Result Date: 4/7/2021  Impression: Low lung volumes with bibasilar atelectasis  Left humeral diaphysis fracture    Workstation performed: [id number]     Other Diagnostic Testing: I have personally reviewed pertinent reports  ACTIVE MEDICATIONS     Current Facility-Administered Medications   Medication Dose Route Frequency    acetaminophen (TYLENOL) tablet 975 mg  975 mg Oral Q8H [address]    amLODIPine (NORVASC) tablet 2 5 mg  2 5 mg Oral Daily    chlorhexidine (PERIDEX) 0 12 % oral rinse 15 mL  15 mL Swish & Spit Once    cholecalciferol (VITAMIN D3) tablet 1,000 Units  1,000 Units Oral Daily    docusate sodium (COLACE) capsule 100 mg  100 mg Oral BID    DULoxetine (CYMBALTA) delayed release capsule 40 mg  40 mg Oral Daily    hydrALAZINE (APRESOLINE) injection 5 mg  5 mg Intravenous Once PRN    HYDROmorphone HCl (DILAUDID) injection 0 2 mg  0 2 mg Intravenous Q4H PRN    insulin detemir (LEVEMIR) subcutaneous injection 5 Units  5 Units Subcutaneous QAM    insulin lispro (HumaLOG) 100 units/mL subcutaneous injection 1-5 Units  1-5 Units Subcutaneous TID AC    levothyroxine tablet 75 mcg  75 mcg Oral Early Morning    ondansetron (ZOFRAN) injection 4 mg  4 mg Intravenous Q6H PRN    oxyCODONE (ROXICODONE) IR tablet 2 5 mg  2 5 mg Oral Q4H PRN    oxyCODONE (ROXICODONE) IR tablet 5 mg  5 mg Oral Q4H PRN    senna (SENOKOT) tablet 8 6 mg  1 tablet Oral HS PRN    sodium chloride 0 9 % infusion  50 mL/hr Intravenous Continuous       VTE Pharmacologic Prophylaxis: Reason for no pharmacologic prophylaxis Preprocedure  VTE Mechanical Prophylaxis: sequential compression device    Portions of the record may have been created with voice recognition software  Occasional wrong word or "sound a like" substitutions may have occurred due to the inherent limitations of voice recognition software    Read the chart carefully and recognize, using context, where substitutions have occurred   ==  Alex Covington, DO  [address]  Internal Medicine Residency PGY-1

## 2021-04-08 NOTE — ASSESSMENT & PLAN NOTE
Seems to be consistently AOx0-1  Her son Apple Paul is the PoA  · Continue dementia precautions  · Geriatrics following

## 2021-04-08 NOTE — ANESTHESIA PREPROCEDURE EVALUATION
Procedure:  OPEN REDUCTION W/ INTERNAL FIXATION (ORIF) LEFT PROXIMAL HUMERAL SHAFT FRACTURE (Left Arm Upper)    Relevant Problems   CARDIO   (+) Hyperlipidemia   (+) Hypertension      ENDO   (+) Hypothyroid      GI/HEPATIC   (+) Dysphagia      /RENAL   (+) Chronic kidney disease, stage 3 (HCC)      MUSCULOSKELETAL   (+) Dextroscoliosis   (+) Pain in lower back      NEURO/PSYCH   (+) Dementia (HCC)   (+) H/O falling        Physical Exam    Airway    Mallampati score: III  TM Distance: <3 FB  Neck ROM: full     Dental   No notable dental hx     Cardiovascular      Pulmonary      Other Findings        Anesthesia Plan  ASA Score- 3     Anesthesia Type- general with ASA Monitors  Additional Monitors:   Airway Plan: ETT  Plan Factors-Exercise tolerance (METS): >4 METS  Chart reviewed  EKG reviewed  Existing labs reviewed  Patient summary reviewed  Patient is not a current smoker  There is medical exclusion for perioperative obstructive sleep apnea risk education  Induction- intravenous  Postoperative Plan- Plan for postoperative opioid use  Informed Consent- Anesthetic plan and risks discussed with healthcare power of  and son  I personally reviewed this patient with the CRNA  Discussed and agreed on the Anesthesia Plan with the HERMILA Tejada

## 2021-04-08 NOTE — ANESTHESIA POSTPROCEDURE EVALUATION
Post-Op Assessment Note    CV Status:  Stable  Pain Score: 0    Pain management: adequate     Mental Status:  Sleepy and arousable   Hydration Status:  Stable   PONV Controlled:  None   Airway Patency:  Patent      Post Op Vitals Reviewed: Yes      Staff: CRNA   Comments: RN aware that pt did not follow commands or speak to me when asked questions preoperatively  Pt in no distress  VSS  No complications documented      BP   140/63   Temp   98 5F   Pulse  71   Resp   20   SpO2   100%

## 2021-04-08 NOTE — CASE MANAGEMENT
Pt is not a <30 day readmission or a bundle  Risk of unplanned readmission score is 15 (green)  Pt admitted due to fracture of left humerus after a fall  Pt to OR today with Ortho for ORIF  Pt documented as alert and oriented x 2 with dementia at baseline  CM called and spoke with pt's son, Panfilo Pablo ([phone]) to introduce CM role and begin discharge planning  Pt is a long term resident at [facility] and pt's son would like pt to return at discharge  Referral placed via [facility]  Pt's son gave CM permission to contact [facility] to identify pt's baseline  Pt's son stated that he believes pt often uses a wheelchair for ambulation  Pt has no reported history of MH treatment or D/A treatment  Pt will require assistance with transportation at time of discharge  CM department to follow for therapy recommendations post-op  CM reviewed d/c planning process including the following: identifying help at home, patient preference for d/c planning needs, Discharge Lounge, [facility] Meds to Bed program, availability of treatment team to discuss questions or concerns patient and/or family may have regarding understanding medications and recognizing signs and symptoms once discharged  CM also encouraged patient to follow up with all recommended appointments after discharge  Patient advised of importance for patient and family to participate in managing patients medical well being

## 2021-04-08 NOTE — ASSESSMENT & PLAN NOTE
Patient's blood pressures are being well controlled  · Continue home amlodipine 2 5 mg  · Hydralazine 5 mg once p r n  if SBP greater than 180

## 2021-04-08 NOTE — OP NOTE
OPERATIVE REPORT  PATIENT NAME: Kristy Villareal    :  1931  MRN: [id number]  Pt Location: BE OR ROOM 04    SURGERY DATE: 2021    Surgeon(s) and Role:     * Corinne Darling MD - Primary     * AZALEA Alvarez-C - [id number], MD - Assisting    Preop Diagnosis:  Displaced left proximal humeral shaft fracture    Post-Op Diagnosis Codes:  Displaced left proximal humeral shaft fracture    Procedure(s) (LRB):  OPEN REDUCTION W/ INTERNAL FIXATION (ORIF) LEFT PROXIMAL HUMERAL SHAFT FRACTURE (Left)    Procedure:  ORIF L proximal humeral shaft fracture (CPT 93975)    Specimen(s):  * No specimens in log *    Estimated Blood Loss:   300 mL    Drains:  * No LDAs found *    Anesthesia Type:   General    Operative Indications:  Left proximal humeral shaft fracture with no cortical apposition, alignment outside of reduction parameters for non operative treatment    Operative Findings:  See below    Complications:   None    Implants: Synthes 3 5 mm 6 hl prox humerus plate, 2 7 mm cortical lag screws x2    Procedure and Technique:  The patient's operative site, laterality, procedure, consent were verified in the preoperative area the patient was transitioned to the operating room  General anesthesia was provided by the anesthesia team and the left upper extremity was prepped and draped in the usual sterile fashion  After time-out for safety, the standard deltopectoral with extended anterolateral approach took place  Metzenbaum scissors were used to dissect the cephalic vein which identified our interval   Retractors were used to hold the interval open and clavipectoral fascia was incised  Dissection took place distally and fracture site was easily identified  It was noted that the proximal these have a large anterior spike that are completely buttonholed anterior to the entire biceps musculature  A large amount of her deltoid insertion had also ruptured as a result of the injury    Minimal deltoid anterior and middle insertions were noted to remain  Brachialis was elevated off the distal aspect of the shaft in its central portion to allow for plate positioning distally  The fracture was reduced by placing the proximal spike back under the biceps muscle, axial traction and rotation  This was held in place with pointed reduction clamp and multiple wires  Given the obliquity of the fracture, it was amenable to two 2 7 mm cortical lag screws which were placed in typical drilling fashion  Reduction was found to be maintained with removal of clamps and wires  A Synthes 6 hole proximal humeral plate was selected and applied to bone  This would allow was to obtain 6 cortices of fixation distal to the fracture  Cortical screws were placed distally and proximally to reduce the plate to bone and the reduction was found to be maintained plate positioning was satisfactory on biplanar fluoroscopic imaging  Remaining distal cortical screws were then placed  Proximal locking cluster was placed without incident  The fixation was found to be satisfactory on biplanar fluoroscopic imaging and there was found to be no exiting of proximal locking screws or prominence on live fluoroscopic imaging  The wound was copiously irrigated with sterile saline  The deltopectoral interval was closed loosely with 0 Vicryl suture in figure-of-eight fashion  Subcutaneous layer closure took place with 2-0 Vicryl suture  Skin layer closure took place with 3-0 nylon suture in interrupted fashion  All final counts, including but not limited to instrument, sponge, needle counts were correct  I was present for the entire procedure  Sterile dressing consisted of Mepilex dressing with silver  The arm was wrapped in Webril and Ace wrap  The patient was placed in a sling  Patient was awakened and transitioned to the PACU in stable condition  There were no immediate complications      The patient will be nonweight bearing to the left upper extremity for an anticipated 6 wks pending bony union  She may come out of her sling for elbow and wrist range of motion   I will see her in clinic in 2 wks for suture removal     Patient Disposition:  PACU     SIGNATURE: Lali Bar MD  DATE: April 8, 2021  TIME: 4:33 PM

## 2021-04-09 LAB
ANION GAP SERPL CALCULATED.3IONS-SCNC: 11 MMOL/L (ref 4–13)
BASOPHILS # BLD AUTO: 0.04 THOUSANDS/ΜL (ref 0–0.1)
BASOPHILS NFR BLD AUTO: 1 % (ref 0–1)
BUN SERPL-MCNC: 22 MG/DL (ref 5–25)
CALCIUM SERPL-MCNC: 9.1 MG/DL (ref 8.3–10.1)
CHLORIDE SERPL-SCNC: 108 MMOL/L (ref 100–108)
CO2 SERPL-SCNC: 25 MMOL/L (ref 21–32)
CREAT SERPL-MCNC: 1.03 MG/DL (ref 0.6–1.3)
EOSINOPHIL # BLD AUTO: 0.19 THOUSAND/ΜL (ref 0–0.61)
EOSINOPHIL NFR BLD AUTO: 3 % (ref 0–6)
ERYTHROCYTE [DISTWIDTH] IN BLOOD BY AUTOMATED COUNT: 13.2 % (ref 11.6–15.1)
FLUAV RNA RESP QL NAA+PROBE: NEGATIVE
FLUBV RNA RESP QL NAA+PROBE: NEGATIVE
GFR SERPL CREATININE-BSD FRML MDRD: 48 ML/MIN/1.73SQ M
GLUCOSE SERPL-MCNC: 155 MG/DL (ref 65–140)
GLUCOSE SERPL-MCNC: 183 MG/DL (ref 65–140)
GLUCOSE SERPL-MCNC: 198 MG/DL (ref 65–140)
GLUCOSE SERPL-MCNC: 216 MG/DL (ref 65–140)
GLUCOSE SERPL-MCNC: 236 MG/DL (ref 65–140)
GLUCOSE SERPL-MCNC: 342 MG/DL (ref 65–140)
GLUCOSE SERPL-MCNC: 382 MG/DL (ref 65–140)
GLUCOSE SERPL-MCNC: 444 MG/DL (ref 65–140)
GLUCOSE SERPL-MCNC: 479 MG/DL (ref 65–140)
HCT VFR BLD AUTO: 28.1 % (ref 34.8–46.1)
HGB BLD-MCNC: 8.8 G/DL (ref 11.5–15.4)
IMM GRANULOCYTES # BLD AUTO: 0.03 THOUSAND/UL (ref 0–0.2)
IMM GRANULOCYTES NFR BLD AUTO: 0 % (ref 0–2)
LYMPHOCYTES # BLD AUTO: 1.71 THOUSANDS/ΜL (ref 0.6–4.47)
LYMPHOCYTES NFR BLD AUTO: 25 % (ref 14–44)
MCH RBC QN AUTO: 32.6 PG (ref 26.8–34.3)
MCHC RBC AUTO-ENTMCNC: 31.3 G/DL (ref 31.4–37.4)
MCV RBC AUTO: 104 FL (ref 82–98)
MONOCYTES # BLD AUTO: 0.67 THOUSAND/ΜL (ref 0.17–1.22)
MONOCYTES NFR BLD AUTO: 10 % (ref 4–12)
NEUTROPHILS # BLD AUTO: 4.26 THOUSANDS/ΜL (ref 1.85–7.62)
NEUTS SEG NFR BLD AUTO: 61 % (ref 43–75)
NRBC BLD AUTO-RTO: 0 /100 WBCS
PLATELET # BLD AUTO: 222 THOUSANDS/UL (ref 149–390)
PMV BLD AUTO: 12.1 FL (ref 8.9–12.7)
POTASSIUM SERPL-SCNC: 3.8 MMOL/L (ref 3.5–5.3)
RBC # BLD AUTO: 2.7 MILLION/UL (ref 3.81–5.12)
RSV RNA RESP QL NAA+PROBE: NEGATIVE
SARS-COV-2 RNA RESP QL NAA+PROBE: NEGATIVE
SODIUM SERPL-SCNC: 144 MMOL/L (ref 136–145)
WBC # BLD AUTO: 6.9 THOUSAND/UL (ref 4.31–10.16)

## 2021-04-09 PROCEDURE — 82948 REAGENT STRIP/BLOOD GLUCOSE: CPT

## 2021-04-09 PROCEDURE — NC001 PR NO CHARGE: Performed by: ORTHOPAEDIC SURGERY

## 2021-04-09 PROCEDURE — 85025 COMPLETE CBC W/AUTO DIFF WBC: CPT | Performed by: PHYSICIAN ASSISTANT

## 2021-04-09 PROCEDURE — 92610 EVALUATE SWALLOWING FUNCTION: CPT

## 2021-04-09 PROCEDURE — 97167 OT EVAL HIGH COMPLEX 60 MIN: CPT

## 2021-04-09 PROCEDURE — 0241U HB NFCT DS VIR RESP RNA 4 TRGT: CPT | Performed by: ORTHOPAEDIC SURGERY

## 2021-04-09 PROCEDURE — 97163 PT EVAL HIGH COMPLEX 45 MIN: CPT

## 2021-04-09 PROCEDURE — 80048 BASIC METABOLIC PNL TOTAL CA: CPT | Performed by: PHYSICIAN ASSISTANT

## 2021-04-09 PROCEDURE — 99222 1ST HOSP IP/OBS MODERATE 55: CPT | Performed by: INTERNAL MEDICINE

## 2021-04-09 PROCEDURE — 99232 SBSQ HOSP IP/OBS MODERATE 35: CPT | Performed by: INTERNAL MEDICINE

## 2021-04-09 RX ORDER — CEPHALEXIN 500 MG/1
500 CAPSULE ORAL EVERY 12 HOURS SCHEDULED
Status: DISCONTINUED | OUTPATIENT
Start: 2021-04-09 | End: 2021-04-10 | Stop reason: HOSPADM

## 2021-04-09 RX ORDER — CEPHALEXIN 500 MG/1
500 CAPSULE ORAL EVERY 12 HOURS SCHEDULED
Status: DISCONTINUED | OUTPATIENT
Start: 2021-04-09 | End: 2021-04-09

## 2021-04-09 RX ORDER — SENNOSIDES 8.6 MG
1 TABLET ORAL
Status: DISCONTINUED | OUTPATIENT
Start: 2021-04-09 | End: 2021-04-10 | Stop reason: HOSPADM

## 2021-04-09 RX ADMIN — DULOXETINE HYDROCHLORIDE 40 MG: 20 CAPSULE, DELAYED RELEASE PELLETS ORAL at 09:14

## 2021-04-09 RX ADMIN — INSULIN LISPRO 2 UNITS: 100 INJECTION, SOLUTION INTRAVENOUS; SUBCUTANEOUS at 09:18

## 2021-04-09 RX ADMIN — SENNOSIDES 8.6 MG: 8.6 TABLET, FILM COATED ORAL at 21:30

## 2021-04-09 RX ADMIN — INSULIN LISPRO 3 UNITS: 100 INJECTION, SOLUTION INTRAVENOUS; SUBCUTANEOUS at 11:39

## 2021-04-09 RX ADMIN — HEPARIN SODIUM 5000 UNITS: 5000 INJECTION INTRAVENOUS; SUBCUTANEOUS at 13:31

## 2021-04-09 RX ADMIN — INSULIN LISPRO 5 UNITS: 100 INJECTION, SOLUTION INTRAVENOUS; SUBCUTANEOUS at 16:13

## 2021-04-09 RX ADMIN — Medication 1000 UNITS: at 09:14

## 2021-04-09 RX ADMIN — CEPHALEXIN 500 MG: 500 CAPSULE ORAL at 21:31

## 2021-04-09 RX ADMIN — INSULIN LISPRO 2 UNITS: 100 INJECTION, SOLUTION INTRAVENOUS; SUBCUTANEOUS at 16:14

## 2021-04-09 RX ADMIN — AMLODIPINE BESYLATE 2.5 MG: 2.5 TABLET ORAL at 09:14

## 2021-04-09 RX ADMIN — LEVOTHYROXINE SODIUM 75 MCG: 75 TABLET ORAL at 05:09

## 2021-04-09 RX ADMIN — SODIUM CHLORIDE 8 UNITS/HR: 9 INJECTION, SOLUTION INTRAVENOUS at 22:56

## 2021-04-09 RX ADMIN — HEPARIN SODIUM 5000 UNITS: 5000 INJECTION INTRAVENOUS; SUBCUTANEOUS at 21:31

## 2021-04-09 RX ADMIN — INSULIN DETEMIR 5 UNITS: 100 INJECTION, SOLUTION SUBCUTANEOUS at 09:18

## 2021-04-09 RX ADMIN — HEPARIN SODIUM 5000 UNITS: 5000 INJECTION INTRAVENOUS; SUBCUTANEOUS at 05:09

## 2021-04-09 RX ADMIN — ACETAMINOPHEN 975 MG: 325 TABLET, FILM COATED ORAL at 21:30

## 2021-04-09 RX ADMIN — ACETAMINOPHEN 975 MG: 325 TABLET, FILM COATED ORAL at 05:08

## 2021-04-09 RX ADMIN — CEPHALEXIN 500 MG: 500 CAPSULE ORAL at 11:39

## 2021-04-09 RX ADMIN — DOCUSATE SODIUM 100 MG: 100 CAPSULE, LIQUID FILLED ORAL at 09:14

## 2021-04-09 RX ADMIN — INSULIN LISPRO 2 UNITS: 100 INJECTION, SOLUTION INTRAVENOUS; SUBCUTANEOUS at 12:24

## 2021-04-09 RX ADMIN — ACETAMINOPHEN 975 MG: 325 TABLET, FILM COATED ORAL at 13:42

## 2021-04-09 NOTE — MALNUTRITION/BMI
This medical record reflects one or more clinical indicators suggestive of malnutrition    Malnutrition Findings:   Adult Malnutrition type: Chronic illness  Adult Degree of Malnutrition: Malnutrition of mild degree  Malnutrition Characteristics: Inadequate energy, Other (comment)(evidenced by inadequate intake of various nutrients/dense foods, likely due to dementia, refusing meals, treated with diet and supplements)    BMI Findings: Body mass index is 27 18 kg/m²  See Nutrition note dated 4/9/21 for additional details  Completed nutrition assessment is viewable in the nutrition documentation

## 2021-04-09 NOTE — OCCUPATIONAL THERAPY NOTE
Occupational Therapy Evaluation     Patient Name: Chalo MARX Date: 4/9/2021  Problem List  Principal Problem:    Closed fracture of proximal end of left humerus  Active Problems:    Dementia ([facility] )    Hypertension    Diabetes mellitus ([facility] )    Chronic kidney disease, stage 3 (HCC)    Hypothyroid    Dysphagia    Hyperlipidemia    Vitamin D deficiency    H/O falling    Preoperative examination    Past Medical History  Past Medical History:   Diagnosis Date    Anemia     Cholangitis 5/5/2018    Cholecystitis     Cholecystitis 5/3/2018    Cholecystitis with cholelithiasis 1/21/2018    Cholecystostomy tube dysfunction 2/16/2018    Choledocholithiasis 5/5/2018    Cholelithiasis     Chronic kidney disease     Coronary artery disease     Dementia ([facility] )     Dementia ([facility] )     Diabetes mellitus ([facility] )     Type 2 blood sugar 257 on admission    Dysphagia     Encephalopathy     GERD (gastroesophageal reflux disease)     H/O falling     Hyperlipidemia     Hypertension     Hypothyroidism     Hypothyroid    Macular degeneration     Mild protein-calorie malnutrition ([facility] ) 5/5/2018    Osteoarthritis     UTI (urinary tract infection)     UTI (urinary tract infection)     Vitamin D deficiency     Vitamin D deficiency      Past Surgical History  Past Surgical History:   Procedure Laterality Date    ERCP N/A 5/3/2018    Procedure: ENDOSCOPIC RETROGRADE CHOLANGIOPANCREATOGRAPHY (ERCP); Surgeon: Agapito Galvin MD;  [facility];  Service: Gastroenterology    IR NEPHROSTOMY TUBE PLACEMENT      ORIF HUMERUS FRACTURE Left 4/8/2021    Procedure: OPEN REDUCTION W/ INTERNAL FIXATION (ORIF) LEFT PROXIMAL HUMERAL SHAFT FRACTURE;  Surgeon: Heydi Kiran MD;  [facility];  Service: Orthopedics    CO ERCP DX COLLECTION SPECIMEN BRUSHING/WASHING N/A 8/16/2018    Procedure: ENDOSCOPIC RETROGRADE CHOLANGIOPANCREATOGRAPHY (ERCP); Surgeon: Agapito Galvin MD;  Location: [facility];   Service: Gastroenterology        04/09/21 0828   OT Last Visit   OT Visit Date 04/09/21   Note Type   Note type Evaluation   Restrictions/Precautions   Weight Bearing Precautions Per Order Yes   LUE Weight Bearing Per Order NWB   Braces or Orthoses Splint;Sling   Other Precautions Cognitive; Chair Alarm; Bed Alarm;WBS;Fall Risk;Pain   Pain Assessment   Pain Assessment Tool 0-10   Pain Score 4   Pain Location/Orientation Orientation: Left; Location: [facility]   Hospital Pain Intervention(s) Repositioned; Ambulation/increased activity; Elevated   Home Living   Type of Home Assisted living  ([address])   Additional Comments PT IS A POOR HISTORIAN  PER CHART, PT WAS A LONG-TERM RESIDENT OF [facility] IN [place]  FURTHER CLARIFICATION NEEDED REGARDING HOME SET-UP  ENGLISH IS NOT PT'S PRIMARY LANGUAGE  Prior Function   Level of [place] Needs assistance with ADLs and functional mobility   Lives With Facility staff   Receives Help From Other (Comment)  (FACILITY STAFF)   ADL Assistance Needs assistance   IADLs Needs assistance   Falls in the last 6 months 1 to 4  (AT LEAST 1, REASON FOR ADMISSION)   Vocational Retired   Comments PT IS A POOR HISTORIAN, INFORMATION OBTAINED FROM CHART REVIEW  PER CHART, PT NEEDED ASSISTANCE W/ ADLS UTILIZED A W/C FOR MOBILITY  FURTHER CLARIFICATION NEEDED ON BASELINE LEVEL OF ASSISTANCE NEEDED  Lifestyle   Autonomy PT NEEDED ASSISTANCE W/ ADLS/IADLS PER CHART, FURTHER CLARIFICATION NEEDED  Reciprocal Relationships PT RECEIVES ASSISTANCE FROM FACILITY STAFF  Service to Others PT IS RETIRED     Intrinsic Gratification PT UNABLE TO REPORT DUE TO COGNITIVE STATUS AND    Subjective   Subjective "I'M TIRED, I'M NOT GOING TO STAND "   ADL   Eating Assistance 4  Minimal Assistance   Grooming Assistance 3  Moderate Assistance   UB Bathing Assistance 3  Moderate Assistance   LB Bathing Assistance 2  Maximal Assistance   UB Dressing Assistance 2  Maximal Assistance   LB Dressing Assistance 2  Maximal Assistance   Toileting Assistance  2  Maximal Assistance   Functional Assistance 2  Maximal Assistance   Bed Mobility   Supine to Sit 3  Moderate assistance   Additional items Assist x 1; Increased time required;Verbal cues;LE management   Sit to Supine Unable to assess  (PT LEFT SEATED IN CHAIR W/ ALARM AND ALL NEEDS IN REACH )   Transfers   Sit to Stand 2  Maximal assistance   Additional items Assist x 1; Increased time required;Verbal cues   Stand to Sit 2  Maximal assistance   Additional items Assist x 1; Increased time required;Verbal cues   Stand pivot 2  Maximal assistance   Additional items Assist x 1; Increased time required;Verbal cues   Additional Comments PT HAD A R LATERAL LEAN WHILE SITTING EOB REQUIRING MOD A WHILE SITTING EOB  Balance   Static Sitting Poor   Static Standing Poor -   Activity Tolerance   Activity Tolerance Patient limited by fatigue;Patient limited by pain   Medical Staff Made Aware CONY, OT; Mena Quiroz, PT   Nurse Made Aware PT APPROPRIATE TO SEE PER NURSING  RUE Assessment   RUE Assessment WFL   LUE Assessment   LUE Assessment X  (NWB)   Cognition   Overall Cognitive Status Impaired   Arousal/Participation Alert   Attention Attends with cues to redirect   Orientation Level Oriented to person;Disoriented to place; Disoriented to time;Disoriented to situation   Memory Decreased short term memory;Decreased recall of recent events;Decreased recall of precautions   Following Commands Follows one step commands with increased time or repetition   Comments PT DX W/ DEMENTIA  PT REQUIRED MAX VERBAL/TACTILE/VISUAL CUES TO ATTEND AND ENGAGE IN ACTIVITY  ENGLISH IS NOT PT'S PRIMARY LANGUAGE, PT DOES SPEAK AND UNDERSTAND ENGLISH  PT REQUIRED SIMPLE 1 STEP COMMANDS  PT HAS LIMITED INSIGHT INTO DEFICITS, DECREASED SAFETY AWARENESS, AND DECREASED JUDGEMENT  PT IS IMPULSIVE, REQUIRING CUES TO WAIT FOR THERAPY TEAM  PT IS CONFUSED     Assessment   Limitation Decreased ADL status; Decreased Safe judgement during ADL;Decreased cognition;Decreased endurance;Decreased self-care trans;Decreased high-level ADLs   Prognosis Fair   Assessment PT R-HAND DOMINANT 89 YO SEEN FOR INITIAL OT EVALUATION WAS ADMITTED TO [facility] ON 4/7/2021 FOLLOWING A FALL WHILE GETTING OOB RESULTING IN L PROXIMAL HUMERAL SHAFT FX 1 WEEK PRIOR  INITIAL PLAN FOR NONOPERATIVE TREATMENT, HOWEVER, FX ALIGNMENT FOUND UNACCEPTABLE FOR NONOPERATIVE TREATMENT  S/P ORIF L PROXIMAL HUMERUS FX ON 4/8/2021  PT IS NWB LUE IN SLING PER ORTHO  PT'S PMH INCLUDES DEMENTIA, ANEMIA, CHOLANGITIS, CHOLECYSTITIS, CKD, CAD, DM, DYSPHAGIA, ENCEPHALOPATHY, GERD, HLD, HTN, HYPOTHYROIDISM, MACULAR DEGENERATION, OSTEOARTHRITIS, UTI, AND VITAMIN D DEFICIENCY  PT IS A POOR HISTORIAN DUE TO HX OF DEMENTIA, DISORIENTATION, AND CONFUSION  ENGLISH IS NOT PT'S PRIMARY LANGUAGE, PT UNDERSTANDS AND CAN SPEAK ENGLISH HOWEVER  FOR FUTURE SESSIONS MAY BE BENEFICIAL  HOME SET-UP AND PLOF INFORMATION RECEIVED FROM CHART  PT RESIDES  [address] skilled nursing  PT NEEDED ASSISTANCE W/ ADLS/IADLS AT BASELINE  PT UTILIZED W/C FOR MOBILITY  PT CURRENTLY MAX A FOR LB ADLS AND MOD-MAX A FOR UB ADLS  PT IS MOD A FOR BED MOBILITY AND MAX A FOR FUNCTIONAL TRANSFERS INCLUDING SIT <-> STAND AND STAND PIVOT  PT REQUIRED MAX VERBAL/TACTILE/VISUAL CUES FOR PT TO ATTEND AND COMPLETE ACTIVITY  PT LIMITED DUE TO WBS, PAIN, FATIGUE, IMPAIRED COGNITION, DEMENTIA, LIMITED INSIGHT INTO DEFICITS, DECREASED SAFETY AWARENESS, DECREASED JUDGEMENT, IMPULSIVITY, CONFUSION, DISORIENTATION, DECREASED BALANCE, LATERAL LEAN, DECREASED ENDURANCE, LIMITED ACTIVITY TOLERANCE, FALL RISK, AND ANTICIPATED LIMITED CARRY OVER OF EDUCATION/PRECAUTIONS  PT EDUCATED ON WBS, SLING MANAGEMENT, BODY MECHANICS, AND SAFETY  The patient's raw score on the AM-PAC Daily Activity inpatient short form is 13, standardized score is 32 03, less than 39 4   Patients at this level are likely to benefit from discharge to post-acute rehabilitation services  Please refer to the recommendation of the Occupational Therapist for safe discharge planning  FROM OT PERSPECTIVE, D/C REC RETURN TO FACILITY W/ APPROPRIATE LEVEL OF SUPPORT VS REHAB IF UNABLE TO PROVIDE  CONT TO FOLLOW-UP 2-3X/WEEK TO ADDRESS THE FOLLOWING GOALS  Goals   Patient Goals TO SLEEP   LTG Time Frame 10-14   Long Term Goal #1 SEE BELOW   Plan   Treatment Interventions ADL retraining;Functional transfer training; Endurance training;Cognitive reorientation;Patient/family training;Equipment evaluation/education; Compensatory technique education; Energy conservation; Activityengagement   Goal Expiration Date 04/23/21   OT Frequency 2-3x/wk   Recommendation   OT Discharge Recommendation Other (Comment)  (RETURN TO FACILITY W/ APPROPRIATE ASSIST VS REHAB IF UNABLE)   OT - OK to Discharge Yes   AM-PAC Daily Activity Inpatient   Lower Body Dressing 2   Bathing 2   Toileting 2   Upper Body Dressing 2   Grooming 2   Eating 3   Daily Activity Raw Score 13   Daily Activity Standardized Score (Calc for Raw Score >=11) 32 03   AM-PAC Applied Cognition Inpatient   Following a Speech/Presentation 2   Understanding Ordinary Conversation 2   Taking Medications 1   Remembering Where Things Are Placed or Put Away 1   Remembering List of 4-5 Errands 1   Taking Care of Complicated Tasks 1   Applied Cognition Raw Score 8   Applied Cognition Standardized Score 19 32   Modified [place] Scale   Modified [place] Scale 4     OT GOALS:    PT WILL COMPLETE BED MOBILITY W/ S TO ENGAGE IN ADLS AT EOB  PT WILL COMPLETE FUNCTIONAL TRANSFERS W/ S TO ENGAGE IN ADLS  PT WILL COMPLETE FUNCTIONAL MOBILITY W/ MIN A TO ENGAGE IN ADLS W/ APPROPRIATE AD  PT WILL COMPLETE ADLS W/ MIN A WHILE SEATED  PT WILL TOLERATE ACTIVITY FOR 15 MIN TO ENGAGE IN ADLS           Antony Alonso, OTS

## 2021-04-09 NOTE — PROGRESS NOTES
INTERNAL MEDICINE RESIDENCY PROGRESS NOTE     Name: Sunita Gauthier   Age & Sex: 80 y o  female   MRN: [id number]  Unit/Bed#: -01   Encounter: [id number]  Team: SOD Team A    PATIENT INFORMATION     Name: Sunita Gauthier   Age & Sex: 80 y o  female   MRN: [id number] Stay Days: 2    ASSESSMENT/PLAN     Principal Problem:    Closed fracture of proximal end of left humerus  Active Problems:    Diabetes mellitus ([facility] [address] )    Chronic kidney disease, stage 3 ([facility] )    Hypertension    Dementia ([facility] )    Hypothyroid    Dysphagia    Hyperlipidemia    Vitamin D deficiency    H/O falling    Preoperative examination      Diabetes mellitus [facility])  Assessment & Plan  Lab Results   Component Value Date    HGBA1C 8 0 (H) 04/07/2021       Recent Labs     04/08/21  1641 04/08/21  2109 04/09/21  0626 04/09/21  1043   POCGLU 183* 303* 236* 342*       Blood Sugar Average: Last 72 hrs:  (P) 223 [id number]     Blood glucose elevated   · Increase Levemir to 6 units  · Add mealtime coverage with 2 units of Humalog  · Continue correctional subcutaneous insulin  · Monitor serum glucose and titrate as necessary    Chronic kidney disease, stage 3 [facility])  Assessment & Plan  Lab Results   Component Value Date    EGFR 48 04/09/2021    EGFR 52 04/08/2021    EGFR 50 04/07/2021    CREATININE 1 03 04/09/2021    CREATININE 0 97 04/08/2021    CREATININE 1 00 04/07/2021     GFR approximately 50, stage IIIA; likely combination of age-related nephron loss, diabetic nephropathy, hypertensive nephrosclerosis  · Avoid hypotension  · Avoid nephrotoxic agents, NSAIDs, and contrast if able    Hypertension  Assessment & Plan  Patient's blood pressures are being well controlled  · Continue home amlodipine 2 5 mg  · Hydralazine 5 mg once p r n  if SBP greater than 180    * Closed fracture of proximal end of left humerus  Assessment & Plan  POD #1 status post fixation    Pain management per primary team   Bowel regimen for opioid induced constipation  H/O falling  Assessment & Plan  Patient has had multiple falls in the past, mass documented on   · Fall precautions  · Consider inquiring more about nature of falls with nursing home to prevent any future episodes    Vitamin D deficiency  Assessment & Plan  · Continue home coli calciferol 1000 units daily    Hyperlipidemia  Assessment & Plan  · On rosuvastatin 5 mg at home--holding during admission    Dysphagia  Assessment & Plan  Dysphagia modified diet  Hypothyroid  Assessment & Plan  · Continue home levothyroxine 75 mcg a m  Dementia [facility])  Assessment & Plan  Seems to be consistently AOx0-1  Her son Adria Acevedo is the PoA  · Continue dementia precautions  · Geriatrics following  Disposition:  Per primary team     SUBJECTIVE     Patient seen and examined  Agitated overnight pulling her IVs   Complains of arm pain this morning and low appetite "don't like the cafeteria food"  OBJECTIVE     Vitals:    21 0300 21 0542 21 0706 21 1151   BP: (!) 117/45  128/54 (!) 127/48   BP Location:       Pulse: 73  72 83   Resp: 17  18 18   Temp: 98 2 °F (36 8 °C)  98 °F (36 7 °C) 98 7 °F (37 1 °C)   TempSrc:       SpO2: 96%  95% 97%   Weight:  55 kg (121 lb 4 1 oz)     Height:          Temperature:   Temp (24hrs), Av 1 °F (36 7 °C), Min:97 5 °F (36 4 °C), Max:98 7 °F (37 1 °C)    Temperature: 98 7 °F (37 1 °C)  Intake & Output:  I/O       701 -  07 07 -  07 - 04/10 0700    P  O   0     I V  (mL/kg)  1450 (26 4)     IV Piggyback  500     Total Intake(mL/kg)  1950 (35 5)     Blood  300     Total Output  300     Net  +1650            Unmeasured Urine Occurrence 2 x      Unmeasured Stool Occurrence 1 x          Weights:   IBW: 36 3 kg    Body mass index is 27 18 kg/m²    Weight (last 2 days)     Date/Time   Weight    21 0542   55 (121 25)    21 1113   54 4 (120)            Physical Exam  Vitals signs and nursing note reviewed  Constitutional:       Appearance: Normal appearance  HENT:      Head: Normocephalic and atraumatic  Cardiovascular:      Rate and Rhythm: Normal rate  Heart sounds: Normal heart sounds  No murmur  Pulmonary:      Effort: Pulmonary effort is normal  No respiratory distress  Breath sounds: Normal breath sounds  No wheezing  Abdominal:      Palpations: Abdomen is soft  Tenderness: There is no abdominal tenderness  Musculoskeletal:      Comments: Left arm sling/dressing  Moving all digits  Skin:     General: Skin is warm  Neurological:      Mental Status: She is alert  Comments: Pleasantly confused       LABORATORY DATA     Labs: I have personally reviewed pertinent reports  Results from last 7 days   Lab Units 04/09/21  0537 04/08/21  0433 04/07/21  1502   WBC Thousand/uL 6 90 7 59 9 38   HEMOGLOBIN g/dL 8 8* 11 4* 12 5   HEMATOCRIT % 28 1* 36 1 39 3   PLATELETS Thousands/uL 222 248 267   NEUTROS PCT % 61 60  --    MONOS PCT % 10 7  --       Results from last 7 days   Lab Units 04/09/21  0540 04/08/21  0433 04/07/21  1502   POTASSIUM mmol/L 3 8 4 2 5 2   CHLORIDE mmol/L 108 105 102   CO2 mmol/L 25 28 24   BUN mg/dL 22 34* 40*   CREATININE mg/dL 1 03 0 97 1 00   CALCIUM mg/dL 9 1 9 7 10 1     Results from last 7 days   Lab Units 04/07/21  1502   MAGNESIUM mg/dL 2 6     Results from last 7 days   Lab Units 04/07/21  1502   PHOSPHORUS mg/dL 3 4      Results from last 7 days   Lab Units 04/07/21  1607   INR  1 00   PTT seconds 25               IMAGING & DIAGNOSTIC TESTING     Radiology Results: I have personally reviewed pertinent reports  Xr Chest Portable    Result Date: 4/7/2021  Impression: Low lung volumes with bibasilar atelectasis  Left humeral diaphysis fracture  Workstation performed: [id number]     Xr Shoulder 2+ Vw Left    Result Date: 4/8/2021  Impression: Displaced angulated proximal left humeral fracture   Patient scheduled for operating room today Workstation performed: [id number]     Xr Humerus Left    Result Date: 4/9/2021  Impression: Fluoroscopic guidance provided for procedure guidance  Please refer to the separate procedure notes for additional details  Workstation performed: [id number]     Other Diagnostic Testing: I have personally reviewed pertinent reports  ACTIVE MEDICATIONS     Current Facility-Administered Medications   Medication Dose Route Frequency    acetaminophen (TYLENOL) tablet 975 mg  975 mg Oral Q8H [address]    amLODIPine (NORVASC) tablet 2 5 mg  2 5 mg Oral Daily    cephalexin (KEFLEX) capsule 500 mg  500 mg Oral Q12H [address]    cholecalciferol (VITAMIN D3) tablet 1,000 Units  1,000 Units Oral Daily    DULoxetine (CYMBALTA) delayed release capsule 40 mg  40 mg Oral Daily    heparin (porcine) subcutaneous injection 5,000 Units  5,000 Units Subcutaneous Q8H [address]    hydrALAZINE (APRESOLINE) injection 5 mg  5 mg Intravenous Once PRN    HYDROmorphone HCl (DILAUDID) injection 0 2 mg  0 2 mg Intravenous Q4H PRN    [START ON 4/10/2021] insulin detemir (LEVEMIR) subcutaneous injection 6 Units  6 Units Subcutaneous QAM    insulin lispro (HumaLOG) 100 units/mL subcutaneous injection 1-5 Units  1-5 Units Subcutaneous TID AC    insulin lispro (HumaLOG) 100 units/mL subcutaneous injection 2 Units  2 Units Subcutaneous TID With Meals    lactated ringers infusion  50 mL/hr Intravenous Continuous    levothyroxine tablet 75 mcg  75 mcg Oral Early Morning    ondansetron (ZOFRAN) injection 4 mg  4 mg Intravenous Q6H PRN    oxyCODONE (ROXICODONE) IR tablet 2 5 mg  2 5 mg Oral Q4H PRN    oxyCODONE (ROXICODONE) IR tablet 5 mg  5 mg Oral Q4H PRN    senna (SENOKOT) tablet 8 6 mg  1 tablet Oral HS    sodium chloride 0 9 % infusion  50 mL/hr Intravenous Continuous       VTE Pharmacologic Prophylaxis: Heparin  VTE Mechanical Prophylaxis: sequential compression device    Portions of the record may have been created with voice recognition software    Occasional wrong word or "sound a like" substitutions may have occurred due to the inherent limitations of voice recognition software    Read the chart carefully and recognize, using context, where substitutions have occurred   ==  Marc Lopez MD  [address]  Internal Medicine Residency PGY-2

## 2021-04-09 NOTE — CASE MANAGEMENT
Patient reviewed during care coordination rounds with Ortho resident Eleonora Magdaleno who informed that pt may be able to discharge later this afternoon vs tomorrow morning  Swallow eval and therapy evals pending  CM updated [facility] of same via [facility] and inquired about if they will require an updated Covid test prior to admission  CM to follow

## 2021-04-09 NOTE — PROGRESS NOTES
Patient pulled out her IV sites, and nursing was unsuccessful with placing another IV at this time  A nurse from ICU was also unsuccessful  Dr Nina Puckett notified that patient still in need of last dose of  IV Ancef

## 2021-04-09 NOTE — PHYSICAL THERAPY NOTE
PHYSICAL THERAPY EVALUATION  NAME:  Onnie Mohs  DATE: 04/09/21    AGE:   80 y o   Mrn:   [id number]  ADMIT DX:  Shoulder pain [M25 519]  Medication management [Z79 899]  Other closed displaced fracture of proximal end of left humerus, initial encounter [S42 292A]  Type 2 diabetes mellitus without complication, with long-term current use of insulin (HCC) [E11 9, Z79 4]  Hyperlipidemia, unspecified hyperlipidemia type [E78 5]  Stage 3a chronic kidney disease [N18 31]  Hypertension, unspecified type [I10]    Past Medical History:   Diagnosis Date    Anemia     Cholangitis 5/5/2018    Cholecystitis     Cholecystitis 5/3/2018    Cholecystitis with cholelithiasis 1/21/2018    Cholecystostomy tube dysfunction 2/16/2018    Choledocholithiasis 5/5/2018    Cholelithiasis     Chronic kidney disease     Coronary artery disease     D[facility] [address] [facility]     Dementia [facility] [address] [facility]     Diabetes mellitus ([facility] [address] [facility]     Type 2 blood sugar 257 on admission    Dysphagia     Encephalopathy     GERD (gastroesophageal reflux disease)     H/O falling     Hyperlipidemia     Hypertension     Hypothyroidism     Hypothyroid    Macular degeneration     Mild protein-calorie malnutrition ([address] ) 5/5/2018    Osteoarthritis     UTI (urinary tract infection)     UTI (urinary tract infection)     Vitamin D deficiency     Vitamin D deficiency        Past Surgical History:   Procedure Laterality Date    ERCP N/A 5/3/2018    Procedure: ENDOSCOPIC RETROGRADE CHOLANGIOPANCREATOGRAPHY (ERCP);   Surgeon: Ramon Rodney MD;  [facility];  Service: Gastroenterology    IR NEPHROSTOMY TUBE PLACEMENT      ORIF HUMERUS FRACTURE Left 4/8/2021    Procedure: OPEN REDUCTION W/ INTERNAL FIXATION (ORIF) LEFT PROXIMAL HUMERAL SHAFT FRACTURE;  Surgeon: Deepthi Smith MD;  [facility];  Service: Orthopedics    DE ERCP DX COLLECTION SPECIMEN BRUSHING/WASHING N/A 8/16/2018    Procedure: ENDOSCOPIC RETROGRADE CHOLANGIOPANCREATOGRAPHY (ERCP); Surgeon: Russell Mason MD;  Location: [facility]; Service: Gastroenterology       Length Of Stay: 2    PHYSICAL THERAPY EVALUATION:        04/09/21 0820   Note Type   Note type Evaluation   Pain Assessment   Pain Assessment Tool 0-10   Pain Score 4   Pain Location/Orientation Orientation: Left; Location: Arm   Pain Onset/Description Onset: Ongoing;Frequency: Constant/Continuous; Descriptor: Aching   Effect of Pain on Daily Activities Increased pain with activity   Patient's Stated Pain Goal No pain   Hospital Pain Intervention(s) Ambulation/increased activity;Repositioned   Home Living   Type of Home Assisted living  [facility]   Additional Comments Patient poor historian, per case management notes patient is a long-term resident at [facility]    Prior Function   Level of [place] Needs assistance with ADLs and functional mobility   Lives With Facility staff   Receives Help From Other (Comment)  (Facility staff)   Falls in the last 6 months 1 to 4   Comments Patient poor historian, per case management note patient often utilizes a wheelchair for mobility  Need to clarify patient's prior level of function   Restrictions/Precautions   Weight Bearing Precautions Per Order Yes   LUE Weight Bearing Per Order [facility]  (In splint and sling )   Braces or Orthoses Sling;Splint   Other Precautions Cognitive; Chair Alarm; Bed Alarm;WBS;Fall Risk;Pain   General   Family/Caregiver Present No   Cognition   Overall Cognitive Status Impaired   Arousal/Participation Alert   Attention Attends with cues to redirect   Orientation Level Oriented to person;Disoriented to place; Disoriented to time;Disoriented to situation   Memory Decreased recall of precautions;Decreased recall of recent events;Decreased short term memory   Following Commands Follows one step commands with increased time or repetition   RUE Assessment   RUE Assessment WFL   LUE Assessment   LUE Assessment X   RLE Assessment   RLE Assessment WFL   Strength RLE   RLE Overall Strength 3+/5   LLE Assessment   LLE Assessment WFL   Strength LLE   LLE Overall Strength 3+/5   Bed Mobility   Supine to Sit 3  Moderate assistance   Additional items Assist x 1; Increased time required;Verbal cues   Transfers   Sit to Stand 2  Maximal assistance   Additional items Assist x 1; Increased time required;Verbal cues   Stand to Sit 2  Maximal assistance   Additional items Assist x 1; Increased time required;Verbal cues   Stand pivot 2  Maximal assistance   Additional items Assist x 1; Increased time required;Verbal cues   Ambulation/Elevation   Gait pattern Not appropriate  (Decreased standing tolerance at current time)   Balance   Static Sitting Poor   Dynamic Sitting Poor   Static Standing Poor -   Dynamic Standing Poor -   Endurance Deficit   Endurance Deficit Yes   Endurance Deficit Description Fatigue, pain   Activity Tolerance   Activity Tolerance Patient limited by fatigue;Patient limited by pain   Medical Staff Made Aware LILLIANA Mckeon; Ivanna Arevalo OT student    Nurse Made Aware Patient appropriate to be seen and mobilized per nursing   Assessment   Prognosis Good   Problem List Decreased strength;Decreased range of motion;Decreased endurance; Impaired balance;Decreased mobility; Decreased cognition; Impaired judgement;Decreased safety awareness;Pain;Orthopedic restrictions   Assessment Pt is 80 y o  female seen for PT evaluation s/p admit to [facility] on 4/7/2021  Two pt identifiers were used to confirm  Pt presented for scheduled OPEN REDUCTION W/ INTERNAL FIXATION (ORIF) LEFT PROXIMAL HUMERAL SHAFT FRACTURE (Left) which was performed on 04/08/2021  Patient had originally sustained a fall on 4/1 resulting in left proximal humerus shaft fracture  Pt was presented to [facility] and was splinted and returned to [facility] with plans for outpt ortho follow up  At OP ortho follow up ortho recommended surgical management for fx    Pt was admitted with a primary dx of:  Closed fracture proximal end of left humerus s/p OPEN REDUCTION W/ INTERNAL FIXATION (ORIF) LEFT PROXIMAL HUMERAL SHAFT FRACTURE (Left)  PT now consulted for assessment of mobility and d/c needs  Pt with Up with assistance orders  Pts current co morbidities affecting treatment include: Anemia, CKD, CAD, dementia, DM, dysphagia, encephalopathy, HLD, HTN, hypothyroidism, macular degeneration, osteoarthritis  Pts current clinical presentation is Unstable/ Unpredictable (high complexity) due to Ongoing medical management for primary dx, Decreased activity tolerance compared to baseline, Fall risk, Increased assistance needed from caregiver at current time, Cog status, Current WBS, Continuous pulse oximetry monitoring , s/p surgical intervention    Upon evaluation, pt currently is requiring Mod Ax1 for bed mobility; Max Ax1 for sit <-> stand and stand pivot transfers  Pt presents at PT eval functioning below baseline and currently w/ overall mobility deficits 2* to: BLE weakness, decreased ROM, impaired balance, decreased endurance, pain, decreased activity tolerance compared to baseline, decreased safety awareness, impaired judgement, fall risk, orthopedic restrictions, decreased cognition  Pt currently at a fall risk 2* to impairments listed above  Based on the aforementioned PT evaluation, pt will continue to benefit from skilled Acute PT interventions to address stated impairments; to maximize functional mobility; for ongoing pt/ family training; and DME needs  At conclusion of PT session pt returned back in chair and chair alarm engaged with phone and call bell within reach  PT is currently recommending return to facility with apporpiate assistance and continued PT   PT will continue to follow during hospital stay     Goals   Patient Goals " to sleep"   STG Expiration Date 04/19/21   Short Term Goal #1 In 10 days pt will complete: 1) Bed mobility skills with min Ax1 while maintaining nonweightbearing to left upper extremity to increase safety and independence as well as decrease caregiver burden  2) Functional sit<-> stand and stand pivot transfers with min Ax1 while maintaining nonweightbearing to left upper extremity to promote increased independence, safety, and QOL  3) Improve balance grades by 1/2 grade to increase safety with all mobility and decrease fall risk  4) Improve BLE strength by 1/2 grade to help increase overall functional mobility and decrease fall risk  Plan   Treatment/Interventions Functional transfer training;LE strengthening/ROM; Therapeutic exercise; Endurance training;Patient/family training;Equipment eval/education; Bed mobility;Spoke to nursing;Continued evaluation;OT   PT Frequency Other (Comment)  (3-6x a week )   Recommendation   PT Discharge Recommendation Other (Comment)  (return to facility with approriate assist and cont  PT )   PT - OK to Discharge Yes  (When medically cleared)   AM-PAC Basic Mobility Inpatient   Turning in Bed Without Bedrails 2   Lying on Back to Sitting on Edge of Flat Bed 2   Moving Bed to Chair 2   Standing Up From Chair 2   Walk in Room 1   Climb 3-5 Stairs 1   Basic Mobility Inpatient Raw Score 10   Turning Head Towards Sound 4   Follow Simple Instructions 3   Low Function Basic Mobility Raw Score 17   Low Function Basic Mobility Standardized Score 27 46   Modified Ivy Scale   Modified Ivy Scale 4   Barthel Index   Feeding 10   Bathing 0   Grooming Score 5   Dressing Score 5   Bladder Score 5   Bowels Score 10   Toilet Use Score 5   Transfers (Bed/Chair) Score 5   Mobility (Level Surface) Score 0   Stairs Score 0   Barthel Index Score 45   Portions of the documentation may have been created using voice recognition software  Occasional wrong word or sound alike substitutions may have occurred due to the inherent limitations of the voice recognition software  Read the chart carefully and recognize, using context, where substitutions have occurred      Ramesh Gabreil Saray Jarrett, PT, DPT

## 2021-04-09 NOTE — QUICK NOTE
DI Alert triggered primarily for SpO2 18%  This was entered in error by the [employer] system  Accurate SpO2 97% on RA

## 2021-04-09 NOTE — PROGRESS NOTES
Subjective: Patient became agitated early this morning and removed her IV sites        Objective:  As below     Lab Results   Component Value Date/Time    WBC 7 59 04/08/2021 04:33 AM    WBC 5 29 07/12/2014 09:39 AM    HGB 11 4 (L) 04/08/2021 04:33 AM    HGB 11 6 07/12/2014 09:39 AM       Vitals:    04/09/21 0300   BP: (!) 117/45   Pulse: 73   Resp: 17   Temp: 98 2 °F (36 8 °C)   SpO2: 96%     Left upper extremity  Dress[place] CDI  Moving all digits spontaneously and appears to respond to light touch along all peripheral nerve distributions   Digits warm and well perfused with brisk capillary refill     Assessment: 80 y o  female with Left humeral shaft fracture sp fixation on 4/8    Plan:  NWB LUE in sling   Pain control  DVT ppx: mechanical, heparin  PT/OT post op  Will continue to assess for acute blood loss anemia  Dispo:  PT eval

## 2021-04-09 NOTE — CONSULTS
Consultation - Geriatric Medicine   [facility] [address] Melissa Sabillon 80 y o  female MRN: [id number]  Unit/Bed#: -[facility] Encounter: [id number]      Assessment/Plan     Left humeral shaft fracture s/p fixation 4/8  · Noted fracture L humerus after sustaining a fall from bed from nursing facility about a week ago, was being managed initially nonoperatively by Ortho as OP and was sent from clinic for ORIF which was done 4/8  · With noted limitation in ROM however pain is pretty much controlled  · PT/OT on board  · Rest of management per Ortho/primary team    Dementia with behavioral disturbances  · Resides in nursing facility  · Requires 24/7 care and support for ADLs, mostly wheelchair bound  · Currently on duloxetine, stable and at baseline prior to admission and no recent reports of behavioral disturbances per facility  · Noted to have mentation changes this morning and was removing IV  Likely due to post-op status, anesthetics, pain, change in environment  · Doing well on duloxetine, can continue this medication  · Continue with supportive and nonpharmacologic modalities as first line treatment, frequent reorientation and redirection, minimize night time distractions if able  Address pain if arises, would recommend starting off with topical pain meds including heat/ice applications, bengay, lidocaine patch  Minimize use of benzos and other sedating/deliriogenic medications  Can add geriatric pain recommendations for pain with low dose Oxy 2 5mg for moderate pain PRN and Oxy 5mg for severe pain PRN  Continue with Tylenol RTC    Dysphagia  · Per nursing facility, on mechanical diet  Currently on dysphagia 2 pureed diet, tolerating okay  · Has ongoing issues with nutrition, favors glucerna per facility  · Likely underlying dementia contributing with nutritional status  · Continue to encourage oral intake as able, dietary supplementation   Aspiration precautions     Hypertension  · BP stable  · Maintained on amlodopine    Hypothyroidism  · Most recent TSH 0 87 (Nov 2020)  · Continue with levothyroxine 75mcg daily    Diabetes mellitus, insulin requiring  · On Levemir 5u HS, metformin 850mg daily  · Blood sugars range 200-300s    CKD 3  · Creatinine range appears to be within 0 8-1 2, currently at baseline  · Monitor nutrition, as patient reports diminished appetite, appears to be ongoing per nursing facility staff as well  · Monitor BMP, electrolytes    Anemia  · Fluctuating, per review appears baseline within 10-12  · Noted drop from 12 --> 8, likely due to post-op status, IV fluids  · MCV high, Vit B low normal, folate WNL  · Consider checking iron studies, can be in the setting of chronic disease with poor nutritional status contributing    Deconditioning/debility/frailty  · Clinical frailty score: severe  · At baseline patient is mostly wheelchair bound, also needs help with ADLs  · Resides in nursing facility  · Likely multifactorial due to age, chronic illnesses, decreased reserve and issues with adequate nutrition  · Establish control of chronic conditions  · Fall precautions, frequent skin care  · Caution with sudden multiple changes with medications  · Continue PT/OT as able    Home Medications review (personally reviewed with nursing facility)  Acetaminophen suppository 650mg  Acetaminophen 325mg, 2 tabs q4 PRN  Amlodipine 2 5mg daily  Aspirin 81mg daily  Bisacodyl suppository PRN  Duloxetine 40mg daily  Sodium phosphate enema PRN  Levemir 5u daily  Levothyroxine 75mcg daily  Metformin 850mg daily    History of Present Illness   Physician Requesting Consult: Eve Berg MD  Reason for Consult / Principal Problem: L humeral fracture, dementia  Hx and PE limited by: dementia  Additional history obtained from: patient, review of chart, nursing facility   Attempted to call son Adria Kristina however not available currently    HPI: June Feliciano is a 80y o  year old female who presents with left humeral fracture after sustaining a fall last week  Patient has PMH of hypertension, hypothyroidism, DM on insulin, dementia with behavioral disturbances, history of multiple falls most recent on 04/01  Patient is a resident of the nursing facility  Per nursing facility staff, patient sustained an unwitnessed fall on 04/01, patient fell off the bed and struck her left shoulder and head  Patient was seen by Ortho as outpatient and had coaptation splinting and on follow up with Dr Moriah Summers 4/7 had xray done which showed unfavorable alignment of fracture hence was admitted for surgical intervention  Patient underwent ORIF left proximal humeral fracture 4/8  Morning post procedure, patient was noted to be agitated and was pulling out her IVs  Patient also with noted drop in hemoglobin 12 --> 8 8  Upon discussion with nursing facility staff, patient mostly with ongoing nutritional issues and decrease in appetite  No recent behavioral changes or outbursts noted prior to hospitalization  Patient mostly wheel-chair bound and needs assistance with ADLs  Inpatient consult to Gerontology     Performed by  Jailene Baker MD     Authorized by Clarita Henning PA-C              Review of Systems   Reason unable to perform ROS: may be limited due to baseline dementia  Constitutional: Positive for appetite change  Negative for fever  HENT: Positive for trouble swallowing  Negative for tinnitus  Eyes: Negative for pain  Respiratory: Negative for cough and shortness of breath  Cardiovascular: Negative for chest pain  Gastrointestinal: Negative for abdominal pain  Genitourinary: Negative for dysuria and hematuria  Musculoskeletal: Positive for arthralgias and gait problem  Skin: Negative for color change  Neurological: Negative for dizziness and light-headedness  Hematological: Negative for adenopathy  Psychiatric/Behavioral: Positive for confusion     All other systems reviewed and are negative  Memory/Cognitive screening:  [facility] 5/22 per chart review back in 2017  Mobility:  Wheelchair bound  Falls:  Most recent 4/1  Assistive Devices:  Wheelchair bound  Aeropuerto: severe  Nutrition/weight loss/grocery shopping/meal preparation: dysphagia diet, per facility on mechanical diet  Hearing impairment: with hearing loss  Incontinence: yes per nursing facility  Delirium: none prior to hospitalization per facility  Polypharmacy: meds reconciled  Patients [facility]    Historical Information   Past Medical History:   Diagnosis Date    Anemia     Cholangitis 5/5/2018    Cholecystitis     Cholecystitis 5/3/2018    Cholecystitis with cholelithiasis 1/21/2018    Cholecystostomy tube dysfunction 2/16/2018    Choledocholithiasis 5/5/2018    Cholelithiasis     Chronic kidney disease     Coronary artery disease     D[facility]     Dementia ([facility]     Diabetes mellitus ([facility] )     Type 2 blood sugar 257 on admission    Dysphagia     Encephalopathy     GERD (gastroesophageal reflux disease)     H/O falling     Hyperlipidemia     Hypertension     Hypothyroidism     Hypothyroid    Macular degeneration     Mild protein-calorie malnutrition ([facility] ) 5/5/2018    Osteoarthritis     UTI (urinary tract infection)     UTI (urinary tract infection)     Vitamin D deficiency     Vitamin D deficiency      Past Surgical History:   Procedure Laterality Date    ERCP N/A 5/3/2018    Procedure: ENDOSCOPIC RETROGRADE CHOLANGIOPANCREATOGRAPHY (ERCP); Surgeon: Artis Damian MD;  [facility];  Service: Gastroenterology    IR NEPHROSTOMY TUBE PLACEMENT      AR ERCP DX COLLECTION SPECIMEN BRUSHING/WASHING N/A 8/16/2018    Procedure: ENDOSCOPIC RETROGRADE CHOLANGIOPANCREATOGRAPHY (ERCP); Surgeon: Artis Damian MD;  [facility];   Service: Gastroenterology     Social History   Social History     Substance and Sexual Activity   Alcohol Use No     Social History     Substance and Sexual Activity   Drug Use No     Social History     Tobacco Use   Smoking Status Never Smoker   Smokeless Tobacco Never Used     Family History:   Family History   Problem Relation Age of Onset    No Known Problems Mother     No Known Problems Father        Meds/Allergies   all current active meds have been reviewed, current meds:   Current Facility-Administered Medications   Medication Dose Route Frequency    acetaminophen (TYLENOL) tablet 975 mg  975 mg Oral Q8H [address]    amLODIPine (NORVASC) tablet 2 5 mg  2 5 mg Oral Daily    cephalexin (KEFLEX) capsule 500 mg  500 mg Oral Q12H [address]    cholecalciferol (VITAMIN D3) tablet 1,000 Units  1,000 Units Oral Daily    DULoxetine (CYMBALTA) delayed release capsule 40 mg  40 mg Oral Daily    heparin (porcine) subcutaneous injection 5,000 Units  5,000 Units Subcutaneous Q8H [address]    hydrALAZINE (APRESOLINE) injection 5 mg  5 mg Intravenous Once PRN    HYDROmorphone HCl (DILAUDID) injection 0 2 mg  0 2 mg Intravenous Q4H PRN    [START ON 4/10/2021] insulin detemir (LEVEMIR) subcutaneous injection 6 Units  6 Units Subcutaneous QAM    insulin lispro (HumaLOG) 100 units/mL subcutaneous injection 1-5 Units  1-5 Units Subcutaneous TID AC    insulin lispro (HumaLOG) 100 units/mL subcutaneous injection 2 Units  2 Units Subcutaneous TID With Meals    lactated ringers infusion  50 mL/hr Intravenous Continuous    levothyroxine tablet 75 mcg  75 mcg Oral Early Morning    ondansetron (ZOFRAN) injection 4 mg  4 mg Intravenous Q6H PRN    oxyCODONE (ROXICODONE) IR tablet 2 5 mg  2 5 mg Oral Q4H PRN    oxyCODONE (ROXICODONE) IR tablet 5 mg  5 mg Oral Q4H PRN    senna (SENOKOT) tablet 8 6 mg  1 tablet Oral HS    sodium chloride 0 9 % infusion  50 mL/hr Intravenous Continuous    and PTA meds:   Prior to Admission Medications   Prescriptions Last Dose Informant Patient Reported? Taking?    DULoxetine HCl 40 MG CPEP 4/7/2021 at Unknown time  Yes Yes Sig: Take 1 capsule by mouth daily   SODIUM PHOSPHATES RE   Yes No   Sig: Insert into the rectum daily as needed   acetaminophen (TYLENOL) 325 mg tablet 4/6/2021 at Unknown time  Yes Yes   Sig: Take 2 tablets by mouth 2 (two) times a day   amLODIPine (NORVASC) 2 5 mg tablet 4/7/2021 at Unknown time  Yes Yes   Sig: Take 1 tablet by mouth daily   bisacodyl (FLEET) 10 MG/30ML ENEM Unknown at Unknown time  Yes No   Sig: Insert 10 mg into the rectum once   cholecalciferol (VITAMIN D3) 1,000 units tablet 4/6/2021 at Unknown time  Yes Yes   Sig: Take 1 tablet by mouth daily   insulin detemir (LEVEMIR) 100 units/mL subcutaneous injection 4/6/2021 at Unknown time  Yes Yes   Sig: Inject 5 Units under the skin daily at bedtime   levothyroxine 75 mcg tablet 4/7/2021 at Unknown time  Yes Yes   Sig: Take 1 tablet by mouth daily   magnesium hydroxide (Milk of Magnesia) 400 mg/5 mL oral suspension Unknown at Unknown time  Yes No   Sig: Take 30 mL by mouth daily as needed for constipation   metFORMIN (GLUCOPHAGE) 850 mg tablet 4/6/2021 at Unknown time  Yes Yes   Sig: Take 1 tablet by mouth daily with dinner   oxyCODONE (ROXICODONE) 5 mg immediate release tablet   Yes No   Sig: Take 1 tablet by mouth every 4 (four) hours as needed   oxyCODONE (ROXICODONE) 5 mg immediate release tablet Unknown at Unknown time  No No   Sig: Take 0 5 tablets (2 5 mg total) by mouth daily for 10 days  At 5:00 p  m  Max Daily Amount: 2 5 mg   rosuvastatin (CRESTOR) 5 mg tablet 4/6/2021 at Unknown time  Yes Yes   Sig: Take 1 tablet by mouth daily   senna-docusate sodium (SENOKOT-S) 8 6-50 mg per tablet 4/6/2021 at Unknown time  Yes Yes   Sig: Take 1 tablet by mouth daily      Facility-Administered Medications: None       Allergies   Allergen Reactions    Penicillins     Valsartan        Objective     Intake/Output Summary (Last 24 hours) at 4/9/2021 0622  Last data filed at 4/8/2021 1753  Gross per 24 hour   Intake 1950 ml   Output 300 ml   Net 1650 ml Invasive Devices     Peripheral Intravenous Line            Peripheral IV 04/07/21 Right Hand 1 day    Peripheral IV 04/08/21 Right Wrist less than 1 day                Physical Exam  Vitals signs and nursing note reviewed  Constitutional:       General: She is not in acute distress  Comments: Frail, elderly female, not in any acute distress   HENT:      Head: Normocephalic  Comments: (+) abrasion R temple area     Nose: Nose normal       Mouth/Throat:      Mouth: Mucous membranes are moist       Pharynx: Oropharynx is clear  Eyes:      Conjunctiva/sclera: Conjunctivae normal       Pupils: Pupils are equal, round, and reactive to light  Neck:      Musculoskeletal: Normal range of motion  Cardiovascular:      Rate and Rhythm: Normal rate and regular rhythm  Pulses: Normal pulses  Pulmonary:      Effort: Pulmonary effort is normal  No respiratory distress  Breath sounds: Normal breath sounds  Abdominal:      General: Bowel sounds are normal       Tenderness: There is no abdominal tenderness  There is no guarding  Musculoskeletal:      Comments: ROM limited due to pain, (+) L arm sling, dressing clean/dry/intact   Skin:     General: Skin is warm  Comments: (+) multiple ecchymosis/bruising ext   Neurological:      General: No focal deficit present  Mental Status: She is alert  Mental status is at baseline  Psychiatric:         Mood and Affect: Mood normal          Thought Content: Thought content normal          Lab Results:   I have personally reviewed pertinent lab results including the following:      I have personally reviewed the following imaging study reports in PACS:  Xr Chest Portable    Result Date: 4/7/2021  Impression: Low lung volumes with bibasilar atelectasis  Left humeral diaphysis fracture  Workstation performed: [id number]     Xr Shoulder 2+ Vw Left    Result Date: 4/8/2021  Impression: Displaced angulated proximal left humeral fracture   Patient scheduled for operating room today Workstation performed: [id number]     Xr Humerus Left    Result Date: 4/9/2021  Impression: Fluoroscopic guidance provided for procedure guidance  Please refer to the separate procedure notes for additional details  Workstation performed: [id number]     Personal interpretation of imaging studies mentioned above:    CXR: no acute abnormality, no consolidation noted    Therapies:   PT: on board  OT: on board    VTE Prophylaxis: Heparin    Code Status: Level 1 - Full Code  Advance Directive and Living Will:      Power of :    POLST:      Family and Social Support: resident of nursing facility, with POA son [place]leg [place]: Other (Comment) ([facility])  Support Systems: Children  Type of Current Residence: Nursing home  [address] Name: [facility]  Discharge planning discussed with[de-identified] Pt's son, Corinne Lares of Choice: Yes  CM Handoff Comments: 4/8/21: D/c TBD  Dx: L humeral fx s/p fall  ORIF w/Ortho 4/8  LTR at [facility], to return at discharge  Dementia at baseline, son is contact  Follow for needs  Will likely require assistance with transportation        Goals of Care: currently full code

## 2021-04-09 NOTE — SPEECH THERAPY NOTE
Speech Language/Pathology  Speech-Language Pathology Bedside Swallow Evaluation      Patient Name: Marilyn OLMOS'Q Date: 4/9/2021     Problem List  Principal Problem:    Closed fracture of proximal end of left humerus  Active Problems:    Dementia ([facility] )    Hypertension    Diabetes mellitus ([facility] [facility] )    Chronic kidney disease, stage 3 (HCC)    Hypothyroid    Dysphagia    Hyperlipidemia    Vitamin D deficiency    H/O falling    Preoperative examination      Past Medical History  Past Medical History:   Diagnosis Date    Anemia     Cholangitis 5/5/2018    Cholecystitis     Cholecystitis 5/3/2018    Cholecystitis with cholelithiasis 1/21/2018    Cholecystostomy tube dysfunction 2/16/2018    Choledocholithiasis 5/5/2018    Cholelithiasis     Chronic kidney disease     Coronary artery disease     Dementia ([facility]     Dementia ([facility] [facility]     Diabetes mellitus (HCC)     Type 2 blood sugar 257 on admission    Dysphagia     Encephalopathy     GERD (gastroesophageal reflux disease)     H/O falling     Hyperlipidemia     Hypertension     Hypothyroidism     Hypothyroid    Macular degeneration     Mild protein-calorie malnutrition ([facility] ) 5/5/2018    Osteoarthritis     UTI (urinary tract infection)     UTI (urinary tract infection)     Vitamin D deficiency     Vitamin D deficiency        Past Surgical History  Past Surgical History:   Procedure Laterality Date    ERCP N/A 5/3/2018    Procedure: ENDOSCOPIC RETROGRADE CHOLANGIOPANCREATOGRAPHY (ERCP); Surgeon: Susi Sharp MD;  [facility];  Service: Gastroenterology    IR NEPHROSTOMY TUBE PLACEMENT      ORIF HUMERUS FRACTURE Left 4/8/2021    Procedure: OPEN REDUCTION W/ INTERNAL FIXATION (ORIF) LEFT PROXIMAL HUMERAL SHAFT FRACTURE;  Surgeon: Génesis Cruz MD;  [facility];  Service: Orthopedics    ME ERCP DX COLLECTION SPECIMEN BRUSHING/WASHING N/A 8/16/2018    Procedure: ENDOSCOPIC RETROGRADE CHOLANGIOPANCREATOGRAPHY (ERCP);   Surgeon: Ekta García MD;  [facility]; Service: Gastroenterology       Summary   Pt presented with functional appearing oral and pharyngeal stage swallowing skills with limited materials administered today  Pt w/ refusal of most PO, only took a few spoonfuls of yogurt and cup and straw sips of thin  Oral manipulation of pureed material is functional, no s/s of reduced oral control  Swallow initiation suspected fairly prompt  No overt s/s aspiration during evaluation  Aspiration risk suspected low, but further assessment w/ advanced trials would be necessary for further recommendations  Risk/s for Aspiration: Suspected low (for baseline modified diet)     Recommended Diet: puree/level 1 diet and thin liquids   Recommended Form of Meds: crushed with puree   Aspiration precautions and swallowing strategies: upright posture and only feed when fully alert  Other Recommendations/Considerations: -        Current Medical Status  Pt is a 80 y o  female who presented to [facility] with 80y o  year old female who presents with left humeral fracture after sustaining a fall last week  Patient has PMH of hypertension, hypothyroidism, DM on insulin, dementia with behavioral disturbances, history of multiple falls most recent on 04/01  Patient is a resident of the nursing facility  Per nursing facility staff, patient sustained an unwitnessed fall on 04/01, patient fell off the bed and struck her left shoulder and head  Patient was seen by Ortho as outpatient and had coaptation splinting and on follow up with Dr Alvarez Ray 4/7 had xray done which showed unfavorable alignment of fracture hence was admitted for surgical intervention  Patient underwent ORIF left proximal humeral fracture 4/8   Morning post procedure, patient was noted to be agitated and was pulling out her IVs  Patient also with noted drop in hemoglobin 12 --> 8 8      Upon discussion with nursing facility staff, patient mostly with ongoing nutritional issues and decrease in appetite  No recent behavioral changes or outbursts noted prior to hospitalization  Patient mostly wheel-chair bound and needs assistance with ADLs       Current Precautions:  Fall    Allergies:  No known food allergies    Past medical history:  Please see H&P for details    Special Studies:  CXR 4/7/21: Low lung volumes with bibasilar atelectasis      Left humeral diaphysis fracture  Social/Education/Vocational Hx:  Pt lives [employer]   Current Risks for Dysphagia & Aspiration: known history of dysphagia and cognitive status  Current Symptoms/Concerns: difficulty masticating  Current Diet: mechanically altered/level 2 diet and thin liquids   Baseline Diet: mechanically altered/level 2 diet and thin liquids      Baseline Assessment   Behavior/Cognition: alert and baseline dementia  Speech/Language Status: able to participate in conversation and able to follow commands inconsistently  Patient Positioning: upright in chair  Pain Status/Interventions/Response to Interventions: Reports significant pain but cannot identify where       Swallow Mechanism Exam  Facial: symmetrical  Labial: WFL  Lingual: unable to test 2/2 limited command following  Velum: unable to visualize  Mandible: unable to test 2/2 limited command following  Dentition: adequate  Vocal quality:clear/adequate   Volitional Cough: weak   Respiratory Status: on RA     Consistencies Assessed and Performance   Consistencies Administered: thin liquids and puree  Materials administered included yogurt, thin liquids by cup and straw    Oral Stage: WFL for limited materials assessed today   Bolus formation and transfer of puree were functional with no significant oral residue noted  No overt s/s reduced oral control  Pharyngeal Stage: suspected WFL for limited materials assessed today  Swallow Mechanics:  Swallowing initiation appeared prompt    Laryngeal rise was palpated and judged to be within functional limits  No coughing, throat clearing, change in vocal quality or respiratory status noted today  Esophageal Concerns: none reported    Strategies and Efficacy: -    Summary and Recommendations (see above)    Results Reviewed with: RN and MD     Treatment Recommended: Yes      Frequency of treatment: As able     Patient Stated Goal: "I want nothing  No "     Dysphagia LTG  -Patient will demonstrate safe and effective oral intake (without overt s/s significant oral/pharyngeal dysphagia including s/s penetration or aspiration) for the highest appropriate diet level       Short Term Goals:  -Pt will tolerate Dysphagia 1/pureed diet and thin liquid with no significant s/s oral or pharyngeal dysphagia across 1-3 diagnostic session/s        Speech Therapy Prognosis   Prognosis: fair    Prognosis Considerations: age, cognitive status and therapeutic potential

## 2021-04-09 NOTE — PLAN OF CARE
Problem: PHYSICAL THERAPY ADULT  Goal: Performs mobility at highest level of function for planned discharge setting  See evaluation for individualized goals  Description: Treatment/Interventions: Functional transfer training, LE strengthening/ROM, Therapeutic exercise, Endurance training, Patient/family training, Equipment eval/education, Bed mobility, Spoke to nursing, Continued evaluation, OT          See flowsheet documentation for full assessment, interventions and recommendations  Note: Prognosis: Good  Problem List: Decreased strength, Decreased range of motion, Decreased endurance, Impaired balance, Decreased mobility, Decreased cognition, Impaired judgement, Decreased safety awareness, Pain, Orthopedic restrictions  Assessment: Pt is 80 y o  female seen for PT evaluation s/p admit to [facility] on 4/7/2021  Two pt identifiers were used to confirm  Pt presented for scheduled OPEN REDUCTION W/ INTERNAL FIXATION (ORIF) LEFT PROXIMAL HUMERAL SHAFT FRACTURE (Left) which was performed on 04/08/2021  Patient had originally sustained a fall on 4/1 resulting in left proximal humerus shaft fracture  Pt was presented to B and was splinted and returned to [facility] with plans for outpt ortho follow up  At OP ortho follow up ortho recommended surgical management for fx  Pt was admitted with a primary dx of:  Closed fracture proximal end of left humerus s/p OPEN REDUCTION W/ INTERNAL FIXATION (ORIF) LEFT PROXIMAL HUMERAL SHAFT FRACTURE (Left)  PT now consulted for assessment of mobility and d/c needs  Pt with Up with assistance orders  Pts current co morbidities affecting treatment include: Anemia, CKD, CAD, dementia, DM, dysphagia, encephalopathy, HLD, HTN, hypothyroidism, macular degeneration, osteoarthritis   Pts current clinical presentation is Unstable/ Unpredictable (high complexity) due to Ongoing medical management for primary dx, Decreased activity tolerance compared to baseline, Fall risk, Increased assistance needed from caregiver at current time, Cog status, Current WBS, Continuous pulse oximetry monitoring , s/p surgical intervention    Upon evaluation, pt currently is requiring Mod Ax1 for bed mobility; Max Ax1 for sit <-> stand and stand pivot transfers  Pt presents at PT eval functioning below baseline and currently w/ overall mobility deficits 2* to: BLE weakness, decreased ROM, impaired balance, decreased endurance, pain, decreased activity tolerance compared to baseline, decreased safety awareness, impaired judgement, fall risk, orthopedic restrictions, decreased cognition  Pt currently at a fall risk 2* to impairments listed above  Based on the aforementioned PT evaluation, pt will continue to benefit from skilled Acute PT interventions to address stated impairments; to maximize functional mobility; for ongoing pt/ family training; and DME needs  At conclusion of PT session pt returned back in chair and chair alarm engaged with phone and call bell within reach  PT is currently recommending return to facility with apporpiate assistance and continued PT   PT will continue to follow during hospital stay  PT Discharge Recommendation: Other (Comment)(return to facility with approriate assist and cont  PT )     PT - OK to Discharge: Yes(When medically cleared)    See flowsheet documentation for full assessment

## 2021-04-10 ENCOUNTER — TELEPHONE (OUTPATIENT)
Dept: OTHER | Facility: OTHER | Age: 86
End: 2021-04-10

## 2021-04-10 VITALS
HEART RATE: 77 BPM | OXYGEN SATURATION: 94 % | DIASTOLIC BLOOD PRESSURE: 68 MMHG | HEIGHT: 56 IN | TEMPERATURE: 98.8 F | SYSTOLIC BLOOD PRESSURE: 109 MMHG | BODY MASS INDEX: 27.87 KG/M2 | WEIGHT: 123.9 LBS | RESPIRATION RATE: 18 BRPM

## 2021-04-10 LAB
GLUCOSE SERPL-MCNC: 133 MG/DL (ref 65–140)
GLUCOSE SERPL-MCNC: 142 MG/DL (ref 65–140)
GLUCOSE SERPL-MCNC: 150 MG/DL (ref 65–140)
GLUCOSE SERPL-MCNC: 224 MG/DL (ref 65–140)
GLUCOSE SERPL-MCNC: 268 MG/DL (ref 65–140)
GLUCOSE SERPL-MCNC: 341 MG/DL (ref 65–140)
GLUCOSE SERPL-MCNC: 90 MG/DL (ref 65–140)

## 2021-04-10 PROCEDURE — NC001 PR NO CHARGE: Performed by: ORTHOPAEDIC SURGERY

## 2021-04-10 PROCEDURE — 99232 SBSQ HOSP IP/OBS MODERATE 35: CPT | Performed by: INTERNAL MEDICINE

## 2021-04-10 PROCEDURE — 82948 REAGENT STRIP/BLOOD GLUCOSE: CPT

## 2021-04-10 RX ORDER — OXYCODONE HYDROCHLORIDE 5 MG/1
5 TABLET ORAL EVERY 6 HOURS PRN
Qty: 20 TABLET | Refills: 0 | Status: SHIPPED | OUTPATIENT
Start: 2021-04-10 | End: 2021-06-08 | Stop reason: ALTCHOICE

## 2021-04-10 RX ORDER — CEPHALEXIN 500 MG/1
500 CAPSULE ORAL EVERY 12 HOURS SCHEDULED
Qty: 1 CAPSULE | Refills: 0 | Status: SHIPPED | OUTPATIENT
Start: 2021-04-10 | End: 2021-04-11

## 2021-04-10 RX ADMIN — CEPHALEXIN 500 MG: 500 CAPSULE ORAL at 08:27

## 2021-04-10 RX ADMIN — Medication 1000 UNITS: at 08:27

## 2021-04-10 RX ADMIN — AMLODIPINE BESYLATE 2.5 MG: 2.5 TABLET ORAL at 08:27

## 2021-04-10 RX ADMIN — INSULIN DETEMIR 10 UNITS: 100 INJECTION, SOLUTION SUBCUTANEOUS at 08:26

## 2021-04-10 RX ADMIN — DULOXETINE HYDROCHLORIDE 40 MG: 20 CAPSULE, DELAYED RELEASE PELLETS ORAL at 08:27

## 2021-04-10 RX ADMIN — ACETAMINOPHEN 975 MG: 325 TABLET, FILM COATED ORAL at 13:44

## 2021-04-10 RX ADMIN — LEVOTHYROXINE SODIUM 75 MCG: 75 TABLET ORAL at 05:01

## 2021-04-10 RX ADMIN — HEPARIN SODIUM 5000 UNITS: 5000 INJECTION INTRAVENOUS; SUBCUTANEOUS at 13:43

## 2021-04-10 RX ADMIN — INSULIN LISPRO 2 UNITS: 100 INJECTION, SOLUTION INTRAVENOUS; SUBCUTANEOUS at 10:38

## 2021-04-10 RX ADMIN — ACETAMINOPHEN 975 MG: 325 TABLET, FILM COATED ORAL at 05:01

## 2021-04-10 RX ADMIN — INSULIN LISPRO 4 UNITS: 100 INJECTION, SOLUTION INTRAVENOUS; SUBCUTANEOUS at 16:31

## 2021-04-10 RX ADMIN — HEPARIN SODIUM 5000 UNITS: 5000 INJECTION INTRAVENOUS; SUBCUTANEOUS at 05:01

## 2021-04-10 NOTE — CASE MANAGEMENT
CM notified pt stable for d/c  Message sent to [facility], pt is a bedhold  Cm scheduled S transport with [facility] for 1630, contacted son Nacho Burkett to make aware, ortho also made aware

## 2021-04-10 NOTE — DISCHARGE SUMMARY
Discharge Summary - Orthopedics   [facility] 80 y o  female MRN: [id number]  Unit/Bed#: -01    Attending Physician: Cristina Siu    Admitting diagnosis: Shoulder pain [M25 519]  Medication management [Z79 899]  Other closed displaced fracture of proximal end of left humerus, initial encounter [S42 292A]  Type 2 diabetes mellitus without complication, with long-term current use of insulin ([address] ) [E11 9, Z79 4]  Hyperlipidemia, unspecified hyperlipidemia type [E78 5]  Stage 3a chronic kidney disease [N18 31]  Hypertension, unspecified type [I10]    Discharge diagnosis: Shoulder pain [M25 519]  Medication management [Z79 899]  Other closed displaced fracture of proximal end of left humerus, initial encounter [S42 292A]  Type 2 diabetes mellitus without complication, with long-term current use of insulin (HCC) [E11 9, Z79 4]  Hyperlipidemia, unspecified hyperlipidemia type [E78 5]  Stage 3a chronic kidney disease [N18 31]  Hypertension, unspecified type [I10]    Date of admission: 4/7/2021    Date of discharge: 04/10/21    Procedure: ORIF L proximal humeral shaft fracture    HPI & Hospital course:  80 y o  female status post fall out of bed at nursing home on 4/1/21 sustaining a left humeral shaft fracture     Pt was admitted to the orthopaedic service and taken to the OR on 4/8/2021 for ORIF left proximal humeral shaft fracture     Prior to surgery the risks and benefits of surgery were explained and informed consent was obtained  Surgery went without complications and pt was discharged to the PACU in a stable condition and was transferred to the floor  On discharge date pt was cleared by PT and the medicine team and determined to be safe for discharge  Daily discussion was had with the patient, nursing staff, orthopaedic team, and family members if present  All questions were answered to the patients satisifaction        0   Lab Value Date/Time    HGB 8 8 (L) 04/09/2021 0537    HGB 11 4 (L) 04/08/2021 0433    HGB 12 5 04/07/2021 1502    HGB 12 2 09/24/2018 0016    HGB 9 6 (L) 05/07/2018 0453    HGB 8 8 (L) 05/06/2018 0442    HGB 8 5 (L) 05/05/2018 0523    HGB 9 1 (L) 05/04/2018 0429    HGB 9 0 (L) 05/04/2018 0006    HGB 9 9 (L) 05/03/2018 1744    HGB 10 2 (L) 05/03/2018 1452    HGB 12 6 05/03/2018 1011    HGB 11 2 (L) 02/16/2018 0447    HGB 11 9 02/15/2018 2139    HGB 10 9 (L) 01/26/2018 0445    HGB 9 6 (L) 01/25/2018 0449    HGB 10 0 (L) 01/24/2018 2256    HGB 10 6 (L) 01/24/2018 1841    HGB 11 1 (L) 01/24/2018 0508    HGB 10 7 (L) 01/23/2018 0507    HGB 9 9 (L) 01/22/2018 0526    HGB 11 4 (L) 01/21/2018 0518    HGB 12 7 01/20/2018 0457    HGB 13 2 01/19/2018 2139    HGB 13 4 11/23/2017 0554    HGB 12 1 11/21/2017 1821    HGB 13 3 11/12/2017 0357    HGB 11 6 07/12/2014 0939       Greater than 2 gram decrease in Hb qualifies for diagnosis of acute blood loss anemia  Vital signs remained stable and pt was resuscitated with IVF as needed  Discharge Instructions:   · NWB LUE in sling  · Keep dressings clean and dry at all times   · Physical therapy  · Follow-up as scheduled, otherwise call for appt  Discharge Medications: For the complete list of discharge medications, please refer to the patient's medication reconciliation

## 2021-04-10 NOTE — PROGRESS NOTES
Subjective: Patient resting comfortably this morning        Objective:  As below     Lab Results   Component Value Date/Time    WBC 6 90 04/09/2021 05:37 AM    WBC 5 29 07/12/2014 09:39 AM    HGB 8 8 (L) 04/09/2021 05:37 AM    HGB 11 6 07/12/2014 09:39 AM       Vitals:    04/10/21 0306   BP: 118/52   Pulse: 67   Resp: 18   Temp: (!) 97 2 °F (36 2 °C)   SpO2: 98%     Left upper extremity  Dressings CDI  Moving all digits spontaneously and appears to respond to light touch along all peripheral nerve distributions   Digits warm and well perfused with brisk capillary refill     Assessment: 80 y o  female with Left humeral shaft fracture sp fixation on 4/8    Plan:  NWB LUE in sling   Pain control  DVT ppx: mechanical, heparin  PT/OT post op  Will continue to assess for acute blood loss anemia  Dispo: likely DC today

## 2021-04-10 NOTE — TRANSPORTATION MEDICAL NECESSITY
Section I - General Information    Name of Patient: Maryann Ramachandran                 : 1931    Medicare #: [id number]  Transport Date: 04/10/21 (PCS is valid for round trips on this date and for all repetitive trips in the 60-day range as noted below )  Origin: [facility] [address]                                                         Destination: [address]  Is the pt's stay covered under Medicare Part A (PPS/DRG)   []     Closest appropriate facility? If no, why is transport to more distant facility required? Yes  If hospice pt, is this transport related to pt's terminal illness? NA       Section II - Medical Necessity Questionnaire  Ambulance transportation is medically necessary only if other means of transport are contraindicated or would be potentially harmful to the patient  To meet this requirement, the patient must either be "bed confined" or suffer from a condition such that transport by means other than ambulance is contraindicated by the patient's condition  The following questions must be answered by the medical professional signing below for this form to be valid    1)  Describe the MEDICAL CONDITION (physical and/or mental) of this patient AT [address] [place] [address] that requires the patient to be transported in an ambulance and why transport by other means is contraindicated by the patient's condition: L humerus fracture, Dementia    2) Is the patient "bed confined" as defined below? Yes  To be "be confined" the patient must satisfy all three of the following conditions: (1) unable to get up from bed without Assistance; AND (2) unable to ambulate; AND (3) unable to sit in a chair or wheelchair  3) Can this patient safely be transported by car or wheelchair van (i e , seated during transport without a medical attendant or monitoring)?    No    4) In addition to completing questions 1-3 above, please check any of the following conditions that apply*:   *Note: supporting documentation for any boxes checked must be maintained in the patient's medical records  If hosp-hosp transfer, describe services needed at 2nd facility not available at 1st facility? Non-Healed Fractures  Patient is confused  Unable to tolerate seated position for time needed to transport       Section III - Signature of Physician or Healthcare Professional  I certify that the above information is true and correct based on my evaluation of this patient, and represent that the patient requires transport by ambulance and that other forms of transport are contraindicated  I understand that this information will be used by the Centers for Medicare and Medicaid Services (CMS) to support the determination of medical necessity for ambulance services, and I represent that I have personal knowledge of the patient's condition at time of transport  []  If this box is checked, I also certify that the patient is physically or mentally incapable of signing the ambulance service's claim and that the institution with which I am affiliated has furnished care, services, or assistance to the patient  My signature below is made on behalf of the patient pursuant to 42 CFR §424 36(b)(4)  In accordance with 42 CFR §424 37, the specific reason(s) that the patient is physically or mentally incapable of signing the claim form is as follows: Za Tang of Physician* or Healthcare Professional______________________________________________________________  Signature Date 04/10/21 (For scheduled repetitive transports, this form is not valid for transports performed more than 60 days after this date)    Printed Name & Credentials of Physician or Healthcare Professional (MD, DO, RN, etc )________________________________  *Form must be signed by patient's attending physician for scheduled, repetitive transports   For non-repetitive, unscheduled ambulance transports, if unable to obtain the signature of the attending physician, any of the following may sign (choose appropriate option below)  [] Physician Assistant []  Clinical Nurse Specialist []  Registered Nurse  []  Nurse Practitioner  [x] Discharge Planner

## 2021-04-10 NOTE — PLAN OF CARE
Problem: Potential for Falls  Goal: Patient will remain free of falls  Description: INTERVENTIONS:  - Assess patient frequently for physical needs  -  Identify cognitive and physical deficits and behaviors that affect risk of falls    -  [place] fall precautions as indicated by assessment   - Educate patient/family on patient safety including physical limitations  - Instruct patient to call for assistance with activity based on assessment  - Modify environment to reduce risk of injury  - Consider OT/PT consult to assist with strengthening/mobility  Outcome: Progressing     Problem: Prexisting or High Potential for Compromised Skin Integrity  Goal: Skin integrity is maintained or improved  Description: INTERVENTIONS:  - Identify patients at risk for skin breakdown  - Assess and monitor skin integrity  - Assess and monitor nutrition and hydration status  - Monitor labs   - Assess for incontinence   - Turn and reposition patient  - Assist with mobility/ambulation  - Relieve pressure over bony prominences  - Avoid friction and shearing  - Provide appropriate hygiene as needed including keeping skin clean and dry  - Evaluate need for skin moisturizer/barrier cream  - Collaborate with interdisciplinary team   - Patient/family teaching  - Consider wound care consult   Outcome: Progressing     Problem: PAIN - ADULT  Goal: Verbalizes/displays adequate comfort level or baseline comfort level  Description: Interventions:  - Encourage patient to monitor pain and request assistance  - Assess pain using appropriate pain scale  - Administer analgesics based on type and severity of pain and evaluate response  - Implement non-pharmacological measures as appropriate and evaluate response  - Consider cultural and social influences on pain and pain management  - Notify physician/advanced practitioner if interventions unsuccessful or patient reports new pain  Outcome: Progressing     Problem: INFECTION - ADULT  Goal: Absence or prevention of progression during hospitalization  Description: INTERVENTIONS:  - Assess and monitor for signs and symptoms of infection  - Monitor lab/diagnostic results  - Monitor all insertion sites, i e  indwelling lines, tubes, and drains  - Monitor endotracheal if appropriate and nasal secretions for changes in amount and color  - [place] appropriate cooling/warming therapies per order  - Administer medications as ordered  - Instruct and encourage patient and family to use good hand hygiene technique  - Identify and instruct in appropriate isolation precautions for identified infection/condition  Outcome: Progressing  Goal: Absence of fever/infection during neutropenic period  Description: INTERVENTIONS:  - Monitor WBC    Outcome: Progressing     Problem: SAFETY ADULT  Goal: Patient will remain free of falls  Description: INTERVENTIONS:  - Assess patient frequently for physical needs  -  Identify cognitive and physical deficits and behaviors that affect risk of falls    -  [place] fall precautions as indicated by assessment   - Educate patient/family on patient safety including physical limitations  - Instruct patient to call for assistance with activity based on assessment  - Modify environment to reduce risk of injury  - Consider OT/PT consult to assist with strengthening/mobility  Outcome: Progressing  Goal: Maintain or return to baseline ADL function  Description: INTERVENTIONS:  -  Assess patient's ability to carry out ADLs; assess patient's baseline for ADL function and identify physical deficits which impact ability to perform ADLs (bathing, care of mouth/teeth, toileting, grooming, dressing, etc )  - Assess/evaluate cause of self-care deficits   - Assess range of motion  - Assess patient's mobility; develop plan if impaired  - Assess patient's need for assistive devices and provide as appropriate  - Encourage maximum independence but intervene and supervise when necessary  - Involve family in performance of ADLs  - Assess for home care needs following discharge   - Consider OT consult to assist with ADL evaluation and planning for discharge  - Provide patient education as appropriate  Outcome: Progressing  Goal: Maintain or return mobility status to optimal level  Description: INTERVENTIONS:  - Assess patient's baseline mobility status (ambulation, transfers, stairs, etc )    - Identify cognitive and physical deficits and behaviors that affect mobility  - Identify mobility aids required to assist with transfers and/or ambulation (gait belt, sit-to-stand, lift, walker, cane, etc )  - [place] fall precautions as indicated by assessment  - Record patient progress and toleration of activity level on Mobility SBAR; progress patient to next Phase/Stage  - Instruct patient to call for assistance with activity based on assessment  - Consider rehabilitation consult to assist with strengthening/weightbearing, etc   Outcome: Progressing     Problem: DISCHARGE PLANNING  Goal: Discharge to home or other facility with appropriate resources  Description: INTERVENTIONS:  - Identify barriers to discharge w/patient and caregiver  - Arrange for needed discharge resources and transportation as appropriate  - Identify discharge learning needs (meds, wound care, etc )  - Arrange for interpretive services to assist at discharge as needed  - Refer to Case Management Department for coordinating discharge planning if the patient needs post-hospital services based on physician/advanced practitioner order or complex needs related to functional status, cognitive ability, or social support system  Outcome: Progressing     Problem: Knowledge Deficit  Goal: Patient/family/caregiver demonstrates understanding of disease process, treatment plan, medications, and discharge instructions  Description: Complete learning assessment and assess knowledge base    Interventions:  - Provide teaching at level of understanding  - Provide teaching via preferred learning methods  Outcome: Progressing     Problem: Nutrition/Hydration-ADULT  Goal: Nutrient/Hydration intake appropriate for improving, restoring or maintaining nutritional needs  Description: Monitor and assess patient's nutrition/hydration status for malnutrition  Collaborate with interdisciplinary team and initiate plan and interventions as ordered  Monitor patient's weight and dietary intake as ordered or per policy  Utilize nutrition screening tool and intervene as necessary  Determine patient's food preferences and provide high-protein, high-caloric foods as appropriate       INTERVENTIONS:  - Monitor oral intake, urinary output, labs, and treatment plans  - Assess nutrition and hydration status and recommend course of action  - Evaluate amount of meals eaten  - Assist patient with eating if necessary   - Allow adequate time for meals  - Recommend/ encourage appropriate diets, oral nutritional supplements, and vitamin/mineral supplements  - Order, calculate, and assess calorie counts as needed  - Recommend, monitor, and adjust tube feedings and TPN/PPN based on assessed needs  - Assess need for intravenous fluids  - Provide specific nutrition/hydration education as appropriate  - Include patient/family/caregiver in decisions related to nutrition  Outcome: Progressing

## 2021-04-10 NOTE — PROGRESS NOTES
INTERNAL MEDICINE RESIDENCY PROGRESS NOTE     Name: Maryann Ramachandran   Age & Sex: 80 y o  female   MRN: [id number]  Unit/Bed#: -01   Encounter: [id number]  Team: SOD Team A    PATIENT INFORMATION     Name: Maryann Ramachandran   Age & Sex: 80 y o  female   MRN: [id number] [facility] Stay Days: 3    ASSESSMENT/PLAN     Principal Problem:    Closed fracture of proximal end of left humerus  Active Problems:    Diabetes mellitus ([facility] )    Chronic kidney disease, stage 3 ([facility] )    Hypertension    Dementia ([facility] )    Hypothyroid    Dysphagia    Hyperlipidemia    Vitamin D deficiency    H/O falling    Preoperative examination      Diabetes mellitus [facility])  Assessment & Plan  Lab Results   Component Value Date    HGBA1C 8 0 (H) 04/07/2021       Recent Labs     04/10/21  0453 04/10/21  0707 04/10/21  0823 04/10/21  1018   POCGLU 150* 142* 133 268*       Blood Sugar Average: Last 72 hrs:  (P) 238 65     Blood glucose elevated overnight, requiring initiation of insulin drip  Will switch to basal insulin and SSI  On discharge, she can go on Levemir 10 units and metformin 850 mg  Chronic kidney disease, stage 3 [facility])  Assessment & Plan  Lab Results   Component Value Date    EGFR 48 04/09/2021    EGFR 52 04/08/2021    EGFR 50 04/07/2021    CREATININE 1 03 04/09/2021    CREATININE 0 97 04/08/2021    CREATININE 1 00 04/07/2021     GFR approximately 50, stage IIIA; likely combination of age-related nephron loss, diabetic nephropathy, hypertensive nephrosclerosis  · Avoid hypotension  · Avoid nephrotoxic agents, NSAIDs, and contrast if able    Hypertension  Assessment & Plan  Patient's blood pressures are being well controlled  · Continue home amlodipine 2 5 mg  · Hydralazine 5 mg once p r n  if SBP greater than 180    * Closed fracture of proximal end of left humerus  Assessment & Plan  POD #1 status post fixation  Pain management per primary team   Bowel regimen for opioid induced constipation      H/O falling  Assessment & Plan  Patient has had multiple falls in the past, mass documented on   · Fall precautions  · Consider inquiring more about nature of falls with nursing home to prevent any future episodes    Vitamin D deficiency  Assessment & Plan  · Continue home coli calciferol 1000 units daily    Hyperlipidemia  Assessment & Plan  · On rosuvastatin 5 mg at home--holding during admission    Dysphagia  Assessment & Plan  Dysphagia modified diet  Hypothyroid  Assessment & Plan  · Continue home levothyroxine 75 mcg a m  Dementia [facility])  Assessment & Plan  Seems to be consistently AOx0-1  Her son Felicitas Solis is the PoA  · Continue dementia precautions  · Geriatrics following  Disposition:  Per primary team    SUBJECTIVE     Patient seen and examined  Hyperglycemic overnight requiring initiation of insulin drip  This morning had no complaints  Planned for discharge  OBJECTIVE     Vitals:    04/10/21 0306 04/10/21 0600 04/10/21 0716 04/10/21 0800   BP: 118/52  121/54    Pulse: 67  62    Resp: 18  19    Temp: (!) 97 2 °F (36 2 °C)  (!) 97 3 °F (36 3 °C)    TempSrc:       SpO2: 98%  98% 98%   Weight:  56 2 kg (123 lb 14 4 oz)     Height:          Temperature:   Temp (24hrs), Av 6 °F (36 4 °C), Min:97 2 °F (36 2 °C), Max:98 °F (36 7 °C)    Temperature: (!) 97 3 °F (36 3 °C)  Intake & Output:  I/O        07 -  0700  07 - 04/10 0700    P  O  0     I V  (mL/kg) 1450 (26 4) 22 7 (0 4)    IV Piggyback 500     Total Intake(mL/kg) 1950 (35 5) 22 7 (0 4)    Blood 300     Total Output 300     Net +1650 +22 7          Unmeasured Urine Occurrence  2 x        Weights:   IBW: 36 3 kg    Body mass index is 27 78 kg/m²  Weight (last 2 days)     Date/Time   Weight    04/10/21 0600   56 2 (123 9)    21 0542   55 (121 25)            Physical Exam  Vitals signs and nursing note reviewed  Constitutional:       General: She is not in acute distress  Appearance: She is well-developed     HENT:      Head: Normocephalic and atraumatic  Eyes:      Conjunctiva/sclera: Conjunctivae normal    Neck:      Musculoskeletal: Neck supple  Cardiovascular:      Rate and Rhythm: Normal rate and regular rhythm  Heart sounds: No murmur  Pulmonary:      Effort: Pulmonary effort is normal  No respiratory distress  Breath sounds: Normal breath sounds  Abdominal:      Palpations: Abdomen is soft  Tenderness: There is no abdominal tenderness  Skin:     General: Skin is warm and dry  Neurological:      Mental Status: She is alert  LABORATORY DATA     Labs: I have personally reviewed pertinent reports  Results from last 7 days   Lab Units 04/09/21  0537 04/08/21  0433 04/07/21  1502   WBC Thousand/uL 6 90 7 59 9 38   HEMOGLOBIN g/dL 8 8* 11 4* 12 5   HEMATOCRIT % 28 1* 36 1 39 3   PLATELETS Thousands/uL 222 248 267   NEUTROS PCT % 61 60  --    MONOS PCT % 10 7  --       Results from last 7 days   Lab Units 04/09/21  0540 04/08/21  0433 04/07/21  1502   POTASSIUM mmol/L 3 8 4 2 5 2   CHLORIDE mmol/L 108 105 102   CO2 mmol/L 25 28 24   BUN mg/dL 22 34* 40*   CREATININE mg/dL 1 03 0 97 1 00   CALCIUM mg/dL 9 1 9 7 10 1     Results from last 7 days   Lab Units 04/07/21  1502   MAGNESIUM mg/dL 2 6     Results from last 7 days   Lab Units 04/07/21  1502   PHOSPHORUS mg/dL 3 4      Results from last 7 days   Lab Units 04/07/21  1607   INR  1 00   PTT seconds 25               IMAGING & DIAGNOSTIC TESTING     Radiology Results: I have personally reviewed pertinent reports  Xr Chest Portable    Result Date: 4/7/2021  Impression: Low lung volumes with bibasilar atelectasis  Left humeral diaphysis fracture  Workstation performed: [id number]     Xr Shoulder 2+ Vw Left    Result Date: 4/8/2021  Impression: Displaced angulated proximal left humeral fracture   Patient scheduled for operating room today Workstation performed: [id number]     Xr Humerus Left    Result Date: 4/9/2021  Impression: Fluoroscopic guidance provided for procedure guidance  Please refer to the separate procedure notes for additional details  Workstation performed: [id number]     Other Diagnostic Testing: I have personally reviewed pertinent reports  ACTIVE MEDICATIONS     Current Facility-Administered Medications   Medication Dose Route Frequency    acetaminophen (TYLENOL) tablet 975 mg  975 mg Oral Q8H [address]    amLODIPine (NORVASC) tablet 2 5 mg  2 5 mg Oral Daily    cephalexin (KEFLEX) capsule 500 mg  500 mg Oral Q12H [address]    cholecalciferol (VITAMIN D3) tablet 1,000 Units  1,000 Units Oral Daily    DULoxetine (CYMBALTA) delayed release capsule 40 mg  40 mg Oral Daily    heparin (porcine) subcutaneous injection 5,000 Units  5,000 Units Subcutaneous Q8H [address]    hydrALAZINE (APRESOLINE) injection 5 mg  5 mg Intravenous Once PRN    HYDROmorphone HCl (DILAUDID) injection 0 2 mg  0 2 mg Intravenous Q4H PRN    insulin detemir (LEVEMIR) subcutaneous injection 10 Units  10 Units Subcutaneous Daily With Breakfast    insulin lispro (HumaLOG) 100 units/mL subcutaneous injection 1-5 Units  1-5 Units Subcutaneous TID AC    insulin lispro (HumaLOG) 100 units/mL subcutaneous injection 1-5 Units  1-5 Units Subcutaneous HS    lactated ringers infusion  50 mL/hr Intravenous Continuous    levothyroxine tablet 75 mcg  75 mcg Oral Early Morning    ondansetron (ZOFRAN) injection 4 mg  4 mg Intravenous Q6H PRN    oxyCODONE (ROXICODONE) IR tablet 2 5 mg  2 5 mg Oral Q4H PRN    oxyCODONE (ROXICODONE) IR tablet 5 mg  5 mg Oral Q4H PRN    senna (SENOKOT) tablet 8 6 mg  1 tablet Oral HS    sodium chloride 0 9 % infusion  50 mL/hr Intravenous Continuous       VTE Pharmacologic Prophylaxis: Heparin  VTE Mechanical Prophylaxis: sequential compression device    Portions of the record may have been created with voice recognition software    Occasional wrong word or "sound a like" substitutions may have occurred due to the inherent limitations of voice recognition software    Read the chart carefully and recognize, using context, where substitutions have occurred   ==  Glendy Stringer, MD  [address]  Internal Medicine Residency PGY-2

## 2021-04-10 NOTE — DISCHARGE INSTRUCTIONS
Discharge Instructions - Orthopedics  Mariposa Stephanie 80 y o  female MRN: [id number]  Unit/Bed#: -01    Weight Bearing Status:                                           nonweight bearing to the left upper extremity    DVT prophylaxis  None    Pain:  Continue analgesics as directed    Dressing Instructions:   Please keep clean, dry and intact until follow up     Appt Instructions: If you do not have your appointment, please call the clinic at [phone]   Otherwise followup as scheduled     Contact the office sooner if you experience any increased numbness/tingling in the extremities        Miscellaneous:  Keflex 500mg prescribed for 1 more dose (ends tonight 4/10)  See med rec for details

## 2021-04-10 NOTE — TELEPHONE ENCOUNTER
TC to on call provider:  Roger Clarke nurse is calling for readmission orders  Please call her back at [phone]  She is located at station 3

## 2021-04-10 NOTE — PLAN OF CARE
Problem: Potential for Falls  Goal: Patient will remain free of falls  Description: INTERVENTIONS:  - Assess patient frequently for physical needs  -  Identify cognitive and physical deficits and behaviors that affect risk of falls    -  [employer] [place] fall precautions as indicated by assessment   - Educate patient/family on patient safety including physical limitations  - Instruct patient to call for assistance with activity based on assessment  - Modify environment to reduce risk of injury  - Consider OT/PT consult to assist with strengthening/mobility  Outcome: Progressing

## 2021-04-10 NOTE — QUICK NOTE
Patient with history of diabetes, her Levemir was recently increased to 6 units sides the meal coverage of 2 units Humalog and correction scale  Patient blood glucose continue to be elevated most recently 444; she is postoperative day 1 status post fixation of closed fracture left humerus  Discontinued the above-noted insulin regimen and switched to sliding scale insulin for now and to continue monitor and re-evaluate for further management as needed

## 2021-04-12 ENCOUNTER — NURSING HOME VISIT (OUTPATIENT)
Dept: GERIATRICS | Facility: OTHER | Age: 86
End: 2021-04-12
Payer: MEDICARE

## 2021-04-12 DIAGNOSIS — F03.91 DEMENTIA WITH BEHAVIORAL DISTURBANCE, UNSPECIFIED DEMENTIA TYPE (HCC): ICD-10-CM

## 2021-04-12 DIAGNOSIS — E11.9 TYPE 2 DIABETES MELLITUS WITHOUT COMPLICATION, WITH LONG-TERM CURRENT USE OF INSULIN (HCC): Chronic | ICD-10-CM

## 2021-04-12 DIAGNOSIS — R53.81 DEBILITY: ICD-10-CM

## 2021-04-12 DIAGNOSIS — Z79.4 TYPE 2 DIABETES MELLITUS WITHOUT COMPLICATION, WITH LONG-TERM CURRENT USE OF INSULIN (HCC): Chronic | ICD-10-CM

## 2021-04-12 DIAGNOSIS — I10 HYPERTENSION, UNSPECIFIED TYPE: ICD-10-CM

## 2021-04-12 DIAGNOSIS — S42.292K OTHER CLOSED DISPLACED FRACTURE OF PROXIMAL END OF LEFT HUMERUS WITH NONUNION, SUBSEQUENT ENCOUNTER: Primary | ICD-10-CM

## 2021-04-12 DIAGNOSIS — Z71.89 GOALS OF CARE, COUNSELING/DISCUSSION: ICD-10-CM

## 2021-04-12 LAB
ABO GROUP BLD BPU: NORMAL
ABO GROUP BLD BPU: NORMAL
BPU ID: NORMAL
BPU ID: NORMAL
CROSSMATCH: NORMAL
CROSSMATCH: NORMAL
UNIT DISPENSE STATUS: NORMAL
UNIT DISPENSE STATUS: NORMAL
UNIT PRODUCT CODE: NORMAL
UNIT PRODUCT CODE: NORMAL
UNIT RH: NORMAL
UNIT RH: NORMAL

## 2021-04-12 PROCEDURE — 99309 SBSQ NF CARE MODERATE MDM 30: CPT | Performed by: INTERNAL MEDICINE

## 2021-04-12 NOTE — PROGRESS NOTES
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, subsequent encounter  Assessment & Plan:  · She is status post ORIF of left proximal humerus fracture on April 8, 2021   · Will continue with pain management  · Will continue her care in collaboration with her orthopedic service   · Will order PT/ OT evaluation and treatment to assist her in returning to her prior level of functioning   · Will continue to monitor for change in condition      2  Type 2 diabetes mellitus without complication, with long-term current use of insulin ([facility])  Assessment & Plan:    · Her Levemir dosage was increased during her hospitalization  · Will continue her on her new dosage of Levemir and routine metformin  · Will continue with monitoring of her fingerstick blood sugar levels  · Will continue to monitor for change in condition      3  Hypertension, unspecified type  Assessment & Plan:  · She is doing well with amlodipine   · I will continue her on this medication   · Will continue with monitoring for change in condition      4  Debility  Assessment & Plan:  · Secondary to her acute on chronic medical conditions   · She continues to require 24/7 care/ support of her ADLs  · I will order PT/ OT evaluation and treatment to assist her in returning to her prior level of functioning  · I advise continued care/ support of her ADLs as a long-term resident at the nursing facility   · Will continue to monitor her for a change in condition      5   Dementia with behavioral disturbance, unspecified dementia type [facility])  Assessment & Plan:  · She requires 24/7 care/ support of her ADLs   · I recommend continuation of her duloxetine  · Will continue to monitor her for change in condition      6  Goals of care, counseling/discussion  Assessment & Plan:  ·  Her resuscitation status is "do not resuscitate "        All medications and routine orders were reviewed and updated as needed  Plan discussed with:  Nursing staff  Please see my note of March 12, 2021 for further assessment and plan  Chief Complaint     She is seen for a follow-up visit to update the care and treatment of her closed left proximal humerus fracture, insulin-requiring type 2 diabetes mellitus, and hypertension  History of Present Illness     She is an 77-year-old woman who is seen with nursing staff and Faroese-speaking female nursing aide for a follow-up visit to update the care and treatment of her closed left proximal humerus fracture - doing well status post ORIF on April 8, 2021, insulin-requiring type 2 diabetes mellitus-doing well with increased dosage of Levemir and routine metformin, and hypertension- doing well with amlodipine  At her orthopedic office visit on April 7, 2021, it was decided that she needed to have an ORIF performed on her left proximal humerus fracture  She was directly admitted to the hospital   She went to the operating room for an ORIF procedure on April 8, 2021  She was seen in consultation by the geriatric and internal medicine services during her hospital stay  Her Levemir insulin dosage was adjusted  She did well postoperatively  She was felt stable to be transferred back to her nursing facility on April 10, 2021  She is being seen by the OT and PT services at her nursing facility  Nursing endorses having no complaints or concerns for me during the visit  The following portions of the patient's history were reviewed and updated as appropriate: current medications, past medical history, past social history, past surgical history and problem list     Allergies:   Allergies   Allergen Reactions    Penicillins     Valsartan        Review of Systems Review of Systems   Unable to perform ROS: Dementia       Medications and orders     All medications reviewed and updated in Nursing Home EMR  Objective     Vitals:  Weight 120 lb, temperature 99 1° F, pulse 72, blood pressure 118/70, fasting fingerstick blood sugar 173  Physical Exam  Vitals signs reviewed  Exam conducted with a chaperone present  Constitutional:       General: She is awake  She is not in acute distress  Appearance: She is well-developed  She is not ill-appearing, toxic-appearing or diaphoretic  Comments: She appears comfortable lying in bed, her stated age, and frail  Cardiovascular:      Rate and Rhythm: Normal rate and regular rhythm  Heart sounds: Normal heart sounds  No murmur  No friction rub  No gallop  Comments: There is no pretibial edema, bilaterally  Pulmonary:      Effort: Pulmonary effort is normal  No respiratory distress  Breath sounds: Normal breath sounds  No stridor  No wheezing, rhonchi or rales  Abdominal:      General: Abdomen is flat  There is no distension  Palpations: Abdomen is soft  There is no mass  Tenderness: There is no abdominal tenderness  There is no guarding or rebound  Musculoskeletal:      Comments: There is a dressing on her left upper arm  Neurological:      Mental Status: She is alert  Pertinent Laboratory/Diagnostic Studies: The following labs were reviewed please see chart or hospital paperwork for details  Lab Results   Component Value Date    WBC 6 90 04/09/2021    HGB 8 8 (L) 04/09/2021    HCT 28 1 (L) 04/09/2021     (H) 04/09/2021     04/09/2021     Lab Results   Component Value Date    SODIUM 144 04/09/2021    K 3 8 04/09/2021     04/09/2021    CO2 25 04/09/2021    BUN 22 04/09/2021    CREATININE 1 03 04/09/2021    GLUC 216 (H) 04/09/2021    CALCIUM 9 1 04/09/2021       - Her admission order summary was reviewed and signed    Treatment plan reviewed with nursing staff     MINA Vargas   4/18/2021 4:04 PM

## 2021-04-13 ENCOUNTER — PATIENT OUTREACH (OUTPATIENT)
Dept: CASE MANAGEMENT | Facility: OTHER | Age: 86
End: 2021-04-13

## 2021-04-18 DIAGNOSIS — S42.292A OTHER CLOSED DISPLACED FRACTURE OF PROXIMAL END OF LEFT HUMERUS, INITIAL ENCOUNTER: Primary | ICD-10-CM

## 2021-04-18 NOTE — ASSESSMENT & PLAN NOTE
· Her Levemir dosage was increased during her hospitalization  · Will continue her on her new dosage of Levemir and routine metformin  · Will continue with monitoring of her fingerstick blood sugar levels  · Will continue to monitor for change in condition

## 2021-04-18 NOTE — ASSESSMENT & PLAN NOTE
· She is status post ORIF of left proximal humerus fracture on April 8, 2021   · Will continue with pain management  · Will continue her care in collaboration with her orthopedic service   · Will order PT/ OT evaluation and treatment to assist her in returning to her prior level of functioning   · Will continue to monitor for change in condition

## 2021-04-18 NOTE — ASSESSMENT & PLAN NOTE
· She requires 24/7 care/ support of her ADLs   · I recommend continuation of her duloxetine  · Will continue to monitor her for change in condition

## 2021-04-18 NOTE — ASSESSMENT & PLAN NOTE
· Secondary to her acute on chronic medical conditions   · She continues to require 24/7 care/ support of her ADLs  · I will order PT/ OT evaluation and treatment to assist her in returning to her prior level of functioning  · I advise continued care/ support of her ADLs as a long-term resident at the nursing facility   · Will continue to monitor her for a change in condition

## 2021-04-19 ENCOUNTER — PATIENT OUTREACH (OUTPATIENT)
Dept: CASE MANAGEMENT | Facility: OTHER | Age: 86
End: 2021-04-19

## 2021-04-22 ENCOUNTER — HOSPITAL ENCOUNTER (OUTPATIENT)
Dept: RADIOLOGY | Facility: HOSPITAL | Age: 86
Discharge: HOME | End: 2021-04-22
Attending: ORTHOPAEDIC SURGERY
Payer: COMMERCIAL

## 2021-04-22 ENCOUNTER — OFFICE VISIT (OUTPATIENT)
Dept: OBGYN CLINIC | Facility: HOSPITAL | Age: 86
End: 2021-04-22

## 2021-04-22 VITALS
HEART RATE: 84 BPM | SYSTOLIC BLOOD PRESSURE: 111 MMHG | BODY MASS INDEX: 27.67 KG/M2 | HEIGHT: 56 IN | WEIGHT: 123 LBS | DIASTOLIC BLOOD PRESSURE: 67 MMHG

## 2021-04-22 DIAGNOSIS — S42.292A OTHER CLOSED DISPLACED FRACTURE OF PROXIMAL END OF LEFT HUMERUS, INITIAL ENCOUNTER: ICD-10-CM

## 2021-04-22 DIAGNOSIS — S42.292A OTHER CLOSED DISPLACED FRACTURE OF PROXIMAL END OF LEFT HUMERUS, INITIAL ENCOUNTER: Primary | ICD-10-CM

## 2021-04-22 PROCEDURE — 99024 POSTOP FOLLOW-UP VISIT: CPT | Performed by: ORTHOPAEDIC SURGERY

## 2021-04-22 PROCEDURE — 73060 X-RAY EXAM OF HUMERUS: CPT

## 2021-04-22 NOTE — PROGRESS NOTES
Orthopaedics Office Visit - Post-op Patient Visit    ASSESSMENT/PLAN:    Assessment:   2 weeks s/p ORIF left humerus fracture performed on 4/8/2021    Plan:   - Continue with non-weight bearing Left upper extremity   - Okay To begin showering  Allow water to flow over the incision sites  Do not vigorously scrubbed or soak wounds until otherwise stated   - Motrin / tylenol as needed / directed   - Ice / heat as directed   - PT ok for ROM exercises of elbow, wrist, shoulder    - follow up 4 weeks w/ XR       To Do Next Visit:  XR left humerus     _____________________________________________________  CHIEF COMPLAINT:  Chief Complaint   Patient presents with    Left Arm - Post-op         SUBJECTIVE:  Nathaly Dominguez is a 80 y o  female who presents 2 weeks s/p ORIF left humerus fracture performed on 4/8/2021  Patient does admit to having mild pain over the incision site but states that is getting better as time goes on  Patient denies any drainage from the wound  Patient denies any numbness or tingling in the hand  Patient denies any increased pain in the shoulder  Patient denies any shortness of breath chest tightness chest pain  Patient has any fevers any chills  Patient offers no other complaints at this time      SOCIAL HISTORY:  Social History     Tobacco Use    Smoking status: Never Smoker    Smokeless tobacco: Never Used   Substance Use Topics    Alcohol use: No    Drug use: No       MEDICATIONS:    Current Outpatient Medications:     acetaminophen (TYLENOL) 325 mg tablet, Take 2 tablets by mouth 2 (two) times a day, Disp: , Rfl:     amLODIPine (NORVASC) 2 5 mg tablet, Take 1 tablet by mouth daily, Disp: , Rfl:     bisacodyl (FLEET) 10 MG/30ML ENEM, Insert 10 mg into the rectum once, Disp: , Rfl:     cholecalciferol (VITAMIN D3) 1,000 units tablet, Take 1 tablet by mouth daily, Disp: , Rfl:     DULoxetine HCl 40 MG CPEP, Take 1 capsule by mouth daily, Disp: , Rfl:     insulin detemir (LEVEMIR) 100 units/mL subcutaneous injection, Inject 10 Units under the skin daily at bedtime, Disp: 10 mL, Rfl: 0    levothyroxine 75 mcg tablet, Take 1 tablet by mouth daily, Disp: , Rfl:     magnesium hydroxide (Milk of Magnesia) 400 mg/5 mL oral suspension, Take 30 mL by mouth daily as needed for constipation, Disp: , Rfl:     metFORMIN (GLUCOPHAGE) 850 mg tablet, Take 1 tablet by mouth daily with dinner, Disp: , Rfl:     oxyCODONE (ROXICODONE) 5 mg immediate release tablet, Take 1 tablet (5 mg total) by mouth every 6 (six) hours as needed for severe painMax Daily Amount: 20 mg, Disp: 20 tablet, Rfl: 0    rosuvastatin (CRESTOR) 5 mg tablet, Take 1 tablet by mouth daily, Disp: , Rfl:     senna-docusate sodium (SENOKOT-S) 8 6-50 mg per tablet, Take 1 tablet by mouth daily, Disp: , Rfl:     SODIUM PHOSPHATES RE, Insert into the rectum daily as needed, Disp: , Rfl:     REVIEW OF SYSTEMS:  MSK: Left humerus pain   Neuro: WNL   Pertinent items are otherwise noted in HPI  A comprehensive review of systems was otherwise negative     _____________________________________________________  PHYSICAL EXAMINATION:  Vital signs: /67   Pulse 84   Ht 4' 8" (1 422 m)   Wt 55 8 kg (123 lb)   BMI 27 58 kg/m²   General: No acute distress, awake and alert  Psychiatric: Mood and affect appear appropriate  HEENT: Trachea Midline, No torticollis, no apparent facial trauma  Cardiovascular: No audible murmurs; Extremities appear perfused  Pulmonary: No audible wheezing or stridor  Skin: No open lesions; see further details (if any) below      MUSCULOSKELETAL EXAMINATION:  Left humerus exanimation:   - Patient sitting comfortably in the office in no acute distress   - incision site clean dry intact with no surrounding erythema or ecchymosis  Sutures intact  Extremity appears well-perfused overall  - mild tenderness palpation noted over incision site    No other bony or soft tissue tenderness palpation noted at this time   - NV intact    _____________________________________________________  STUDIES REVIEWED:  I personally reviewed the images and interpretation is as follows:  Left humerus XR 2 views:  Healing humeral shaft fracture in acceptable anatomic alignment with no acute osseous abnormalities present  hardware intact    PROCEDURES PERFORMED:  Suture removal    Date/Time: 4/22/2021 10:57 AM  Performed by: Abilio Bernal PA-C  Authorized by: Abilio Bernal PA-C   Universal Protocol:  Consent: Verbal consent obtained  Risks and benefits: risks, benefits and alternatives were discussed  Consent given by: patient and guardian  Patient understanding: patient states understanding of the procedure being performed  Patient identity confirmed: verbally with patient        Patient location:  Bedside  Location:     Laterality:  Left    Location:  Upper extremity    Upper extremity location:  Arm    Arm location:  L upper arm  Procedure details: Tools used:  Suture removal kit    Wound appearance:  No sign(s) of infection, good wound healing and clean  Post-procedure details:     Post-removal:  No dressing applied    Patient tolerance of procedure:   Tolerated well, no immediate complications           Kia Madrid PA-C - assisting    Jeremias Floyd MD

## 2021-04-22 NOTE — PATIENT INSTRUCTIONS
- Continue with non-weight bearing Left upper extremity   - Okay To begin showering  Allow water to flow over the incision sites    Do not vigorously scrubbed or soak wounds until otherwise stated   - Motrin / tylenol as needed / directed   - Ice / heat as directed   - PT ok for ROM exercises of elbow, wrist    - follow up 4 weeks w/ XR

## 2021-04-30 ENCOUNTER — PATIENT OUTREACH (OUTPATIENT)
Dept: CASE MANAGEMENT | Facility: OTHER | Age: 86
End: 2021-04-30

## 2021-05-07 ENCOUNTER — PATIENT OUTREACH (OUTPATIENT)
Dept: CASE MANAGEMENT | Facility: OTHER | Age: 86
End: 2021-05-07

## 2021-05-07 ENCOUNTER — NURSING HOME VISIT (OUTPATIENT)
Dept: GERIATRICS | Facility: OTHER | Age: 86
End: 2021-05-07
Payer: MEDICARE

## 2021-05-07 DIAGNOSIS — N18.31 STAGE 3A CHRONIC KIDNEY DISEASE (HCC): Chronic | ICD-10-CM

## 2021-05-07 DIAGNOSIS — I10 HYPERTENSION, UNSPECIFIED TYPE: ICD-10-CM

## 2021-05-07 DIAGNOSIS — F03.91 DEMENTIA WITH BEHAVIORAL DISTURBANCE, UNSPECIFIED DEMENTIA TYPE (HCC): ICD-10-CM

## 2021-05-07 DIAGNOSIS — F43.21 ADJUSTMENT DISORDER WITH DEPRESSED MOOD: Chronic | ICD-10-CM

## 2021-05-07 DIAGNOSIS — Z79.4 TYPE 2 DIABETES MELLITUS WITHOUT COMPLICATION, WITH LONG-TERM CURRENT USE OF INSULIN (HCC): Chronic | ICD-10-CM

## 2021-05-07 DIAGNOSIS — E03.9 HYPOTHYROIDISM, UNSPECIFIED TYPE: Chronic | ICD-10-CM

## 2021-05-07 DIAGNOSIS — S42.292S OTHER CLOSED DISPLACED FRACTURE OF PROXIMAL END OF LEFT HUMERUS, SEQUELA: Primary | ICD-10-CM

## 2021-05-07 DIAGNOSIS — E11.9 TYPE 2 DIABETES MELLITUS WITHOUT COMPLICATION, WITH LONG-TERM CURRENT USE OF INSULIN (HCC): Chronic | ICD-10-CM

## 2021-05-07 PROCEDURE — 99309 SBSQ NF CARE MODERATE MDM 30: CPT | Performed by: NURSE PRACTITIONER

## 2021-05-07 NOTE — PROGRESS NOTES
[address], [address], [facility], [address] [place] [address]  [phone]    NAME: Trena Fierro  AGE: 80 y o  SEX: female    Progress Note    Location: [place]  POS: 32 (LTC)    Assessment/Plan:    Closed fracture of proximal end of left humerus  Denies pain on this visit  Continue Q shift pain assessment  Continue Acetaminophen 650mg BID + PRN Q4 hours (Max: 3G/day)  Continue Vit D 2000 units daily  Nursing to follow-up Orthopedic appointment    Dementia ([facility] )  Stable  Continue 24/7 LTCF supportive care and management  Per nursing, fair to poor meal completion  Continue supplement: Glucerna QID  Continue to reorient and redirect as often as needed  Fall precaution    Diabetes mellitus ([facility] )    Lab Results   Component Value Date    HGBA1C 8 0 (H) 04/07/2021   Goal: < 8%  FBG range (5/1-7/2021) = 73 to 108  Pre-dinner BG range (5/1-7/2021) = 97 to 231  Continue Metformin 500mg daily  Nursing to continue to monitor  Hypothyroid  Continue Levothyroxine 75mcg daily    Hypertension  Historically stable and controlled  BP range (5/1-7/2021) = 115/55 to 153/65  HR range (5/1-7/2021) = 64 to 82/min  Continue Amlodipine 2 5mg daily    Adjustment disorder with depressed mood  Deemed stable  Continue Duloxetine 40mg daily  Continue to monitor meal completion/ sleep/ mood and behavior patterns    Chronic kidney disease, stage 3 (HCC)  Lab Results   Component Value Date    EGFR 48 04/09/2021    EGFR 52 04/08/2021    EGFR 50 04/07/2021    CREATININE 1 03 04/09/2021    CREATININE 0 97 04/08/2021    CREATININE 1 00 04/07/2021   Continue avoid hypotensive and nephrotoxic medications  Nursing to monitor hydration status  Chief complaint / Reason for visit: Follow-up visit (30 day)    History of Present Illness: This is an 80-year-old female patient admitted at [place] for debility   Patient recently re-admitted to facility following discharge form acute care hospitalization [facility]) with Dx of Displaced fracture of proximal end of Left Humerus - S/P ORIF  Patient is seen and examined today to follow-up acute and chronic medical conditions as mentioned above and Dementia, DM Type 2, and HTN  Patient is in bed for this visit - alert, cooperative, calm, pleasant and not in distress  Patient verbally engaged on this visit - at baseline poor historian secondary to dementia - responses to ROS assessment seems to vary and change - however patient did denies any pain on this visit  Per nursing that is baseline with patient due to dementia - no acute medical concerns for this visit - patient is described as at baseline and stable  Review of Systems:  Per history of present illness, all other systems reviewed and negative  HISTORY:  Medical Hx: Reviewed, unchanged  Family Hx: Reviewed, unchanged  Soc Hx: Reviewed,  unchanged    ALLERGY: Reviewed, unchanged  Allergies   Allergen Reactions    Penicillins     Valsartan         PHYSICAL EXAM:   Vital Signs: T98 3F -P70 -R18 BP: 132/70 SpO2: 98% RA  Weight: 118 5 lbs (2021)    General: NAD, Frail stature  Head: Atraumatic  Normocephalic  Eye Exam: anicteric sclera, no discharge, PERRLA, No injection  Oral Exam: moist mucous membranes, no buccaloropharyngeal erythema, palatine tonsils WNL  Neck Exam: no anterior cervical lymphadenopathy noted, neck supple  Cardiovascular: regular rate, irregular rhythm, (+) murmurs, rubs, or gallops  Pulmonary: no wheeze, no rhonchi, no rales  No chest tenderness  Abdominal: soft, non-tender, nondistended, bowel sounds audible x 4 quadrants  Extremities and skin: no edema noted, no rashes  Intact skin  Neurological: alert, cooperative and responsive, Unable to assess - did not participate  Ambulatory dysfunction  Sling to Left UE      Laboratory results / Imaging reviewed: [facility] Epic Record:    * BMP (2021) = WNL except:  Glu: 216 (H)    * CBC with diff (2021) = WNL except:  Hb 8 (L)  Hct: 28 1 (L)  RBC: 2 70 (L)  MCV: 104 (H)    * HbA1C (4/7/2021) = 8 0 (H)    Current Medications:   All medications reviewed and updated in [address]  6/7/2021

## 2021-05-16 DIAGNOSIS — S42.292A OTHER CLOSED DISPLACED FRACTURE OF PROXIMAL END OF LEFT HUMERUS, INITIAL ENCOUNTER: Primary | ICD-10-CM

## 2021-05-19 ENCOUNTER — HOSPITAL ENCOUNTER (OUTPATIENT)
Dept: RADIOLOGY | Facility: HOSPITAL | Age: 86
Discharge: HOME | End: 2021-05-19
Attending: ORTHOPAEDIC SURGERY
Payer: MEDICARE

## 2021-05-19 ENCOUNTER — OFFICE VISIT (OUTPATIENT)
Dept: OBGYN CLINIC | Facility: HOSPITAL | Age: 86
End: 2021-05-19

## 2021-05-19 VITALS
BODY MASS INDEX: 27.58 KG/M2 | SYSTOLIC BLOOD PRESSURE: 126 MMHG | HEIGHT: 56 IN | HEART RATE: 77 BPM | DIASTOLIC BLOOD PRESSURE: 78 MMHG

## 2021-05-19 DIAGNOSIS — S42.292A OTHER CLOSED DISPLACED FRACTURE OF PROXIMAL END OF LEFT HUMERUS, INITIAL ENCOUNTER: ICD-10-CM

## 2021-05-19 DIAGNOSIS — S42.292S OTHER CLOSED DISPLACED FRACTURE OF PROXIMAL END OF LEFT HUMERUS, SEQUELA: ICD-10-CM

## 2021-05-19 DIAGNOSIS — Z98.890 S/P ORIF (OPEN REDUCTION INTERNAL FIXATION) FRACTURE: Primary | ICD-10-CM

## 2021-05-19 DIAGNOSIS — Z87.81 S/P ORIF (OPEN REDUCTION INTERNAL FIXATION) FRACTURE: Primary | ICD-10-CM

## 2021-05-19 PROCEDURE — 73030 X-RAY EXAM OF SHOULDER: CPT

## 2021-05-19 PROCEDURE — 99024 POSTOP FOLLOW-UP VISIT: CPT | Performed by: ORTHOPAEDIC SURGERY

## 2021-05-19 RX ORDER — DULOXETIN HYDROCHLORIDE 20 MG/1
CAPSULE, DELAYED RELEASE ORAL
COMMUNITY
Start: 2021-03-15 | End: 2021-06-08 | Stop reason: ALTCHOICE

## 2021-05-19 RX ORDER — OSELTAMIVIR PHOSPHATE 30 MG/1
CAPSULE ORAL
COMMUNITY
End: 2021-06-08 | Stop reason: ALTCHOICE

## 2021-05-19 RX ORDER — MIRTAZAPINE 7.5 MG/1
TABLET, FILM COATED ORAL
COMMUNITY
End: 2021-06-08 | Stop reason: ALTCHOICE

## 2021-05-19 RX ORDER — INSULIN GLARGINE 100 [IU]/ML
INJECTION, SOLUTION SUBCUTANEOUS
COMMUNITY
End: 2021-06-08 | Stop reason: ALTCHOICE

## 2021-05-19 RX ORDER — MIRTAZAPINE 15 MG/1
TABLET, FILM COATED ORAL
COMMUNITY
End: 2021-06-08 | Stop reason: ALTCHOICE

## 2021-05-19 NOTE — PROGRESS NOTES
Assessment:   Diagnosis ICD-10-CM Associated Orders   1  S/P ORIF (open reduction internal fixation) fracture  Z98 890     Z87 81    2  Other closed displaced fracture of proximal end of left humerus, sequela  S42 292S        Plan:   x-rays taken reviewed, physical exam performed  Radiographically she is healed enough to discontinue the sling and overall doing well  At this point she will discontinue use of her sling  Continue PT/OT to improve range of motion, strength, overall function of her left upper extremity  There was 2 or 3 areas of Vicryl suture that were removed successfully  The nurse assistant that was with Melva Hunt today was advised if they see any suture material to clean it with an alcohol swab and may excise it  Weight-bearing as tolerated on her left upper extremity  To do next visit:  Return in about 6 weeks (around 6/30/2021) for re-check with x-rays left shoulder  The above stated was discussed in layman's terms and the patient expressed understanding  All questions were answered to the patient's satisfaction  Scribe Attestation    I,:  Zeferino Alvarez am acting as a scribe while in the presence of the attending physician :       I,:  Bobby Sarkar MD personally performed the services described in this documentation    as scribed in my presence :             Subjective:   Maryann Ramachandran is a 80 y o  female who presents today for repeat evaluation of her left upper extremity and is now 6 weeks status post open reduction internal fixation left humerus fracture  Patient has been wearing her sling  The nurses aide that accompanies her today states that she only has discomfort and describes it as discomfort with therapy or after therapy  She has no pain at rest   She has more discomfort at her shoulder than at the fracture fixation site         Review of systems negative unless otherwise specified in HPI  Review of Systems    Past Medical History:   Diagnosis Date    Anemia     Cholangitis 5/5/2018    Cholecystitis     Cholecystitis 5/3/2018    Cholecystitis with cholelithiasis 1/21/2018    Cholecystostomy tube dysfunction 2/16/2018    Choledocholithiasis 5/5/2018    Cholelithiasis     Chronic kidney disease     Coronary artery disease     Dementia ([facility] )     Dementia ([place] [address] )     Diabetes mellitus ([place] [address] )     Type 2 blood sugar 257 on admission    Dysphagia     Encephalopathy     GERD (gastroesophageal reflux disease)     H/O falling     Hyperlipidemia     Hypertension     Hypothyroidism     Hypothyroid    Macular degeneration     Mild protein-calorie malnutrition ([facility] ) 5/5/2018    Osteoarthritis     UTI (urinary tract infection)     UTI (urinary tract infection)     Vitamin D deficiency     Vitamin D deficiency        Past Surgical History:   Procedure Laterality Date    ERCP N/A 5/3/2018    Procedure: ENDOSCOPIC RETROGRADE CHOLANGIOPANCREATOGRAPHY (ERCP); Surgeon: Tiki Montero MD;  [facility];  Service: Gastroenterology    IR NEPHROSTOMY TUBE PLACEMENT      ORIF HUMERUS FRACTURE Left 4/8/2021    Procedure: OPEN REDUCTION W/ INTERNAL FIXATION (ORIF) LEFT PROXIMAL HUMERAL SHAFT FRACTURE;  Surgeon: Marco A Mathur MD;  [facility];  Service: Orthopedics    NM ERCP DX COLLECTION SPECIMEN BRUSHING/WASHING N/A 8/16/2018    Procedure: ENDOSCOPIC RETROGRADE CHOLANGIOPANCREATOGRAPHY (ERCP); Surgeon: Tiki Montero MD;  Location: [facility];   Service: Gastroenterology       Family History   Problem Relation Age of Onset    No Known Problems Mother     No Known Problems Father        Social History     Occupational History    Not on file   Tobacco Use    Smoking status: Never Smoker    Smokeless tobacco: Never Used   Substance and Sexual Activity    Alcohol use: No    Drug use: No    Sexual activity: Not on file         Current Outpatient Medications:     acetaminophen (TYLENOL) 325 mg tablet, Take 2 tablets by mouth 2 (two) times a day, Disp: , Rfl:   amLODIPine (NORVASC) 2 5 mg tablet, Take 1 tablet by mouth daily, Disp: , Rfl:     bisacodyl (FLEET) 10 MG/30ML ENEM, Insert 10 mg into the rectum once, Disp: , Rfl:     cholecalciferol (VITAMIN D3) 1,000 units tablet, Take 1 tablet by mouth daily, Disp: , Rfl:     DULoxetine (CYMBALTA) 20 mg capsule, , Disp: , Rfl:     DULoxetine HCl 40 MG CPEP, Take 1 capsule by mouth daily, Disp: , Rfl:     insulin aspart (NovoLOG) 100 units/mL injection, Novolog U-100 Insulin aspart 100 unit/mL subcutaneous solution, Disp: , Rfl:     insulin detemir (LEVEMIR) 100 units/mL subcutaneous injection, Inject 10 Units under the skin daily at bedtime, Disp: 10 mL, Rfl: 0    insulin glargine (Lantus) 100 units/mL subcutaneous injection, Lantus U-100 Insulin 100 unit/mL subcutaneous solution, Disp: , Rfl:     levothyroxine 75 mcg tablet, Take 1 tablet by mouth daily, Disp: , Rfl:     magnesium hydroxide (Milk of Magnesia) 400 mg/5 mL oral suspension, Take 30 mL by mouth daily as needed for constipation, Disp: , Rfl:     metFORMIN (GLUCOPHAGE) 850 mg tablet, Take 1 tablet by mouth daily with dinner, Disp: , Rfl:     mirtazapine (REMERON) 15 mg tablet, mirtazapine 15 mg tablet, Disp: , Rfl:     mirtazapine (REMERON) 7 5 MG tablet, mirtazapine 7 5 mg tablet, Disp: , Rfl:     oseltamivir (TAMIFLU) 30 MG capsule, oseltamivir 30 mg capsule, Disp: , Rfl:     oxyCODONE (ROXICODONE) 5 mg immediate release tablet, Take 1 tablet (5 mg total) by mouth every 6 (six) hours as needed for severe painMax Daily Amount: 20 mg, Disp: 20 tablet, Rfl: 0    rosuvastatin (CRESTOR) 5 mg tablet, Take 1 tablet by mouth daily, Disp: , Rfl:     senna-docusate sodium (SENOKOT-S) 8 6-50 mg per tablet, Take 1 tablet by mouth daily, Disp: , Rfl:     sertraline (ZOLOFT) 50 mg tablet, sertraline 50 mg tablet, Disp: , Rfl:     SODIUM PHOSPHATES RE, Insert into the rectum daily as needed, Disp: , Rfl:     Allergies   Allergen Reactions    Penicillins  Valsartan             Vitals:    05/19/21 0933   BP: 126/78   Pulse: 77       Objective:                    Left Shoulder Exam     Tenderness   The patient is experiencing no tenderness  Other   Erythema: absent  Sensation: normal     Comments:    Healed incision  No erythema  No swelling  No tenderness over fracture site  She has mild restriction of her shoulder motion in all planes  Full left elbow range of motion in all planes  Axillary, median, ulnar, radial nerve motor and sensory functions intact  Extremity warm/well perfused    Diagnostics, reviewed and taken today if performed as documented: The attending physician has personally reviewed the pertinent films in PACS and interpretation is as follows:    Left shoulder x-rays taken and reviewed in the office today show:  Left humerus   Fracture remains well reduced and aligned with callus formation present, evidence of plate with multiple screws without evidence of failure or hardware loosening  Procedures, if performed today:    Procedures    None performed      Portions of the record may have been created with voice recognition software  Occasional wrong word or "sound a like" substitutions may have occurred due to the inherent limitations of voice recognition software  Read the chart carefully and recognize, using context, where substitutions have occurred

## 2021-05-21 ENCOUNTER — PATIENT OUTREACH (OUTPATIENT)
Dept: CASE MANAGEMENT | Facility: OTHER | Age: 86
End: 2021-05-21

## 2021-06-07 ENCOUNTER — NURSING HOME VISIT (OUTPATIENT)
Dept: GERIATRICS | Facility: OTHER | Age: 86
End: 2021-06-07
Payer: MEDICARE

## 2021-06-07 DIAGNOSIS — R53.81 DEBILITY: Primary | ICD-10-CM

## 2021-06-07 DIAGNOSIS — E03.9 HYPOTHYROIDISM, UNSPECIFIED TYPE: Chronic | ICD-10-CM

## 2021-06-07 DIAGNOSIS — Z71.89 GOALS OF CARE, COUNSELING/DISCUSSION: ICD-10-CM

## 2021-06-07 DIAGNOSIS — R10.84 GENERALIZED ABDOMINAL PAIN: ICD-10-CM

## 2021-06-07 DIAGNOSIS — E11.9 TYPE 2 DIABETES MELLITUS WITHOUT COMPLICATION, WITH LONG-TERM CURRENT USE OF INSULIN (HCC): Chronic | ICD-10-CM

## 2021-06-07 DIAGNOSIS — S42.292D OTHER CLOSED DISPLACED FRACTURE OF PROXIMAL END OF LEFT HUMERUS WITH ROUTINE HEALING, SUBSEQUENT ENCOUNTER: ICD-10-CM

## 2021-06-07 DIAGNOSIS — F03.91 DEMENTIA WITH BEHAVIORAL DISTURBANCE, UNSPECIFIED DEMENTIA TYPE (HCC): ICD-10-CM

## 2021-06-07 DIAGNOSIS — N18.31 STAGE 3A CHRONIC KIDNEY DISEASE (HCC): Chronic | ICD-10-CM

## 2021-06-07 DIAGNOSIS — Z79.4 TYPE 2 DIABETES MELLITUS WITHOUT COMPLICATION, WITH LONG-TERM CURRENT USE OF INSULIN (HCC): Chronic | ICD-10-CM

## 2021-06-07 DIAGNOSIS — K59.00 CONSTIPATION, UNSPECIFIED CONSTIPATION TYPE: ICD-10-CM

## 2021-06-07 DIAGNOSIS — I10 HYPERTENSION, UNSPECIFIED TYPE: ICD-10-CM

## 2021-06-07 PROCEDURE — 99309 SBSQ NF CARE MODERATE MDM 30: CPT | Performed by: INTERNAL MEDICINE

## 2021-06-07 NOTE — PROGRESS NOTES
[address]  [address] [place] [address]  Facility: [facility]  32    NAME: June Feliciano  AGE: 80 y o  SEX: female    DATE OF ENCOUNTER: 6/7/2021    Code status:  DNR    Assessment and Plan     1  Debility  Assessment & Plan:  · Secondary to her chronic medical conditions   · She continues to require 24/7 care/support of her ADLs  · I recommend continued care/support of her ADLs as a long-term resident at the nursing facility   · Will continue to monitor for change in her condition      2  Hypertension, unspecified type  Assessment & Plan:  · She is doing well with amlodipine  · Will continue with monitoring for change in her condition      3  Type 2 diabetes mellitus without complication, with long-term current use of insulin ([facility])  Assessment & Plan:    · Review of her fingerstick blood sugar log looks well   · Will decrease her fingerstick blood sugar monitoring to a daily fasting fingerstick blood sugar  · Will continue her metformin and Levemir insulin   · Will continue with clinical and periodic laboratory monitoring for change in her condition      4  Hypothyroidism, unspecified type  Assessment & Plan:  · She is doing well with levothyroxine  · Will continue her on thyroid replacement therapy  · Will continue with clinical and periodic laboratory monitoring for change in her condition      5  Other closed displaced fracture of proximal end of left humerus with routine healing, subsequent encounter  Assessment & Plan:  · She is doing well and following with the orthopedic service for care   · At her follow-up visit on May 19, 2021, her sling therapy was discontinued and she was cleared for weight-bearing as tolerated to her left arm  · Follow-up in 6 weeks was requested by the orthopedic service  · Will continue her care in collaboration with the orthopedic service   · Will continue to monitor for change in her condition      6   Generalized abdominal pain  Assessment & Plan:  ·  She is unable to quantify, but states that she feels sick in her stomach   · Nursing reports chronic poor appetite, but she drinks her supplements  · Nursing endorses no issues with constipation   · Has a history of gallstones with cholecystitis  · Her exam is benign   · Will start with laboratory testing  · Further recommendations, pending results  · Will continue with monitoring      7  Dementia with behavioral disturbance, unspecified dementia type [facility])  Assessment & Plan:  · She is doing well with care/support as a long-term resident at the nursing facility  · Will continue to provide a safe, secure, structured, and supportive environment at the nursing facility  · Will continue to monitor for change in her condition      8  Stage 3a chronic kidney disease ([facility] )  Assessment & Plan:  · She is doing well   · Will continue with adequate hydration and avoidance of hypotension  · Will continue with clinical and periodic laboratory monitoring for change in her condition      9  Constipation, unspecified constipation type  Assessment & Plan:  · She is doing well with Senokot S  · Will continue to monitor for change in her condition      10  Goals of care, counseling/discussion  Assessment & Plan:  ·  Her resuscitation status is "do not resuscitate"      See my note of April 12, 2021 for further assessment and plan  All medications and routine orders were reviewed and updated as needed  Plan discussed with:   Nursing staff  Chief Complaint     She is seen for a follow-up visit to update the care and treatment of her debility secondary to her chronic medical condition, hypertension, insulin-requiring type 2 diabetes mellitus, hypothyroidism, and left humerus fracture      History of Present Illness     She is an 51-year-old woman who is seen with nursing staff for a follow-up visit to update the care and treatment of her debility secondary to her chronic medical conditions-she continues to require 24/7 care/support of her ADLs, hypertension-doing well with amlodipine, insulin-requiring type 2 diabetes mellitus-doing well with metformin/Levemir insulin, hypothyroidism-doing well levothyroxine, and left humerus fracture status post ORIF on April 8, 2021-doing well postoperatively and following with the orthopedic service  Nursing reports having no complaints or concerns for me during the visit  The resident notes that she feels sick to her stomach  Nursing reports she is historically has a poor appetite, but drinks her supplements  There is no report of constipation  At her follow-up visit with her orthopedic surgeon on May 19, 2021, her sling therapy was discontinued and she was released to weight-bearing as tolerated to her left arm  She is to follow-up in 6 weeks  The following portions of the patient's history were reviewed and updated as appropriate: current medications, past medical history, past social history, past surgical history and problem list     Allergies: Allergies   Allergen Reactions    Penicillins     Valsartan        Review of Systems     Review of Systems   Unable to perform ROS: Dementia       Medications and orders     All medications reviewed and updated in Nursing Home EMR  Objective     Vitals:   Monthly vitals:  Weight 117 5 lb (stable), pulse 71, blood pressure 136/54, fasting fingerstick blood sugar 99    Physical Exam  Vitals signs reviewed  Exam conducted with a chaperone present  Constitutional:       General: She is awake  She is not in acute distress  Appearance: She is well-developed  She is not toxic-appearing or diaphoretic  Comments: She appears comfortable sitting in her wheelchair, stated age, chronically ill, and frail  Cardiovascular:      Rate and Rhythm: Normal rate and regular rhythm  Heart sounds: Normal heart sounds  No murmur  No friction rub  No gallop  Comments:  There is no pretibial edema, bilaterally  Pulmonary:      Effort: Pulmonary effort is normal  No respiratory distress  Breath sounds: Normal breath sounds  No stridor  No wheezing or rhonchi  Abdominal:      General: Abdomen is flat  There is no distension  Palpations: Abdomen is soft  There is no mass  Tenderness: There is abdominal tenderness (nonspecific and mild, at most)  There is no guarding or rebound  Neurological:      Mental Status: She is alert  Psychiatric:         Behavior: Behavior is cooperative  Pertinent Laboratory/Diagnostic Studies: The following labs were reviewed please see chart or hospital paperwork for details  April 9, 2021 (during hospitalization):     BMP: Sodium 144, potassium 3 8, BUN 22, creatinine 1 03, fasting blood sugar 216, EGFR 48    - Her monthly order summary was reviewed and signed  Treatment plan reviewed with nursing staff      MINA Sultana   6/8/2021 12:07 PM

## 2021-06-08 PROBLEM — R10.9 ABDOMINAL PAIN: Status: ACTIVE | Noted: 2021-06-08

## 2021-06-08 NOTE — ASSESSMENT & PLAN NOTE
Historically stable and controlled    BP range (5/1-7/2021) = 115/55 to 153/65  HR range (5/1-7/2021) = 64 to 82/min  Continue Amlodipine 2 5mg daily

## 2021-06-08 NOTE — ASSESSMENT & PLAN NOTE
· She is doing well with care/support as a long-term resident at the nursing facility  · Will continue to provide a safe, secure, structured, and supportive environment at the nursing facility  · Will continue to monitor for change in her condition

## 2021-06-08 NOTE — ASSESSMENT & PLAN NOTE
Stable    Continue 24/7 LTCF supportive care and management  Per nursing, fair to poor meal completion  Continue supplement: Glucerna QID  Continue to reorient and redirect as often as needed  Fall precaution

## 2021-06-08 NOTE — ASSESSMENT & PLAN NOTE
Deemed stable    Continue Duloxetine 40mg daily  Continue to monitor meal completion/ sleep/ mood and behavior patterns

## 2021-06-08 NOTE — ASSESSMENT & PLAN NOTE
· She is doing well   · Will continue with adequate hydration and avoidance of hypotension  · Will continue with clinical and periodic laboratory monitoring for change in her condition

## 2021-06-08 NOTE — ASSESSMENT & PLAN NOTE
Denies pain on this visit  Continue Q shift pain assessment  Continue Acetaminophen 650mg BID + PRN Q4 hours (Max: 3G/day)  Continue Vit D 2000 units daily  Nursing to follow-up Orthopedic appointment

## 2021-06-08 NOTE — ASSESSMENT & PLAN NOTE
· She is doing well with levothyroxine  · Will continue her on thyroid replacement therapy  · Will continue with clinical and periodic laboratory monitoring for change in her condition

## 2021-06-08 NOTE — ASSESSMENT & PLAN NOTE
Lab Results   Component Value Date    HGBA1C 8 0 (H) 04/07/2021   Goal: < 8%  FBG range (5/1-7/2021) = 73 to 108  Pre-dinner BG range (5/1-7/2021) = 97 to 231  Continue Metformin 500mg daily  Nursing to continue to monitor

## 2021-06-08 NOTE — ASSESSMENT & PLAN NOTE
Lab Results   Component Value Date    EGFR 48 04/09/2021    EGFR 52 04/08/2021    EGFR 50 04/07/2021    CREATININE 1 03 04/09/2021    CREATININE 0 97 04/08/2021    CREATININE 1 00 04/07/2021   Continue avoid hypotensive and nephrotoxic medications  Nursing to monitor hydration status

## 2021-06-08 NOTE — ASSESSMENT & PLAN NOTE
· Secondary to her chronic medical conditions   · She continues to require 24/7 care/support of her ADLs  · I recommend continued care/support of her ADLs as a long-term resident at the nursing facility   · Will continue to monitor for change in her condition

## 2021-06-08 NOTE — ASSESSMENT & PLAN NOTE
·  She is unable to quantify, but states that she feels sick in her stomach   · Nursing reports chronic poor appetite, but she drinks her supplements  · Nursing endorses no issues with constipation   · Has a history of gallstones with cholecystitis  · Her exam is benign   · Will start with laboratory testing  · Further recommendations, pending results  · Will continue with monitoring

## 2021-06-08 NOTE — ASSESSMENT & PLAN NOTE
· She is doing well and following with the orthopedic service for care   · At her follow-up visit on May 19, 2021, her sling therapy was discontinued and she was cleared for weight-bearing as tolerated to her left arm  · Follow-up in 6 weeks was requested by the orthopedic service  · Will continue her care in collaboration with the orthopedic service   · Will continue to monitor for change in her condition

## 2021-06-08 NOTE — ASSESSMENT & PLAN NOTE
· Review of her fingerstick blood sugar log looks well   · Will decrease her fingerstick blood sugar monitoring to a daily fasting fingerstick blood sugar  · Will continue her metformin and Levemir insulin   · Will continue with clinical and periodic laboratory monitoring for change in her condition

## 2021-06-09 ENCOUNTER — PATIENT OUTREACH (OUTPATIENT)
Dept: CASE MANAGEMENT | Facility: OTHER | Age: 86
End: 2021-06-09

## 2021-06-29 DIAGNOSIS — Z87.81 S/P ORIF (OPEN REDUCTION INTERNAL FIXATION) FRACTURE: Primary | ICD-10-CM

## 2021-06-29 DIAGNOSIS — Z98.890 S/P ORIF (OPEN REDUCTION INTERNAL FIXATION) FRACTURE: Primary | ICD-10-CM

## 2021-06-30 ENCOUNTER — HOSPITAL ENCOUNTER (OUTPATIENT)
Dept: RADIOLOGY | Facility: HOSPITAL | Age: 86
Discharge: HOME | End: 2021-06-30
Attending: ORTHOPAEDIC SURGERY
Payer: MEDICARE

## 2021-06-30 ENCOUNTER — OFFICE VISIT (OUTPATIENT)
Dept: OBGYN CLINIC | Facility: HOSPITAL | Age: 86
End: 2021-06-30

## 2021-06-30 VITALS
BODY MASS INDEX: 27.85 KG/M2 | WEIGHT: 123.8 LBS | DIASTOLIC BLOOD PRESSURE: 79 MMHG | HEIGHT: 56 IN | HEART RATE: 79 BPM | SYSTOLIC BLOOD PRESSURE: 148 MMHG

## 2021-06-30 DIAGNOSIS — Z87.81 S/P ORIF (OPEN REDUCTION INTERNAL FIXATION) FRACTURE: ICD-10-CM

## 2021-06-30 DIAGNOSIS — S42.292S OTHER CLOSED DISPLACED FRACTURE OF PROXIMAL END OF LEFT HUMERUS, SEQUELA: ICD-10-CM

## 2021-06-30 DIAGNOSIS — Z87.81 S/P ORIF (OPEN REDUCTION INTERNAL FIXATION) FRACTURE: Primary | ICD-10-CM

## 2021-06-30 DIAGNOSIS — Z98.890 S/P ORIF (OPEN REDUCTION INTERNAL FIXATION) FRACTURE: ICD-10-CM

## 2021-06-30 DIAGNOSIS — Z98.890 S/P ORIF (OPEN REDUCTION INTERNAL FIXATION) FRACTURE: Primary | ICD-10-CM

## 2021-06-30 PROCEDURE — 99024 POSTOP FOLLOW-UP VISIT: CPT | Performed by: ORTHOPAEDIC SURGERY

## 2021-06-30 PROCEDURE — 73030 X-RAY EXAM OF SHOULDER: CPT

## 2021-06-30 NOTE — PROGRESS NOTES
Assessment:   Diagnosis ICD-10-CM Associated Orders   1  S/P ORIF (open reduction internal fixation) fracture  Z98 890     Z87 81    2  Other closed displaced fracture of proximal end of left humerus, sequela  S42 292S        Plan:  -80 y o  female s/p ORIF left proximal humeral shaft fracture DOS: 4/08/2021   -I discussed that she should continue working on motion about the left shoulder    -I will see her back in 3 months for a re-evaluation of the left shoulder  To do next visit:  No follow-ups on file  The above stated was discussed in layman's terms and the patient expressed understanding  All questions were answered to the patient's satisfaction  Scribe Attestation    I,:  Frances Lopes am acting as a scribe while in the presence of the attending physician :       I,:  Aaron Lowry MD personally performed the services described in this documentation    as scribed in my presence :             Subjective:   Jermaine Acevedo is a 80 y o  female who presents to the office s/p ORIF left proximal humeral shaft fracture DOS: 4/08/2021  Patient is 12 weeks post-op  Patient has since discontinued the use of her sling since her visit  The nurse aide that accompanies her today stated that she has no complaints of pain or discomfort  She stated that she has not been doing physical therapy  Patient presents today in a wheelchair  Review of systems negative unless otherwise specified in HPI  Review of Systems   All other systems reviewed and are negative        Past Medical History:   Diagnosis Date    Anemia     Cholangitis 5/5/2018    Cholecystitis     Cholecystitis 5/3/2018    Cholecystitis with cholelithiasis 1/21/2018    Cholecystostomy tube dysfunction 2/16/2018    Choledocholithiasis 5/5/2018    Cholelithiasis     Chronic kidney disease     Coronary artery disease     Dementia (HCC)     D[facility]     Diabetes mellitus ([facility] )     Type 2 blood sugar 257 on admission    Dysphagia     Encephalopathy     GERD (gastroesophageal reflux disease)     H/O falling     Hyperlipidemia     Hypertension     Hypothyroidism     Hypothyroid    Macular degeneration     Mild protein-calorie malnutrition ([facility] ) 5/5/2018    Osteoarthritis     UTI (urinary tract infection)     UTI (urinary tract infection)     Vitamin D deficiency     Vitamin D deficiency        Past Surgical History:   Procedure Laterality Date    ERCP N/A 5/3/2018    Procedure: ENDOSCOPIC RETROGRADE CHOLANGIOPANCREATOGRAPHY (ERCP); Surgeon: Julio Varghese MD;  [facility];  Service: Gastroenterology    IR NEPHROSTOMY TUBE PLACEMENT      ORIF HUMERUS FRACTURE Left 4/8/2021    Procedure: OPEN REDUCTION W/ INTERNAL FIXATION (ORIF) LEFT PROXIMAL HUMERAL SHAFT FRACTURE;  Surgeon: Johnny Lundy MD;  [facility];  Service: Orthopedics    NE ERCP DX COLLECTION SPECIMEN BRUSHING/WASHING N/A 8/16/2018    Procedure: ENDOSCOPIC RETROGRADE CHOLANGIOPANCREATOGRAPHY (ERCP); Surgeon: Julio Varghese MD;  [facility];   Service: Gastroenterology       Family History   Problem Relation Age of Onset    No Known Problems Mother     No Known Problems Father        Social History     Occupational History    Not on file   Tobacco Use    Smoking status: Never Smoker    Smokeless tobacco: Never Used   Vaping Use    Vaping Use: Never used   Substance and Sexual Activity    Alcohol use: No    Drug use: No    Sexual activity: Not on file         Current Outpatient Medications:     acetaminophen (TYLENOL) 325 mg tablet, Take 2 tablets by mouth 2 (two) times a day, Disp: , Rfl:     amLODIPine (NORVASC) 2 5 mg tablet, Take 1 tablet by mouth daily, Disp: , Rfl:     bisacodyl (FLEET) 10 MG/30ML ENEM, Insert 10 mg into the rectum once, Disp: , Rfl:     cholecalciferol (VITAMIN D3) 1,000 units tablet, Take 1 tablet by mouth daily, Disp: , Rfl:     DULoxetine HCl 40 MG CPEP, Take 1 capsule by mouth daily, Disp: , Rfl:   insulin detemir (LEVEMIR) 100 units/mL subcutaneous injection, Inject 6 Units under the skin daily, Disp: , Rfl:     levothyroxine 75 mcg tablet, Take 1 tablet by mouth daily, Disp: , Rfl:     magnesium hydroxide (Milk of Magnesia) 400 mg/5 mL oral suspension, Take 30 mL by mouth daily as needed for constipation, Disp: , Rfl:     metFORMIN (GLUCOPHAGE) 850 mg tablet, Take 1 tablet by mouth daily with dinner, Disp: , Rfl:     rosuvastatin (CRESTOR) 5 mg tablet, Take 1 tablet by mouth daily, Disp: , Rfl:     senna-docusate sodium (SENOKOT-S) 8 6-50 mg per tablet, Take 1 tablet by mouth daily, Disp: , Rfl:     SODIUM PHOSPHATES RE, Insert into the rectum daily as needed, Disp: , Rfl:     Allergies   Allergen Reactions    Penicillins     Valsartan             Vitals:    06/30/21 1037   BP: 148/79   Pulse: 79       Objective:                    Right Shoulder Exam   Right shoulder exam is normal       Left Shoulder Exam     Tenderness   The patient is experiencing no tenderness  Other   Erythema: absent  Scars: absent  Sensation: normal  Pulse: present     Comments:  Compartments soft   Fingers pink and mobile   Incision well healed, dry, intact   Sensation intact   Mild restriction of shoulder motion in all planes             Diagnostics, reviewed and taken today if performed as documented:    X-rays of the left shoulder performed today demonstrate healing left proximal humerus fracture s/p ORIF  The attending physician has personally reviewed the pertinent films in PACS and interpretation is as follows:      Procedures, if performed today:  None performed      Portions of the record may have been created with voice recognition software  Occasional wrong word or "sound a like" substitutions may have occurred due to the inherent limitations of voice recognition software  Read the chart carefully and recognize, using context, where substitutions have occurred

## 2021-07-02 ENCOUNTER — PATIENT OUTREACH (OUTPATIENT)
Dept: CASE MANAGEMENT | Facility: OTHER | Age: 86
End: 2021-07-02

## 2021-07-02 PROBLEM — Z98.890 S/P ORIF (OPEN REDUCTION INTERNAL FIXATION) FRACTURE: Status: ACTIVE | Noted: 2021-07-02

## 2021-07-02 PROBLEM — Z87.81 S/P ORIF (OPEN REDUCTION INTERNAL FIXATION) FRACTURE: Status: ACTIVE | Noted: 2021-07-02

## 2021-07-07 ENCOUNTER — NURSING HOME VISIT (OUTPATIENT)
Dept: GERIATRICS | Facility: OTHER | Age: 86
End: 2021-07-07
Payer: MEDICARE

## 2021-07-07 DIAGNOSIS — Z79.4 TYPE 2 DIABETES MELLITUS WITHOUT COMPLICATION, WITH LONG-TERM CURRENT USE OF INSULIN (HCC): Primary | Chronic | ICD-10-CM

## 2021-07-07 DIAGNOSIS — I10 HYPERTENSION, UNSPECIFIED TYPE: ICD-10-CM

## 2021-07-07 DIAGNOSIS — F03.91 DEMENTIA WITH BEHAVIORAL DISTURBANCE, UNSPECIFIED DEMENTIA TYPE (HCC): ICD-10-CM

## 2021-07-07 DIAGNOSIS — E11.9 TYPE 2 DIABETES MELLITUS WITHOUT COMPLICATION, WITH LONG-TERM CURRENT USE OF INSULIN (HCC): Primary | Chronic | ICD-10-CM

## 2021-07-07 DIAGNOSIS — E03.9 HYPOTHYROIDISM, UNSPECIFIED TYPE: Chronic | ICD-10-CM

## 2021-07-07 DIAGNOSIS — N18.31 STAGE 3A CHRONIC KIDNEY DISEASE (HCC): Chronic | ICD-10-CM

## 2021-07-07 DIAGNOSIS — F43.21 ADJUSTMENT DISORDER WITH DEPRESSED MOOD: Chronic | ICD-10-CM

## 2021-07-07 PROCEDURE — 99309 SBSQ NF CARE MODERATE MDM 30: CPT | Performed by: PHYSICIAN ASSISTANT

## 2021-07-08 VITALS
SYSTOLIC BLOOD PRESSURE: 130 MMHG | HEART RATE: 70 BPM | BODY MASS INDEX: 26.12 KG/M2 | WEIGHT: 116.5 LBS | TEMPERATURE: 98.5 F | DIASTOLIC BLOOD PRESSURE: 64 MMHG

## 2021-07-08 NOTE — ASSESSMENT & PLAN NOTE
Lab Results   Component Value Date    EGFR 48 04/09/2021    EGFR 52 04/08/2021    EGFR 50 04/07/2021    CREATININE 1 03 04/09/2021    CREATININE 0 97 04/08/2021    CREATININE 1 00 04/07/2021       6/10/21 creat 0 89

## 2021-07-08 NOTE — PROGRESS NOTES
[facility]  [phone]) Ted [address]  Code  32      NAME: Yudy Alejandro  AGE: 80 y o  SEX: female [id number]    DATE OF ENCOUNTER: 7/7/21    CODE STATUS: DNR    Assessment and Plan     Problem List Items Addressed This Visit        Endocrine    Diabetes mellitus ([address] ) - Primary (Chronic)       Lab Results   Component Value Date    HGBA1C 7 4 (H) 06/10/2021       Blood sugar log reviewed, stable  Continue levemir, metformin         Hypothyroid (Chronic)     6/10/21  TSH 0 43  Continue levothyroxine            Cardiovascular and Mediastinum    Hypertension     BP stable  Continue amlodipine            Nervous and Auditory    Dementia (HCC)     Stable  Doing well in long term care facility  Continue supportive care  Manage chronic conditions            Genitourinary    Chronic kidney disease, stage 3 (HCC) (Chronic)     Lab Results   Component Value Date    EGFR 48 04/09/2021    EGFR 52 04/08/2021    EGFR 50 04/07/2021    CREATININE 1 03 04/09/2021    CREATININE 0 97 04/08/2021    CREATININE 1 00 04/07/2021       6/10/21 creat 0 89            Other    Adjustment disorder with depressed mood (Chronic)     Stable  Continue duloxetine               No orders of the defined types were placed in this encounter  Chief Complaint     Chief Complaint   Patient presents with    Geriatric Evaluation     follow up for chronic conditions       History of Present Illness   80year old female resident of [address] [place] [address] being seen today in collaboration with nursing for follow up for chronic conditions  Nursing has no concerns          The following portions of the patient's history were reviewed and updated as appropriate: allergies, current medications, past family history, past medical history, past social history, past surgical history and problem list     Review of Systems   Review of Systems   Unable to perform ROS: Dementia          Active Problem List     Patient Active Problem List   Diagnosis    Dementia ([facility] )    Hypertension    Diabetes mellitus ([facility] )    Chronic kidney disease, stage 3 ([facility] )    Hypothyroid    Dysphagia    Debility    Hyperlipidemia    Vitamin D deficiency    Pain in lower back    Dextroscoliosis    Urinary retention    Constipation    Trigger ring finger of left hand    Adjustment disorder with depressed mood    Goals of care, counseling/discussion    Medication management    Weight loss    Closed fracture of proximal end of left humerus    H/O falling    Abdominal pain    S/P ORIF (open reduction internal fixation) fracture         Objective     /64   Pulse 70   Temp 98 5 °F (36 9 °C)   Wt 52 8 kg (116 lb 8 oz)   BMI 26 12 kg/m²     Physical Exam  Vitals reviewed  Constitutional:       General: She is not in acute distress  Appearance: Normal appearance  She is not ill-appearing or diaphoretic  HENT:      Head: Normocephalic and atraumatic  Nose: Nose normal       Mouth/Throat:      Mouth: Mucous membranes are moist       Pharynx: Oropharynx is clear  Eyes:      Conjunctiva/sclera: Conjunctivae normal       Pupils: Pupils are equal, round, and reactive to light  Cardiovascular:      Rate and Rhythm: Normal rate and regular rhythm  Pulmonary:      Effort: Pulmonary effort is normal  No respiratory distress  Breath sounds: Normal breath sounds  Abdominal:      General: Abdomen is flat  Bowel sounds are normal  There is no distension  Palpations: Abdomen is soft  Musculoskeletal:         General: No signs of injury  Cervical back: Neck supple  Lymphadenopathy:      Cervical: No cervical adenopathy  Skin:     General: Skin is warm and dry  Findings: No erythema  Neurological:      Mental Status: Mental status is at baseline           Pertinent Laboratory/Diagnostic Studies:    6/10/21  hgb 11 2    Current Medications   Medications reviewed and updated in facility chart

## 2021-07-08 NOTE — ASSESSMENT & PLAN NOTE
Lab Results   Component Value Date    HGBA1C 7 4 (H) 06/10/2021       Blood sugar log reviewed, stable  Continue levemir, metformin

## 2021-07-09 ENCOUNTER — PATIENT OUTREACH (OUTPATIENT)
Dept: CASE MANAGEMENT | Facility: OTHER | Age: 86
End: 2021-07-09

## 2021-09-03 ENCOUNTER — NURSING HOME VISIT (OUTPATIENT)
Dept: GERIATRICS | Facility: OTHER | Age: 86
End: 2021-09-03
Payer: MEDICARE

## 2021-09-03 DIAGNOSIS — R53.81 DEBILITY: Primary | ICD-10-CM

## 2021-09-03 DIAGNOSIS — E03.9 HYPOTHYROIDISM, UNSPECIFIED TYPE: Chronic | ICD-10-CM

## 2021-09-03 DIAGNOSIS — Z66 DNR (DO NOT RESUSCITATE): ICD-10-CM

## 2021-09-03 DIAGNOSIS — K59.00 CONSTIPATION, UNSPECIFIED CONSTIPATION TYPE: ICD-10-CM

## 2021-09-03 DIAGNOSIS — E11.9 TYPE 2 DIABETES MELLITUS WITHOUT COMPLICATION, WITH LONG-TERM CURRENT USE OF INSULIN (HCC): Chronic | ICD-10-CM

## 2021-09-03 DIAGNOSIS — F03.91 DEMENTIA WITH BEHAVIORAL DISTURBANCE, UNSPECIFIED DEMENTIA TYPE (HCC): ICD-10-CM

## 2021-09-03 DIAGNOSIS — Z79.4 TYPE 2 DIABETES MELLITUS WITHOUT COMPLICATION, WITH LONG-TERM CURRENT USE OF INSULIN (HCC): Chronic | ICD-10-CM

## 2021-09-03 DIAGNOSIS — E78.5 HYPERLIPIDEMIA, UNSPECIFIED HYPERLIPIDEMIA TYPE: ICD-10-CM

## 2021-09-03 PROCEDURE — 99309 SBSQ NF CARE MODERATE MDM 30: CPT | Performed by: INTERNAL MEDICINE

## 2021-09-13 PROBLEM — Z66 DNR (DO NOT RESUSCITATE): Status: ACTIVE | Noted: 2021-09-13

## 2021-09-13 NOTE — ASSESSMENT & PLAN NOTE
· Rosalind 10, 2021: Hemoglobin A1c:  7 4%,    · She is doing well with her current dosage of insulin glargine and metformin   · Will continue with this medication regimen  · Will continue with clinical and periodic laboratory monitoring for change in her condition

## 2021-09-13 NOTE — ASSESSMENT & PLAN NOTE
· Secondary to her chronic medical conditions   · She continues to require 24/7 care/support of her ADLs   · I recommend continued care / support of her ADLs as a long-term resident at the nursing facility  · Will continue to monitor for change in her condition

## 2021-09-13 NOTE — ASSESSMENT & PLAN NOTE
· She is doing well with care/support of her ADLs at the nursing facility   · Will continue her on duloxetine 40 mg daily   · Will continue to provide a safe, secure, structured, and supportive environment at the nursing facility  · Will continue monitor for change in her condition

## 2021-09-13 NOTE — ASSESSMENT & PLAN NOTE
· Rosalind 10, 2021: TSH:  0 43   · She is doing well with her current dosage of levothyroxine  · Will continue with clinical and periodic laboratory monitoring for change in her condition

## 2021-09-13 NOTE — PROGRESS NOTES
[address]  [address] [place] [address]  Facility: [facility]  32    NAME: Nayely Head  AGE: 80 y o  SEX: female    DATE OF ENCOUNTER: 9/3/2021    Code status:  DNR    Assessment and Plan     1  Debility  Assessment & Plan:  · Secondary to her chronic medical conditions   · She continues to require 24/7 care/support of her ADLs   · I recommend continued care / support of her ADLs as a long-term resident at the nursing facility  · Will continue to monitor for change in her condition      2  Type 2 diabetes mellitus without complication, with long-term current use of insulin [facility])  Assessment & Plan:  · Rosalind 10, 2021: Hemoglobin A1c:  7 4%,    · She is doing well with her current dosage of insulin glargine and metformin   · Will continue with this medication regimen  · Will continue with clinical and periodic laboratory monitoring for change in her condition      3  Hypothyroidism, unspecified type  Assessment & Plan:  · Rosalind 10, 2021: TSH:  0 43   · She is doing well with her current dosage of levothyroxine  · Will continue with clinical and periodic laboratory monitoring for change in her condition      4  Constipation, unspecified constipation type  Assessment & Plan:  · Nursing staff endorses that she is doing well with routine Senokot S   · Will continue with this medication regimen   · Will continue with monitoring for change in her condition      5  Dementia with behavioral disturbance, unspecified dementia type [facility])  Assessment & Plan:  · She is doing well with care/support of her ADLs at the nursing facility   · Will continue her on duloxetine 40 mg daily   · Will continue to provide a safe, secure, structured, and supportive environment at the nursing facility  · Will continue monitor for change in her condition      6   Hyperlipidemia, unspecified hyperlipidemia type  Assessment & Plan:  · She is doing well with rosuvastatin   · Her LFTs look well  · Will continue with clinical and periodic laboratory monitoring for change in her condition      7  DNR (do not resuscitate)    See my note of June 7, 2021 for further assessment and plan  All medications and routine orders were reviewed and updated as needed  Plan discussed with:  Nursing staff  Chief Complaint     She is seen for a follow-up visit to update the care and treatment of her debility secondary to her chronic medical conditions, hypothyroidism, insulin-requiring type 2 diabetes mellitus, and constipation  History of Present Illness     She is seen with a [place]an-speaking nursing aide for a follow-up visit to update the care and treatment of her debility secondary to her chronic medical conditions-she continues to require 24/7 care/support of her ADLs, hypothyroidism-doing well with levothyroxine, insulin-requiring type 2 diabetes mellitus-doing well with insulin glargine and metformin, and constipation-nursing endorses that she is doing well with Senokot S       Nursing endorses that she is having no trouble with constipation and is sleeping well  She reportedly does not like the food at the facility, but does drink her dietary supplements  The following portions of the patient's history were reviewed and updated as appropriate: current medications, past medical history, past social history, past surgical history and problem list     Allergies: Allergies   Allergen Reactions    Penicillins     Valsartan        Review of Systems     Review of Systems   Unable to perform ROS: Dementia       Medications and orders     All medications reviewed and updated in Nursing Home EMR  Objective     Vitals:   Monthly vitals:  Weight 114 9 lb (stable), pulse 68, blood pressure 118/68, fasting blood sugar 91  Review of her BP and AP logs looks well  Physical Exam  Vitals reviewed  Exam conducted with a chaperone present  Constitutional:       General: She is awake  Appearance: She is well-developed  She is not toxic-appearing or diaphoretic  Comments: She appears comfortable in her wheelchair, stated age, and frail  Psychiatric:         Attention and Perception: She is inattentive  Pertinent Laboratory/Diagnostic Studies: The following labs were reviewed please see chart or hospital paperwork for details  Rosalind 10, 2021:     CBC with diff:  WBC count 5 7, hemoglobin 11 2, hematocrit 34 3, platelet count [id number]     CMP:  Sodium 143, potassium 4 2, BUN 33, creatinine 0 89, fasting blood sugar 81, EGFR 58, LFTs within normal limits except albumin 3 3     Hemoglobin A1c:  7 4%, EA     TSH:  0 43    - Her monthly order summary was reviewed and signed  Treatment plan reviewed with nursing staff      MINA Bowman   2021 12:45 AM

## 2021-09-13 NOTE — ASSESSMENT & PLAN NOTE
· Nursing staff endorses that she is doing well with routine Senokot S   · Will continue with this medication regimen   · Will continue with monitoring for change in her condition

## 2021-09-13 NOTE — ASSESSMENT & PLAN NOTE
· She is doing well with rosuvastatin   · Her LFTs look well  · Will continue with clinical and periodic laboratory monitoring for change in her condition

## 2021-09-27 DIAGNOSIS — Z87.81 S/P ORIF (OPEN REDUCTION INTERNAL FIXATION) FRACTURE: Primary | ICD-10-CM

## 2021-09-27 DIAGNOSIS — Z98.890 S/P ORIF (OPEN REDUCTION INTERNAL FIXATION) FRACTURE: Primary | ICD-10-CM

## 2021-09-29 ENCOUNTER — HOSPITAL ENCOUNTER (OUTPATIENT)
Dept: RADIOLOGY | Facility: HOSPITAL | Age: 86
Discharge: HOME | End: 2021-09-29
Attending: ORTHOPAEDIC SURGERY
Payer: MEDICARE

## 2021-09-29 ENCOUNTER — OFFICE VISIT (OUTPATIENT)
Dept: OBGYN CLINIC | Facility: HOSPITAL | Age: 86
End: 2021-09-29
Payer: MEDICARE

## 2021-09-29 VITALS
HEIGHT: 56 IN | BODY MASS INDEX: 26.12 KG/M2 | DIASTOLIC BLOOD PRESSURE: 78 MMHG | HEART RATE: 80 BPM | SYSTOLIC BLOOD PRESSURE: 149 MMHG

## 2021-09-29 DIAGNOSIS — Z98.890 S/P ORIF (OPEN REDUCTION INTERNAL FIXATION) FRACTURE: Primary | ICD-10-CM

## 2021-09-29 DIAGNOSIS — S42.292S OTHER CLOSED DISPLACED FRACTURE OF PROXIMAL END OF LEFT HUMERUS, SEQUELA: ICD-10-CM

## 2021-09-29 DIAGNOSIS — Z87.81 S/P ORIF (OPEN REDUCTION INTERNAL FIXATION) FRACTURE: Primary | ICD-10-CM

## 2021-09-29 DIAGNOSIS — Z98.890 S/P ORIF (OPEN REDUCTION INTERNAL FIXATION) FRACTURE: ICD-10-CM

## 2021-09-29 DIAGNOSIS — Z87.81 S/P ORIF (OPEN REDUCTION INTERNAL FIXATION) FRACTURE: ICD-10-CM

## 2021-09-29 PROCEDURE — 99213 OFFICE O/P EST LOW 20 MIN: CPT | Performed by: ORTHOPAEDIC SURGERY

## 2021-09-29 PROCEDURE — 73060 X-RAY EXAM OF HUMERUS: CPT

## 2021-09-29 PROCEDURE — 1124F ACP DISCUSS-NO DSCNMKR DOCD: CPT | Performed by: ORTHOPAEDIC SURGERY

## 2021-09-29 RX ORDER — OMEPRAZOLE 20 MG/1
CAPSULE, DELAYED RELEASE ORAL
COMMUNITY

## 2021-09-29 RX ORDER — ASPIRIN 81 MG/1
81 TABLET ORAL DAILY
COMMUNITY

## 2021-09-29 RX ORDER — DULOXETIN HYDROCHLORIDE 20 MG/1
CAPSULE, DELAYED RELEASE ORAL
COMMUNITY
Start: 2021-08-30

## 2021-09-29 NOTE — PROGRESS NOTES
Assessment:   Diagnosis ICD-10-CM Associated Orders   1  S/P ORIF (open reduction internal fixation) fracture  Z98 890     Z87 81    2  Other closed displaced fracture of proximal end of left humerus, sequela  S42 292S        Plan:  X-rays taken radiographically and clinically she is healed status post ORIF of her left humerus  She may continue to receive therapy at her nursing facility  Weight-bearing and activities as tolerated  No restrictions  To do next visit:  Return for re-check on an as needed basis (PRN)  The above stated was discussed in layman's terms and the patient expressed understanding  All questions were answered to the patient's satisfaction  Scribe Attestation    I,:  Ajith Soriano am acting as a scribe while in the presence of the attending physician :       I,:  Kailey Camargo MD personally performed the services described in this documentation    as scribed in my presence :             Subjective:   Adalberto Davison is a 80 y o  female who presents today for repeat evaluation 6 months status post ORIF left proximal humeral shaft fracture, 04/08/2021  She is residing at a local nursing facility where she is receiving therapy  She notes occasional discomfort but no significant amount of pain        Review of systems negative unless otherwise specified in HPI  Review of Systems    Past Medical History:   Diagnosis Date    Anemia     Cholangitis 5/5/2018    Cholecystitis     Cholecystitis 5/3/2018    Cholecystitis with cholelithiasis 1/21/2018    Cholecystostomy tube dysfunction 2/16/2018    Choledocholithiasis 5/5/2018    Cholelithiasis     Chronic kidney disease     Coronary artery disease     Dementia ([facility] )     Dementia ([facility] )     Diabetes mellitus ([facility] )     Type 2 blood sugar 257 on admission    Dysphagia     Encephalopathy     GERD (gastroesophageal reflux disease)     H/O falling     Hyperlipidemia     Hypertension     Hypothyroidism     Hypothyroid    Macular degeneration     Mild protein-calorie malnutrition ([facility] ) 5/5/2018    Osteoarthritis     UTI (urinary tract infection)     UTI (urinary tract infection)     Vitamin D deficiency     Vitamin D deficiency        Past Surgical History:   Procedure Laterality Date    ERCP N/A 5/3/2018    Procedure: ENDOSCOPIC RETROGRADE CHOLANGIOPANCREATOGRAPHY (ERCP); Surgeon: Rukhsana Mancia MD;  [facility];  Service: Gastroenterology    IR NEPHROSTOMY TUBE PLACEMENT      ORIF HUMERUS FRACTURE Left 4/8/2021    Procedure: OPEN REDUCTION W/ INTERNAL FIXATION (ORIF) LEFT PROXIMAL HUMERAL SHAFT FRACTURE;  Surgeon: Snehal Alvarez MD;  [facility];  Service: Orthopedics    ME ERCP DX COLLECTION SPECIMEN BRUSHING/WASHING N/A 8/16/2018    Procedure: ENDOSCOPIC RETROGRADE CHOLANGIOPANCREATOGRAPHY (ERCP); Surgeon: Rukhsana Mancia MD;  [facility];   Service: Gastroenterology       Family History   Problem Relation Age of Onset    No Known Problems Mother     No Known Problems Father        Social History     Occupational History    Not on file   Tobacco Use    Smoking status: Never Smoker    Smokeless tobacco: Never Used   Vaping Use    Vaping Use: Never used   Substance and Sexual Activity    Alcohol use: No    Drug use: No    Sexual activity: Not on file         Current Outpatient Medications:     acetaminophen (TYLENOL) 325 mg tablet, Take 2 tablets by mouth 2 (two) times a day, Disp: , Rfl:     amLODIPine (NORVASC) 2 5 mg tablet, Take 1 tablet by mouth daily, Disp: , Rfl:     aspirin (ECOTRIN LOW STRENGTH) 81 mg EC tablet, Take 81 mg by mouth daily, Disp: , Rfl:     bisacodyl (FLEET) 10 MG/30ML ENEM, Insert 10 mg into the rectum once, Disp: , Rfl:     cholecalciferol (VITAMIN D3) 1,000 units tablet, Take 1 tablet by mouth daily, Disp: , Rfl:     DULoxetine (CYMBALTA) 20 mg capsule, , Disp: , Rfl:     DULoxetine HCl 40 MG CPEP, Take 1 capsule by mouth daily, Disp: , Rfl:     insulin detemir (LEVEMIR) 100 units/mL subcutaneous injection, Inject 6 Units under the skin daily, Disp: , Rfl:     levothyroxine 75 mcg tablet, Take 1 tablet by mouth daily, Disp: , Rfl:     magnesium hydroxide (Milk of Magnesia) 400 mg/5 mL oral suspension, Take 30 mL by mouth daily as needed for constipation, Disp: , Rfl:     metFORMIN (GLUCOPHAGE) 850 mg tablet, Take 1 tablet by mouth daily with dinner, Disp: , Rfl:     mupirocin (BACTROBAN) 2 % ointment, mupirocin 2 % topical ointment, Disp: , Rfl:     omeprazole (PriLOSEC) 20 mg delayed release capsule, omeprazole 20 mg capsule,delayed release, Disp: , Rfl:     rosuvastatin (CRESTOR) 5 mg tablet, Take 1 tablet by mouth daily, Disp: , Rfl:     senna-docusate sodium (SENOKOT-S) 8 6-50 mg per tablet, Take 1 tablet by mouth daily, Disp: , Rfl:     SODIUM PHOSPHATES RE, Insert into the rectum daily as needed, Disp: , Rfl:     Allergies   Allergen Reactions    Penicillins     Valsartan             Vitals:    09/29/21 1111   BP: 149/78   Pulse: 80       Objective:                    Left Shoulder Exam     Tenderness   The patient is experiencing no tenderness  Other   Erythema: absent  Sensation: normal     Comments:    Restricted range of motion of her left shoulder due to stiffness but not pain  Mild diffuse muscle weakness of her left upper extremity  Diagnostics, reviewed and taken today if performed as documented: The attending physician has personally reviewed the pertinent films in PACS and interpretation is as follows:    Left humerus x-rays taken and reviewed in the office today show:  Healed proximal shaft fracture with hardware in excellent position alignment, callus formation present, no signs of hardware failure  Procedures, if performed today:    Procedures    None performed      Portions of the record may have been created with voice recognition software    Occasional wrong word or "sound a like" substitutions may have occurred due to the inherent limitations of voice recognition software  Read the chart carefully and recognize, using context, where substitutions have occurred

## 2021-09-29 NOTE — PATIENT INSTRUCTIONS
1  S/P ORIF (open reduction internal fixation) fracture     2  Other closed displaced fracture of proximal end of left humerus, sequela           Return for re-check on an as needed basis (PRN)

## 2021-11-02 ENCOUNTER — NURSING HOME VISIT (OUTPATIENT)
Dept: GERIATRICS | Facility: OTHER | Age: 86
End: 2021-11-02
Payer: MEDICARE

## 2021-11-02 DIAGNOSIS — F43.21 ADJUSTMENT DISORDER WITH DEPRESSED MOOD: Chronic | ICD-10-CM

## 2021-11-02 DIAGNOSIS — E03.9 HYPOTHYROIDISM, UNSPECIFIED TYPE: Chronic | ICD-10-CM

## 2021-11-02 DIAGNOSIS — Z79.4 TYPE 2 DIABETES MELLITUS WITHOUT COMPLICATION, WITH LONG-TERM CURRENT USE OF INSULIN (HCC): Chronic | ICD-10-CM

## 2021-11-02 DIAGNOSIS — I10 HYPERTENSION, UNSPECIFIED TYPE: ICD-10-CM

## 2021-11-02 DIAGNOSIS — F03.91 DEMENTIA WITH BEHAVIORAL DISTURBANCE, UNSPECIFIED DEMENTIA TYPE (HCC): ICD-10-CM

## 2021-11-02 DIAGNOSIS — E11.9 TYPE 2 DIABETES MELLITUS WITHOUT COMPLICATION, WITH LONG-TERM CURRENT USE OF INSULIN (HCC): Chronic | ICD-10-CM

## 2021-11-02 DIAGNOSIS — R13.10 DYSPHAGIA, UNSPECIFIED TYPE: Primary | ICD-10-CM

## 2021-11-02 DIAGNOSIS — E78.5 HYPERLIPIDEMIA, UNSPECIFIED HYPERLIPIDEMIA TYPE: ICD-10-CM

## 2021-11-02 PROCEDURE — 99309 SBSQ NF CARE MODERATE MDM 30: CPT | Performed by: PHYSICIAN ASSISTANT

## 2021-11-03 VITALS
DIASTOLIC BLOOD PRESSURE: 66 MMHG | WEIGHT: 104 LBS | HEART RATE: 70 BPM | BODY MASS INDEX: 23.32 KG/M2 | TEMPERATURE: 98.7 F | SYSTOLIC BLOOD PRESSURE: 120 MMHG

## 2022-01-03 ENCOUNTER — NURSING HOME VISIT (OUTPATIENT)
Dept: GERIATRICS | Facility: OTHER | Age: 87
End: 2022-01-03
Payer: MEDICARE

## 2022-01-03 DIAGNOSIS — Z66 DNR (DO NOT RESUSCITATE): ICD-10-CM

## 2022-01-03 DIAGNOSIS — E03.9 HYPOTHYROIDISM, UNSPECIFIED TYPE: Chronic | ICD-10-CM

## 2022-01-03 DIAGNOSIS — E11.9 TYPE 2 DIABETES MELLITUS WITHOUT COMPLICATION, WITHOUT LONG-TERM CURRENT USE OF INSULIN (HCC): Chronic | ICD-10-CM

## 2022-01-03 DIAGNOSIS — R54 FRAILTY SYNDROME IN GERIATRIC PATIENT: ICD-10-CM

## 2022-01-03 DIAGNOSIS — I10 HYPERTENSION, UNSPECIFIED TYPE: Primary | ICD-10-CM

## 2022-01-03 PROCEDURE — 99309 SBSQ NF CARE MODERATE MDM 30: CPT | Performed by: INTERNAL MEDICINE

## 2022-01-03 NOTE — PROGRESS NOTES
[address]  Facility: [facility]  32  Follow-up visit    NAME: Chandra Kim  AGE: 80 y o  SEX: female    DATE OF ENCOUNTER: 1/3/2022    Code status:  DNR    Assessment and Plan     1  Hypertension, unspecified type  Assessment & Plan:  · Review of her BP and AP logs looks well with amlodipine   · Will continue her on this medication   · Will continue with monitoring for change in her condition      2  Frailty syndrome in geriatric patient  Assessment & Plan:  · Secondary to her chronic medical conditions   · She continues to require 24/7 care/support of her ADLs  · She is level 7 on the clinical frailty scale consistent with being severely frail   · I recommend continued care/support of her ADLs as a long-term resident at the nursing facility   · Will continue to monitor for change in her condition      3  Hypothyroidism, unspecified type  Assessment & Plan:  · December 16, 2021: TSH:  3 17   · She is doing well on lower dosage of levothyroxine  · Will continue with clinical and periodic laboratory monitoring for change in her condition      4  Type 2 diabetes mellitus without complication, without long-term current use of insulin ([facility])  Assessment & Plan:  · She continues to do well with metformin daily  · She is doing well without insulin detemir  · Will continue with clinical and periodic laboratory monitoring for change in her condition      5  DNR (do not resuscitate)    See my note of September 3, 2021 for further assessment and plan  All medications and routine orders were reviewed and updated as needed  Plan discussed with:  Nursing staff    Chief Complaint     She is seen for a follow-up visit to update the care and treatment of her hypertension, frailty, hypothyroidism, and type 2 diabetes mellitus      History of Present Illness     She is a 22-year-old woman who is seen with Irish-speaking unit clerk for a follow-up visit to update the care and treatment of her hypertension-she is doing well with amlodipine, frailty-she continues to require 24/7 care/support of her ADLs, hypothyroidism-doing well with her lower dosage of levothyroxine, and type 2 diabetes mellitus-doing well with metformin and with discontinuation of detemir insulin  Nursing staff endorses that her appetite continues to be poor, but that she does drinker supplements  Nursing staff reports that she sleeps well and is having no difficulty with constipation  She does require assistance with her ADLs  Nursing staff reports that she does not exhibit any distressing or disturbing behaviors  The following portions of the patient's history were reviewed and updated as appropriate: current medications, past family history, past medical history, past social history, past surgical history and problem list     Allergies: Allergies   Allergen Reactions    Penicillins     Valsartan        Review of Systems     Review of Systems   Unable to perform ROS: Dementia       Medications and orders     All medications reviewed and updated in Nursing Home EMR  Objective     Vitals:  Monthly vitals:  Weight 106 3 lb (stable for 3 months), pulse 70, blood pressure 134/74, fasting fingerstick blood sugar 189  Physical Exam  Vitals reviewed  Exam conducted with a chaperone present  Constitutional:       General: She is awake  She is not in acute distress  Appearance: She is well-developed  She is not ill-appearing, toxic-appearing or diaphoretic  Comments:  Appears comfortable, stated age, and frail  Cardiovascular:      Rate and Rhythm: Normal rate and regular rhythm  Heart sounds: Normal heart sounds  No murmur heard  No friction rub  No gallop  Comments: There is no pretibial edema, bilaterally  Pulmonary:      Effort: Pulmonary effort is normal  No respiratory distress  Breath sounds: Normal breath sounds   No stridor  No wheezing, rhonchi or rales  Abdominal:      General: Abdomen is flat  There is no distension  Palpations: Abdomen is soft  There is no mass  Tenderness: There is no abdominal tenderness  There is no guarding or rebound  Neurological:      Mental Status: She is alert  Psychiatric:         Behavior: Behavior is cooperative  Pertinent Laboratory/Diagnostic Studies: The following labs were reviewed please see chart or hospital paperwork for details  November 4, 2021:     TSH:  0 2  Free T4: 1 14     CBC with diff:  WBC count 6 4, hemoglobin 12 1, hematocrit 36 7, platelet count [id number]     CMP:  Sodium 142, potassium 4 1, BUN 38, creatinine 0 9, fasting blood sugar 194, EGFR 56, LFTs within normal limits except albumin 2 7     Vitamin-D 25 hydroxy level: 35    - Her monthly order summary was reviewed and signed      MINA Sultana   1/9/2022 5:10 PM

## 2022-01-09 PROBLEM — R54 FRAILTY SYNDROME IN GERIATRIC PATIENT: Status: ACTIVE | Noted: 2022-01-09

## 2022-01-09 RX ORDER — LEVOTHYROXINE SODIUM 0.05 MG/1
1 TABLET ORAL DAILY
COMMUNITY
Start: 2021-11-05

## 2022-01-09 NOTE — ASSESSMENT & PLAN NOTE
· She continues to do well with metformin daily  · She is doing well without insulin detemir  · Will continue with clinical and periodic laboratory monitoring for change in her condition

## 2022-01-09 NOTE — ASSESSMENT & PLAN NOTE
· December 16, 2021: TSH:  3 17   · She is doing well on lower dosage of levothyroxine  · Will continue with clinical and periodic laboratory monitoring for change in her condition

## 2022-01-09 NOTE — ASSESSMENT & PLAN NOTE
· Secondary to her chronic medical conditions   · She continues to require 24/7 care/support of her ADLs  · She is level 7 on the clinical frailty scale consistent with being severely frail   · I recommend continued care/support of her ADLs as a long-term resident at the nursing facility   · Will continue to monitor for change in her condition

## 2022-01-09 NOTE — ASSESSMENT & PLAN NOTE
· Review of her BP and AP logs looks well with amlodipine   · Will continue her on this medication   · Will continue with monitoring for change in her condition

## 2022-01-28 ENCOUNTER — NURSING HOME VISIT (OUTPATIENT)
Dept: GERIATRICS | Facility: OTHER | Age: 87
End: 2022-01-28
Payer: MEDICARE

## 2022-01-28 DIAGNOSIS — N39.0 URINARY TRACT INFECTION WITHOUT HEMATURIA, SITE UNSPECIFIED: Primary | ICD-10-CM

## 2022-01-28 PROCEDURE — 99310 SBSQ NF CARE HIGH MDM 45: CPT | Performed by: PHYSICIAN ASSISTANT

## 2022-01-29 ENCOUNTER — TELEPHONE (OUTPATIENT)
Dept: OTHER | Facility: OTHER | Age: 87
End: 2022-01-29

## 2022-01-29 VITALS
TEMPERATURE: 98.7 F | SYSTOLIC BLOOD PRESSURE: 125 MMHG | WEIGHT: 104.5 LBS | BODY MASS INDEX: 23.43 KG/M2 | DIASTOLIC BLOOD PRESSURE: 72 MMHG | HEART RATE: 103 BPM

## 2022-01-29 NOTE — PROGRESS NOTES
[facility]  [phone]) Ted Sparks 83  Code  32      NAME: Clair Cazares  AGE: 80 y o  SEX: female [id number]    DATE OF ENCOUNTER: 22    CODE STATUS: DNR    Assessment and Plan     Problem List Items Addressed This Visit        Genitourinary    UTI (urinary tract infection) - Primary     Patient presented with hyperglycemia last evening  Her blood sugars have been checked regularly and have been relatively well controlled  Last night appeared more lethargic, nursing checked blood sugar, found to be in 400's  Provider notified immediately  Blood work and UA ordered  Patient given dose of Bactrim for presumed UTI  Finger sticks were ordered QID with additional coverage  On evaluation the following AM, UA showed +4 bacteria and  WBC  Continue bactrim but renal dosing based on elevated creatinine  HGB 15 5  WBC 10 9  Creat 1 6, up from 0 9      Phone discussion with son/POA  Discussed treatment options  Patient is DNR/DNI  She does not eat well at baseline but staff has been able to encourage to to drink fluids today  She is very frail and advanced age at 80  Patient is comfortable at this time, does not appear to be in any pain  Discussed treatment options includin- continuing PO antibiotics and PO fluids/comfort measures/possible transition to a hospice type situation based on her frailty   2- continuing antibiotics and trying more aggressive fluids based on labs  Would recommend hypodermoclysis if patient would leave the access in    3- offered to transfer to hospital for most aggressive care if son felt most comfortable with that option    After discussion, son agreed to option #2  Will try for fluids in the facility and continue to treat the UTI with PO antibiotics and close monitoring of blood sugars  He understands that this will not change her underlying issues will poor PO intake   Plan to repeat labs on Monday               No orders of the defined types were placed in this encounter  Chief Complaint     Chief Complaint   Patient presents with    Geriatric Evaluation     abnormal labs       History of Present Illness   80year old female resident of [address] [place] [address] facility being seen today in collaboration with nursing for follow up for abnormal labs  She was noted to have high blood sugars last evening  The provider was called and labs and UA was ordered  She was started on antibiotics  Nursing has been able to encourage her to drink fluids today  Her blood sugars continue to run high  The following portions of the patient's history were reviewed and updated as appropriate: allergies, current medications, past family history, past medical history, past social history, past surgical history and problem list     Review of Systems   Review of Systems   Unable to perform ROS: Dementia          Active Problem List     Patient Active Problem List   Diagnosis    Dementia ([facility] )    Hypertension    [facility]    UTI (urinary tract infection)    Chronic kidney disease, stage 3 ([facility] )    Hypothyroid    Dysphagia    Debility    Hyperlipidemia    Vitamin D deficiency    Pain in lower back    Dextroscoliosis    Urinary retention    Constipation    Trigger ring finger of left hand    Adjustment disorder with depressed mood    Goals of care, counseling/discussion    Medication management    Weight loss    Closed fracture of proximal end of left humerus    H/O falling    Abdominal pain    S/P ORIF (open reduction internal fixation) fracture    DNR (do not resuscitate)    Frailty syndrome in geriatric patient         Objective     /72   Pulse 103   Temp 98 7 °F (37 1 °C)   Wt 47 4 kg (104 lb 8 oz)   BMI 23 43 kg/m²     Physical Exam  Vitals reviewed  Constitutional:       General: She is not in acute distress  Appearance: She is not diaphoretic  Comments: Frail appearing   HENT:      Head: Normocephalic and atraumatic  Cardiovascular:      Rate and Rhythm: Regular rhythm  Comments: 's  Pulmonary:      Effort: Pulmonary effort is normal  No respiratory distress  Breath sounds: Normal breath sounds  No wheezing  Abdominal:      General: Abdomen is flat  Bowel sounds are normal  There is no distension  Palpations: Abdomen is soft  Tenderness: There is no abdominal tenderness  Musculoskeletal:         General: No swelling, tenderness, deformity or signs of injury  Skin:     General: Skin is warm and dry  Coloration: Skin is not jaundiced  Findings: No bruising or rash  Neurological:      Comments: Nonverbal at this this         Pertinent Laboratory/Diagnostic Studies:    hgb 15 5  Wbc 10 9  Creat 1 6      Current Medications   Medications reviewed and updated in facility chart  I have spent 45 minutes with Patient and family today in which greater than 50% of this time was spent in counseling/coordination of care regarding Risks and benefits of tx options

## 2023-12-27 NOTE — PLAN OF CARE
DISCHARGE PLANNING     Discharge to home or other facility with appropriate resources Progressing        DISCHARGE PLANNING - CARE MANAGEMENT     Discharge to post-acute care or home with appropriate resources Progressing        INFECTION - ADULT     Absence or prevention of progression during hospitalization Progressing     Absence of fever/infection during neutropenic period Progressing        Knowledge Deficit     Patient/family/caregiver demonstrates understanding of disease process, treatment plan, medications, and discharge instructions Progressing        PAIN - ADULT     Verbalizes/displays adequate comfort level or baseline comfort level Progressing        Potential for Falls     Patient will remain free of falls Progressing        Prexisting or High Potential for Compromised Skin Integrity     Skin integrity is maintained or improved Progressing        SAFETY ADULT     Maintain or return to baseline ADL function Progressing     Maintain or return mobility status to optimal level Progressing does not have one

## 2024-04-15 NOTE — PROGRESS NOTES
"Great, can we enter a normal BP in her chart?    Thanks  Danuta \"Eduard\" PHILL Tan    " Progress Note - Nathaly Webster 80 y o  female MRN: [id number]    Unit/Bed#: [facility] 728-01 Encounter: [id number]    * Altered mental status   Assessment & Plan    · Cause unclear  · Prior to admission, there was note of facial droop, no longer present on exam  · F/U MRI to r/o stroke  · No clear metabolic cause at this time, although she was recently treated for UTI  F/U urine culture  · Continue to monitor for infection  · Continue IVFs  Patient appears dehydrated  · Consider progression of dementia  Discussed this with patient's son  He feels she may need upgrade from assisted living to skilled nursing upon discharge  Epigastric pain   Assessment & Plan    · No abnormality on CT scan  · Increase Protonix to 40mg  Add Mylanta  · Cardiac cause not suspected  Troponins normal  EKG unremarkable  Falls   Assessment & Plan    · Multiple recurrent falls with rib fractures, sternum fracture  · Scheduled Tylenol, Lidoderm        UTI (urinary tract infection)   Assessment & Plan    · Recently complete course of Macrobid  Dementia   Assessment & Plan    · Son noted overall decline over last year  · Feels she needs upgrade from assisted living to skilled nursing upon discharge          VTE Pharmacologic Prophylaxis:   Pharmacologic: Enoxaparin (Lovenox)  Mechanical VTE Prophylaxis in Place: Yes    Patient Centered Rounds: I have performed bedside rounds with nursing staff today  Discussions with Specialists or Other Care Team Provider: Case mgmt    Education and Discussions with Family / Patient: Spoke with patient's sonReji  Time Spent for Care: 45 minutes  More than 50% of total time spent on counseling and coordination of care as described above  Current Length of Stay: 1 day(s)    Current Patient Status: Inpatient   Certification Statement: The patient will continue to require additional inpatient hospital stay due to workup of altered mental status      Discharge Plan: Pending findings  Likely needs skilled nursing placement upon discharge  Code Status: Level 1 - Full Code      Subjective:   Patient awake and alert to person  She reports epigastric pain and headache  Does not want to be moved as she is pain  Objective:     Vitals:   Temp (24hrs), Av 9 °F (36 6 °C), Min:97 6 °F (36 4 °C), Max:98 2 °F (36 8 °C)    HR:  [59-65] 65  Resp:  [18-20] 18  BP: ()/() 105/60  SpO2:  [96 %-98 %] 96 %  Body mass index is 32 8 kg/m²  Input and Output Summary (last 24 hours): Intake/Output Summary (Last 24 hours) at 17 1459  Last data filed at 17 1300   Gross per 24 hour   Intake              120 ml   Output              746 ml   Net             -626 ml       Physical Exam:     Physical Exam   Constitutional:   Appears frail, chronically ill   HENT:   Dry mucous membranes   Eyes: No scleral icterus  Neck: Normal range of motion  Neck supple  Cardiovascular: Normal rate, regular rhythm and normal heart sounds  Pulmonary/Chest: Effort normal and breath sounds normal  No respiratory distress  She has no wheezes  She has no rales  She exhibits tenderness  Abdominal: Soft  Bowel sounds are normal  She exhibits no distension  There is no tenderness  There is no rebound and no guarding  Musculoskeletal: She exhibits no edema  Neurological: She is alert  Oriented x 1   Skin: Skin is warm and dry  Psychiatric: She has a normal mood and affect   Her behavior is normal        Additional Data:     Labs:      Results from last 7 days  Lab Units 17  0633 17  1821   WBC Thousand/uL  --  5 66   HEMOGLOBIN g/dL  --  12 1   HEMATOCRIT %  --  36 3   PLATELETS Thousands/uL 230 311   NEUTROS PCT %  --  51   LYMPHS PCT %  --  35   MONOS PCT %  --  9   EOS PCT %  --  4       Results from last 7 days  Lab Units 17  0633 17  1821   SODIUM mmol/L 139 135*   POTASSIUM mmol/L 4 1 5 9*   CHLORIDE mmol/L 107 103   CO2 mmol/L 28 27   BUN mg/dL 17 23 CREATININE mg/dL 1 10 1 19   CALCIUM mg/dL 8 7 9 1   TOTAL PROTEIN g/dL  --  8 0   BILIRUBIN TOTAL mg/dL  --  0 34   ALK PHOS U/L  --  70   ALT U/L  --  20   AST U/L  --  59*   GLUCOSE RANDOM mg/dL 139 189*           * I Have Reviewed All Lab Data Listed Above  * Additional Pertinent Lab Tests Reviewed: All Labs Within Last 24 Hours Reviewed    Imaging:    Imaging Reports Reviewed Today Include: CT abdomen/pelvis, head  Imaging Personally Reviewed by Myself Includes:  None    Recent Cultures (last 7 days):       Results from last 7 days  Lab Units 11/15/17  2246   URINE CULTURE  >100,000 cfu/ml Escherichia coli*  >100,000 cfu/ml Aerococcus urinae*       Last 24 Hours Medication List:     acetaminophen 650 mg Oral Once   acetaminophen 975 mg Oral Q8H [address]   amLODIPine 2 5 mg Oral Daily   aspirin 81 mg Oral Daily   cholecalciferol 1,000 Units Oral Daily   cyanocobalamin 1,000 mcg Oral Daily   enoxaparin 30 mg Subcutaneous Daily   escitalopram 20 mg Oral Daily   insulin glargine 10 Units Subcutaneous HS   insulin lispro 1-5 Units Subcutaneous TID AC   insulin lispro 1-5 Units Subcutaneous HS   levothyroxine 75 mcg Oral Early Morning   lidocaine 1 patch Transdermal Daily   nystatin  Topical TID   pantoprazole 40 mg Oral Early Morning   pravastatin 40 mg Oral Daily With Dinner   sitaGLIPtin 25 mg Oral Daily        Today, Patient Was Seen By: Jose F Arzate PA-C    ** Please Note: Dragon 360 Dictation voice to text software may have been used in the creation of this document   **

## (undated) DEVICE — DRAPE C-ARMOUR

## (undated) DEVICE — ACE WRAP 6 IN UNSTERILE

## (undated) DEVICE — 2.0MM DRILL BIT/QC/125MM

## (undated) DEVICE — PADDING CAST 4 IN  COTTON STRL

## (undated) DEVICE — RETRIEVAL BALLOON CATHETER: Brand: EXTRACTOR™ PRO RX

## (undated) DEVICE — DRAPE C-ARM X-RAY

## (undated) DEVICE — SUT ETHILON 3-0 PS-1 18 IN 1663G

## (undated) DEVICE — INTENDED FOR TISSUE SEPARATION, AND OTHER PROCEDURES THAT REQUIRE A SHARP SURGICAL BLADE TO PUNCTURE OR CUT.: Brand: BARD-PARKER SAFETY BLADES SIZE 10, STERILE

## (undated) DEVICE — DRAPE EQUIPMENT RF WAND

## (undated) DEVICE — DRESSING MEPILEX AG BORDER 4 X 10 IN

## (undated) DEVICE — IMPERVIOUS STOCKINETTE: Brand: DEROYAL

## (undated) DEVICE — SCD SEQUENTIAL COMPRESSION COMFORT SLEEVE MEDIUM KNEE LENGTH: Brand: KENDALL SCD

## (undated) DEVICE — DRESSING MEPILEX AG BORDER 4 X 8 IN

## (undated) DEVICE — GLOVE SRG BIOGEL 8

## (undated) DEVICE — ARM SLING: Brand: DEROYAL

## (undated) DEVICE — PACK MAJOR ORTHO W/SPLITS PBDS

## (undated) DEVICE — GLOVE INDICATOR PI UNDERGLOVE SZ 8 BLUE

## (undated) DEVICE — LIGHT HANDLE COVER SLEEVE DISP BLUE STELLAR

## (undated) DEVICE — HIGH PERFORMANCE GUIDEWIRE: Brand: DREAMWIRE

## (undated) DEVICE — 2.7MM THREE-FLUTED DRILL BIT QC/125MM

## (undated) DEVICE — PENCIL ELECTROSURG E-Z CLEAN -0035H

## (undated) DEVICE — INTENDED FOR TISSUE SEPARATION, AND OTHER PROCEDURES THAT REQUIRE A SHARP SURGICAL BLADE TO PUNCTURE OR CUT.: Brand: BARD-PARKER SAFETY BLADES SIZE 15, STERILE

## (undated) DEVICE — DELIVERY SYSTEM NAVFLX 10FR X 202.5CM

## (undated) DEVICE — ELECTRODE BLADE MOD E-Z CLEAN 2.5IN 6.4CM -0012M

## (undated) DEVICE — CHLORAPREP HI-LITE 26ML ORANGE

## (undated) DEVICE — SPHINCTEROTOME: Brand: DREAMTOME™ RX 44